# Patient Record
Sex: FEMALE | Race: WHITE | Employment: OTHER | URBAN - METROPOLITAN AREA
[De-identification: names, ages, dates, MRNs, and addresses within clinical notes are randomized per-mention and may not be internally consistent; named-entity substitution may affect disease eponyms.]

---

## 2017-06-02 PROBLEM — E78.2 MIXED HYPERLIPIDEMIA: Status: ACTIVE | Noted: 2017-06-02

## 2017-06-28 PROCEDURE — 89055 LEUKOCYTE ASSESSMENT FECAL: CPT | Performed by: INTERNAL MEDICINE

## 2017-06-28 PROCEDURE — 87177 OVA AND PARASITES SMEARS: CPT | Performed by: INTERNAL MEDICINE

## 2017-06-28 PROCEDURE — 87046 STOOL CULTR AEROBIC BACT EA: CPT | Performed by: INTERNAL MEDICINE

## 2017-06-28 PROCEDURE — 87045 FECES CULTURE AEROBIC BACT: CPT | Performed by: INTERNAL MEDICINE

## 2017-06-28 PROCEDURE — 87209 SMEAR COMPLEX STAIN: CPT | Performed by: INTERNAL MEDICINE

## 2017-06-28 PROCEDURE — 87493 C DIFF AMPLIFIED PROBE: CPT | Performed by: INTERNAL MEDICINE

## 2017-06-28 PROCEDURE — 87269 GIARDIA AG IF: CPT | Performed by: INTERNAL MEDICINE

## 2017-07-20 PROCEDURE — 36415 COLL VENOUS BLD VENIPUNCTURE: CPT | Performed by: INTERNAL MEDICINE

## 2017-07-20 PROCEDURE — 83516 IMMUNOASSAY NONANTIBODY: CPT | Performed by: INTERNAL MEDICINE

## 2017-08-23 PROBLEM — A41.50 GRAM NEGATIVE SEPSIS (HCC): Status: ACTIVE | Noted: 2017-08-23

## 2017-09-02 ENCOUNTER — TELEPHONE (OUTPATIENT)
Dept: RESPIRATORY THERAPY | Facility: HOSPITAL | Age: 62
End: 2017-09-02

## 2017-09-02 NOTE — TELEPHONE ENCOUNTER
Pt. Here  At [facility] today for IV Invanz infusion. Medication and side effects, lab results and treatment plan including tentative stop date 9/10/17 reviewed.  Pt. Is tolerating medication well with no issues, reports she is still having diarrh

## 2017-09-07 PROBLEM — K52.831 COLLAGENOUS COLITIS: Status: ACTIVE | Noted: 2017-09-07

## 2017-12-07 PROBLEM — A41.50 GRAM NEGATIVE SEPSIS (HCC): Status: RESOLVED | Noted: 2017-08-23 | Resolved: 2017-12-07

## 2018-05-14 PROBLEM — F11.90 OPIATE USE: Status: ACTIVE | Noted: 2018-05-14

## 2018-05-15 ENCOUNTER — LAB ENCOUNTER (OUTPATIENT)
Dept: LAB | Age: 63
End: 2018-05-15
Attending: Other
Payer: COMMERCIAL

## 2018-05-15 DIAGNOSIS — D46.4 RA (REFRACTORY ANEMIA) (HCC): Primary | ICD-10-CM

## 2018-05-15 PROCEDURE — 84443 ASSAY THYROID STIM HORMONE: CPT

## 2018-05-15 PROCEDURE — 83883 ASSAY NEPHELOMETRY NOT SPEC: CPT

## 2018-05-15 PROCEDURE — 86334 IMMUNOFIX E-PHORESIS SERUM: CPT

## 2018-05-15 PROCEDURE — 85652 RBC SED RATE AUTOMATED: CPT

## 2018-05-15 PROCEDURE — 82746 ASSAY OF FOLIC ACID SERUM: CPT

## 2018-05-15 PROCEDURE — 86038 ANTINUCLEAR ANTIBODIES: CPT

## 2018-05-15 PROCEDURE — 82306 VITAMIN D 25 HYDROXY: CPT

## 2018-05-15 PROCEDURE — 86431 RHEUMATOID FACTOR QUANT: CPT

## 2018-05-15 PROCEDURE — 86225 DNA ANTIBODY NATIVE: CPT

## 2018-05-15 PROCEDURE — 82607 VITAMIN B-12: CPT

## 2018-05-15 PROCEDURE — 84165 PROTEIN E-PHORESIS SERUM: CPT

## 2018-05-15 PROCEDURE — 86235 NUCLEAR ANTIGEN ANTIBODY: CPT

## 2018-05-15 PROCEDURE — 86140 C-REACTIVE PROTEIN: CPT

## 2018-05-15 PROCEDURE — 83036 HEMOGLOBIN GLYCOSYLATED A1C: CPT

## 2018-07-27 PROBLEM — M48.02 SPINAL STENOSIS IN CERVICAL REGION: Status: ACTIVE | Noted: 2018-07-27

## 2018-07-27 PROBLEM — M54.12 CERVICAL RADICULITIS: Status: ACTIVE | Noted: 2018-07-27

## 2018-07-27 PROBLEM — M47.12 CERVICAL SPONDYLITIC CORD COMPRESSION: Status: ACTIVE | Noted: 2018-07-27

## 2018-07-27 PROBLEM — M50.30 DDD (DEGENERATIVE DISC DISEASE), CERVICAL: Status: ACTIVE | Noted: 2018-07-27

## 2018-07-27 PROBLEM — M47.812 OSTEOARTHRITIS OF CERVICAL SPINE, UNSPECIFIED SPINAL OSTEOARTHRITIS COMPLICATION STATUS: Status: ACTIVE | Noted: 2018-07-27

## 2018-08-07 ENCOUNTER — HOSPITAL ENCOUNTER (OUTPATIENT)
Dept: GENERAL RADIOLOGY | Facility: HOSPITAL | Age: 63
Discharge: HOME | End: 2018-08-07
Attending: Other
Payer: COMMERCIAL

## 2018-08-07 DIAGNOSIS — R13.10 DYSPHAGIA: ICD-10-CM

## 2018-08-07 PROCEDURE — 74230 X-RAY XM SWLNG FUNCJ C+: CPT | Performed by: OTHER

## 2018-08-07 PROCEDURE — 92611 MOTION FLUOROSCOPY/SWALLOW: CPT

## 2018-08-07 NOTE — PROGRESS NOTES
ADULT VIDEOFLUOROSCOPIC SWALLOWING STUDY    Admission Date: 8/7/2018  Evaluation Date: 08/07/18  Radiologist: Dr. Melyssa Latif    Reason for Referral:  Pt is an 59 y/o female with c/o sensation of \"food sticking\" indicating sternal region.  She also reported sy Living Situation:  (Home)  History of Recent: No recent respiratory difficulty         Reason for Referral: R/O aspiration      Family/Patient Goals:   To determine etiology of symptoms     ASSESSMENT   DYSPHAGIA ASSESSMENT  Test completed in conjunction wi observed. Anterior osteophytes at C5-6 and C6-7 level noted by radiologist (please see report) however minimal retrograde flow with thin liquids only and contained within the esophagus.  Adequate base of tongue retraction/hyolaryngeal elevation observed wit

## 2018-10-15 ENCOUNTER — LAB ENCOUNTER (OUTPATIENT)
Dept: LAB | Age: 63
End: 2018-10-15
Attending: INTERNAL MEDICINE
Payer: COMMERCIAL

## 2018-10-15 DIAGNOSIS — I27.82 CHRONIC PULMONARY EMBOLISM (HCC): Primary | ICD-10-CM

## 2018-10-15 PROCEDURE — 85610 PROTHROMBIN TIME: CPT

## 2018-10-30 ENCOUNTER — LAB ENCOUNTER (OUTPATIENT)
Dept: LAB | Age: 63
End: 2018-10-30
Attending: INTERNAL MEDICINE
Payer: COMMERCIAL

## 2018-10-30 DIAGNOSIS — I27.82 CHRONIC PULMONARY THROMBOEMBOLISM SYNDROME (HCC): Primary | ICD-10-CM

## 2018-10-30 PROCEDURE — 85610 PROTHROMBIN TIME: CPT

## 2018-11-05 ENCOUNTER — HOSPITAL (OUTPATIENT)
Dept: OTHER | Age: 63
End: 2018-11-05
Attending: NEUROLOGICAL SURGERY

## 2018-12-10 PROBLEM — M54.12 CERVICAL RADICULITIS: Status: RESOLVED | Noted: 2018-07-27 | Resolved: 2018-12-10

## 2018-12-10 PROBLEM — M47.12 CERVICAL SPONDYLITIC CORD COMPRESSION: Status: RESOLVED | Noted: 2018-07-27 | Resolved: 2018-12-10

## 2020-02-11 ENCOUNTER — HOSPITAL ENCOUNTER (OUTPATIENT)
Dept: WOUND CARE | Age: 65
Discharge: STILL ADMITTED OR ADMINISTRATIVE | End: 2020-02-11
Attending: INTERNAL MEDICINE

## 2020-02-11 VITALS
SYSTOLIC BLOOD PRESSURE: 181 MMHG | OXYGEN SATURATION: 99 % | RESPIRATION RATE: 15 BRPM | TEMPERATURE: 96.6 F | HEART RATE: 77 BPM | DIASTOLIC BLOOD PRESSURE: 88 MMHG

## 2020-02-11 DIAGNOSIS — L97.912 ULCER OF RIGHT LOWER EXTREMITY WITH FAT LAYER EXPOSED (CMD): Primary | ICD-10-CM

## 2020-02-11 DIAGNOSIS — M06.9 RHEUMATOID ARTHRITIS INVOLVING MULTIPLE SITES, UNSPECIFIED RHEUMATOID FACTOR PRESENCE: ICD-10-CM

## 2020-02-11 DIAGNOSIS — I26.99 PULMONARY EMBOLISM, UNSPECIFIED CHRONICITY, UNSPECIFIED PULMONARY EMBOLISM TYPE, UNSPECIFIED WHETHER ACUTE COR PULMONALE PRESENT (CMD): ICD-10-CM

## 2020-02-11 DIAGNOSIS — L03.119 CELLULITIS AND ABSCESS OF LEG: ICD-10-CM

## 2020-02-11 DIAGNOSIS — D68.51 FACTOR V LEIDEN (CMD): ICD-10-CM

## 2020-02-11 DIAGNOSIS — L02.419 CELLULITIS AND ABSCESS OF LEG: ICD-10-CM

## 2020-02-11 DIAGNOSIS — L97.922 ULCER OF LEFT LOWER EXTREMITY, WITH FAT LAYER EXPOSED (CMD): ICD-10-CM

## 2020-02-11 PROBLEM — M50.30 DDD (DEGENERATIVE DISC DISEASE), CERVICAL: Status: ACTIVE | Noted: 2018-07-27

## 2020-02-11 PROBLEM — J44.9 COPD (CHRONIC OBSTRUCTIVE PULMONARY DISEASE) (CMD): Status: ACTIVE | Noted: 2020-02-11

## 2020-02-11 PROBLEM — R60.0 LEG EDEMA, LEFT: Status: ACTIVE | Noted: 2017-07-24

## 2020-02-11 PROBLEM — E78.2 MIXED HYPERLIPIDEMIA: Status: ACTIVE | Noted: 2017-06-02

## 2020-02-11 PROBLEM — D64.9 ANEMIA: Status: ACTIVE | Noted: 2017-07-24

## 2020-02-11 PROBLEM — M48.02 SPINAL STENOSIS IN CERVICAL REGION: Status: ACTIVE | Noted: 2018-07-27

## 2020-02-11 PROCEDURE — 99213 OFFICE O/P EST LOW 20 MIN: CPT

## 2020-02-11 RX ORDER — WARFARIN SODIUM 4 MG/1
4 TABLET ORAL
Status: ON HOLD | COMMUNITY
Start: 2015-03-17 | End: 2022-05-17

## 2020-02-11 RX ORDER — GABAPENTIN 400 MG/1
600 CAPSULE ORAL 2 TIMES DAILY
COMMUNITY
Start: 2019-12-06 | End: 2020-08-18 | Stop reason: DRUGHIGH

## 2020-02-11 RX ORDER — NYSTATIN 100000 [USP'U]/G
POWDER TOPICAL
Status: ON HOLD | COMMUNITY
Start: 2019-07-08 | End: 2022-05-17

## 2020-02-11 RX ORDER — LEFLUNOMIDE 20 MG/1
20 TABLET ORAL DAILY
COMMUNITY
End: 2022-08-26

## 2020-02-11 RX ORDER — ERYTHROMYCIN 5 MG/G
OINTMENT OPHTHALMIC
COMMUNITY
Start: 2019-02-12 | End: 2022-04-26

## 2020-02-11 RX ORDER — MORPHINE SULFATE 15 MG/1
15 TABLET ORAL 2 TIMES DAILY PRN
Status: ON HOLD | COMMUNITY
Start: 2019-12-02 | End: 2022-08-26 | Stop reason: SDUPTHER

## 2020-02-11 RX ORDER — SPIRONOLACTONE 25 MG/1
25 TABLET ORAL DAILY
COMMUNITY
Start: 2014-08-03

## 2020-02-11 RX ORDER — ALBUTEROL SULFATE 90 UG/1
2 AEROSOL, METERED RESPIRATORY (INHALATION) EVERY 4 HOURS PRN
COMMUNITY
Start: 2019-02-07

## 2020-02-11 RX ORDER — POTASSIUM CHLORIDE 1500 MG/1
20 TABLET, EXTENDED RELEASE ORAL 2 TIMES DAILY
COMMUNITY

## 2020-02-11 RX ORDER — MONTELUKAST SODIUM 10 MG/1
10 TABLET ORAL NIGHTLY
COMMUNITY
Start: 2015-01-20

## 2020-02-11 RX ORDER — CYCLOSPORINE 0.5 MG/ML
1 EMULSION OPHTHALMIC 2 TIMES DAILY
Refills: 9 | COMMUNITY
Start: 2019-12-06

## 2020-02-11 RX ORDER — BUDESONIDE 180 UG/1
1 AEROSOL, POWDER RESPIRATORY (INHALATION) PRN
COMMUNITY
Start: 2020-02-10

## 2020-02-11 RX ORDER — ZOLPIDEM TARTRATE 10 MG/1
10 TABLET ORAL
Status: ON HOLD | COMMUNITY
Start: 2017-02-02 | End: 2022-05-17

## 2020-02-11 RX ORDER — METHYLPREDNISOLONE 4 MG/1
4 TABLET ORAL 2 TIMES DAILY
COMMUNITY

## 2020-02-11 RX ORDER — TOPIRAMATE 50 MG/1
50 TABLET, FILM COATED ORAL 2 TIMES DAILY
COMMUNITY
Start: 2019-12-02 | End: 2022-08-31

## 2020-02-11 RX ORDER — PREDNISONE 5 MG/1
4 TABLET ORAL
Status: ON HOLD | COMMUNITY
Start: 2015-04-09 | End: 2022-05-17

## 2020-02-11 RX ORDER — HYDROXYCHLOROQUINE SULFATE 200 MG/1
200 TABLET, FILM COATED ORAL DAILY
COMMUNITY
Start: 2015-03-17 | End: 2022-08-26

## 2020-02-11 RX ORDER — CYCLOSPORINE 0.5 MG/ML
1 EMULSION OPHTHALMIC
Status: ON HOLD | COMMUNITY
End: 2022-05-17

## 2020-02-11 SDOH — HEALTH STABILITY: MENTAL HEALTH: HOW OFTEN DO YOU HAVE A DRINK CONTAINING ALCOHOL?: NEVER

## 2020-02-11 ASSESSMENT — ENCOUNTER SYMPTOMS
ABDOMINAL PAIN: 0
SINUS PAIN: 0
CHEST TIGHTNESS: 0
CHILLS: 0
COUGH: 0
EYE PAIN: 0
FATIGUE: 1
WOUND: 1
HEADACHES: 0
FEVER: 0
NAUSEA: 0
BACK PAIN: 1
EYE ITCHING: 0
AGITATION: 0
SORE THROAT: 0

## 2020-02-11 ASSESSMENT — PAIN SCALES - GENERAL: PAINLEVEL_OUTOF10: 7

## 2020-02-14 SDOH — HEALTH STABILITY: MENTAL HEALTH: HOW OFTEN DO YOU HAVE A DRINK CONTAINING ALCOHOL?: NEVER

## 2020-02-20 ENCOUNTER — HOSPITAL ENCOUNTER (OUTPATIENT)
Dept: WOUND CARE | Age: 65
Discharge: STILL ADMITTED OR ADMINISTRATIVE | End: 2020-02-20
Attending: FAMILY MEDICINE

## 2020-02-20 VITALS — TEMPERATURE: 98.8 F

## 2020-02-20 DIAGNOSIS — L97.922 ULCER OF LEFT LOWER EXTREMITY WITH FAT LAYER EXPOSED (CMD): Primary | ICD-10-CM

## 2020-02-20 DIAGNOSIS — L03.119 CELLULITIS AND ABSCESS OF LEG: ICD-10-CM

## 2020-02-20 DIAGNOSIS — I73.9 PAD (PERIPHERAL ARTERY DISEASE) (CMD): ICD-10-CM

## 2020-02-20 DIAGNOSIS — M06.9 RHEUMATOID ARTHRITIS INVOLVING MULTIPLE SITES, UNSPECIFIED RHEUMATOID FACTOR PRESENCE: ICD-10-CM

## 2020-02-20 DIAGNOSIS — L02.419 CELLULITIS AND ABSCESS OF LEG: ICD-10-CM

## 2020-02-20 PROCEDURE — 11042 DBRDMT SUBQ TIS 1ST 20SQCM/<: CPT

## 2020-02-20 RX ORDER — DOXYCYCLINE HYCLATE 100 MG/1
CAPSULE ORAL
Refills: 0 | COMMUNITY
Start: 2020-02-11 | End: 2020-09-10 | Stop reason: ALTCHOICE

## 2020-02-20 RX ORDER — DOXYCYCLINE HYCLATE 100 MG/1
100 CAPSULE ORAL 2 TIMES DAILY
Qty: 20 CAPSULE | Refills: 0 | Status: SHIPPED | OUTPATIENT
Start: 2020-02-20 | End: 2022-04-26

## 2020-02-27 ENCOUNTER — HOSPITAL ENCOUNTER (OUTPATIENT)
Dept: WOUND CARE | Age: 65
Discharge: STILL ADMITTED OR ADMINISTRATIVE | End: 2020-02-27
Attending: FAMILY MEDICINE

## 2020-02-27 VITALS — TEMPERATURE: 98.2 F

## 2020-02-27 DIAGNOSIS — L02.419 CELLULITIS AND ABSCESS OF LEG: ICD-10-CM

## 2020-02-27 DIAGNOSIS — R60.0 LEG EDEMA, LEFT: ICD-10-CM

## 2020-02-27 DIAGNOSIS — M06.9 RHEUMATOID ARTHRITIS INVOLVING MULTIPLE SITES, UNSPECIFIED RHEUMATOID FACTOR PRESENCE: ICD-10-CM

## 2020-02-27 DIAGNOSIS — L97.922 ULCER OF LEFT LOWER EXTREMITY WITH FAT LAYER EXPOSED (CMD): Primary | ICD-10-CM

## 2020-02-27 DIAGNOSIS — L03.119 CELLULITIS AND ABSCESS OF LEG: ICD-10-CM

## 2020-02-27 PROCEDURE — 11042 DBRDMT SUBQ TIS 1ST 20SQCM/<: CPT

## 2020-02-28 ENCOUNTER — HOSPITAL ENCOUNTER (OUTPATIENT)
Dept: ULTRASOUND IMAGING | Age: 65
Discharge: HOME | End: 2020-02-28
Attending: FAMILY MEDICINE

## 2020-02-28 DIAGNOSIS — I73.9 PAD (PERIPHERAL ARTERY DISEASE) (CMD): ICD-10-CM

## 2020-02-28 DIAGNOSIS — L02.419 CELLULITIS AND ABSCESS OF LEG: ICD-10-CM

## 2020-02-28 DIAGNOSIS — L03.119 CELLULITIS AND ABSCESS OF LEG: ICD-10-CM

## 2020-02-28 DIAGNOSIS — M06.9 RHEUMATOID ARTHRITIS INVOLVING MULTIPLE SITES, UNSPECIFIED RHEUMATOID FACTOR PRESENCE: ICD-10-CM

## 2020-02-28 PROCEDURE — 93925 LOWER EXTREMITY STUDY: CPT

## 2020-03-10 ENCOUNTER — APPOINTMENT (OUTPATIENT)
Dept: WOUND CARE | Age: 65
End: 2020-03-10
Attending: FAMILY MEDICINE

## 2020-03-12 ENCOUNTER — HOSPITAL ENCOUNTER (OUTPATIENT)
Dept: WOUND CARE | Age: 65
Discharge: STILL ADMITTED OR ADMINISTRATIVE | End: 2020-03-12
Attending: FAMILY MEDICINE

## 2020-03-12 VITALS — TEMPERATURE: 96.8 F

## 2020-03-12 DIAGNOSIS — I87.332 CHRONIC VENOUS HYPERTENSION (IDIOPATHIC) WITH ULCER AND INFLAMMATION OF LEFT LOWER EXTREMITY (CMD): ICD-10-CM

## 2020-03-12 DIAGNOSIS — L02.419 CELLULITIS AND ABSCESS OF LEG: ICD-10-CM

## 2020-03-12 DIAGNOSIS — L97.922 ULCER OF LEFT LOWER EXTREMITY WITH FAT LAYER EXPOSED (CMD): Primary | ICD-10-CM

## 2020-03-12 DIAGNOSIS — L03.119 CELLULITIS AND ABSCESS OF LEG: ICD-10-CM

## 2020-03-12 PROCEDURE — 11042 DBRDMT SUBQ TIS 1ST 20SQCM/<: CPT

## 2020-03-12 SDOH — HEALTH STABILITY: MENTAL HEALTH: HOW OFTEN DO YOU HAVE A DRINK CONTAINING ALCOHOL?: NEVER

## 2020-03-12 ASSESSMENT — PAIN SCALES - GENERAL: PAINLEVEL_OUTOF10: 0

## 2020-03-17 ENCOUNTER — APPOINTMENT (OUTPATIENT)
Dept: WOUND CARE | Age: 65
End: 2020-03-17
Attending: FAMILY MEDICINE

## 2020-03-17 ENCOUNTER — TELEPHONE (OUTPATIENT)
Dept: WOUND CARE | Age: 65
End: 2020-03-17

## 2020-03-19 ENCOUNTER — APPOINTMENT (OUTPATIENT)
Dept: WOUND CARE | Age: 65
End: 2020-03-19
Attending: FAMILY MEDICINE

## 2020-03-30 ENCOUNTER — TELEPHONE (OUTPATIENT)
Dept: WOUND CARE | Age: 65
End: 2020-03-30

## 2020-04-09 ENCOUNTER — APPOINTMENT (OUTPATIENT)
Dept: WOUND CARE | Age: 65
End: 2020-04-09
Attending: FAMILY MEDICINE

## 2020-04-30 ENCOUNTER — TELEPHONE (OUTPATIENT)
Dept: WOUND CARE | Age: 65
End: 2020-04-30

## 2020-05-07 ENCOUNTER — APPOINTMENT (OUTPATIENT)
Dept: WOUND CARE | Age: 65
End: 2020-05-07
Attending: FAMILY MEDICINE

## 2020-06-04 ENCOUNTER — APPOINTMENT (OUTPATIENT)
Dept: WOUND CARE | Age: 65
End: 2020-06-04
Attending: FAMILY MEDICINE

## 2020-06-09 ENCOUNTER — HOSPITAL ENCOUNTER (OUTPATIENT)
Dept: WOUND CARE | Age: 65
Discharge: STILL ADMITTED OR ADMINISTRATIVE | End: 2020-06-09
Attending: FAMILY MEDICINE

## 2020-06-09 VITALS
DIASTOLIC BLOOD PRESSURE: 84 MMHG | SYSTOLIC BLOOD PRESSURE: 183 MMHG | HEART RATE: 89 BPM | OXYGEN SATURATION: 98 % | TEMPERATURE: 96.6 F

## 2020-06-09 DIAGNOSIS — L97.912 ULCER OF RIGHT LOWER EXTREMITY WITH FAT LAYER EXPOSED (CMD): ICD-10-CM

## 2020-06-09 DIAGNOSIS — L02.419 CELLULITIS AND ABSCESS OF LEG: ICD-10-CM

## 2020-06-09 DIAGNOSIS — I87.333 VENOUS HYPERTENSION, CHRONIC, WITH ULCER AND INFLAMMATION, BILATERAL (CMD): ICD-10-CM

## 2020-06-09 DIAGNOSIS — L97.922 ULCER OF LEFT LOWER EXTREMITY WITH FAT LAYER EXPOSED (CMD): Primary | ICD-10-CM

## 2020-06-09 DIAGNOSIS — L03.119 CELLULITIS AND ABSCESS OF LEG: ICD-10-CM

## 2020-06-09 PROCEDURE — 11042 DBRDMT SUBQ TIS 1ST 20SQCM/<: CPT

## 2020-06-09 ASSESSMENT — PAIN SCALES - GENERAL: PAINLEVEL_OUTOF10: 8

## 2020-06-16 ENCOUNTER — HOSPITAL ENCOUNTER (OUTPATIENT)
Dept: WOUND CARE | Age: 65
Discharge: STILL ADMITTED OR ADMINISTRATIVE | End: 2020-06-16
Attending: FAMILY MEDICINE

## 2020-06-16 VITALS — TEMPERATURE: 95.4 F

## 2020-06-16 DIAGNOSIS — L97.922 ULCER OF LEFT LOWER EXTREMITY WITH FAT LAYER EXPOSED (CMD): Primary | ICD-10-CM

## 2020-06-16 DIAGNOSIS — L02.419 CELLULITIS AND ABSCESS OF LEG: ICD-10-CM

## 2020-06-16 DIAGNOSIS — I87.333 VENOUS HYPERTENSION, CHRONIC, WITH ULCER AND INFLAMMATION, BILATERAL (CMD): ICD-10-CM

## 2020-06-16 DIAGNOSIS — L03.119 CELLULITIS AND ABSCESS OF LEG: ICD-10-CM

## 2020-06-16 DIAGNOSIS — L97.912 ULCER OF RIGHT LOWER EXTREMITY WITH FAT LAYER EXPOSED (CMD): ICD-10-CM

## 2020-06-16 PROBLEM — Z86.711 HISTORY OF PULMONARY EMBOLISM: Status: ACTIVE | Noted: 2020-06-16

## 2020-06-16 PROBLEM — D68.51 FACTOR V LEIDEN (HCC): Status: ACTIVE | Noted: 2020-06-16

## 2020-06-16 PROBLEM — Z51.81 MONITORING FOR LONG-TERM ANTICOAGULANT USE: Status: ACTIVE | Noted: 2020-06-16

## 2020-06-16 PROBLEM — Z79.01 MONITORING FOR LONG-TERM ANTICOAGULANT USE: Status: ACTIVE | Noted: 2020-06-16

## 2020-06-16 PROCEDURE — 11042 DBRDMT SUBQ TIS 1ST 20SQCM/<: CPT

## 2020-06-16 PROCEDURE — 11045 DBRDMT SUBQ TISS EACH ADDL: CPT

## 2020-06-16 SDOH — HEALTH STABILITY: MENTAL HEALTH: HOW OFTEN DO YOU HAVE A DRINK CONTAINING ALCOHOL?: NEVER

## 2020-06-16 ASSESSMENT — PAIN SCALES - GENERAL: PAINLEVEL_OUTOF10: 6

## 2020-06-23 ENCOUNTER — HOSPITAL ENCOUNTER (OUTPATIENT)
Dept: WOUND CARE | Age: 65
Discharge: STILL ADMITTED OR ADMINISTRATIVE | End: 2020-06-23
Attending: FAMILY MEDICINE

## 2020-06-23 VITALS — TEMPERATURE: 96.8 F

## 2020-06-23 DIAGNOSIS — L03.119 CELLULITIS AND ABSCESS OF LEG: ICD-10-CM

## 2020-06-23 DIAGNOSIS — L02.419 CELLULITIS AND ABSCESS OF LEG: ICD-10-CM

## 2020-06-23 DIAGNOSIS — I87.333 VENOUS HYPERTENSION, CHRONIC, WITH ULCER AND INFLAMMATION, BILATERAL (CMD): Primary | ICD-10-CM

## 2020-06-23 DIAGNOSIS — I87.332 CHRONIC VENOUS HYPERTENSION (IDIOPATHIC) WITH ULCER AND INFLAMMATION OF LEFT LOWER EXTREMITY (CMD): ICD-10-CM

## 2020-06-23 DIAGNOSIS — L97.922 ULCER OF LEFT LOWER EXTREMITY WITH FAT LAYER EXPOSED (CMD): ICD-10-CM

## 2020-06-23 DIAGNOSIS — L97.912 ULCER OF RIGHT LOWER EXTREMITY WITH FAT LAYER EXPOSED (CMD): ICD-10-CM

## 2020-06-23 PROCEDURE — 11042 DBRDMT SUBQ TIS 1ST 20SQCM/<: CPT

## 2020-06-23 SDOH — HEALTH STABILITY: MENTAL HEALTH: HOW OFTEN DO YOU HAVE A DRINK CONTAINING ALCOHOL?: NEVER

## 2020-06-23 ASSESSMENT — PAIN SCALES - GENERAL: PAINLEVEL_OUTOF10: 9

## 2020-06-30 ENCOUNTER — HOSPITAL ENCOUNTER (OUTPATIENT)
Dept: WOUND CARE | Age: 65
Discharge: STILL ADMITTED OR ADMINISTRATIVE | End: 2020-06-30
Attending: FAMILY MEDICINE

## 2020-06-30 VITALS — TEMPERATURE: 97.7 F

## 2020-06-30 DIAGNOSIS — L97.922 ULCER OF LEFT LOWER EXTREMITY WITH FAT LAYER EXPOSED (CMD): ICD-10-CM

## 2020-06-30 DIAGNOSIS — I87.332 CHRONIC VENOUS HYPERTENSION (IDIOPATHIC) WITH ULCER AND INFLAMMATION OF LEFT LOWER EXTREMITY (CMD): ICD-10-CM

## 2020-06-30 DIAGNOSIS — L03.119 CELLULITIS AND ABSCESS OF LEG: ICD-10-CM

## 2020-06-30 DIAGNOSIS — L97.912 ULCER OF RIGHT LOWER EXTREMITY WITH FAT LAYER EXPOSED (CMD): ICD-10-CM

## 2020-06-30 DIAGNOSIS — L02.419 CELLULITIS AND ABSCESS OF LEG: ICD-10-CM

## 2020-06-30 DIAGNOSIS — I87.331 CHRONIC VENOUS HYPERTENSION (IDIOPATHIC) WITH ULCER AND INFLAMMATION OF RIGHT LOWER EXTREMITY (CMD): Primary | ICD-10-CM

## 2020-06-30 PROCEDURE — 11042 DBRDMT SUBQ TIS 1ST 20SQCM/<: CPT

## 2020-06-30 SDOH — HEALTH STABILITY: MENTAL HEALTH: HOW OFTEN DO YOU HAVE A DRINK CONTAINING ALCOHOL?: NEVER

## 2020-06-30 ASSESSMENT — PAIN SCALES - GENERAL: PAINLEVEL_OUTOF10: 7

## 2020-07-07 ENCOUNTER — HOSPITAL ENCOUNTER (OUTPATIENT)
Dept: WOUND CARE | Age: 65
Discharge: STILL ADMITTED OR ADMINISTRATIVE | End: 2020-07-07
Attending: FAMILY MEDICINE

## 2020-07-07 VITALS — TEMPERATURE: 97.1 F

## 2020-07-07 DIAGNOSIS — I87.332 CHRONIC VENOUS HYPERTENSION (IDIOPATHIC) WITH ULCER AND INFLAMMATION OF LEFT LOWER EXTREMITY (CMD): ICD-10-CM

## 2020-07-07 DIAGNOSIS — L02.419 CELLULITIS AND ABSCESS OF LEG: ICD-10-CM

## 2020-07-07 DIAGNOSIS — L97.922 ULCER OF LEFT LOWER EXTREMITY WITH FAT LAYER EXPOSED (CMD): ICD-10-CM

## 2020-07-07 DIAGNOSIS — I87.331 CHRONIC VENOUS HYPERTENSION (IDIOPATHIC) WITH ULCER AND INFLAMMATION OF RIGHT LOWER EXTREMITY (CMD): Primary | ICD-10-CM

## 2020-07-07 DIAGNOSIS — L97.912 ULCER OF RIGHT LOWER EXTREMITY WITH FAT LAYER EXPOSED (CMD): ICD-10-CM

## 2020-07-07 DIAGNOSIS — L03.119 CELLULITIS AND ABSCESS OF LEG: ICD-10-CM

## 2020-07-07 PROCEDURE — 11042 DBRDMT SUBQ TIS 1ST 20SQCM/<: CPT

## 2020-07-07 SDOH — HEALTH STABILITY: MENTAL HEALTH: HOW OFTEN DO YOU HAVE A DRINK CONTAINING ALCOHOL?: NEVER

## 2020-07-07 ASSESSMENT — PAIN SCALES - GENERAL: PAINLEVEL_OUTOF10: 0

## 2020-07-09 PROBLEM — M48.02 SPINAL STENOSIS IN CERVICAL REGION: Status: RESOLVED | Noted: 2018-07-27 | Resolved: 2020-07-09

## 2020-07-09 PROBLEM — K52.831 COLLAGENOUS COLITIS: Status: RESOLVED | Noted: 2017-09-07 | Resolved: 2020-07-09

## 2020-07-09 PROBLEM — M50.30 DDD (DEGENERATIVE DISC DISEASE), CERVICAL: Status: RESOLVED | Noted: 2018-07-27 | Resolved: 2020-07-09

## 2020-07-09 PROBLEM — Z86.711 HISTORY OF PULMONARY EMBOLISM: Status: RESOLVED | Noted: 2020-06-16 | Resolved: 2020-07-09

## 2020-07-14 ENCOUNTER — HOSPITAL ENCOUNTER (OUTPATIENT)
Dept: WOUND CARE | Age: 65
Discharge: STILL ADMITTED OR ADMINISTRATIVE | End: 2020-07-14
Attending: FAMILY MEDICINE

## 2020-07-14 VITALS — TEMPERATURE: 97.3 F

## 2020-07-14 DIAGNOSIS — L97.912 ULCER OF RIGHT LOWER EXTREMITY WITH FAT LAYER EXPOSED (CMD): ICD-10-CM

## 2020-07-14 DIAGNOSIS — L97.922 ULCER OF LEFT LOWER EXTREMITY WITH FAT LAYER EXPOSED (CMD): ICD-10-CM

## 2020-07-14 DIAGNOSIS — L02.419 CELLULITIS AND ABSCESS OF LEG: ICD-10-CM

## 2020-07-14 DIAGNOSIS — I87.331 CHRONIC VENOUS HYPERTENSION (IDIOPATHIC) WITH ULCER AND INFLAMMATION OF RIGHT LOWER EXTREMITY (CMD): Primary | ICD-10-CM

## 2020-07-14 DIAGNOSIS — I87.332 CHRONIC VENOUS HYPERTENSION (IDIOPATHIC) WITH ULCER AND INFLAMMATION OF LEFT LOWER EXTREMITY (CMD): ICD-10-CM

## 2020-07-14 DIAGNOSIS — L03.119 CELLULITIS AND ABSCESS OF LEG: ICD-10-CM

## 2020-07-14 PROCEDURE — 11042 DBRDMT SUBQ TIS 1ST 20SQCM/<: CPT

## 2020-07-14 RX ORDER — GABAPENTIN 600 MG/1
TABLET ORAL SEE ADMIN INSTRUCTIONS
COMMUNITY

## 2020-07-14 SDOH — HEALTH STABILITY: MENTAL HEALTH: HOW OFTEN DO YOU HAVE A DRINK CONTAINING ALCOHOL?: NEVER

## 2020-07-14 ASSESSMENT — PAIN SCALES - GENERAL: PAINLEVEL_OUTOF10: 0

## 2020-07-28 ENCOUNTER — HOSPITAL ENCOUNTER (OUTPATIENT)
Dept: WOUND CARE | Age: 65
Discharge: STILL ADMITTED OR ADMINISTRATIVE | End: 2020-07-28
Attending: FAMILY MEDICINE

## 2020-07-28 VITALS — TEMPERATURE: 97 F

## 2020-07-28 DIAGNOSIS — L02.419 CELLULITIS AND ABSCESS OF LEG: ICD-10-CM

## 2020-07-28 DIAGNOSIS — L97.912 ULCER OF RIGHT LOWER EXTREMITY WITH FAT LAYER EXPOSED (CMD): Primary | ICD-10-CM

## 2020-07-28 DIAGNOSIS — L03.119 CELLULITIS AND ABSCESS OF LEG: ICD-10-CM

## 2020-07-28 DIAGNOSIS — L97.922 ULCER OF LEFT LOWER EXTREMITY WITH FAT LAYER EXPOSED (CMD): ICD-10-CM

## 2020-07-28 PROCEDURE — 11042 DBRDMT SUBQ TIS 1ST 20SQCM/<: CPT

## 2020-07-28 SDOH — HEALTH STABILITY: MENTAL HEALTH: HOW OFTEN DO YOU HAVE A DRINK CONTAINING ALCOHOL?: NEVER

## 2020-07-28 ASSESSMENT — PAIN SCALES - GENERAL: PAINLEVEL_OUTOF10: 9

## 2020-08-06 ENCOUNTER — HOSPITAL ENCOUNTER (OUTPATIENT)
Dept: WOUND CARE | Age: 65
Discharge: STILL ADMITTED OR ADMINISTRATIVE | End: 2020-08-06
Attending: FAMILY MEDICINE

## 2020-08-06 VITALS — TEMPERATURE: 96.4 F

## 2020-08-06 DIAGNOSIS — L97.912 ULCER OF RIGHT LOWER EXTREMITY WITH FAT LAYER EXPOSED (CMD): ICD-10-CM

## 2020-08-06 DIAGNOSIS — L97.922 ULCER OF LEFT LOWER EXTREMITY WITH FAT LAYER EXPOSED (CMD): ICD-10-CM

## 2020-08-06 DIAGNOSIS — L03.119 CELLULITIS AND ABSCESS OF LEG: ICD-10-CM

## 2020-08-06 DIAGNOSIS — I87.332 CHRONIC VENOUS HYPERTENSION (IDIOPATHIC) WITH ULCER AND INFLAMMATION OF LEFT LOWER EXTREMITY (CMD): ICD-10-CM

## 2020-08-06 DIAGNOSIS — L02.419 CELLULITIS AND ABSCESS OF LEG: ICD-10-CM

## 2020-08-06 DIAGNOSIS — I87.331 CHRONIC VENOUS HYPERTENSION (IDIOPATHIC) WITH ULCER AND INFLAMMATION OF RIGHT LOWER EXTREMITY (CMD): Primary | ICD-10-CM

## 2020-08-06 PROCEDURE — 11042 DBRDMT SUBQ TIS 1ST 20SQCM/<: CPT

## 2020-08-06 ASSESSMENT — PAIN SCALES - GENERAL: PAINLEVEL_OUTOF10: 0

## 2020-08-18 ENCOUNTER — HOSPITAL ENCOUNTER (OUTPATIENT)
Dept: WOUND CARE | Age: 65
Discharge: STILL ADMITTED OR ADMINISTRATIVE | End: 2020-08-18
Attending: FAMILY MEDICINE

## 2020-08-18 VITALS — TEMPERATURE: 95.8 F

## 2020-08-18 DIAGNOSIS — L97.922 ULCER OF LEFT LOWER EXTREMITY WITH FAT LAYER EXPOSED (CMD): Primary | ICD-10-CM

## 2020-08-18 DIAGNOSIS — L97.912 ULCER OF RIGHT LOWER EXTREMITY WITH FAT LAYER EXPOSED (CMD): ICD-10-CM

## 2020-08-18 PROCEDURE — 11042 DBRDMT SUBQ TIS 1ST 20SQCM/<: CPT

## 2020-08-18 RX ORDER — NORTRIPTYLINE HYDROCHLORIDE 10 MG/1
10 CAPSULE ORAL NIGHTLY
COMMUNITY
End: 2022-05-03 | Stop reason: ALTCHOICE

## 2020-08-18 SDOH — HEALTH STABILITY: MENTAL HEALTH: HOW OFTEN DO YOU HAVE A DRINK CONTAINING ALCOHOL?: NEVER

## 2020-08-18 ASSESSMENT — PAIN SCALES - GENERAL: PAINLEVEL_OUTOF10: 0

## 2020-08-25 ENCOUNTER — HOSPITAL ENCOUNTER (OUTPATIENT)
Dept: WOUND CARE | Age: 65
Discharge: STILL ADMITTED OR ADMINISTRATIVE | End: 2020-08-25
Attending: FAMILY MEDICINE

## 2020-08-25 VITALS — TEMPERATURE: 96.4 F

## 2020-08-25 DIAGNOSIS — L97.922 ULCER OF LEFT LOWER EXTREMITY WITH FAT LAYER EXPOSED (CMD): ICD-10-CM

## 2020-08-25 DIAGNOSIS — L97.912 ULCER OF RIGHT LOWER EXTREMITY WITH FAT LAYER EXPOSED (CMD): Primary | ICD-10-CM

## 2020-08-25 DIAGNOSIS — L03.119 CELLULITIS AND ABSCESS OF LEG: ICD-10-CM

## 2020-08-25 DIAGNOSIS — L02.419 CELLULITIS AND ABSCESS OF LEG: ICD-10-CM

## 2020-08-25 PROCEDURE — 11042 DBRDMT SUBQ TIS 1ST 20SQCM/<: CPT

## 2020-08-25 SDOH — HEALTH STABILITY: MENTAL HEALTH: HOW OFTEN DO YOU HAVE A DRINK CONTAINING ALCOHOL?: NEVER

## 2020-09-01 ENCOUNTER — HOSPITAL ENCOUNTER (OUTPATIENT)
Dept: WOUND CARE | Age: 65
Discharge: STILL ADMITTED OR ADMINISTRATIVE | End: 2020-09-01
Attending: FAMILY MEDICINE

## 2020-09-01 VITALS — TEMPERATURE: 97.3 F

## 2020-09-01 DIAGNOSIS — L02.419 CELLULITIS AND ABSCESS OF LEG: ICD-10-CM

## 2020-09-01 DIAGNOSIS — L97.912 ULCER OF RIGHT LOWER EXTREMITY WITH FAT LAYER EXPOSED (CMD): ICD-10-CM

## 2020-09-01 DIAGNOSIS — L97.922 ULCER OF LEFT LOWER EXTREMITY WITH FAT LAYER EXPOSED (CMD): ICD-10-CM

## 2020-09-01 DIAGNOSIS — L03.119 CELLULITIS AND ABSCESS OF LEG: ICD-10-CM

## 2020-09-01 DIAGNOSIS — I87.332 CHRONIC VENOUS HYPERTENSION (IDIOPATHIC) WITH ULCER AND INFLAMMATION OF LEFT LOWER EXTREMITY (CMD): Primary | ICD-10-CM

## 2020-09-01 PROCEDURE — 11042 DBRDMT SUBQ TIS 1ST 20SQCM/<: CPT

## 2020-09-01 SDOH — HEALTH STABILITY: MENTAL HEALTH: HOW OFTEN DO YOU HAVE A DRINK CONTAINING ALCOHOL?: NEVER

## 2020-09-01 ASSESSMENT — PAIN SCALES - GENERAL: PAINLEVEL_OUTOF10: 0

## 2020-09-10 ENCOUNTER — HOSPITAL ENCOUNTER (OUTPATIENT)
Dept: WOUND CARE | Age: 65
Discharge: STILL ADMITTED OR ADMINISTRATIVE | End: 2020-09-10
Attending: FAMILY MEDICINE

## 2020-09-10 VITALS — TEMPERATURE: 96.3 F

## 2020-09-10 DIAGNOSIS — L03.119 CELLULITIS AND ABSCESS OF LEG: ICD-10-CM

## 2020-09-10 DIAGNOSIS — L02.419 CELLULITIS AND ABSCESS OF LEG: ICD-10-CM

## 2020-09-10 PROCEDURE — 99212 OFFICE O/P EST SF 10 MIN: CPT

## 2020-09-10 ASSESSMENT — PAIN SCALES - GENERAL: PAINLEVEL_OUTOF10: 0

## 2021-04-12 DIAGNOSIS — Z23 NEED FOR VACCINATION: ICD-10-CM

## 2022-04-15 ENCOUNTER — HOSPITAL ENCOUNTER (OUTPATIENT)
Dept: WOUND CARE | Age: 67
Discharge: STILL ADMITTED OR ADMINISTRATIVE | End: 2022-04-15
Attending: FAMILY MEDICINE

## 2022-04-15 VITALS
OXYGEN SATURATION: 97 % | HEART RATE: 90 BPM | SYSTOLIC BLOOD PRESSURE: 126 MMHG | DIASTOLIC BLOOD PRESSURE: 60 MMHG | TEMPERATURE: 97.1 F

## 2022-04-15 DIAGNOSIS — L97.912 ULCER OF RIGHT LOWER EXTREMITY WITH FAT LAYER EXPOSED (CMD): ICD-10-CM

## 2022-04-15 DIAGNOSIS — S81.801A TRAUMATIC OPEN WOUND OF RIGHT LOWER LEG, INITIAL ENCOUNTER: ICD-10-CM

## 2022-04-15 DIAGNOSIS — L97.922 ULCER OF LEFT LOWER EXTREMITY WITH FAT LAYER EXPOSED (CMD): ICD-10-CM

## 2022-04-15 DIAGNOSIS — L02.419 CELLULITIS AND ABSCESS OF LEG: ICD-10-CM

## 2022-04-15 DIAGNOSIS — S81.802A TRAUMATIC OPEN WOUND OF LEFT LOWER LEG, INITIAL ENCOUNTER: Primary | ICD-10-CM

## 2022-04-15 DIAGNOSIS — L03.119 CELLULITIS AND ABSCESS OF LEG: ICD-10-CM

## 2022-04-15 PROCEDURE — 99213 OFFICE O/P EST LOW 20 MIN: CPT

## 2022-04-15 ASSESSMENT — PAIN SCALES - GENERAL: PAINLEVEL_OUTOF10: 0

## 2022-04-26 ENCOUNTER — HOSPITAL ENCOUNTER (OUTPATIENT)
Dept: WOUND CARE | Age: 67
Discharge: STILL ADMITTED OR ADMINISTRATIVE | End: 2022-04-26
Attending: FAMILY MEDICINE

## 2022-04-26 VITALS — TEMPERATURE: 97.2 F

## 2022-04-26 DIAGNOSIS — I87.332 CHRONIC VENOUS HYPERTENSION (IDIOPATHIC) WITH ULCER AND INFLAMMATION OF LEFT LOWER EXTREMITY (CMD): Primary | ICD-10-CM

## 2022-04-26 DIAGNOSIS — S81.802D OPEN WOUND OF LEFT LOWER LEG, SUBSEQUENT ENCOUNTER: ICD-10-CM

## 2022-04-26 DIAGNOSIS — I87.331 CHRONIC VENOUS HYPERTENSION (IDIOPATHIC) WITH ULCER AND INFLAMMATION OF RIGHT LOWER EXTREMITY (CMD): ICD-10-CM

## 2022-04-26 DIAGNOSIS — S81.802A TRAUMATIC OPEN WOUND OF LEFT LOWER LEG, INITIAL ENCOUNTER: ICD-10-CM

## 2022-04-26 DIAGNOSIS — S81.801D OPEN WOUND OF RIGHT LOWER LEG, SUBSEQUENT ENCOUNTER: ICD-10-CM

## 2022-04-26 DIAGNOSIS — S81.801A TRAUMATIC OPEN WOUND OF RIGHT LOWER LEG, INITIAL ENCOUNTER: ICD-10-CM

## 2022-04-26 PROCEDURE — 11042 DBRDMT SUBQ TIS 1ST 20SQCM/<: CPT

## 2022-04-26 PROCEDURE — 11045 DBRDMT SUBQ TISS EACH ADDL: CPT

## 2022-04-26 ASSESSMENT — PAIN SCALES - GENERAL: PAINLEVEL_OUTOF10: 8

## 2022-05-03 ENCOUNTER — HOSPITAL ENCOUNTER (OUTPATIENT)
Dept: GENERAL RADIOLOGY | Age: 67
Discharge: HOME | End: 2022-05-03
Attending: FAMILY MEDICINE

## 2022-05-03 ENCOUNTER — HOSPITAL ENCOUNTER (OUTPATIENT)
Dept: WOUND CARE | Age: 67
Discharge: STILL ADMITTED OR ADMINISTRATIVE | End: 2022-05-03
Attending: FAMILY MEDICINE

## 2022-05-03 VITALS — TEMPERATURE: 96.8 F

## 2022-05-03 DIAGNOSIS — L03.119 CELLULITIS AND ABSCESS OF LEG: ICD-10-CM

## 2022-05-03 DIAGNOSIS — L03.119 CELLULITIS AND ABSCESS OF LEG: Primary | ICD-10-CM

## 2022-05-03 DIAGNOSIS — M79.604 PAIN IN RIGHT LEG: ICD-10-CM

## 2022-05-03 DIAGNOSIS — L97.912 ULCER OF RIGHT LOWER EXTREMITY WITH FAT LAYER EXPOSED (CMD): ICD-10-CM

## 2022-05-03 DIAGNOSIS — L02.419 CELLULITIS AND ABSCESS OF LEG: Primary | ICD-10-CM

## 2022-05-03 DIAGNOSIS — L97.922 ULCER OF LEFT LOWER EXTREMITY WITH FAT LAYER EXPOSED (CMD): ICD-10-CM

## 2022-05-03 DIAGNOSIS — L02.419 CELLULITIS AND ABSCESS OF LEG: ICD-10-CM

## 2022-05-03 PROCEDURE — 11045 DBRDMT SUBQ TISS EACH ADDL: CPT

## 2022-05-03 PROCEDURE — 73590 X-RAY EXAM OF LOWER LEG: CPT

## 2022-05-03 PROCEDURE — 11042 DBRDMT SUBQ TIS 1ST 20SQCM/<: CPT

## 2022-05-03 RX ORDER — BACLOFEN 10 MG/1
10 TABLET ORAL 3 TIMES DAILY
COMMUNITY
Start: 2022-04-13

## 2022-05-03 RX ORDER — FUROSEMIDE 20 MG/1
20 TABLET ORAL DAILY
COMMUNITY
Start: 2022-04-28

## 2022-05-03 ASSESSMENT — PAIN SCALES - GENERAL: PAINLEVEL_OUTOF10: 8

## 2022-05-10 ENCOUNTER — HOSPITAL ENCOUNTER (OUTPATIENT)
Dept: WOUND CARE | Age: 67
Discharge: STILL ADMITTED OR ADMINISTRATIVE | End: 2022-05-10
Attending: FAMILY MEDICINE

## 2022-05-10 VITALS — TEMPERATURE: 96 F

## 2022-05-10 DIAGNOSIS — L03.119 CELLULITIS AND ABSCESS OF LEG: ICD-10-CM

## 2022-05-10 DIAGNOSIS — L02.419 CELLULITIS AND ABSCESS OF LEG: ICD-10-CM

## 2022-05-10 DIAGNOSIS — L97.912 ULCER OF RIGHT LOWER EXTREMITY WITH FAT LAYER EXPOSED (CMD): ICD-10-CM

## 2022-05-10 DIAGNOSIS — L97.922 ULCER OF LEFT LOWER EXTREMITY WITH FAT LAYER EXPOSED (CMD): Primary | ICD-10-CM

## 2022-05-10 PROCEDURE — 11045 DBRDMT SUBQ TISS EACH ADDL: CPT

## 2022-05-10 PROCEDURE — 11042 DBRDMT SUBQ TIS 1ST 20SQCM/<: CPT

## 2022-05-10 RX ORDER — LIDOCAINE 50 MG/G
OINTMENT TOPICAL PRN
Qty: 35.44 G | Refills: 0 | Status: SHIPPED | OUTPATIENT
Start: 2022-05-10 | End: 2022-07-26 | Stop reason: ALTCHOICE

## 2022-05-10 ASSESSMENT — PAIN SCALES - GENERAL: PAINLEVEL_OUTOF10: 10

## 2022-05-17 ENCOUNTER — APPOINTMENT (OUTPATIENT)
Dept: ULTRASOUND IMAGING | Age: 67
DRG: 603 | End: 2022-05-17
Attending: HOSPITALIST

## 2022-05-17 ENCOUNTER — HOSPITAL ENCOUNTER (OUTPATIENT)
Dept: WOUND CARE | Age: 67
Discharge: STILL ADMITTED OR ADMINISTRATIVE | End: 2022-05-17
Attending: FAMILY MEDICINE

## 2022-05-17 ENCOUNTER — HOSPITAL ENCOUNTER (INPATIENT)
Age: 67
LOS: 1 days | Discharge: HOME | DRG: 603 | End: 2022-05-20
Attending: EMERGENCY MEDICINE | Admitting: HOSPITALIST

## 2022-05-17 ENCOUNTER — APPOINTMENT (OUTPATIENT)
Dept: CT IMAGING | Age: 67
DRG: 603 | End: 2022-05-17
Attending: EMERGENCY MEDICINE

## 2022-05-17 VITALS — TEMPERATURE: 96.3 F

## 2022-05-17 DIAGNOSIS — L03.115 CELLULITIS OF RIGHT LOWER LEG: Primary | ICD-10-CM

## 2022-05-17 DIAGNOSIS — G89.4 CHRONIC PAIN DISORDER: ICD-10-CM

## 2022-05-17 DIAGNOSIS — L97.922 ULCER OF LEFT LOWER EXTREMITY WITH FAT LAYER EXPOSED (CMD): ICD-10-CM

## 2022-05-17 DIAGNOSIS — L97.912 ULCER OF RIGHT LOWER EXTREMITY WITH FAT LAYER EXPOSED (CMD): ICD-10-CM

## 2022-05-17 DIAGNOSIS — L02.419 CELLULITIS AND ABSCESS OF LEG: Primary | ICD-10-CM

## 2022-05-17 DIAGNOSIS — L03.119 CELLULITIS AND ABSCESS OF LEG: Primary | ICD-10-CM

## 2022-05-17 DIAGNOSIS — L97.912 NON-PRESSURE CHRONIC ULCER OF RIGHT LOWER LEG WITH FAT LAYER EXPOSED (CMD): ICD-10-CM

## 2022-05-17 PROBLEM — L97.901: Status: ACTIVE | Noted: 2022-05-17

## 2022-05-17 LAB
ALBUMIN SERPL-MCNC: 3.9 G/DL (ref 3.6–5.1)
ALBUMIN/GLOB SERPL: 1.1 {RATIO} (ref 1–2.4)
ALP SERPL-CCNC: 81 UNITS/L (ref 45–117)
ALT SERPL-CCNC: 36 UNITS/L
ANION GAP SERPL CALC-SCNC: 9 MMOL/L (ref 10–20)
APTT PPP: 40 SEC (ref 22–30)
AST SERPL-CCNC: 43 UNITS/L
BASOPHILS # BLD: 0.1 K/MCL (ref 0–0.3)
BASOPHILS NFR BLD: 1 %
BILIRUB SERPL-MCNC: 0.7 MG/DL (ref 0.2–1)
BUN SERPL-MCNC: 21 MG/DL (ref 6–20)
BUN/CREAT SERPL: 36 (ref 7–25)
CALCIUM SERPL-MCNC: 10.5 MG/DL (ref 8.4–10.2)
CHLORIDE SERPL-SCNC: 109 MMOL/L (ref 98–107)
CO2 SERPL-SCNC: 24 MMOL/L (ref 21–32)
CREAT SERPL-MCNC: 0.58 MG/DL (ref 0.51–0.95)
CRP SERPL-MCNC: 0.7 MG/DL
DEPRECATED RDW RBC: 56 FL (ref 39–50)
EOSINOPHIL # BLD: 0 K/MCL (ref 0–0.5)
EOSINOPHIL NFR BLD: 0 %
ERYTHROCYTE [DISTWIDTH] IN BLOOD: 15.8 % (ref 11–15)
ERYTHROCYTE [SEDIMENTATION RATE] IN BLOOD BY WESTERGREN METHOD: 28 MM/HR (ref 0–20)
FASTING DURATION TIME PATIENT: ABNORMAL H
FLUAV RNA RESP QL NAA+PROBE: NOT DETECTED
FLUBV RNA RESP QL NAA+PROBE: NOT DETECTED
GFR SERPLBLD BASED ON 1.73 SQ M-ARVRAT: >90 ML/MIN
GLOBULIN SER-MCNC: 3.6 G/DL (ref 2–4)
GLUCOSE SERPL-MCNC: 91 MG/DL (ref 70–99)
HCT VFR BLD CALC: 41.4 % (ref 36–46.5)
HGB BLD-MCNC: 12.5 G/DL (ref 12–15.5)
IMM GRANULOCYTES # BLD AUTO: 0.1 K/MCL (ref 0–0.2)
IMM GRANULOCYTES # BLD: 0 %
INR PPP: 3.7
LYMPHOCYTES # BLD: 0.5 K/MCL (ref 1–4)
LYMPHOCYTES NFR BLD: 4 %
MCH RBC QN AUTO: 29.6 PG (ref 26–34)
MCHC RBC AUTO-ENTMCNC: 30.2 G/DL (ref 32–36.5)
MCV RBC AUTO: 98.1 FL (ref 78–100)
MONOCYTES # BLD: 0.6 K/MCL (ref 0.3–0.9)
MONOCYTES NFR BLD: 5 %
NEUTROPHILS # BLD: 12.5 K/MCL (ref 1.8–7.7)
NEUTROPHILS NFR BLD: 90 %
NRBC BLD MANUAL-RTO: 0 /100 WBC
PLATELET # BLD AUTO: 222 K/MCL (ref 140–450)
POTASSIUM SERPL-SCNC: 5.4 MMOL/L (ref 3.4–5.1)
PROCALCITONIN SERPL IA-MCNC: <0.05 NG/ML
PROT SERPL-MCNC: 7.5 G/DL (ref 6.4–8.2)
PROTHROMBIN TIME: 36.6 SEC (ref 9.7–11.8)
RAINBOW EXTRA TUBES HOLD SPECIMEN: NORMAL
RBC # BLD: 4.22 MIL/MCL (ref 4–5.2)
RSV AG NPH QL IA.RAPID: NOT DETECTED
SARS-COV-2 RNA RESP QL NAA+PROBE: NOT DETECTED
SERVICE CMNT-IMP: NORMAL
SERVICE CMNT-IMP: NORMAL
SODIUM SERPL-SCNC: 137 MMOL/L (ref 135–145)
WBC # BLD: 13.8 K/MCL (ref 4.2–11)

## 2022-05-17 PROCEDURE — 85025 COMPLETE CBC W/AUTO DIFF WBC: CPT | Performed by: EMERGENCY MEDICINE

## 2022-05-17 PROCEDURE — 10002803 HB RX 637: Performed by: HOSPITALIST

## 2022-05-17 PROCEDURE — 87205 SMEAR GRAM STAIN: CPT | Performed by: EMERGENCY MEDICINE

## 2022-05-17 PROCEDURE — 86140 C-REACTIVE PROTEIN: CPT | Performed by: EMERGENCY MEDICINE

## 2022-05-17 PROCEDURE — 85652 RBC SED RATE AUTOMATED: CPT | Performed by: EMERGENCY MEDICINE

## 2022-05-17 PROCEDURE — 10002800 HB RX 250 W HCPCS: Performed by: EMERGENCY MEDICINE

## 2022-05-17 PROCEDURE — G0378 HOSPITAL OBSERVATION PER HR: HCPCS

## 2022-05-17 PROCEDURE — 93925 LOWER EXTREMITY STUDY: CPT

## 2022-05-17 PROCEDURE — 96375 TX/PRO/DX INJ NEW DRUG ADDON: CPT

## 2022-05-17 PROCEDURE — 10002801 HB RX 250 W/O HCPCS: Performed by: EMERGENCY MEDICINE

## 2022-05-17 PROCEDURE — 84145 PROCALCITONIN (PCT): CPT | Performed by: EMERGENCY MEDICINE

## 2022-05-17 PROCEDURE — 99213 OFFICE O/P EST LOW 20 MIN: CPT

## 2022-05-17 PROCEDURE — 73700 CT LOWER EXTREMITY W/O DYE: CPT

## 2022-05-17 PROCEDURE — 0241U COVID/FLU/RSV PANEL: CPT | Performed by: EMERGENCY MEDICINE

## 2022-05-17 PROCEDURE — 99284 EMERGENCY DEPT VISIT MOD MDM: CPT

## 2022-05-17 PROCEDURE — 80053 COMPREHEN METABOLIC PANEL: CPT | Performed by: EMERGENCY MEDICINE

## 2022-05-17 PROCEDURE — 10004651 HB RX, NO CHARGE ITEM: Performed by: HOSPITALIST

## 2022-05-17 PROCEDURE — 85730 THROMBOPLASTIN TIME PARTIAL: CPT | Performed by: EMERGENCY MEDICINE

## 2022-05-17 PROCEDURE — 87040 BLOOD CULTURE FOR BACTERIA: CPT | Performed by: EMERGENCY MEDICINE

## 2022-05-17 PROCEDURE — 85610 PROTHROMBIN TIME: CPT | Performed by: EMERGENCY MEDICINE

## 2022-05-17 PROCEDURE — 96366 THER/PROPH/DIAG IV INF ADDON: CPT

## 2022-05-17 PROCEDURE — G1004 CDSM NDSC: HCPCS

## 2022-05-17 PROCEDURE — 36415 COLL VENOUS BLD VENIPUNCTURE: CPT

## 2022-05-17 PROCEDURE — 10002807 HB RX 258: Performed by: EMERGENCY MEDICINE

## 2022-05-17 PROCEDURE — C9803 HOPD COVID-19 SPEC COLLECT: HCPCS

## 2022-05-17 PROCEDURE — 96365 THER/PROPH/DIAG IV INF INIT: CPT

## 2022-05-17 RX ORDER — OMEPRAZOLE 40 MG/1
40 CAPSULE, DELAYED RELEASE ORAL DAILY
Status: ON HOLD | COMMUNITY
End: 2022-08-26 | Stop reason: SDUPTHER

## 2022-05-17 RX ORDER — MYCOPHENOLATE MOFETIL 500 MG/1
1000 TABLET ORAL
Status: ON HOLD | COMMUNITY
End: 2022-08-26 | Stop reason: HOSPADM

## 2022-05-17 RX ORDER — WARFARIN SODIUM 2 MG/1
2 TABLET ORAL
COMMUNITY

## 2022-05-17 RX ORDER — DOCUSATE SODIUM 100 MG/1
100 CAPSULE, LIQUID FILLED ORAL DAILY
COMMUNITY

## 2022-05-17 RX ORDER — CEFAZOLIN SODIUM/WATER 1 G/10 ML
1000 SYRINGE (ML) INTRAVENOUS EVERY EVENING
Status: DISCONTINUED | OUTPATIENT
Start: 2022-05-18 | End: 2022-05-20 | Stop reason: HOSPADM

## 2022-05-17 RX ORDER — LIDOCAINE 50 MG/G
1 OINTMENT TOPICAL
COMMUNITY
End: 2022-08-15

## 2022-05-17 RX ORDER — TOLTERODINE 4 MG/1
4 CAPSULE, EXTENDED RELEASE ORAL DAILY
COMMUNITY

## 2022-05-17 RX ORDER — PILOCARPINE HYDROCHLORIDE 5 MG/1
5-10 TABLET, FILM COATED ORAL 2 TIMES DAILY
COMMUNITY

## 2022-05-17 RX ORDER — ACETAMINOPHEN 325 MG/1
650 TABLET ORAL EVERY 6 HOURS PRN
Status: DISCONTINUED | OUTPATIENT
Start: 2022-05-17 | End: 2022-05-20 | Stop reason: HOSPADM

## 2022-05-17 RX ORDER — CEFAZOLIN SODIUM/WATER 2 G/20 ML
2000 SYRINGE (ML) INTRAVENOUS ONCE
Status: COMPLETED | OUTPATIENT
Start: 2022-05-17 | End: 2022-05-17

## 2022-05-17 RX ORDER — SIMVASTATIN 5 MG
5 TABLET ORAL NIGHTLY
COMMUNITY

## 2022-05-17 RX ORDER — MORPHINE SULFATE 15 MG/1
15 TABLET ORAL EVERY 8 HOURS PRN
Status: DISCONTINUED | OUTPATIENT
Start: 2022-05-17 | End: 2022-05-20 | Stop reason: HOSPADM

## 2022-05-17 RX ADMIN — CEFTRIAXONE SODIUM 2000 MG: 10 INJECTION, POWDER, FOR SOLUTION INTRAVENOUS at 16:13

## 2022-05-17 RX ADMIN — MORPHINE SULFATE 15 MG: 15 TABLET ORAL at 20:25

## 2022-05-17 RX ADMIN — VANCOMYCIN HYDROCHLORIDE 1250 MG: 10 INJECTION, POWDER, LYOPHILIZED, FOR SOLUTION INTRAVENOUS at 15:36

## 2022-05-17 RX ADMIN — ACETAMINOPHEN 650 MG: 325 TABLET ORAL at 23:15

## 2022-05-17 ASSESSMENT — ENCOUNTER SYMPTOMS
ADENOPATHY: 0
WHEEZING: 0
RHINORRHEA: 0
DIARRHEA: 0
ABDOMINAL PAIN: 0
FATIGUE: 0
HEADACHES: 0
SHORTNESS OF BREATH: 0
FEVER: 0
WEAKNESS: 0
CHILLS: 0
SORE THROAT: 0
WOUND: 1
NAUSEA: 0
VOMITING: 0
NUMBNESS: 0
PHOTOPHOBIA: 0
HALLUCINATIONS: 0
SINUS PRESSURE: 0

## 2022-05-17 ASSESSMENT — LIFESTYLE VARIABLES
ALCOHOL_USE_STATUS: NO OR LOW RISK WITH VALIDATED TOOL
HOW MANY STANDARD DRINKS CONTAINING ALCOHOL DO YOU HAVE ON A TYPICAL DAY: 0,1 OR 2
HOW OFTEN DO YOU HAVE 6 OR MORE DRINKS ON ONE OCCASION: NEVER
HOW OFTEN DO YOU HAVE A DRINK CONTAINING ALCOHOL: NEVER
AUDIT-C TOTAL SCORE: 0

## 2022-05-17 ASSESSMENT — COGNITIVE AND FUNCTIONAL STATUS - GENERAL
DO YOU HAVE DIFFICULTY DRESSING OR BATHING: NO
ARE YOU BLIND OR DO YOU HAVE SERIOUS DIFFICULTY SEEING, EVEN WHEN WEARING GLASSES: NO
BECAUSE OF A PHYSICAL, MENTAL, OR EMOTIONAL CONDITION, DO YOU HAVE DIFFICULTY DOING ERRANDS ALONE: NO
ARE YOU DEAF OR DO YOU HAVE SERIOUS DIFFICULTY  HEARING: NO
BECAUSE OF A PHYSICAL, MENTAL, OR EMOTIONAL CONDITION, DO YOU HAVE SERIOUS DIFFICULTY CONCENTRATING, REMEMBERING OR MAKING DECISIONS: NO
DO YOU HAVE SERIOUS DIFFICULTY WALKING OR CLIMBING STAIRS: NO

## 2022-05-17 ASSESSMENT — PAIN SCALES - GENERAL
PAINLEVEL_OUTOF10: 10

## 2022-05-17 ASSESSMENT — PATIENT HEALTH QUESTIONNAIRE - PHQ9
CLINICAL INTERPRETATION OF PHQ2 SCORE: NO FURTHER SCREENING NEEDED
2. FEELING DOWN, DEPRESSED OR HOPELESS: NOT AT ALL
SUM OF ALL RESPONSES TO PHQ9 QUESTIONS 1 AND 2: 0
SUM OF ALL RESPONSES TO PHQ9 QUESTIONS 1 AND 2: 0
IS PATIENT ABLE TO COMPLETE PHQ2 OR PHQ9: YES
1. LITTLE INTEREST OR PLEASURE IN DOING THINGS: NOT AT ALL

## 2022-05-17 ASSESSMENT — COLUMBIA-SUICIDE SEVERITY RATING SCALE - C-SSRS
2. HAVE YOU ACTUALLY HAD ANY THOUGHTS OF KILLING YOURSELF?: NO
IS THE PATIENT ABLE TO COMPLETE C-SSRS: YES
1. WITHIN THE PAST MONTH, HAVE YOU WISHED YOU WERE DEAD OR WISHED YOU COULD GO TO SLEEP AND NOT WAKE UP?: NO
6. HAVE YOU EVER DONE ANYTHING, STARTED TO DO ANYTHING, OR PREPARED TO DO ANYTHING TO END YOUR LIFE?: NO

## 2022-05-17 ASSESSMENT — ACTIVITIES OF DAILY LIVING (ADL)
RECENT_DECLINE_ADL: NO
CHRONIC_PAIN_PRESENT: YES, CHRONIC
ADL_SHORT_OF_BREATH: NO
ADL_SCORE: 12
ADL_BEFORE_ADMISSION: INDEPENDENT

## 2022-05-18 LAB
ANION GAP SERPL CALC-SCNC: 8 MMOL/L (ref 10–20)
BUN SERPL-MCNC: 22 MG/DL (ref 6–20)
BUN/CREAT SERPL: 35 (ref 7–25)
CALCIUM SERPL-MCNC: 9.6 MG/DL (ref 8.4–10.2)
CHLORIDE SERPL-SCNC: 114 MMOL/L (ref 98–107)
CO2 SERPL-SCNC: 23 MMOL/L (ref 21–32)
CREAT SERPL-MCNC: 0.62 MG/DL (ref 0.51–0.95)
DEPRECATED RDW RBC: 55 FL (ref 39–50)
ERYTHROCYTE [DISTWIDTH] IN BLOOD: 15.6 % (ref 11–15)
FASTING DURATION TIME PATIENT: ABNORMAL H
GFR SERPLBLD BASED ON 1.73 SQ M-ARVRAT: >90 ML/MIN
GLUCOSE SERPL-MCNC: 76 MG/DL (ref 70–99)
HCT VFR BLD CALC: 34.6 % (ref 36–46.5)
HGB BLD-MCNC: 10.6 G/DL (ref 12–15.5)
INR PPP: 2.7
MAGNESIUM SERPL-MCNC: 2 MG/DL (ref 1.7–2.4)
MCH RBC QN AUTO: 29.5 PG (ref 26–34)
MCHC RBC AUTO-ENTMCNC: 30.6 G/DL (ref 32–36.5)
MCV RBC AUTO: 96.4 FL (ref 78–100)
NRBC BLD MANUAL-RTO: 0 /100 WBC
PLATELET # BLD AUTO: 205 K/MCL (ref 140–450)
POTASSIUM SERPL-SCNC: 3.5 MMOL/L (ref 3.4–5.1)
PROTHROMBIN TIME: 27.2 SEC (ref 9.7–11.8)
RBC # BLD: 3.59 MIL/MCL (ref 4–5.2)
SODIUM SERPL-SCNC: 141 MMOL/L (ref 135–145)
WBC # BLD: 6.1 K/MCL (ref 4.2–11)

## 2022-05-18 PROCEDURE — 85610 PROTHROMBIN TIME: CPT | Performed by: HOSPITALIST

## 2022-05-18 PROCEDURE — 96376 TX/PRO/DX INJ SAME DRUG ADON: CPT

## 2022-05-18 PROCEDURE — G0378 HOSPITAL OBSERVATION PER HR: HCPCS

## 2022-05-18 PROCEDURE — 10004651 HB RX, NO CHARGE ITEM: Performed by: HOSPITALIST

## 2022-05-18 PROCEDURE — 10002803 HB RX 637: Performed by: HOSPITALIST

## 2022-05-18 PROCEDURE — 36415 COLL VENOUS BLD VENIPUNCTURE: CPT | Performed by: HOSPITALIST

## 2022-05-18 PROCEDURE — 94664 DEMO&/EVAL PT USE INHALER: CPT

## 2022-05-18 PROCEDURE — 80048 BASIC METABOLIC PNL TOTAL CA: CPT | Performed by: HOSPITALIST

## 2022-05-18 PROCEDURE — 85027 COMPLETE CBC AUTOMATED: CPT | Performed by: HOSPITALIST

## 2022-05-18 PROCEDURE — 10002800 HB RX 250 W HCPCS: Performed by: HOSPITALIST

## 2022-05-18 PROCEDURE — 10002801 HB RX 250 W/O HCPCS: Performed by: HOSPITALIST

## 2022-05-18 PROCEDURE — 83735 ASSAY OF MAGNESIUM: CPT | Performed by: HOSPITALIST

## 2022-05-18 PROCEDURE — 94640 AIRWAY INHALATION TREATMENT: CPT

## 2022-05-18 PROCEDURE — 10002807 HB RX 258: Performed by: HOSPITALIST

## 2022-05-18 RX ORDER — BACLOFEN 10 MG/1
10 TABLET ORAL 3 TIMES DAILY
Status: DISCONTINUED | OUTPATIENT
Start: 2022-05-18 | End: 2022-05-20 | Stop reason: HOSPADM

## 2022-05-18 RX ORDER — METHYLPREDNISOLONE 4 MG/1
4 TABLET ORAL 2 TIMES DAILY WITH MEALS
Status: DISCONTINUED | OUTPATIENT
Start: 2022-05-18 | End: 2022-05-20 | Stop reason: HOSPADM

## 2022-05-18 RX ORDER — GABAPENTIN 300 MG/1
1200 CAPSULE ORAL NIGHTLY
Status: DISCONTINUED | OUTPATIENT
Start: 2022-05-18 | End: 2022-05-20 | Stop reason: HOSPADM

## 2022-05-18 RX ORDER — FUROSEMIDE 20 MG/1
20 TABLET ORAL DAILY
Status: DISCONTINUED | OUTPATIENT
Start: 2022-05-18 | End: 2022-05-20 | Stop reason: HOSPADM

## 2022-05-18 RX ORDER — PRAVASTATIN SODIUM 10 MG
10 TABLET ORAL NIGHTLY
Status: DISCONTINUED | OUTPATIENT
Start: 2022-05-18 | End: 2022-05-20 | Stop reason: HOSPADM

## 2022-05-18 RX ORDER — TOLTERODINE 2 MG/1
4 CAPSULE, EXTENDED RELEASE ORAL DAILY
Status: DISCONTINUED | OUTPATIENT
Start: 2022-05-18 | End: 2022-05-20 | Stop reason: HOSPADM

## 2022-05-18 RX ORDER — TOPIRAMATE 25 MG/1
50 TABLET ORAL 2 TIMES DAILY
Status: DISCONTINUED | OUTPATIENT
Start: 2022-05-18 | End: 2022-05-20 | Stop reason: HOSPADM

## 2022-05-18 RX ORDER — PANTOPRAZOLE SODIUM 40 MG/1
40 TABLET, DELAYED RELEASE ORAL
Status: DISCONTINUED | OUTPATIENT
Start: 2022-05-18 | End: 2022-05-20 | Stop reason: HOSPADM

## 2022-05-18 RX ORDER — HYDROXYCHLOROQUINE SULFATE 200 MG/1
200 TABLET, FILM COATED ORAL 2 TIMES DAILY WITH MEALS
Status: DISCONTINUED | OUTPATIENT
Start: 2022-05-18 | End: 2022-05-20 | Stop reason: HOSPADM

## 2022-05-18 RX ORDER — LEFLUNOMIDE 10 MG/1
20 TABLET ORAL DAILY
Status: DISCONTINUED | OUTPATIENT
Start: 2022-05-18 | End: 2022-05-20 | Stop reason: HOSPADM

## 2022-05-18 RX ORDER — CYCLOSPORINE 0.5 MG/ML
1 EMULSION OPHTHALMIC 2 TIMES DAILY
Status: DISCONTINUED | OUTPATIENT
Start: 2022-05-18 | End: 2022-05-20 | Stop reason: HOSPADM

## 2022-05-18 RX ORDER — MONTELUKAST SODIUM 10 MG/1
10 TABLET ORAL NIGHTLY
Status: DISCONTINUED | OUTPATIENT
Start: 2022-05-18 | End: 2022-05-20 | Stop reason: HOSPADM

## 2022-05-18 RX ORDER — ALBUTEROL SULFATE 2.5 MG/3ML
2.5 SOLUTION RESPIRATORY (INHALATION)
Status: DISCONTINUED | OUTPATIENT
Start: 2022-05-18 | End: 2022-05-20 | Stop reason: HOSPADM

## 2022-05-18 RX ORDER — GABAPENTIN 300 MG/1
900 CAPSULE ORAL 2 TIMES DAILY
Status: DISCONTINUED | OUTPATIENT
Start: 2022-05-18 | End: 2022-05-20 | Stop reason: HOSPADM

## 2022-05-18 RX ORDER — MYCOPHENOLATE MOFETIL 500 MG/1
500-1000 TABLET ORAL 2 TIMES DAILY
Status: DISCONTINUED | OUTPATIENT
Start: 2022-05-18 | End: 2022-05-18 | Stop reason: DRUGHIGH

## 2022-05-18 RX ORDER — MYCOPHENOLATE MOFETIL 500 MG/1
500 TABLET ORAL
Status: DISCONTINUED | OUTPATIENT
Start: 2022-05-18 | End: 2022-05-20 | Stop reason: HOSPADM

## 2022-05-18 RX ORDER — MYCOPHENOLATE MOFETIL 500 MG/1
1000 TABLET ORAL
Status: DISCONTINUED | OUTPATIENT
Start: 2022-05-18 | End: 2022-05-20 | Stop reason: HOSPADM

## 2022-05-18 RX ORDER — GABAPENTIN 300 MG/1
900 CAPSULE ORAL 3 TIMES DAILY
Status: DISCONTINUED | OUTPATIENT
Start: 2022-05-18 | End: 2022-05-18 | Stop reason: DRUGHIGH

## 2022-05-18 RX ORDER — SPIRONOLACTONE 25 MG/1
25 TABLET ORAL DAILY
Status: DISCONTINUED | OUTPATIENT
Start: 2022-05-18 | End: 2022-05-20 | Stop reason: HOSPADM

## 2022-05-18 RX ORDER — GABAPENTIN 300 MG/1
300 CAPSULE ORAL NIGHTLY
Status: DISCONTINUED | OUTPATIENT
Start: 2022-05-18 | End: 2022-05-18 | Stop reason: DRUGHIGH

## 2022-05-18 RX ADMIN — ACETAMINOPHEN 650 MG: 325 TABLET ORAL at 16:06

## 2022-05-18 RX ADMIN — GABAPENTIN 900 MG: 300 CAPSULE ORAL at 12:13

## 2022-05-18 RX ADMIN — FLUTICASONE FUROATE 1 PUFF: 100 POWDER RESPIRATORY (INHALATION) at 20:49

## 2022-05-18 RX ADMIN — FUROSEMIDE 20 MG: 20 TABLET ORAL at 12:09

## 2022-05-18 RX ADMIN — SPIRONOLACTONE 25 MG: 25 TABLET ORAL at 12:15

## 2022-05-18 RX ADMIN — BACLOFEN 10 MG: 10 TABLET ORAL at 12:08

## 2022-05-18 RX ADMIN — TOLTERODINE 4 MG: 2 CAPSULE, EXTENDED RELEASE ORAL at 12:14

## 2022-05-18 RX ADMIN — MORPHINE SULFATE 15 MG: 15 TABLET ORAL at 04:33

## 2022-05-18 RX ADMIN — ACETAMINOPHEN 650 MG: 325 TABLET ORAL at 09:00

## 2022-05-18 RX ADMIN — METHYLPREDNISOLONE 4 MG: 4 TABLET ORAL at 17:13

## 2022-05-18 RX ADMIN — MORPHINE SULFATE 15 MG: 15 TABLET ORAL at 12:13

## 2022-05-18 RX ADMIN — MONTELUKAST SODIUM 10 MG: 10 TABLET, FILM COATED ORAL at 21:36

## 2022-05-18 RX ADMIN — CEFTRIAXONE SODIUM 1000 MG: 10 INJECTION, POWDER, FOR SOLUTION INTRAVENOUS at 16:06

## 2022-05-18 RX ADMIN — MORPHINE SULFATE 15 MG: 15 TABLET ORAL at 21:35

## 2022-05-18 RX ADMIN — TOPIRAMATE 50 MG: 25 TABLET, FILM COATED ORAL at 21:36

## 2022-05-18 RX ADMIN — PANTOPRAZOLE SODIUM 40 MG: 40 TABLET, DELAYED RELEASE ORAL at 12:12

## 2022-05-18 RX ADMIN — HYDROXYCHLOROQUINE SULFATE 200 MG: 200 TABLET, FILM COATED ORAL at 17:13

## 2022-05-18 RX ADMIN — BACLOFEN 10 MG: 10 TABLET ORAL at 21:35

## 2022-05-18 RX ADMIN — LEFLUNOMIDE 20 MG: 10 TABLET ORAL at 13:37

## 2022-05-18 RX ADMIN — VANCOMYCIN HYDROCHLORIDE 750 MG: 750 INJECTION, POWDER, LYOPHILIZED, FOR SOLUTION INTRAVENOUS at 17:13

## 2022-05-18 RX ADMIN — METHYLPREDNISOLONE 4 MG: 4 TABLET ORAL at 13:37

## 2022-05-18 RX ADMIN — TOPIRAMATE 50 MG: 25 TABLET, FILM COATED ORAL at 12:18

## 2022-05-18 RX ADMIN — CYCLOSPORINE 1 DROP: 0.5 EMULSION OPHTHALMIC at 13:38

## 2022-05-18 RX ADMIN — VANCOMYCIN HYDROCHLORIDE 750 MG: 750 INJECTION, POWDER, LYOPHILIZED, FOR SOLUTION INTRAVENOUS at 04:39

## 2022-05-18 RX ADMIN — MYCOPHENOLATE MOFETIL 1000 MG: 500 TABLET, FILM COATED ORAL at 13:55

## 2022-05-18 RX ADMIN — MYCOPHENOLATE MOFETIL 500 MG: 500 TABLET, FILM COATED ORAL at 21:36

## 2022-05-18 RX ADMIN — PRAVASTATIN SODIUM 10 MG: 10 TABLET ORAL at 21:36

## 2022-05-18 RX ADMIN — GABAPENTIN 1200 MG: 300 CAPSULE ORAL at 21:37

## 2022-05-18 RX ADMIN — HYDROXYCHLOROQUINE SULFATE 200 MG: 200 TABLET, FILM COATED ORAL at 12:08

## 2022-05-18 ASSESSMENT — PAIN SCALES - GENERAL
PAINLEVEL_OUTOF10: 5
PAINLEVEL_OUTOF10: 8
PAINLEVEL_OUTOF10: 5
PAINLEVEL_OUTOF10: 5
PAINLEVEL_OUTOF10: 8
PAINLEVEL_OUTOF10: 4
PAINLEVEL_OUTOF10: 0
PAINLEVEL_OUTOF10: 10
PAINLEVEL_OUTOF10: 10
PAINLEVEL_OUTOF10: 0
PAINLEVEL_OUTOF10: 10

## 2022-05-18 ASSESSMENT — PULMONARY FUNCTION TESTS
FEV1: 0
FEV1: 0
FEV1/FVC: UNABLE TO OBTAIN, OR GREATER THAN 70%
FEV1: 0
FEV1_PREDICTED: 0

## 2022-05-18 ASSESSMENT — COGNITIVE AND FUNCTIONAL STATUS - GENERAL
DO YOU HAVE SERIOUS DIFFICULTY WALKING OR CLIMBING STAIRS: YES
DO YOU HAVE DIFFICULTY DRESSING OR BATHING: NO
BECAUSE OF A PHYSICAL, MENTAL, OR EMOTIONAL CONDITION, DO YOU HAVE DIFFICULTY DOING ERRANDS ALONE: NO
BECAUSE OF A PHYSICAL, MENTAL, OR EMOTIONAL CONDITION, DO YOU HAVE SERIOUS DIFFICULTY CONCENTRATING, REMEMBERING OR MAKING DECISIONS: NO

## 2022-05-19 LAB
ANION GAP SERPL CALC-SCNC: 8 MMOL/L (ref 7–19)
BACTERIA SPEC AEROBE CULT: NORMAL
BUN SERPL-MCNC: 19 MG/DL (ref 6–20)
BUN/CREAT SERPL: 30 (ref 7–25)
CALCIUM SERPL-MCNC: 10.4 MG/DL (ref 8.4–10.2)
CHLORIDE SERPL-SCNC: 111 MMOL/L (ref 97–110)
CO2 SERPL-SCNC: 25 MMOL/L (ref 21–32)
CREAT SERPL-MCNC: 0.63 MG/DL (ref 0.51–0.95)
DEPRECATED RDW RBC: 57 FL (ref 39–50)
ERYTHROCYTE [DISTWIDTH] IN BLOOD: 15.7 % (ref 11–15)
FASTING DURATION TIME PATIENT: ABNORMAL H
GFR SERPLBLD BASED ON 1.73 SQ M-ARVRAT: >90 ML/MIN
GLUCOSE SERPL-MCNC: 91 MG/DL (ref 70–99)
GRAM STN SPEC: NORMAL
HCT VFR BLD CALC: 37.9 % (ref 36–46.5)
HGB BLD-MCNC: 11 G/DL (ref 12–15.5)
INR PPP: 1.6
MCH RBC QN AUTO: 28.9 PG (ref 26–34)
MCHC RBC AUTO-ENTMCNC: 29 G/DL (ref 32–36.5)
MCV RBC AUTO: 99.5 FL (ref 78–100)
NRBC BLD MANUAL-RTO: 0 /100 WBC
PLATELET # BLD AUTO: 202 K/MCL (ref 140–450)
POTASSIUM SERPL-SCNC: 3.7 MMOL/L (ref 3.4–5.1)
PROTHROMBIN TIME: 16.7 SEC (ref 9.7–11.8)
RBC # BLD: 3.81 MIL/MCL (ref 4–5.2)
SODIUM SERPL-SCNC: 140 MMOL/L (ref 135–145)
VANCOMYCIN TROUGH SERPL-MCNC: 15.1 MCG/ML (ref 10–20)
WBC # BLD: 7.1 K/MCL (ref 4.2–11)

## 2022-05-19 PROCEDURE — 10004651 HB RX, NO CHARGE ITEM: Performed by: HOSPITALIST

## 2022-05-19 PROCEDURE — 10002801 HB RX 250 W/O HCPCS: Performed by: HOSPITALIST

## 2022-05-19 PROCEDURE — 85027 COMPLETE CBC AUTOMATED: CPT | Performed by: HOSPITALIST

## 2022-05-19 PROCEDURE — 10002807 HB RX 258: Performed by: HOSPITALIST

## 2022-05-19 PROCEDURE — 10002803 HB RX 637: Performed by: HOSPITALIST

## 2022-05-19 PROCEDURE — 10004281 HB COUNTER-STAFF TIME PER 15 MIN

## 2022-05-19 PROCEDURE — 80202 ASSAY OF VANCOMYCIN: CPT | Performed by: HOSPITALIST

## 2022-05-19 PROCEDURE — 94640 AIRWAY INHALATION TREATMENT: CPT

## 2022-05-19 PROCEDURE — 13003291 HB INS MIDLINE W/GUIDE INCL CATH

## 2022-05-19 PROCEDURE — 87493 C DIFF AMPLIFIED PROBE: CPT | Performed by: INTERNAL MEDICINE

## 2022-05-19 PROCEDURE — 10002800 HB RX 250 W HCPCS: Performed by: HOSPITALIST

## 2022-05-19 PROCEDURE — 36415 COLL VENOUS BLD VENIPUNCTURE: CPT | Performed by: HOSPITALIST

## 2022-05-19 PROCEDURE — 85610 PROTHROMBIN TIME: CPT | Performed by: HOSPITALIST

## 2022-05-19 PROCEDURE — G0378 HOSPITAL OBSERVATION PER HR: HCPCS

## 2022-05-19 PROCEDURE — 80048 BASIC METABOLIC PNL TOTAL CA: CPT | Performed by: HOSPITALIST

## 2022-05-19 PROCEDURE — 10000002 HB ROOM CHARGE MED SURG

## 2022-05-19 PROCEDURE — 96376 TX/PRO/DX INJ SAME DRUG ADON: CPT

## 2022-05-19 RX ORDER — DAPTOMYCIN 50 MG/ML
4 INJECTION, POWDER, LYOPHILIZED, FOR SOLUTION INTRAVENOUS ONCE
Status: CANCELLED | OUTPATIENT
Start: 2022-05-21 | End: 2022-05-21

## 2022-05-19 RX ORDER — WARFARIN SODIUM 2 MG/1
2 TABLET ORAL ONCE
Status: COMPLETED | OUTPATIENT
Start: 2022-05-19 | End: 2022-05-19

## 2022-05-19 RX ORDER — 0.9 % SODIUM CHLORIDE 0.9 %
10 VIAL (ML) INJECTION EVERY 12 HOURS SCHEDULED
Status: DISCONTINUED | OUTPATIENT
Start: 2022-05-19 | End: 2022-05-20 | Stop reason: HOSPADM

## 2022-05-19 RX ORDER — 0.9 % SODIUM CHLORIDE 0.9 %
10 VIAL (ML) INJECTION PRN
Status: DISCONTINUED | OUTPATIENT
Start: 2022-05-19 | End: 2022-05-20 | Stop reason: HOSPADM

## 2022-05-19 RX ORDER — ENOXAPARIN SODIUM 100 MG/ML
1 INJECTION SUBCUTANEOUS EVERY 12 HOURS SCHEDULED
Status: DISCONTINUED | OUTPATIENT
Start: 2022-05-19 | End: 2022-05-20 | Stop reason: HOSPADM

## 2022-05-19 RX ADMIN — GABAPENTIN 900 MG: 300 CAPSULE ORAL at 08:12

## 2022-05-19 RX ADMIN — GABAPENTIN 900 MG: 300 CAPSULE ORAL at 13:34

## 2022-05-19 RX ADMIN — VANCOMYCIN HYDROCHLORIDE 750 MG: 750 INJECTION, POWDER, LYOPHILIZED, FOR SOLUTION INTRAVENOUS at 06:20

## 2022-05-19 RX ADMIN — ENOXAPARIN SODIUM 50 MG: 60 INJECTION SUBCUTANEOUS at 13:38

## 2022-05-19 RX ADMIN — FUROSEMIDE 20 MG: 20 TABLET ORAL at 08:12

## 2022-05-19 RX ADMIN — LEFLUNOMIDE 20 MG: 10 TABLET ORAL at 08:12

## 2022-05-19 RX ADMIN — GABAPENTIN 1200 MG: 300 CAPSULE ORAL at 20:47

## 2022-05-19 RX ADMIN — PRAVASTATIN SODIUM 10 MG: 10 TABLET ORAL at 20:48

## 2022-05-19 RX ADMIN — CYCLOSPORINE 1 DROP: 0.5 EMULSION OPHTHALMIC at 22:42

## 2022-05-19 RX ADMIN — TOPIRAMATE 50 MG: 25 TABLET, FILM COATED ORAL at 20:48

## 2022-05-19 RX ADMIN — BACLOFEN 10 MG: 10 TABLET ORAL at 13:35

## 2022-05-19 RX ADMIN — BACLOFEN 10 MG: 10 TABLET ORAL at 20:47

## 2022-05-19 RX ADMIN — MYCOPHENOLATE MOFETIL 1000 MG: 500 TABLET, FILM COATED ORAL at 08:17

## 2022-05-19 RX ADMIN — WARFARIN SODIUM 2 MG: 2 TABLET ORAL at 17:21

## 2022-05-19 RX ADMIN — MORPHINE SULFATE 15 MG: 15 TABLET ORAL at 22:46

## 2022-05-19 RX ADMIN — CYCLOSPORINE 1 DROP: 0.5 EMULSION OPHTHALMIC at 08:12

## 2022-05-19 RX ADMIN — ACETAMINOPHEN 650 MG: 325 TABLET ORAL at 13:34

## 2022-05-19 RX ADMIN — MYCOPHENOLATE MOFETIL 500 MG: 500 TABLET, FILM COATED ORAL at 22:42

## 2022-05-19 RX ADMIN — METHYLPREDNISOLONE 4 MG: 4 TABLET ORAL at 08:11

## 2022-05-19 RX ADMIN — ACETAMINOPHEN 650 MG: 325 TABLET ORAL at 20:47

## 2022-05-19 RX ADMIN — BACLOFEN 10 MG: 10 TABLET ORAL at 08:11

## 2022-05-19 RX ADMIN — TOLTERODINE 4 MG: 2 CAPSULE, EXTENDED RELEASE ORAL at 08:11

## 2022-05-19 RX ADMIN — PANTOPRAZOLE SODIUM 40 MG: 40 TABLET, DELAYED RELEASE ORAL at 06:08

## 2022-05-19 RX ADMIN — FLUTICASONE FUROATE 1 PUFF: 100 POWDER RESPIRATORY (INHALATION) at 20:23

## 2022-05-19 RX ADMIN — TOPIRAMATE 50 MG: 25 TABLET, FILM COATED ORAL at 08:12

## 2022-05-19 RX ADMIN — ACETAMINOPHEN 650 MG: 325 TABLET ORAL at 06:08

## 2022-05-19 RX ADMIN — VANCOMYCIN HYDROCHLORIDE 750 MG: 750 INJECTION, POWDER, LYOPHILIZED, FOR SOLUTION INTRAVENOUS at 17:25

## 2022-05-19 RX ADMIN — CEFTRIAXONE SODIUM 1000 MG: 10 INJECTION, POWDER, FOR SOLUTION INTRAVENOUS at 17:20

## 2022-05-19 RX ADMIN — HYDROXYCHLOROQUINE SULFATE 200 MG: 200 TABLET, FILM COATED ORAL at 08:12

## 2022-05-19 RX ADMIN — METHYLPREDNISOLONE 4 MG: 4 TABLET ORAL at 17:20

## 2022-05-19 RX ADMIN — MORPHINE SULFATE 15 MG: 15 TABLET ORAL at 08:17

## 2022-05-19 RX ADMIN — HYDROXYCHLOROQUINE SULFATE 200 MG: 200 TABLET, FILM COATED ORAL at 17:21

## 2022-05-19 RX ADMIN — SPIRONOLACTONE 25 MG: 25 TABLET ORAL at 08:11

## 2022-05-19 RX ADMIN — MONTELUKAST SODIUM 10 MG: 10 TABLET, FILM COATED ORAL at 20:48

## 2022-05-19 ASSESSMENT — PAIN SCALES - GENERAL
PAINLEVEL_OUTOF10: 10
PAINLEVEL_OUTOF10: 0
PAINLEVEL_OUTOF10: 10
PAINLEVEL_OUTOF10: 9
PAINLEVEL_OUTOF10: 9

## 2022-05-19 ASSESSMENT — PAIN SCALES - WONG BAKER
WONGBAKER_NUMERICALRESPONSE: 9
WONGBAKER_NUMERICALRESPONSE: 4
WONGBAKER_NUMERICALRESPONSE: 0

## 2022-05-20 VITALS
HEART RATE: 77 BPM | DIASTOLIC BLOOD PRESSURE: 74 MMHG | BODY MASS INDEX: 21.85 KG/M2 | HEIGHT: 61 IN | TEMPERATURE: 97.9 F | SYSTOLIC BLOOD PRESSURE: 147 MMHG | RESPIRATION RATE: 16 BRPM | WEIGHT: 115.74 LBS | OXYGEN SATURATION: 98 %

## 2022-05-20 LAB
C DIFF TOX B TCDB STL QL NAA+PROBE: NOT DETECTED
INR PPP: 1.5
PROTHROMBIN TIME: 15.6 SEC (ref 9.7–11.8)
RAINBOW EXTRA TUBES HOLD SPECIMEN: NORMAL

## 2022-05-20 PROCEDURE — 10002807 HB RX 258: Performed by: HOSPITALIST

## 2022-05-20 PROCEDURE — 10002800 HB RX 250 W HCPCS: Performed by: HOSPITALIST

## 2022-05-20 PROCEDURE — 10002800 HB RX 250 W HCPCS: Performed by: INTERNAL MEDICINE

## 2022-05-20 PROCEDURE — 10002803 HB RX 637: Performed by: HOSPITALIST

## 2022-05-20 PROCEDURE — 96376 TX/PRO/DX INJ SAME DRUG ADON: CPT

## 2022-05-20 PROCEDURE — 36415 COLL VENOUS BLD VENIPUNCTURE: CPT | Performed by: HOSPITALIST

## 2022-05-20 PROCEDURE — 85610 PROTHROMBIN TIME: CPT | Performed by: HOSPITALIST

## 2022-05-20 PROCEDURE — 10004651 HB RX, NO CHARGE ITEM: Performed by: INTERNAL MEDICINE

## 2022-05-20 RX ORDER — WARFARIN SODIUM 2 MG/1
2 TABLET ORAL ONCE
Status: COMPLETED | OUTPATIENT
Start: 2022-05-20 | End: 2022-05-20

## 2022-05-20 RX ORDER — ENOXAPARIN SODIUM 100 MG/ML
1 INJECTION SUBCUTANEOUS EVERY 12 HOURS SCHEDULED
Qty: 4.2 ML | Refills: 1 | Status: SHIPPED | OUTPATIENT
Start: 2022-05-20 | End: 2022-05-24

## 2022-05-20 RX ORDER — DAPTOMYCIN 50 MG/ML
4 INJECTION, POWDER, LYOPHILIZED, FOR SOLUTION INTRAVENOUS ONCE
Status: COMPLETED | OUTPATIENT
Start: 2022-05-20 | End: 2022-05-20

## 2022-05-20 RX ORDER — LOSARTAN POTASSIUM 25 MG/1
25 TABLET ORAL DAILY
Qty: 30 TABLET | Refills: 0 | Status: SHIPPED | OUTPATIENT
Start: 2022-05-20 | End: 2022-07-26 | Stop reason: ALTCHOICE

## 2022-05-20 RX ORDER — DAPTOMYCIN 50 MG/ML
200 INJECTION, POWDER, LYOPHILIZED, FOR SOLUTION INTRAVENOUS ONCE
Status: CANCELLED | OUTPATIENT
Start: 2022-05-21 | End: 2022-05-21

## 2022-05-20 RX ORDER — LOSARTAN POTASSIUM 25 MG/1
25 TABLET ORAL DAILY
Status: DISCONTINUED | OUTPATIENT
Start: 2022-05-20 | End: 2022-05-20 | Stop reason: HOSPADM

## 2022-05-20 RX ORDER — DIPHENOXYLATE HYDROCHLORIDE AND ATROPINE SULFATE 2.5; .025 MG/1; MG/1
2 TABLET ORAL 4 TIMES DAILY PRN
Qty: 20 TABLET | Refills: 0 | Status: SHIPPED | OUTPATIENT
Start: 2022-05-20

## 2022-05-20 RX ORDER — DIPHENOXYLATE HYDROCHLORIDE AND ATROPINE SULFATE 2.5; .025 MG/1; MG/1
2 TABLET ORAL 4 TIMES DAILY PRN
Status: DISCONTINUED | OUTPATIENT
Start: 2022-05-20 | End: 2022-05-20 | Stop reason: HOSPADM

## 2022-05-20 RX ADMIN — METHYLPREDNISOLONE 4 MG: 4 TABLET ORAL at 17:35

## 2022-05-20 RX ADMIN — CEFTRIAXONE SODIUM 1000 MG: 10 INJECTION, POWDER, FOR SOLUTION INTRAVENOUS at 17:37

## 2022-05-20 RX ADMIN — METHYLPREDNISOLONE 4 MG: 4 TABLET ORAL at 09:34

## 2022-05-20 RX ADMIN — MYCOPHENOLATE MOFETIL 1000 MG: 500 TABLET, FILM COATED ORAL at 08:39

## 2022-05-20 RX ADMIN — HYDROXYCHLOROQUINE SULFATE 200 MG: 200 TABLET, FILM COATED ORAL at 09:34

## 2022-05-20 RX ADMIN — SODIUM CHLORIDE, PRESERVATIVE FREE 10 ML: 5 INJECTION INTRAVENOUS at 09:41

## 2022-05-20 RX ADMIN — BACLOFEN 10 MG: 10 TABLET ORAL at 09:35

## 2022-05-20 RX ADMIN — HYDROXYCHLOROQUINE SULFATE 200 MG: 200 TABLET, FILM COATED ORAL at 17:36

## 2022-05-20 RX ADMIN — DAPTOMYCIN 200 MG: 500 INJECTION, POWDER, LYOPHILIZED, FOR SOLUTION INTRAVENOUS at 18:45

## 2022-05-20 RX ADMIN — SODIUM CHLORIDE, PRESERVATIVE FREE 10 ML: 5 INJECTION INTRAVENOUS at 00:03

## 2022-05-20 RX ADMIN — PANTOPRAZOLE SODIUM 40 MG: 40 TABLET, DELAYED RELEASE ORAL at 06:11

## 2022-05-20 RX ADMIN — LOSARTAN POTASSIUM 25 MG: 25 TABLET, FILM COATED ORAL at 17:36

## 2022-05-20 RX ADMIN — CYCLOSPORINE 1 DROP: 0.5 EMULSION OPHTHALMIC at 09:36

## 2022-05-20 RX ADMIN — SPIRONOLACTONE 25 MG: 25 TABLET ORAL at 09:34

## 2022-05-20 RX ADMIN — MORPHINE SULFATE 15 MG: 15 TABLET ORAL at 14:21

## 2022-05-20 RX ADMIN — WARFARIN SODIUM 2 MG: 2 TABLET ORAL at 17:36

## 2022-05-20 RX ADMIN — ENOXAPARIN SODIUM 50 MG: 60 INJECTION SUBCUTANEOUS at 09:36

## 2022-05-20 RX ADMIN — GABAPENTIN 900 MG: 300 CAPSULE ORAL at 09:35

## 2022-05-20 RX ADMIN — BACLOFEN 10 MG: 10 TABLET ORAL at 14:21

## 2022-05-20 RX ADMIN — LEFLUNOMIDE 20 MG: 10 TABLET ORAL at 09:34

## 2022-05-20 RX ADMIN — GABAPENTIN 900 MG: 300 CAPSULE ORAL at 14:21

## 2022-05-20 RX ADMIN — TOPIRAMATE 50 MG: 25 TABLET, FILM COATED ORAL at 09:35

## 2022-05-20 RX ADMIN — TOLTERODINE 4 MG: 2 CAPSULE, EXTENDED RELEASE ORAL at 09:35

## 2022-05-20 RX ADMIN — FUROSEMIDE 20 MG: 20 TABLET ORAL at 09:34

## 2022-05-20 RX ADMIN — VANCOMYCIN HYDROCHLORIDE 750 MG: 750 INJECTION, POWDER, LYOPHILIZED, FOR SOLUTION INTRAVENOUS at 06:19

## 2022-05-20 ASSESSMENT — PAIN SCALES - GENERAL
PAINLEVEL_OUTOF10: 0
PAINLEVEL_OUTOF10: 0

## 2022-05-21 ENCOUNTER — HOSPITAL ENCOUNTER (OUTPATIENT)
Dept: INFUSION THERAPY | Age: 67
Discharge: STILL ADMITTED OR ADMINISTRATIVE | End: 2022-05-21

## 2022-05-21 VITALS
HEART RATE: 75 BPM | DIASTOLIC BLOOD PRESSURE: 81 MMHG | TEMPERATURE: 98 F | SYSTOLIC BLOOD PRESSURE: 128 MMHG | OXYGEN SATURATION: 98 % | RESPIRATION RATE: 16 BRPM

## 2022-05-21 DIAGNOSIS — L97.912 ULCER OF RIGHT LOWER EXTREMITY WITH FAT LAYER EXPOSED (CMD): ICD-10-CM

## 2022-05-21 DIAGNOSIS — L97.901 LOWER EXTREMITY ULCERATION, UNSPECIFIED LATERALITY, LIMITED TO BREAKDOWN OF SKIN (CMD): ICD-10-CM

## 2022-05-21 DIAGNOSIS — L03.115 CELLULITIS OF RIGHT LOWER LEG: Primary | ICD-10-CM

## 2022-05-21 PROCEDURE — 96374 THER/PROPH/DIAG INJ IV PUSH: CPT

## 2022-05-21 PROCEDURE — 10002800 HB RX 250 W HCPCS: Performed by: INTERNAL MEDICINE

## 2022-05-21 RX ORDER — DAPTOMYCIN 50 MG/ML
200 INJECTION, POWDER, LYOPHILIZED, FOR SOLUTION INTRAVENOUS ONCE
Status: COMPLETED | OUTPATIENT
Start: 2022-05-21 | End: 2022-05-21

## 2022-05-21 RX ORDER — DAPTOMYCIN 50 MG/ML
200 INJECTION, POWDER, LYOPHILIZED, FOR SOLUTION INTRAVENOUS ONCE
Status: CANCELLED | OUTPATIENT
Start: 2022-05-22 | End: 2022-05-23

## 2022-05-21 RX ADMIN — DAPTOMYCIN 200 MG: 500 INJECTION, POWDER, LYOPHILIZED, FOR SOLUTION INTRAVENOUS at 09:51

## 2022-05-21 ASSESSMENT — PAIN SCALES - WONG BAKER: WONGBAKER_NUMERICALRESPONSE: 0

## 2022-05-21 ASSESSMENT — PAIN SCALES - GENERAL: PAINLEVEL_OUTOF10: 0

## 2022-05-22 ENCOUNTER — HOSPITAL ENCOUNTER (OUTPATIENT)
Dept: INFUSION THERAPY | Age: 67
Discharge: STILL ADMITTED OR ADMINISTRATIVE | End: 2022-05-22

## 2022-05-22 VITALS
SYSTOLIC BLOOD PRESSURE: 113 MMHG | OXYGEN SATURATION: 96 % | RESPIRATION RATE: 17 BRPM | HEART RATE: 80 BPM | TEMPERATURE: 97.3 F | DIASTOLIC BLOOD PRESSURE: 70 MMHG

## 2022-05-22 DIAGNOSIS — L03.115 CELLULITIS OF RIGHT LOWER LEG: Primary | ICD-10-CM

## 2022-05-22 DIAGNOSIS — L97.912 ULCER OF RIGHT LOWER EXTREMITY WITH FAT LAYER EXPOSED (CMD): ICD-10-CM

## 2022-05-22 DIAGNOSIS — L97.901 LOWER EXTREMITY ULCERATION, UNSPECIFIED LATERALITY, LIMITED TO BREAKDOWN OF SKIN (CMD): ICD-10-CM

## 2022-05-22 LAB
BACTERIA BLD CULT: NORMAL
BACTERIA BLD CULT: NORMAL

## 2022-05-22 PROCEDURE — 96374 THER/PROPH/DIAG INJ IV PUSH: CPT

## 2022-05-22 PROCEDURE — 10002800 HB RX 250 W HCPCS: Performed by: INTERNAL MEDICINE

## 2022-05-22 RX ORDER — DAPTOMYCIN 50 MG/ML
200 INJECTION, POWDER, LYOPHILIZED, FOR SOLUTION INTRAVENOUS ONCE
Status: CANCELLED | OUTPATIENT
Start: 2022-05-23 | End: 2022-05-23

## 2022-05-22 RX ORDER — DAPTOMYCIN 50 MG/ML
200 INJECTION, POWDER, LYOPHILIZED, FOR SOLUTION INTRAVENOUS ONCE
Status: COMPLETED | OUTPATIENT
Start: 2022-05-22 | End: 2022-05-22

## 2022-05-22 RX ADMIN — DAPTOMYCIN 200 MG: 500 INJECTION, POWDER, LYOPHILIZED, FOR SOLUTION INTRAVENOUS at 09:40

## 2022-05-22 ASSESSMENT — PAIN SCALES - GENERAL: PAINLEVEL_OUTOF10: 0

## 2022-05-23 ENCOUNTER — HOSPITAL ENCOUNTER (OUTPATIENT)
Dept: INFUSION THERAPY | Age: 67
Discharge: STILL ADMITTED OR ADMINISTRATIVE | End: 2022-05-23
Attending: INTERNAL MEDICINE

## 2022-05-23 ENCOUNTER — TELEPHONE (OUTPATIENT)
Dept: INFUSION THERAPY | Age: 67
End: 2022-05-23

## 2022-05-23 VITALS
HEART RATE: 71 BPM | OXYGEN SATURATION: 98 % | RESPIRATION RATE: 16 BRPM | SYSTOLIC BLOOD PRESSURE: 128 MMHG | DIASTOLIC BLOOD PRESSURE: 74 MMHG | TEMPERATURE: 98.4 F

## 2022-05-23 DIAGNOSIS — L03.115 CELLULITIS OF RIGHT LOWER LEG: Primary | ICD-10-CM

## 2022-05-23 DIAGNOSIS — L97.901 LOWER EXTREMITY ULCERATION, UNSPECIFIED LATERALITY, LIMITED TO BREAKDOWN OF SKIN (CMD): ICD-10-CM

## 2022-05-23 DIAGNOSIS — L97.912 ULCER OF RIGHT LOWER EXTREMITY WITH FAT LAYER EXPOSED (CMD): ICD-10-CM

## 2022-05-23 LAB
ALBUMIN SERPL-MCNC: 3.7 G/DL (ref 3.6–5.1)
ALBUMIN/GLOB SERPL: 1.2 {RATIO} (ref 1–2.4)
ALP SERPL-CCNC: 66 UNITS/L (ref 45–117)
ALT SERPL-CCNC: 43 UNITS/L
ANION GAP SERPL CALC-SCNC: 8 MMOL/L (ref 7–19)
AST SERPL-CCNC: 38 UNITS/L
BASOPHILS # BLD: 0.1 K/MCL (ref 0–0.3)
BASOPHILS NFR BLD: 1 %
BILIRUB SERPL-MCNC: 0.6 MG/DL (ref 0.2–1)
BUN SERPL-MCNC: 19 MG/DL (ref 6–20)
BUN/CREAT SERPL: 26 (ref 7–25)
CALCIUM SERPL-MCNC: 10 MG/DL (ref 8.4–10.2)
CHLORIDE SERPL-SCNC: 107 MMOL/L (ref 97–110)
CK SERPL-CCNC: 156 UNITS/L (ref 26–192)
CO2 SERPL-SCNC: 27 MMOL/L (ref 21–32)
CREAT SERPL-MCNC: 0.73 MG/DL (ref 0.51–0.95)
CRP SERPL-MCNC: <0.3 MG/DL
DEPRECATED RDW RBC: 54.1 FL (ref 39–50)
EOSINOPHIL # BLD: 0.1 K/MCL (ref 0–0.5)
EOSINOPHIL NFR BLD: 1 %
ERYTHROCYTE [DISTWIDTH] IN BLOOD: 15.1 % (ref 11–15)
FASTING DURATION TIME PATIENT: ABNORMAL H
GFR SERPLBLD BASED ON 1.73 SQ M-ARVRAT: >90 ML/MIN
GLOBULIN SER-MCNC: 3.1 G/DL (ref 2–4)
GLUCOSE SERPL-MCNC: 83 MG/DL (ref 70–99)
HCT VFR BLD CALC: 35.9 % (ref 36–46.5)
HGB BLD-MCNC: 10.8 G/DL (ref 12–15.5)
IMM GRANULOCYTES # BLD AUTO: 0.1 K/MCL (ref 0–0.2)
IMM GRANULOCYTES # BLD: 1 %
INR PPP: 2
LYMPHOCYTES # BLD: 0.7 K/MCL (ref 1–4)
LYMPHOCYTES NFR BLD: 12 %
MCH RBC QN AUTO: 29.5 PG (ref 26–34)
MCHC RBC AUTO-ENTMCNC: 30.1 G/DL (ref 32–36.5)
MCV RBC AUTO: 98.1 FL (ref 78–100)
MONOCYTES # BLD: 0.8 K/MCL (ref 0.3–0.9)
MONOCYTES NFR BLD: 14 %
NEUTROPHILS # BLD: 4.3 K/MCL (ref 1.8–7.7)
NEUTROPHILS NFR BLD: 71 %
NRBC BLD MANUAL-RTO: 0 /100 WBC
PLATELET # BLD AUTO: 195 K/MCL (ref 140–450)
POTASSIUM SERPL-SCNC: 3.2 MMOL/L (ref 3.4–5.1)
PROT SERPL-MCNC: 6.8 G/DL (ref 6.4–8.2)
PROTHROMBIN TIME: 20.1 SEC (ref 9.7–11.8)
RAINBOW EXTRA TUBES HOLD SPECIMEN: NORMAL
RAINBOW EXTRA TUBES HOLD SPECIMEN: NORMAL
RBC # BLD: 3.66 MIL/MCL (ref 4–5.2)
SODIUM SERPL-SCNC: 139 MMOL/L (ref 135–145)
WBC # BLD: 6.1 K/MCL (ref 4.2–11)

## 2022-05-23 PROCEDURE — 86140 C-REACTIVE PROTEIN: CPT | Performed by: INTERNAL MEDICINE

## 2022-05-23 PROCEDURE — 96374 THER/PROPH/DIAG INJ IV PUSH: CPT

## 2022-05-23 PROCEDURE — 85610 PROTHROMBIN TIME: CPT | Performed by: INTERNAL MEDICINE

## 2022-05-23 PROCEDURE — 80053 COMPREHEN METABOLIC PANEL: CPT | Performed by: INTERNAL MEDICINE

## 2022-05-23 PROCEDURE — 10002800 HB RX 250 W HCPCS: Performed by: INTERNAL MEDICINE

## 2022-05-23 PROCEDURE — 36592 COLLECT BLOOD FROM PICC: CPT

## 2022-05-23 PROCEDURE — 82550 ASSAY OF CK (CPK): CPT | Performed by: INTERNAL MEDICINE

## 2022-05-23 PROCEDURE — 85025 COMPLETE CBC W/AUTO DIFF WBC: CPT | Performed by: INTERNAL MEDICINE

## 2022-05-23 RX ORDER — DAPTOMYCIN 50 MG/ML
200 INJECTION, POWDER, LYOPHILIZED, FOR SOLUTION INTRAVENOUS ONCE
Status: COMPLETED | OUTPATIENT
Start: 2022-05-23 | End: 2022-05-23

## 2022-05-23 RX ORDER — DAPTOMYCIN 50 MG/ML
200 INJECTION, POWDER, LYOPHILIZED, FOR SOLUTION INTRAVENOUS ONCE
Status: CANCELLED | OUTPATIENT
Start: 2022-05-24 | End: 2022-05-30

## 2022-05-23 RX ADMIN — DAPTOMYCIN 200 MG: 500 INJECTION, POWDER, LYOPHILIZED, FOR SOLUTION INTRAVENOUS at 13:52

## 2022-05-23 ASSESSMENT — PAIN SCALES - GENERAL: PAINLEVEL_OUTOF10: 0

## 2022-05-24 ENCOUNTER — HOSPITAL ENCOUNTER (OUTPATIENT)
Dept: WOUND CARE | Age: 67
Discharge: STILL ADMITTED OR ADMINISTRATIVE | End: 2022-05-24
Attending: FAMILY MEDICINE

## 2022-05-24 ENCOUNTER — HOSPITAL ENCOUNTER (OUTPATIENT)
Dept: INFUSION THERAPY | Age: 67
Discharge: STILL ADMITTED OR ADMINISTRATIVE | End: 2022-05-24
Attending: INTERNAL MEDICINE

## 2022-05-24 VITALS
OXYGEN SATURATION: 98 % | RESPIRATION RATE: 18 BRPM | HEART RATE: 81 BPM | SYSTOLIC BLOOD PRESSURE: 120 MMHG | TEMPERATURE: 97.4 F | DIASTOLIC BLOOD PRESSURE: 68 MMHG

## 2022-05-24 VITALS — TEMPERATURE: 96.6 F

## 2022-05-24 DIAGNOSIS — L02.419 CELLULITIS AND ABSCESS OF LEG: ICD-10-CM

## 2022-05-24 DIAGNOSIS — L03.115 CELLULITIS OF RIGHT LOWER LEG: Primary | ICD-10-CM

## 2022-05-24 DIAGNOSIS — I87.332 CHRONIC VENOUS HYPERTENSION (IDIOPATHIC) WITH ULCER AND INFLAMMATION OF LEFT LOWER EXTREMITY (CMD): ICD-10-CM

## 2022-05-24 DIAGNOSIS — L03.119 CELLULITIS AND ABSCESS OF LEG: ICD-10-CM

## 2022-05-24 DIAGNOSIS — L97.901 LOWER EXTREMITY ULCERATION, UNSPECIFIED LATERALITY, LIMITED TO BREAKDOWN OF SKIN (CMD): ICD-10-CM

## 2022-05-24 DIAGNOSIS — L97.912 ULCER OF RIGHT LOWER EXTREMITY WITH FAT LAYER EXPOSED (CMD): Primary | ICD-10-CM

## 2022-05-24 DIAGNOSIS — L97.912 ULCER OF RIGHT LOWER EXTREMITY WITH FAT LAYER EXPOSED (CMD): ICD-10-CM

## 2022-05-24 DIAGNOSIS — L97.922 ULCER OF LEFT LOWER EXTREMITY WITH FAT LAYER EXPOSED (CMD): ICD-10-CM

## 2022-05-24 DIAGNOSIS — I87.331 CHRONIC VENOUS HYPERTENSION (IDIOPATHIC) WITH ULCER AND INFLAMMATION OF RIGHT LOWER EXTREMITY (CMD): ICD-10-CM

## 2022-05-24 PROCEDURE — 11042 DBRDMT SUBQ TIS 1ST 20SQCM/<: CPT

## 2022-05-24 PROCEDURE — 11045 DBRDMT SUBQ TISS EACH ADDL: CPT

## 2022-05-24 PROCEDURE — 10002800 HB RX 250 W HCPCS: Performed by: INTERNAL MEDICINE

## 2022-05-24 PROCEDURE — 96374 THER/PROPH/DIAG INJ IV PUSH: CPT

## 2022-05-24 RX ORDER — DAPTOMYCIN 50 MG/ML
200 INJECTION, POWDER, LYOPHILIZED, FOR SOLUTION INTRAVENOUS ONCE
Status: CANCELLED | OUTPATIENT
Start: 2022-05-30 | End: 2022-05-30

## 2022-05-24 RX ORDER — DAPTOMYCIN 50 MG/ML
200 INJECTION, POWDER, LYOPHILIZED, FOR SOLUTION INTRAVENOUS ONCE
Status: COMPLETED | OUTPATIENT
Start: 2022-05-24 | End: 2022-05-24

## 2022-05-24 RX ADMIN — DAPTOMYCIN 200 MG: 500 INJECTION, POWDER, LYOPHILIZED, FOR SOLUTION INTRAVENOUS at 13:15

## 2022-05-24 ASSESSMENT — PAIN SCALES - GENERAL
PAINLEVEL_OUTOF10: 10
PAINLEVEL_OUTOF10: 10

## 2022-05-25 ENCOUNTER — APPOINTMENT (OUTPATIENT)
Dept: INFUSION THERAPY | Age: 67
End: 2022-05-25

## 2022-05-25 ENCOUNTER — HOSPITAL ENCOUNTER (OUTPATIENT)
Dept: INFUSION THERAPY | Age: 67
Discharge: STILL ADMITTED OR ADMINISTRATIVE | End: 2022-05-25
Attending: RADIOLOGY

## 2022-05-25 VITALS
OXYGEN SATURATION: 98 % | TEMPERATURE: 98 F | RESPIRATION RATE: 16 BRPM | DIASTOLIC BLOOD PRESSURE: 66 MMHG | HEART RATE: 74 BPM | SYSTOLIC BLOOD PRESSURE: 116 MMHG

## 2022-05-25 DIAGNOSIS — L97.912 ULCER OF RIGHT LOWER EXTREMITY WITH FAT LAYER EXPOSED (CMD): ICD-10-CM

## 2022-05-25 DIAGNOSIS — L03.115 CELLULITIS OF RIGHT LOWER LEG: Primary | ICD-10-CM

## 2022-05-25 DIAGNOSIS — L97.901 LOWER EXTREMITY ULCERATION, UNSPECIFIED LATERALITY, LIMITED TO BREAKDOWN OF SKIN (CMD): ICD-10-CM

## 2022-05-25 PROCEDURE — 10004651 HB RX, NO CHARGE ITEM: Performed by: INTERNAL MEDICINE

## 2022-05-25 PROCEDURE — 96374 THER/PROPH/DIAG INJ IV PUSH: CPT

## 2022-05-25 PROCEDURE — 10002800 HB RX 250 W HCPCS: Performed by: INTERNAL MEDICINE

## 2022-05-25 RX ORDER — DAPTOMYCIN 50 MG/ML
200 INJECTION, POWDER, LYOPHILIZED, FOR SOLUTION INTRAVENOUS ONCE
Status: CANCELLED | OUTPATIENT
Start: 2022-05-26 | End: 2022-05-30

## 2022-05-25 RX ORDER — DAPTOMYCIN 50 MG/ML
200 INJECTION, POWDER, LYOPHILIZED, FOR SOLUTION INTRAVENOUS ONCE
Status: COMPLETED | OUTPATIENT
Start: 2022-05-25 | End: 2022-05-25

## 2022-05-25 RX ORDER — 0.9 % SODIUM CHLORIDE 0.9 %
10 VIAL (ML) INJECTION PRN
Status: DISCONTINUED | OUTPATIENT
Start: 2022-05-25 | End: 2022-05-27 | Stop reason: HOSPADM

## 2022-05-25 RX ORDER — 0.9 % SODIUM CHLORIDE 0.9 %
10 VIAL (ML) INJECTION PRN
Status: CANCELLED
Start: 2022-05-26

## 2022-05-25 RX ADMIN — SODIUM CHLORIDE 10 ML: 9 INJECTION, SOLUTION INTRAMUSCULAR; INTRAVENOUS; SUBCUTANEOUS at 08:11

## 2022-05-25 RX ADMIN — DAPTOMYCIN 200 MG: 500 INJECTION, POWDER, LYOPHILIZED, FOR SOLUTION INTRAVENOUS at 08:12

## 2022-05-25 RX ADMIN — SODIUM CHLORIDE 10 ML: 9 INJECTION, SOLUTION INTRAMUSCULAR; INTRAVENOUS; SUBCUTANEOUS at 08:15

## 2022-05-25 ASSESSMENT — PAIN SCALES - GENERAL: PAINLEVEL_OUTOF10: 9

## 2022-05-26 ENCOUNTER — APPOINTMENT (OUTPATIENT)
Dept: INFUSION THERAPY | Age: 67
End: 2022-05-26

## 2022-05-26 ENCOUNTER — HOSPITAL ENCOUNTER (OUTPATIENT)
Dept: INFUSION THERAPY | Age: 67
Discharge: STILL ADMITTED OR ADMINISTRATIVE | End: 2022-05-26
Attending: INTERNAL MEDICINE

## 2022-05-26 VITALS
OXYGEN SATURATION: 97 % | RESPIRATION RATE: 16 BRPM | SYSTOLIC BLOOD PRESSURE: 130 MMHG | DIASTOLIC BLOOD PRESSURE: 67 MMHG | TEMPERATURE: 97.8 F | HEART RATE: 78 BPM

## 2022-05-26 DIAGNOSIS — L03.115 CELLULITIS OF RIGHT LOWER LEG: Primary | ICD-10-CM

## 2022-05-26 DIAGNOSIS — L97.901 LOWER EXTREMITY ULCERATION, UNSPECIFIED LATERALITY, LIMITED TO BREAKDOWN OF SKIN (CMD): ICD-10-CM

## 2022-05-26 DIAGNOSIS — L97.912 ULCER OF RIGHT LOWER EXTREMITY WITH FAT LAYER EXPOSED (CMD): ICD-10-CM

## 2022-05-26 PROCEDURE — 10002800 HB RX 250 W HCPCS: Performed by: INTERNAL MEDICINE

## 2022-05-26 PROCEDURE — 10004651 HB RX, NO CHARGE ITEM: Performed by: INTERNAL MEDICINE

## 2022-05-26 PROCEDURE — 96374 THER/PROPH/DIAG INJ IV PUSH: CPT

## 2022-05-26 RX ORDER — 0.9 % SODIUM CHLORIDE 0.9 %
10 VIAL (ML) INJECTION PRN
Status: DISCONTINUED | OUTPATIENT
Start: 2022-05-26 | End: 2022-05-28 | Stop reason: HOSPADM

## 2022-05-26 RX ORDER — DAPTOMYCIN 50 MG/ML
200 INJECTION, POWDER, LYOPHILIZED, FOR SOLUTION INTRAVENOUS ONCE
Status: COMPLETED | OUTPATIENT
Start: 2022-05-26 | End: 2022-05-26

## 2022-05-26 RX ORDER — 0.9 % SODIUM CHLORIDE 0.9 %
10 VIAL (ML) INJECTION PRN
Status: CANCELLED
Start: 2022-05-30

## 2022-05-26 RX ORDER — DAPTOMYCIN 50 MG/ML
200 INJECTION, POWDER, LYOPHILIZED, FOR SOLUTION INTRAVENOUS ONCE
Status: CANCELLED | OUTPATIENT
Start: 2022-05-27 | End: 2022-05-30

## 2022-05-26 RX ADMIN — SODIUM CHLORIDE, PRESERVATIVE FREE 10 ML: 5 INJECTION INTRAVENOUS at 08:21

## 2022-05-26 RX ADMIN — DAPTOMYCIN 200 MG: 500 INJECTION, POWDER, LYOPHILIZED, FOR SOLUTION INTRAVENOUS at 08:16

## 2022-05-26 RX ADMIN — SODIUM CHLORIDE, PRESERVATIVE FREE 10 ML: 5 INJECTION INTRAVENOUS at 08:16

## 2022-05-26 ASSESSMENT — PAIN SCALES - GENERAL: PAINLEVEL_OUTOF10: 8

## 2022-05-27 ENCOUNTER — HOSPITAL ENCOUNTER (OUTPATIENT)
Dept: INFUSION THERAPY | Age: 67
Discharge: STILL ADMITTED OR ADMINISTRATIVE | End: 2022-05-27
Attending: INTERNAL MEDICINE

## 2022-05-27 VITALS
RESPIRATION RATE: 16 BRPM | HEART RATE: 85 BPM | DIASTOLIC BLOOD PRESSURE: 57 MMHG | OXYGEN SATURATION: 97 % | TEMPERATURE: 97.8 F | SYSTOLIC BLOOD PRESSURE: 125 MMHG

## 2022-05-27 DIAGNOSIS — L03.115 CELLULITIS OF RIGHT LOWER LEG: Primary | ICD-10-CM

## 2022-05-27 DIAGNOSIS — L97.901 LOWER EXTREMITY ULCERATION, UNSPECIFIED LATERALITY, LIMITED TO BREAKDOWN OF SKIN (CMD): ICD-10-CM

## 2022-05-27 DIAGNOSIS — L97.912 ULCER OF RIGHT LOWER EXTREMITY WITH FAT LAYER EXPOSED (CMD): ICD-10-CM

## 2022-05-27 PROCEDURE — 10002800 HB RX 250 W HCPCS: Performed by: INTERNAL MEDICINE

## 2022-05-27 PROCEDURE — 96374 THER/PROPH/DIAG INJ IV PUSH: CPT

## 2022-05-27 PROCEDURE — 10004651 HB RX, NO CHARGE ITEM: Performed by: INTERNAL MEDICINE

## 2022-05-27 RX ORDER — 0.9 % SODIUM CHLORIDE 0.9 %
10 VIAL (ML) INJECTION PRN
Status: DISCONTINUED | OUTPATIENT
Start: 2022-05-27 | End: 2022-05-29 | Stop reason: HOSPADM

## 2022-05-27 RX ORDER — 0.9 % SODIUM CHLORIDE 0.9 %
10 VIAL (ML) INJECTION PRN
Status: CANCELLED
Start: 2022-05-30

## 2022-05-27 RX ORDER — DAPTOMYCIN 50 MG/ML
200 INJECTION, POWDER, LYOPHILIZED, FOR SOLUTION INTRAVENOUS ONCE
Status: CANCELLED | OUTPATIENT
Start: 2022-05-28 | End: 2022-05-30

## 2022-05-27 RX ORDER — DAPTOMYCIN 50 MG/ML
200 INJECTION, POWDER, LYOPHILIZED, FOR SOLUTION INTRAVENOUS ONCE
Status: COMPLETED | OUTPATIENT
Start: 2022-05-27 | End: 2022-05-27

## 2022-05-27 RX ADMIN — DAPTOMYCIN 200 MG: 500 INJECTION, POWDER, LYOPHILIZED, FOR SOLUTION INTRAVENOUS at 13:09

## 2022-05-27 RX ADMIN — SODIUM CHLORIDE, PRESERVATIVE FREE 10 ML: 5 INJECTION INTRAVENOUS at 13:12

## 2022-05-27 RX ADMIN — SODIUM CHLORIDE, PRESERVATIVE FREE 10 ML: 5 INJECTION INTRAVENOUS at 13:09

## 2022-05-27 ASSESSMENT — PAIN SCALES - GENERAL: PAINLEVEL_OUTOF10: 8

## 2022-05-28 ENCOUNTER — HOSPITAL ENCOUNTER (OUTPATIENT)
Dept: INFUSION THERAPY | Age: 67
Discharge: STILL ADMITTED OR ADMINISTRATIVE | End: 2022-05-28

## 2022-05-28 VITALS
DIASTOLIC BLOOD PRESSURE: 69 MMHG | TEMPERATURE: 97.9 F | RESPIRATION RATE: 16 BRPM | SYSTOLIC BLOOD PRESSURE: 119 MMHG | OXYGEN SATURATION: 98 % | HEART RATE: 70 BPM

## 2022-05-28 DIAGNOSIS — L03.115 CELLULITIS OF RIGHT LOWER LEG: Primary | ICD-10-CM

## 2022-05-28 DIAGNOSIS — L97.912 ULCER OF RIGHT LOWER EXTREMITY WITH FAT LAYER EXPOSED (CMD): ICD-10-CM

## 2022-05-28 DIAGNOSIS — L97.901 LOWER EXTREMITY ULCERATION, UNSPECIFIED LATERALITY, LIMITED TO BREAKDOWN OF SKIN (CMD): ICD-10-CM

## 2022-05-28 PROCEDURE — 96374 THER/PROPH/DIAG INJ IV PUSH: CPT

## 2022-05-28 PROCEDURE — 10002800 HB RX 250 W HCPCS: Performed by: INTERNAL MEDICINE

## 2022-05-28 RX ORDER — 0.9 % SODIUM CHLORIDE 0.9 %
10 VIAL (ML) INJECTION PRN
Status: DISCONTINUED | OUTPATIENT
Start: 2022-05-28 | End: 2022-05-30 | Stop reason: HOSPADM

## 2022-05-28 RX ORDER — 0.9 % SODIUM CHLORIDE 0.9 %
10 VIAL (ML) INJECTION PRN
Status: CANCELLED
Start: 2022-05-30

## 2022-05-28 RX ORDER — DAPTOMYCIN 50 MG/ML
200 INJECTION, POWDER, LYOPHILIZED, FOR SOLUTION INTRAVENOUS ONCE
Status: COMPLETED | OUTPATIENT
Start: 2022-05-28 | End: 2022-05-28

## 2022-05-28 RX ORDER — DAPTOMYCIN 50 MG/ML
200 INJECTION, POWDER, LYOPHILIZED, FOR SOLUTION INTRAVENOUS ONCE
Status: CANCELLED | OUTPATIENT
Start: 2022-05-29 | End: 2022-05-30

## 2022-05-28 RX ADMIN — DAPTOMYCIN 200 MG: 500 INJECTION, POWDER, LYOPHILIZED, FOR SOLUTION INTRAVENOUS at 10:34

## 2022-05-29 ENCOUNTER — HOSPITAL ENCOUNTER (OUTPATIENT)
Dept: INFUSION THERAPY | Age: 67
Discharge: STILL ADMITTED OR ADMINISTRATIVE | End: 2022-05-29

## 2022-05-29 VITALS
HEART RATE: 74 BPM | DIASTOLIC BLOOD PRESSURE: 69 MMHG | TEMPERATURE: 97.7 F | OXYGEN SATURATION: 97 % | SYSTOLIC BLOOD PRESSURE: 115 MMHG | RESPIRATION RATE: 16 BRPM

## 2022-05-29 DIAGNOSIS — L97.912 ULCER OF RIGHT LOWER EXTREMITY WITH FAT LAYER EXPOSED (CMD): ICD-10-CM

## 2022-05-29 DIAGNOSIS — L97.901 LOWER EXTREMITY ULCERATION, UNSPECIFIED LATERALITY, LIMITED TO BREAKDOWN OF SKIN (CMD): ICD-10-CM

## 2022-05-29 DIAGNOSIS — L03.115 CELLULITIS OF RIGHT LOWER LEG: Primary | ICD-10-CM

## 2022-05-29 PROCEDURE — 10002800 HB RX 250 W HCPCS: Performed by: INTERNAL MEDICINE

## 2022-05-29 PROCEDURE — 96374 THER/PROPH/DIAG INJ IV PUSH: CPT

## 2022-05-29 RX ORDER — 0.9 % SODIUM CHLORIDE 0.9 %
10 VIAL (ML) INJECTION PRN
Status: DISCONTINUED | OUTPATIENT
Start: 2022-05-29 | End: 2022-05-31 | Stop reason: HOSPADM

## 2022-05-29 RX ORDER — DAPTOMYCIN 50 MG/ML
200 INJECTION, POWDER, LYOPHILIZED, FOR SOLUTION INTRAVENOUS ONCE
Status: CANCELLED | OUTPATIENT
Start: 2022-05-30 | End: 2022-05-30

## 2022-05-29 RX ORDER — DAPTOMYCIN 50 MG/ML
200 INJECTION, POWDER, LYOPHILIZED, FOR SOLUTION INTRAVENOUS ONCE
Status: COMPLETED | OUTPATIENT
Start: 2022-05-29 | End: 2022-05-29

## 2022-05-29 RX ORDER — 0.9 % SODIUM CHLORIDE 0.9 %
10 VIAL (ML) INJECTION PRN
Status: CANCELLED
Start: 2022-05-30

## 2022-05-29 RX ADMIN — DAPTOMYCIN 200 MG: 500 INJECTION, POWDER, LYOPHILIZED, FOR SOLUTION INTRAVENOUS at 08:59

## 2022-05-30 ENCOUNTER — HOSPITAL ENCOUNTER (OUTPATIENT)
Dept: INFUSION THERAPY | Age: 67
Discharge: STILL ADMITTED OR ADMINISTRATIVE | End: 2022-05-30

## 2022-05-30 VITALS
HEART RATE: 73 BPM | DIASTOLIC BLOOD PRESSURE: 70 MMHG | RESPIRATION RATE: 16 BRPM | SYSTOLIC BLOOD PRESSURE: 120 MMHG | TEMPERATURE: 97.9 F | OXYGEN SATURATION: 98 %

## 2022-05-30 DIAGNOSIS — L97.901 LOWER EXTREMITY ULCERATION, UNSPECIFIED LATERALITY, LIMITED TO BREAKDOWN OF SKIN (CMD): ICD-10-CM

## 2022-05-30 DIAGNOSIS — L03.115 CELLULITIS OF RIGHT LOWER LEG: Primary | ICD-10-CM

## 2022-05-30 DIAGNOSIS — L97.912 ULCER OF RIGHT LOWER EXTREMITY WITH FAT LAYER EXPOSED (CMD): ICD-10-CM

## 2022-05-30 LAB
ALBUMIN SERPL-MCNC: 3.5 G/DL (ref 3.6–5.1)
ALBUMIN/GLOB SERPL: 1.2 {RATIO} (ref 1–2.4)
ALP SERPL-CCNC: 53 UNITS/L (ref 45–117)
ALT SERPL-CCNC: 49 UNITS/L
ANION GAP SERPL CALC-SCNC: 11 MMOL/L (ref 7–19)
AST SERPL-CCNC: 35 UNITS/L
BASOPHILS # BLD: 0 K/MCL (ref 0–0.3)
BASOPHILS NFR BLD: 1 %
BILIRUB SERPL-MCNC: 0.4 MG/DL (ref 0.2–1)
BUN SERPL-MCNC: 24 MG/DL (ref 6–20)
BUN/CREAT SERPL: 37 (ref 7–25)
CALCIUM SERPL-MCNC: 10.4 MG/DL (ref 8.4–10.2)
CHLORIDE SERPL-SCNC: 112 MMOL/L (ref 97–110)
CK SERPL-CCNC: 257 UNITS/L (ref 26–192)
CO2 SERPL-SCNC: 23 MMOL/L (ref 21–32)
CREAT SERPL-MCNC: 0.65 MG/DL (ref 0.51–0.95)
CRP SERPL-MCNC: <0.3 MG/DL
DEPRECATED RDW RBC: 52.8 FL (ref 39–50)
EOSINOPHIL # BLD: 0.1 K/MCL (ref 0–0.5)
EOSINOPHIL NFR BLD: 1 %
ERYTHROCYTE [DISTWIDTH] IN BLOOD: 14.7 % (ref 11–15)
FASTING DURATION TIME PATIENT: ABNORMAL H
GFR SERPLBLD BASED ON 1.73 SQ M-ARVRAT: >90 ML/MIN
GLOBULIN SER-MCNC: 3 G/DL (ref 2–4)
GLUCOSE SERPL-MCNC: 93 MG/DL (ref 70–99)
HCT VFR BLD CALC: 36.5 % (ref 36–46.5)
HGB BLD-MCNC: 11.1 G/DL (ref 12–15.5)
IMM GRANULOCYTES # BLD AUTO: 0 K/MCL (ref 0–0.2)
IMM GRANULOCYTES # BLD: 1 %
LYMPHOCYTES # BLD: 0.7 K/MCL (ref 1–4)
LYMPHOCYTES NFR BLD: 11 %
MCH RBC QN AUTO: 29.4 PG (ref 26–34)
MCHC RBC AUTO-ENTMCNC: 30.4 G/DL (ref 32–36.5)
MCV RBC AUTO: 96.8 FL (ref 78–100)
MONOCYTES # BLD: 0.8 K/MCL (ref 0.3–0.9)
MONOCYTES NFR BLD: 12 %
NEUTROPHILS # BLD: 5.3 K/MCL (ref 1.8–7.7)
NEUTROPHILS NFR BLD: 74 %
NRBC BLD MANUAL-RTO: 0 /100 WBC
PLATELET # BLD AUTO: 232 K/MCL (ref 140–450)
POTASSIUM SERPL-SCNC: 3.7 MMOL/L (ref 3.4–5.1)
PROT SERPL-MCNC: 6.5 G/DL (ref 6.4–8.2)
RBC # BLD: 3.77 MIL/MCL (ref 4–5.2)
SODIUM SERPL-SCNC: 142 MMOL/L (ref 135–145)
WBC # BLD: 7 K/MCL (ref 4.2–11)

## 2022-05-30 PROCEDURE — 86140 C-REACTIVE PROTEIN: CPT | Performed by: INTERNAL MEDICINE

## 2022-05-30 PROCEDURE — 82550 ASSAY OF CK (CPK): CPT | Performed by: INTERNAL MEDICINE

## 2022-05-30 PROCEDURE — 96374 THER/PROPH/DIAG INJ IV PUSH: CPT

## 2022-05-30 PROCEDURE — 80053 COMPREHEN METABOLIC PANEL: CPT | Performed by: INTERNAL MEDICINE

## 2022-05-30 PROCEDURE — 85025 COMPLETE CBC W/AUTO DIFF WBC: CPT | Performed by: INTERNAL MEDICINE

## 2022-05-30 PROCEDURE — 10002800 HB RX 250 W HCPCS: Performed by: INTERNAL MEDICINE

## 2022-05-30 RX ORDER — DAPTOMYCIN 50 MG/ML
200 INJECTION, POWDER, LYOPHILIZED, FOR SOLUTION INTRAVENOUS ONCE
Status: CANCELLED | OUTPATIENT
Start: 2022-05-31 | End: 2022-06-06

## 2022-05-30 RX ORDER — 0.9 % SODIUM CHLORIDE 0.9 %
10 VIAL (ML) INJECTION PRN
Status: DISCONTINUED | OUTPATIENT
Start: 2022-05-30 | End: 2022-06-01 | Stop reason: HOSPADM

## 2022-05-30 RX ORDER — 0.9 % SODIUM CHLORIDE 0.9 %
10 VIAL (ML) INJECTION PRN
Status: CANCELLED
Start: 2022-06-06

## 2022-05-30 RX ORDER — DAPTOMYCIN 50 MG/ML
200 INJECTION, POWDER, LYOPHILIZED, FOR SOLUTION INTRAVENOUS ONCE
Status: COMPLETED | OUTPATIENT
Start: 2022-05-30 | End: 2022-05-30

## 2022-05-30 RX ADMIN — DAPTOMYCIN 200 MG: 500 INJECTION, POWDER, LYOPHILIZED, FOR SOLUTION INTRAVENOUS at 09:08

## 2022-05-31 ENCOUNTER — HOSPITAL ENCOUNTER (OUTPATIENT)
Dept: INFUSION THERAPY | Age: 67
Discharge: STILL ADMITTED OR ADMINISTRATIVE | End: 2022-05-31
Attending: INTERNAL MEDICINE

## 2022-05-31 VITALS
HEART RATE: 65 BPM | SYSTOLIC BLOOD PRESSURE: 107 MMHG | DIASTOLIC BLOOD PRESSURE: 68 MMHG | RESPIRATION RATE: 16 BRPM | TEMPERATURE: 98.2 F | OXYGEN SATURATION: 97 %

## 2022-05-31 DIAGNOSIS — L03.115 CELLULITIS OF RIGHT LOWER LEG: Primary | ICD-10-CM

## 2022-05-31 DIAGNOSIS — L97.912 ULCER OF RIGHT LOWER EXTREMITY WITH FAT LAYER EXPOSED (CMD): ICD-10-CM

## 2022-05-31 DIAGNOSIS — L97.901 LOWER EXTREMITY ULCERATION, UNSPECIFIED LATERALITY, LIMITED TO BREAKDOWN OF SKIN (CMD): ICD-10-CM

## 2022-05-31 PROCEDURE — 10002800 HB RX 250 W HCPCS: Performed by: INTERNAL MEDICINE

## 2022-05-31 PROCEDURE — 10004651 HB RX, NO CHARGE ITEM: Performed by: INTERNAL MEDICINE

## 2022-05-31 PROCEDURE — 96374 THER/PROPH/DIAG INJ IV PUSH: CPT

## 2022-05-31 RX ORDER — DAPTOMYCIN 50 MG/ML
200 INJECTION, POWDER, LYOPHILIZED, FOR SOLUTION INTRAVENOUS ONCE
Status: CANCELLED | OUTPATIENT
Start: 2022-06-01 | End: 2022-06-06

## 2022-05-31 RX ORDER — 0.9 % SODIUM CHLORIDE 0.9 %
10 VIAL (ML) INJECTION PRN
Status: CANCELLED
Start: 2022-06-06

## 2022-05-31 RX ORDER — 0.9 % SODIUM CHLORIDE 0.9 %
10 VIAL (ML) INJECTION PRN
Status: DISCONTINUED | OUTPATIENT
Start: 2022-05-31 | End: 2022-06-02 | Stop reason: HOSPADM

## 2022-05-31 RX ORDER — DAPTOMYCIN 50 MG/ML
200 INJECTION, POWDER, LYOPHILIZED, FOR SOLUTION INTRAVENOUS ONCE
Status: COMPLETED | OUTPATIENT
Start: 2022-05-31 | End: 2022-05-31

## 2022-05-31 RX ADMIN — SODIUM CHLORIDE, PRESERVATIVE FREE 10 ML: 5 INJECTION INTRAVENOUS at 14:09

## 2022-05-31 RX ADMIN — DAPTOMYCIN 200 MG: 500 INJECTION, POWDER, LYOPHILIZED, FOR SOLUTION INTRAVENOUS at 14:09

## 2022-05-31 ASSESSMENT — PAIN SCALES - GENERAL: PAINLEVEL_OUTOF10: 4

## 2022-06-01 ENCOUNTER — HOSPITAL ENCOUNTER (OUTPATIENT)
Dept: INFUSION THERAPY | Age: 67
Discharge: STILL ADMITTED OR ADMINISTRATIVE | End: 2022-06-01
Attending: INTERNAL MEDICINE

## 2022-06-01 VITALS
OXYGEN SATURATION: 98 % | RESPIRATION RATE: 16 BRPM | DIASTOLIC BLOOD PRESSURE: 73 MMHG | SYSTOLIC BLOOD PRESSURE: 139 MMHG | HEART RATE: 68 BPM | TEMPERATURE: 97.9 F

## 2022-06-01 DIAGNOSIS — L03.115 CELLULITIS OF RIGHT LOWER LEG: Primary | ICD-10-CM

## 2022-06-01 DIAGNOSIS — L97.901 LOWER EXTREMITY ULCERATION, UNSPECIFIED LATERALITY, LIMITED TO BREAKDOWN OF SKIN (CMD): ICD-10-CM

## 2022-06-01 DIAGNOSIS — L97.912 ULCER OF RIGHT LOWER EXTREMITY WITH FAT LAYER EXPOSED (CMD): ICD-10-CM

## 2022-06-01 PROCEDURE — 96374 THER/PROPH/DIAG INJ IV PUSH: CPT

## 2022-06-01 PROCEDURE — 10002800 HB RX 250 W HCPCS: Performed by: INTERNAL MEDICINE

## 2022-06-01 RX ORDER — 0.9 % SODIUM CHLORIDE 0.9 %
10 VIAL (ML) INJECTION PRN
Status: CANCELLED
Start: 2022-06-06

## 2022-06-01 RX ORDER — DAPTOMYCIN 50 MG/ML
200 INJECTION, POWDER, LYOPHILIZED, FOR SOLUTION INTRAVENOUS ONCE
Status: COMPLETED | OUTPATIENT
Start: 2022-06-01 | End: 2022-06-01

## 2022-06-01 RX ORDER — 0.9 % SODIUM CHLORIDE 0.9 %
10 VIAL (ML) INJECTION PRN
Status: DISCONTINUED | OUTPATIENT
Start: 2022-06-01 | End: 2022-06-03 | Stop reason: HOSPADM

## 2022-06-01 RX ORDER — DAPTOMYCIN 50 MG/ML
200 INJECTION, POWDER, LYOPHILIZED, FOR SOLUTION INTRAVENOUS ONCE
Status: CANCELLED | OUTPATIENT
Start: 2022-06-06 | End: 2022-06-06

## 2022-06-01 RX ORDER — DAPTOMYCIN 50 MG/ML
4 INJECTION, POWDER, LYOPHILIZED, FOR SOLUTION INTRAVENOUS ONCE
Status: CANCELLED | OUTPATIENT
Start: 2022-06-06 | End: 2022-06-06

## 2022-06-01 RX ADMIN — DAPTOMYCIN 200 MG: 500 INJECTION, POWDER, LYOPHILIZED, FOR SOLUTION INTRAVENOUS at 09:58

## 2022-06-02 ENCOUNTER — HOSPITAL ENCOUNTER (OUTPATIENT)
Dept: INFUSION THERAPY | Age: 67
Discharge: STILL ADMITTED OR ADMINISTRATIVE | End: 2022-06-02
Attending: OBSTETRICS & GYNECOLOGY

## 2022-06-02 VITALS
SYSTOLIC BLOOD PRESSURE: 98 MMHG | RESPIRATION RATE: 16 BRPM | TEMPERATURE: 97.3 F | OXYGEN SATURATION: 97 % | DIASTOLIC BLOOD PRESSURE: 60 MMHG | HEART RATE: 79 BPM

## 2022-06-02 DIAGNOSIS — L03.115 CELLULITIS OF RIGHT LOWER LEG: Primary | ICD-10-CM

## 2022-06-02 DIAGNOSIS — L97.901 LOWER EXTREMITY ULCERATION, UNSPECIFIED LATERALITY, LIMITED TO BREAKDOWN OF SKIN (CMD): ICD-10-CM

## 2022-06-02 DIAGNOSIS — L97.912 ULCER OF RIGHT LOWER EXTREMITY WITH FAT LAYER EXPOSED (CMD): ICD-10-CM

## 2022-06-02 PROCEDURE — 10002800 HB RX 250 W HCPCS: Performed by: INTERNAL MEDICINE

## 2022-06-02 PROCEDURE — 96374 THER/PROPH/DIAG INJ IV PUSH: CPT

## 2022-06-02 PROCEDURE — 10004651 HB RX, NO CHARGE ITEM: Performed by: INTERNAL MEDICINE

## 2022-06-02 RX ORDER — 0.9 % SODIUM CHLORIDE 0.9 %
10 VIAL (ML) INJECTION PRN
Status: DISCONTINUED | OUTPATIENT
Start: 2022-06-02 | End: 2022-06-04 | Stop reason: HOSPADM

## 2022-06-02 RX ORDER — DAPTOMYCIN 50 MG/ML
200 INJECTION, POWDER, LYOPHILIZED, FOR SOLUTION INTRAVENOUS ONCE
Status: COMPLETED | OUTPATIENT
Start: 2022-06-02 | End: 2022-06-02

## 2022-06-02 RX ORDER — DAPTOMYCIN 50 MG/ML
200 INJECTION, POWDER, LYOPHILIZED, FOR SOLUTION INTRAVENOUS ONCE
Status: CANCELLED | OUTPATIENT
Start: 2022-06-03 | End: 2022-06-06

## 2022-06-02 RX ORDER — 0.9 % SODIUM CHLORIDE 0.9 %
10 VIAL (ML) INJECTION PRN
Status: CANCELLED
Start: 2022-06-03

## 2022-06-02 RX ADMIN — SODIUM CHLORIDE, PRESERVATIVE FREE 10 ML: 5 INJECTION INTRAVENOUS at 08:27

## 2022-06-02 RX ADMIN — SODIUM CHLORIDE, PRESERVATIVE FREE 10 ML: 5 INJECTION INTRAVENOUS at 08:22

## 2022-06-02 RX ADMIN — DAPTOMYCIN 200 MG: 500 INJECTION, POWDER, LYOPHILIZED, FOR SOLUTION INTRAVENOUS at 08:23

## 2022-06-02 ASSESSMENT — PAIN SCALES - GENERAL: PAINLEVEL_OUTOF10: 8

## 2022-06-03 ENCOUNTER — HOSPITAL ENCOUNTER (OUTPATIENT)
Dept: INFUSION THERAPY | Age: 67
Discharge: STILL ADMITTED OR ADMINISTRATIVE | End: 2022-06-03

## 2022-06-03 VITALS
TEMPERATURE: 98.2 F | OXYGEN SATURATION: 98 % | DIASTOLIC BLOOD PRESSURE: 58 MMHG | SYSTOLIC BLOOD PRESSURE: 113 MMHG | HEART RATE: 82 BPM | RESPIRATION RATE: 16 BRPM

## 2022-06-03 DIAGNOSIS — L97.912 ULCER OF RIGHT LOWER EXTREMITY WITH FAT LAYER EXPOSED (CMD): ICD-10-CM

## 2022-06-03 DIAGNOSIS — L03.115 CELLULITIS OF RIGHT LOWER LEG: Primary | ICD-10-CM

## 2022-06-03 DIAGNOSIS — L97.901 LOWER EXTREMITY ULCERATION, UNSPECIFIED LATERALITY, LIMITED TO BREAKDOWN OF SKIN (CMD): ICD-10-CM

## 2022-06-03 PROCEDURE — 10002800 HB RX 250 W HCPCS: Performed by: INTERNAL MEDICINE

## 2022-06-03 PROCEDURE — 10004651 HB RX, NO CHARGE ITEM: Performed by: INTERNAL MEDICINE

## 2022-06-03 PROCEDURE — 96374 THER/PROPH/DIAG INJ IV PUSH: CPT

## 2022-06-03 RX ORDER — DAPTOMYCIN 50 MG/ML
200 INJECTION, POWDER, LYOPHILIZED, FOR SOLUTION INTRAVENOUS ONCE
Status: COMPLETED | OUTPATIENT
Start: 2022-06-03 | End: 2022-06-03

## 2022-06-03 RX ORDER — 0.9 % SODIUM CHLORIDE 0.9 %
10 VIAL (ML) INJECTION PRN
Status: DISCONTINUED | OUTPATIENT
Start: 2022-06-03 | End: 2022-06-05 | Stop reason: HOSPADM

## 2022-06-03 RX ORDER — DAPTOMYCIN 50 MG/ML
200 INJECTION, POWDER, LYOPHILIZED, FOR SOLUTION INTRAVENOUS ONCE
Status: CANCELLED | OUTPATIENT
Start: 2022-06-04 | End: 2022-06-06

## 2022-06-03 RX ORDER — 0.9 % SODIUM CHLORIDE 0.9 %
10 VIAL (ML) INJECTION PRN
Status: CANCELLED
Start: 2022-06-06

## 2022-06-03 RX ADMIN — SODIUM CHLORIDE, PRESERVATIVE FREE 10 ML: 5 INJECTION INTRAVENOUS at 13:11

## 2022-06-03 RX ADMIN — DAPTOMYCIN 200 MG: 500 INJECTION, POWDER, LYOPHILIZED, FOR SOLUTION INTRAVENOUS at 13:11

## 2022-06-03 ASSESSMENT — PAIN SCALES - GENERAL: PAINLEVEL_OUTOF10: 9

## 2022-06-04 ENCOUNTER — HOSPITAL ENCOUNTER (OUTPATIENT)
Dept: INFUSION THERAPY | Age: 67
Discharge: STILL ADMITTED OR ADMINISTRATIVE | End: 2022-06-04
Attending: INTERNAL MEDICINE

## 2022-06-04 VITALS
HEART RATE: 68 BPM | RESPIRATION RATE: 16 BRPM | DIASTOLIC BLOOD PRESSURE: 68 MMHG | SYSTOLIC BLOOD PRESSURE: 126 MMHG | OXYGEN SATURATION: 99 % | TEMPERATURE: 98 F

## 2022-06-04 DIAGNOSIS — L03.115 CELLULITIS OF RIGHT LOWER LEG: Primary | ICD-10-CM

## 2022-06-04 DIAGNOSIS — L97.912 ULCER OF RIGHT LOWER EXTREMITY WITH FAT LAYER EXPOSED (CMD): ICD-10-CM

## 2022-06-04 DIAGNOSIS — L97.901 LOWER EXTREMITY ULCERATION, UNSPECIFIED LATERALITY, LIMITED TO BREAKDOWN OF SKIN (CMD): ICD-10-CM

## 2022-06-04 PROCEDURE — 96374 THER/PROPH/DIAG INJ IV PUSH: CPT

## 2022-06-04 PROCEDURE — 10002800 HB RX 250 W HCPCS: Performed by: INTERNAL MEDICINE

## 2022-06-04 RX ORDER — DAPTOMYCIN 50 MG/ML
200 INJECTION, POWDER, LYOPHILIZED, FOR SOLUTION INTRAVENOUS ONCE
Status: COMPLETED | OUTPATIENT
Start: 2022-06-04 | End: 2022-06-04

## 2022-06-04 RX ORDER — 0.9 % SODIUM CHLORIDE 0.9 %
10 VIAL (ML) INJECTION PRN
Status: DISCONTINUED | OUTPATIENT
Start: 2022-06-04 | End: 2022-06-06 | Stop reason: HOSPADM

## 2022-06-04 RX ORDER — 0.9 % SODIUM CHLORIDE 0.9 %
10 VIAL (ML) INJECTION PRN
Status: CANCELLED
Start: 2022-06-06

## 2022-06-04 RX ORDER — DAPTOMYCIN 50 MG/ML
200 INJECTION, POWDER, LYOPHILIZED, FOR SOLUTION INTRAVENOUS ONCE
Status: CANCELLED | OUTPATIENT
Start: 2022-06-05 | End: 2022-06-06

## 2022-06-04 RX ADMIN — DAPTOMYCIN 200 MG: 500 INJECTION, POWDER, LYOPHILIZED, FOR SOLUTION INTRAVENOUS at 13:10

## 2022-06-04 ASSESSMENT — PAIN SCALES - GENERAL: PAINLEVEL_OUTOF10: 9

## 2022-06-05 ENCOUNTER — HOSPITAL ENCOUNTER (OUTPATIENT)
Dept: INFUSION THERAPY | Age: 67
Discharge: STILL ADMITTED OR ADMINISTRATIVE | End: 2022-06-05
Attending: INTERNAL MEDICINE

## 2022-06-05 VITALS
SYSTOLIC BLOOD PRESSURE: 121 MMHG | TEMPERATURE: 98.5 F | HEART RATE: 74 BPM | OXYGEN SATURATION: 98 % | DIASTOLIC BLOOD PRESSURE: 54 MMHG | RESPIRATION RATE: 16 BRPM

## 2022-06-05 DIAGNOSIS — L03.115 CELLULITIS OF RIGHT LOWER LEG: Primary | ICD-10-CM

## 2022-06-05 DIAGNOSIS — L97.912 ULCER OF RIGHT LOWER EXTREMITY WITH FAT LAYER EXPOSED (CMD): ICD-10-CM

## 2022-06-05 DIAGNOSIS — L97.901 LOWER EXTREMITY ULCERATION, UNSPECIFIED LATERALITY, LIMITED TO BREAKDOWN OF SKIN (CMD): ICD-10-CM

## 2022-06-05 PROCEDURE — 96374 THER/PROPH/DIAG INJ IV PUSH: CPT

## 2022-06-05 PROCEDURE — 10002800 HB RX 250 W HCPCS: Performed by: INTERNAL MEDICINE

## 2022-06-05 RX ORDER — 0.9 % SODIUM CHLORIDE 0.9 %
10 VIAL (ML) INJECTION PRN
Status: CANCELLED
Start: 2022-06-06

## 2022-06-05 RX ORDER — 0.9 % SODIUM CHLORIDE 0.9 %
10 VIAL (ML) INJECTION PRN
Status: DISCONTINUED | OUTPATIENT
Start: 2022-06-05 | End: 2022-06-07 | Stop reason: HOSPADM

## 2022-06-05 RX ORDER — DAPTOMYCIN 50 MG/ML
200 INJECTION, POWDER, LYOPHILIZED, FOR SOLUTION INTRAVENOUS ONCE
Status: COMPLETED | OUTPATIENT
Start: 2022-06-05 | End: 2022-06-05

## 2022-06-05 RX ORDER — DAPTOMYCIN 50 MG/ML
200 INJECTION, POWDER, LYOPHILIZED, FOR SOLUTION INTRAVENOUS ONCE
Status: CANCELLED | OUTPATIENT
Start: 2022-06-06 | End: 2022-06-06

## 2022-06-05 RX ADMIN — DAPTOMYCIN 200 MG: 500 INJECTION, POWDER, LYOPHILIZED, FOR SOLUTION INTRAVENOUS at 09:37

## 2022-06-05 ASSESSMENT — PAIN SCALES - GENERAL: PAINLEVEL_OUTOF10: 8

## 2022-06-06 ENCOUNTER — HOSPITAL ENCOUNTER (OUTPATIENT)
Dept: INFUSION THERAPY | Age: 67
Discharge: STILL ADMITTED OR ADMINISTRATIVE | End: 2022-06-06
Attending: INTERNAL MEDICINE

## 2022-06-06 VITALS
RESPIRATION RATE: 16 BRPM | DIASTOLIC BLOOD PRESSURE: 73 MMHG | SYSTOLIC BLOOD PRESSURE: 121 MMHG | OXYGEN SATURATION: 99 % | TEMPERATURE: 97.8 F | HEART RATE: 72 BPM

## 2022-06-06 DIAGNOSIS — L97.912 ULCER OF RIGHT LOWER EXTREMITY WITH FAT LAYER EXPOSED (CMD): ICD-10-CM

## 2022-06-06 DIAGNOSIS — L03.115 CELLULITIS OF RIGHT LOWER LEG: Primary | ICD-10-CM

## 2022-06-06 DIAGNOSIS — L97.901 LOWER EXTREMITY ULCERATION, UNSPECIFIED LATERALITY, LIMITED TO BREAKDOWN OF SKIN (CMD): ICD-10-CM

## 2022-06-06 LAB
ALBUMIN SERPL-MCNC: 3.6 G/DL (ref 3.6–5.1)
ALBUMIN/GLOB SERPL: 1.2 {RATIO} (ref 1–2.4)
ALP SERPL-CCNC: 67 UNITS/L (ref 45–117)
ALT SERPL-CCNC: 41 UNITS/L
ANION GAP SERPL CALC-SCNC: 9 MMOL/L (ref 7–19)
AST SERPL-CCNC: 40 UNITS/L
BASOPHILS # BLD: 0.1 K/MCL (ref 0–0.3)
BASOPHILS NFR BLD: 0 %
BILIRUB SERPL-MCNC: 0.5 MG/DL (ref 0.2–1)
BUN SERPL-MCNC: 19 MG/DL (ref 6–20)
BUN/CREAT SERPL: 33 (ref 7–25)
CALCIUM SERPL-MCNC: 9.9 MG/DL (ref 8.4–10.2)
CHLORIDE SERPL-SCNC: 108 MMOL/L (ref 97–110)
CK SERPL-CCNC: 287 UNITS/L (ref 26–192)
CO2 SERPL-SCNC: 26 MMOL/L (ref 21–32)
CREAT SERPL-MCNC: 0.58 MG/DL (ref 0.51–0.95)
CRP SERPL-MCNC: <0.3 MG/DL
DEPRECATED RDW RBC: 51.9 FL (ref 39–50)
EOSINOPHIL # BLD: 0 K/MCL (ref 0–0.5)
EOSINOPHIL NFR BLD: 0 %
ERYTHROCYTE [DISTWIDTH] IN BLOOD: 14.5 % (ref 11–15)
FASTING DURATION TIME PATIENT: ABNORMAL H
GFR SERPLBLD BASED ON 1.73 SQ M-ARVRAT: >90 ML/MIN
GLOBULIN SER-MCNC: 3 G/DL (ref 2–4)
GLUCOSE SERPL-MCNC: 79 MG/DL (ref 70–99)
HCT VFR BLD CALC: 37.4 % (ref 36–46.5)
HGB BLD-MCNC: 11.3 G/DL (ref 12–15.5)
IMM GRANULOCYTES # BLD AUTO: 0.1 K/MCL (ref 0–0.2)
IMM GRANULOCYTES # BLD: 0 %
LYMPHOCYTES # BLD: 0.6 K/MCL (ref 1–4)
LYMPHOCYTES NFR BLD: 5 %
MCH RBC QN AUTO: 29.4 PG (ref 26–34)
MCHC RBC AUTO-ENTMCNC: 30.2 G/DL (ref 32–36.5)
MCV RBC AUTO: 97.4 FL (ref 78–100)
MONOCYTES # BLD: 1 K/MCL (ref 0.3–0.9)
MONOCYTES NFR BLD: 7 %
NEUTROPHILS # BLD: 12.1 K/MCL (ref 1.8–7.7)
NEUTROPHILS NFR BLD: 88 %
NRBC BLD MANUAL-RTO: 0 /100 WBC
PLATELET # BLD AUTO: 251 K/MCL (ref 140–450)
POTASSIUM SERPL-SCNC: 3.4 MMOL/L (ref 3.4–5.1)
PROT SERPL-MCNC: 6.6 G/DL (ref 6.4–8.2)
RAINBOW EXTRA TUBES HOLD SPECIMEN: NORMAL
RBC # BLD: 3.84 MIL/MCL (ref 4–5.2)
SODIUM SERPL-SCNC: 140 MMOL/L (ref 135–145)
WBC # BLD: 13.9 K/MCL (ref 4.2–11)

## 2022-06-06 PROCEDURE — 10002800 HB RX 250 W HCPCS: Performed by: INTERNAL MEDICINE

## 2022-06-06 PROCEDURE — 86140 C-REACTIVE PROTEIN: CPT | Performed by: INTERNAL MEDICINE

## 2022-06-06 PROCEDURE — 10004651 HB RX, NO CHARGE ITEM: Performed by: INTERNAL MEDICINE

## 2022-06-06 PROCEDURE — 96374 THER/PROPH/DIAG INJ IV PUSH: CPT

## 2022-06-06 PROCEDURE — 80053 COMPREHEN METABOLIC PANEL: CPT | Performed by: INTERNAL MEDICINE

## 2022-06-06 PROCEDURE — 85025 COMPLETE CBC W/AUTO DIFF WBC: CPT | Performed by: INTERNAL MEDICINE

## 2022-06-06 PROCEDURE — 82550 ASSAY OF CK (CPK): CPT | Performed by: INTERNAL MEDICINE

## 2022-06-06 PROCEDURE — 36415 COLL VENOUS BLD VENIPUNCTURE: CPT

## 2022-06-06 RX ORDER — 0.9 % SODIUM CHLORIDE 0.9 %
10 VIAL (ML) INJECTION PRN
Status: CANCELLED
Start: 2022-06-13

## 2022-06-06 RX ORDER — 0.9 % SODIUM CHLORIDE 0.9 %
10 VIAL (ML) INJECTION PRN
Status: DISCONTINUED | OUTPATIENT
Start: 2022-06-06 | End: 2022-06-08 | Stop reason: HOSPADM

## 2022-06-06 RX ORDER — DAPTOMYCIN 50 MG/ML
200 INJECTION, POWDER, LYOPHILIZED, FOR SOLUTION INTRAVENOUS ONCE
Status: CANCELLED | OUTPATIENT
Start: 2022-06-07 | End: 2022-06-13

## 2022-06-06 RX ORDER — DAPTOMYCIN 50 MG/ML
200 INJECTION, POWDER, LYOPHILIZED, FOR SOLUTION INTRAVENOUS ONCE
Status: COMPLETED | OUTPATIENT
Start: 2022-06-06 | End: 2022-06-06

## 2022-06-06 RX ADMIN — SODIUM CHLORIDE, PRESERVATIVE FREE 10 ML: 5 INJECTION INTRAVENOUS at 13:17

## 2022-06-06 RX ADMIN — DAPTOMYCIN 200 MG: 500 INJECTION, POWDER, LYOPHILIZED, FOR SOLUTION INTRAVENOUS at 13:15

## 2022-06-06 ASSESSMENT — PAIN SCALES - GENERAL: PAINLEVEL_OUTOF10: 9

## 2022-06-07 ENCOUNTER — HOSPITAL ENCOUNTER (OUTPATIENT)
Dept: WOUND CARE | Age: 67
Discharge: STILL ADMITTED OR ADMINISTRATIVE | End: 2022-06-07
Attending: FAMILY MEDICINE

## 2022-06-07 ENCOUNTER — HOSPITAL ENCOUNTER (OUTPATIENT)
Dept: INFUSION THERAPY | Age: 67
Discharge: STILL ADMITTED OR ADMINISTRATIVE | End: 2022-06-07
Attending: INTERNAL MEDICINE

## 2022-06-07 VITALS
RESPIRATION RATE: 16 BRPM | SYSTOLIC BLOOD PRESSURE: 112 MMHG | HEART RATE: 77 BPM | DIASTOLIC BLOOD PRESSURE: 68 MMHG | OXYGEN SATURATION: 98 % | TEMPERATURE: 98 F

## 2022-06-07 VITALS — TEMPERATURE: 97 F

## 2022-06-07 DIAGNOSIS — L97.912 ULCER OF RIGHT LOWER EXTREMITY WITH FAT LAYER EXPOSED (CMD): Primary | ICD-10-CM

## 2022-06-07 DIAGNOSIS — L03.115 CELLULITIS OF RIGHT LOWER LEG: Primary | ICD-10-CM

## 2022-06-07 DIAGNOSIS — L97.901 LOWER EXTREMITY ULCERATION, UNSPECIFIED LATERALITY, LIMITED TO BREAKDOWN OF SKIN (CMD): ICD-10-CM

## 2022-06-07 DIAGNOSIS — L02.419 CELLULITIS AND ABSCESS OF LEG: ICD-10-CM

## 2022-06-07 DIAGNOSIS — L97.912 ULCER OF RIGHT LOWER EXTREMITY WITH FAT LAYER EXPOSED (CMD): ICD-10-CM

## 2022-06-07 DIAGNOSIS — L03.119 CELLULITIS AND ABSCESS OF LEG: ICD-10-CM

## 2022-06-07 DIAGNOSIS — L97.922 ULCER OF LEFT LOWER EXTREMITY WITH FAT LAYER EXPOSED (CMD): ICD-10-CM

## 2022-06-07 DIAGNOSIS — I87.332 CHRONIC VENOUS HYPERTENSION (IDIOPATHIC) WITH ULCER AND INFLAMMATION OF LEFT LOWER EXTREMITY (CMD): ICD-10-CM

## 2022-06-07 DIAGNOSIS — I87.331 CHRONIC VENOUS HYPERTENSION (IDIOPATHIC) WITH ULCER AND INFLAMMATION OF RIGHT LOWER EXTREMITY (CMD): ICD-10-CM

## 2022-06-07 PROCEDURE — 10002800 HB RX 250 W HCPCS: Performed by: INTERNAL MEDICINE

## 2022-06-07 PROCEDURE — 10004651 HB RX, NO CHARGE ITEM: Performed by: INTERNAL MEDICINE

## 2022-06-07 PROCEDURE — 11045 DBRDMT SUBQ TISS EACH ADDL: CPT

## 2022-06-07 PROCEDURE — 11042 DBRDMT SUBQ TIS 1ST 20SQCM/<: CPT

## 2022-06-07 PROCEDURE — 96374 THER/PROPH/DIAG INJ IV PUSH: CPT

## 2022-06-07 RX ORDER — DAPTOMYCIN 50 MG/ML
200 INJECTION, POWDER, LYOPHILIZED, FOR SOLUTION INTRAVENOUS ONCE
Status: CANCELLED | OUTPATIENT
Start: 2022-06-08 | End: 2022-06-13

## 2022-06-07 RX ORDER — DAPTOMYCIN 50 MG/ML
200 INJECTION, POWDER, LYOPHILIZED, FOR SOLUTION INTRAVENOUS ONCE
Status: COMPLETED | OUTPATIENT
Start: 2022-06-07 | End: 2022-06-07

## 2022-06-07 RX ORDER — 0.9 % SODIUM CHLORIDE 0.9 %
10 VIAL (ML) INJECTION PRN
Status: DISCONTINUED | OUTPATIENT
Start: 2022-06-07 | End: 2022-06-09 | Stop reason: HOSPADM

## 2022-06-07 RX ORDER — 0.9 % SODIUM CHLORIDE 0.9 %
10 VIAL (ML) INJECTION PRN
Status: CANCELLED
Start: 2022-06-13

## 2022-06-07 RX ADMIN — DAPTOMYCIN 200 MG: 500 INJECTION, POWDER, LYOPHILIZED, FOR SOLUTION INTRAVENOUS at 14:09

## 2022-06-07 RX ADMIN — SODIUM CHLORIDE, PRESERVATIVE FREE 10 ML: 5 INJECTION INTRAVENOUS at 14:14

## 2022-06-07 ASSESSMENT — PAIN SCALES - GENERAL
PAINLEVEL_OUTOF10: 6
PAINLEVEL_OUTOF10: 10

## 2022-06-08 ENCOUNTER — HOSPITAL ENCOUNTER (OUTPATIENT)
Dept: INFUSION THERAPY | Age: 67
Discharge: STILL ADMITTED OR ADMINISTRATIVE | End: 2022-06-08
Attending: INTERNAL MEDICINE

## 2022-06-08 VITALS
SYSTOLIC BLOOD PRESSURE: 122 MMHG | OXYGEN SATURATION: 100 % | HEART RATE: 72 BPM | TEMPERATURE: 97.9 F | RESPIRATION RATE: 16 BRPM | DIASTOLIC BLOOD PRESSURE: 77 MMHG

## 2022-06-08 DIAGNOSIS — L03.115 CELLULITIS OF RIGHT LOWER LEG: Primary | ICD-10-CM

## 2022-06-08 DIAGNOSIS — L97.901 LOWER EXTREMITY ULCERATION, UNSPECIFIED LATERALITY, LIMITED TO BREAKDOWN OF SKIN (CMD): ICD-10-CM

## 2022-06-08 DIAGNOSIS — L97.912 ULCER OF RIGHT LOWER EXTREMITY WITH FAT LAYER EXPOSED (CMD): ICD-10-CM

## 2022-06-08 PROCEDURE — 10002800 HB RX 250 W HCPCS: Performed by: INTERNAL MEDICINE

## 2022-06-08 PROCEDURE — 96374 THER/PROPH/DIAG INJ IV PUSH: CPT

## 2022-06-08 PROCEDURE — 10004651 HB RX, NO CHARGE ITEM: Performed by: INTERNAL MEDICINE

## 2022-06-08 RX ORDER — DAPTOMYCIN 50 MG/ML
200 INJECTION, POWDER, LYOPHILIZED, FOR SOLUTION INTRAVENOUS ONCE
Status: COMPLETED | OUTPATIENT
Start: 2022-06-08 | End: 2022-06-08

## 2022-06-08 RX ORDER — DAPTOMYCIN 50 MG/ML
200 INJECTION, POWDER, LYOPHILIZED, FOR SOLUTION INTRAVENOUS ONCE
Status: CANCELLED | OUTPATIENT
Start: 2022-06-09 | End: 2022-06-13

## 2022-06-08 RX ORDER — 0.9 % SODIUM CHLORIDE 0.9 %
10 VIAL (ML) INJECTION PRN
Status: DISCONTINUED | OUTPATIENT
Start: 2022-06-08 | End: 2022-06-10 | Stop reason: HOSPADM

## 2022-06-08 RX ORDER — 0.9 % SODIUM CHLORIDE 0.9 %
10 VIAL (ML) INJECTION PRN
Status: CANCELLED
Start: 2022-06-13

## 2022-06-08 RX ADMIN — DAPTOMYCIN 200 MG: 500 INJECTION, POWDER, LYOPHILIZED, FOR SOLUTION INTRAVENOUS at 15:19

## 2022-06-08 RX ADMIN — SODIUM CHLORIDE, PRESERVATIVE FREE 10 ML: 5 INJECTION INTRAVENOUS at 15:19

## 2022-06-08 ASSESSMENT — PAIN SCALES - GENERAL: PAINLEVEL_OUTOF10: 10

## 2022-06-09 ENCOUNTER — APPOINTMENT (OUTPATIENT)
Dept: CT IMAGING | Age: 67
End: 2022-06-09
Attending: FAMILY MEDICINE

## 2022-06-09 ENCOUNTER — HOSPITAL ENCOUNTER (OUTPATIENT)
Dept: INFUSION THERAPY | Age: 67
Discharge: STILL ADMITTED OR ADMINISTRATIVE | End: 2022-06-09
Attending: INTERNAL MEDICINE

## 2022-06-09 VITALS
RESPIRATION RATE: 16 BRPM | DIASTOLIC BLOOD PRESSURE: 67 MMHG | TEMPERATURE: 97.7 F | SYSTOLIC BLOOD PRESSURE: 121 MMHG | HEART RATE: 73 BPM | OXYGEN SATURATION: 97 %

## 2022-06-09 DIAGNOSIS — L03.115 CELLULITIS OF RIGHT LOWER LEG: Primary | ICD-10-CM

## 2022-06-09 DIAGNOSIS — L97.912 ULCER OF RIGHT LOWER EXTREMITY WITH FAT LAYER EXPOSED (CMD): ICD-10-CM

## 2022-06-09 DIAGNOSIS — L97.901 LOWER EXTREMITY ULCERATION, UNSPECIFIED LATERALITY, LIMITED TO BREAKDOWN OF SKIN (CMD): ICD-10-CM

## 2022-06-09 PROCEDURE — 10002800 HB RX 250 W HCPCS: Performed by: INTERNAL MEDICINE

## 2022-06-09 PROCEDURE — 10004651 HB RX, NO CHARGE ITEM: Performed by: INTERNAL MEDICINE

## 2022-06-09 PROCEDURE — 96374 THER/PROPH/DIAG INJ IV PUSH: CPT

## 2022-06-09 RX ORDER — DAPTOMYCIN 50 MG/ML
200 INJECTION, POWDER, LYOPHILIZED, FOR SOLUTION INTRAVENOUS ONCE
Status: COMPLETED | OUTPATIENT
Start: 2022-06-09 | End: 2022-06-09

## 2022-06-09 RX ORDER — DAPTOMYCIN 50 MG/ML
200 INJECTION, POWDER, LYOPHILIZED, FOR SOLUTION INTRAVENOUS ONCE
Status: CANCELLED | OUTPATIENT
Start: 2022-06-10 | End: 2022-06-13

## 2022-06-09 RX ORDER — 0.9 % SODIUM CHLORIDE 0.9 %
10 VIAL (ML) INJECTION PRN
Status: DISCONTINUED | OUTPATIENT
Start: 2022-06-09 | End: 2022-06-11 | Stop reason: HOSPADM

## 2022-06-09 RX ORDER — 0.9 % SODIUM CHLORIDE 0.9 %
10 VIAL (ML) INJECTION PRN
Status: CANCELLED
Start: 2022-06-13

## 2022-06-09 RX ADMIN — SODIUM CHLORIDE, PRESERVATIVE FREE 10 ML: 5 INJECTION INTRAVENOUS at 08:48

## 2022-06-09 RX ADMIN — SODIUM CHLORIDE, PRESERVATIVE FREE 10 ML: 5 INJECTION INTRAVENOUS at 08:37

## 2022-06-09 RX ADMIN — DAPTOMYCIN 200 MG: 500 INJECTION, POWDER, LYOPHILIZED, FOR SOLUTION INTRAVENOUS at 08:44

## 2022-06-10 ENCOUNTER — HOSPITAL ENCOUNTER (OUTPATIENT)
Dept: INFUSION THERAPY | Age: 67
Discharge: STILL ADMITTED OR ADMINISTRATIVE | End: 2022-06-10
Attending: INTERNAL MEDICINE

## 2022-06-10 VITALS
SYSTOLIC BLOOD PRESSURE: 114 MMHG | RESPIRATION RATE: 16 BRPM | DIASTOLIC BLOOD PRESSURE: 70 MMHG | HEART RATE: 79 BPM | TEMPERATURE: 97.6 F | OXYGEN SATURATION: 97 %

## 2022-06-10 DIAGNOSIS — L97.901 LOWER EXTREMITY ULCERATION, UNSPECIFIED LATERALITY, LIMITED TO BREAKDOWN OF SKIN (CMD): ICD-10-CM

## 2022-06-10 DIAGNOSIS — L03.115 CELLULITIS OF RIGHT LOWER LEG: Primary | ICD-10-CM

## 2022-06-10 DIAGNOSIS — L97.912 ULCER OF RIGHT LOWER EXTREMITY WITH FAT LAYER EXPOSED (CMD): ICD-10-CM

## 2022-06-10 PROCEDURE — 10004651 HB RX, NO CHARGE ITEM: Performed by: INTERNAL MEDICINE

## 2022-06-10 PROCEDURE — 10002800 HB RX 250 W HCPCS: Performed by: INTERNAL MEDICINE

## 2022-06-10 PROCEDURE — 96374 THER/PROPH/DIAG INJ IV PUSH: CPT

## 2022-06-10 RX ORDER — 0.9 % SODIUM CHLORIDE 0.9 %
10 VIAL (ML) INJECTION PRN
Status: DISCONTINUED | OUTPATIENT
Start: 2022-06-10 | End: 2022-06-10

## 2022-06-10 RX ORDER — 0.9 % SODIUM CHLORIDE 0.9 %
10 VIAL (ML) INJECTION PRN
Status: CANCELLED
Start: 2022-06-13

## 2022-06-10 RX ORDER — DAPTOMYCIN 50 MG/ML
200 INJECTION, POWDER, LYOPHILIZED, FOR SOLUTION INTRAVENOUS ONCE
Status: CANCELLED | OUTPATIENT
Start: 2022-06-13 | End: 2022-06-13

## 2022-06-10 RX ORDER — DAPTOMYCIN 50 MG/ML
200 INJECTION, POWDER, LYOPHILIZED, FOR SOLUTION INTRAVENOUS ONCE
Status: COMPLETED | OUTPATIENT
Start: 2022-06-10 | End: 2022-06-10

## 2022-06-10 RX ADMIN — SODIUM CHLORIDE, PRESERVATIVE FREE 10 ML: 5 INJECTION INTRAVENOUS at 08:39

## 2022-06-10 RX ADMIN — DAPTOMYCIN 200 MG: 500 INJECTION, POWDER, LYOPHILIZED, FOR SOLUTION INTRAVENOUS at 08:36

## 2022-06-10 ASSESSMENT — PAIN SCALES - GENERAL: PAINLEVEL_OUTOF10: 8

## 2022-06-21 ENCOUNTER — HOSPITAL ENCOUNTER (OUTPATIENT)
Dept: WOUND CARE | Age: 67
Discharge: STILL ADMITTED OR ADMINISTRATIVE | End: 2022-06-21
Attending: FAMILY MEDICINE

## 2022-06-21 VITALS — TEMPERATURE: 97.2 F

## 2022-06-21 DIAGNOSIS — I87.332 CHRONIC VENOUS HYPERTENSION (IDIOPATHIC) WITH ULCER AND INFLAMMATION OF LEFT LOWER EXTREMITY (CMD): Primary | ICD-10-CM

## 2022-06-21 DIAGNOSIS — I87.331 CHRONIC VENOUS HYPERTENSION (IDIOPATHIC) WITH ULCER AND INFLAMMATION OF RIGHT LOWER EXTREMITY (CMD): ICD-10-CM

## 2022-06-21 DIAGNOSIS — L97.922 ULCER OF LEFT LOWER EXTREMITY WITH FAT LAYER EXPOSED (CMD): ICD-10-CM

## 2022-06-21 DIAGNOSIS — L03.119 CELLULITIS AND ABSCESS OF LEG: ICD-10-CM

## 2022-06-21 DIAGNOSIS — L97.912 ULCER OF RIGHT LOWER EXTREMITY WITH FAT LAYER EXPOSED (CMD): ICD-10-CM

## 2022-06-21 DIAGNOSIS — L02.419 CELLULITIS AND ABSCESS OF LEG: ICD-10-CM

## 2022-06-21 PROCEDURE — 11042 DBRDMT SUBQ TIS 1ST 20SQCM/<: CPT

## 2022-06-21 ASSESSMENT — PAIN SCALES - GENERAL: PAINLEVEL_OUTOF10: 7

## 2022-06-28 ENCOUNTER — HOSPITAL ENCOUNTER (OUTPATIENT)
Dept: WOUND CARE | Age: 67
Discharge: STILL ADMITTED OR ADMINISTRATIVE | End: 2022-06-28
Attending: FAMILY MEDICINE

## 2022-06-28 VITALS — TEMPERATURE: 96.8 F

## 2022-06-28 DIAGNOSIS — L97.912 ULCER OF RIGHT LOWER EXTREMITY WITH FAT LAYER EXPOSED (CMD): Primary | ICD-10-CM

## 2022-06-28 DIAGNOSIS — L03.119 CELLULITIS AND ABSCESS OF LEG: ICD-10-CM

## 2022-06-28 DIAGNOSIS — L02.419 CELLULITIS AND ABSCESS OF LEG: ICD-10-CM

## 2022-06-28 DIAGNOSIS — L97.922 ULCER OF LEFT LOWER EXTREMITY WITH FAT LAYER EXPOSED (CMD): ICD-10-CM

## 2022-06-28 PROCEDURE — 11042 DBRDMT SUBQ TIS 1ST 20SQCM/<: CPT

## 2022-06-28 ASSESSMENT — PAIN SCALES - GENERAL: PAINLEVEL_OUTOF10: 5

## 2022-07-05 ENCOUNTER — APPOINTMENT (OUTPATIENT)
Dept: WOUND CARE | Age: 67
End: 2022-07-05
Attending: FAMILY MEDICINE

## 2022-07-05 ENCOUNTER — TELEPHONE (OUTPATIENT)
Dept: WOUND CARE | Age: 67
End: 2022-07-05

## 2022-07-12 ENCOUNTER — HOSPITAL ENCOUNTER (OUTPATIENT)
Dept: WOUND CARE | Age: 67
Discharge: STILL ADMITTED OR ADMINISTRATIVE | End: 2022-07-12
Attending: SPECIALIST

## 2022-07-12 VITALS — TEMPERATURE: 96.7 F

## 2022-07-12 DIAGNOSIS — L97.922 ULCER OF LEFT LOWER EXTREMITY WITH FAT LAYER EXPOSED (CMD): ICD-10-CM

## 2022-07-12 DIAGNOSIS — L03.119 CELLULITIS AND ABSCESS OF LEG: ICD-10-CM

## 2022-07-12 DIAGNOSIS — L97.912 ULCER OF RIGHT LOWER EXTREMITY WITH FAT LAYER EXPOSED (CMD): ICD-10-CM

## 2022-07-12 DIAGNOSIS — L02.419 CELLULITIS AND ABSCESS OF LEG: ICD-10-CM

## 2022-07-12 DIAGNOSIS — I87.332 CHRONIC VENOUS HYPERTENSION (IDIOPATHIC) WITH ULCER AND INFLAMMATION OF LEFT LOWER EXTREMITY (CMD): Primary | ICD-10-CM

## 2022-07-12 DIAGNOSIS — I87.331 CHRONIC VENOUS HYPERTENSION (IDIOPATHIC) WITH ULCER AND INFLAMMATION OF RIGHT LOWER EXTREMITY (CMD): ICD-10-CM

## 2022-07-12 PROCEDURE — 11042 DBRDMT SUBQ TIS 1ST 20SQCM/<: CPT

## 2022-07-12 ASSESSMENT — PAIN SCALES - GENERAL: PAINLEVEL_OUTOF10: 7

## 2022-07-18 ENCOUNTER — HOSPITAL ENCOUNTER (OUTPATIENT)
Dept: WOUND CARE | Age: 67
Discharge: STILL ADMITTED OR ADMINISTRATIVE | End: 2022-07-18
Attending: SPECIALIST

## 2022-07-18 ENCOUNTER — HOSPITAL ENCOUNTER (OUTPATIENT)
Age: 67
End: 2022-07-18
Attending: SPECIALIST | Admitting: SPECIALIST

## 2022-07-18 VITALS — TEMPERATURE: 97.2 F

## 2022-07-18 DIAGNOSIS — J44.9 CHRONIC OBSTRUCTIVE PULMONARY DISEASE, UNSPECIFIED COPD TYPE (CMD): ICD-10-CM

## 2022-07-18 DIAGNOSIS — L03.119 CELLULITIS AND ABSCESS OF LEG: ICD-10-CM

## 2022-07-18 DIAGNOSIS — L97.922 ULCER OF LEFT LOWER EXTREMITY WITH FAT LAYER EXPOSED (CMD): ICD-10-CM

## 2022-07-18 DIAGNOSIS — Z79.01 CHRONIC ANTICOAGULATION: ICD-10-CM

## 2022-07-18 DIAGNOSIS — Z01.812 PRE-PROCEDURAL LABORATORY EXAMINATIONS: Primary | ICD-10-CM

## 2022-07-18 DIAGNOSIS — L02.419 CELLULITIS AND ABSCESS OF LEG: ICD-10-CM

## 2022-07-18 DIAGNOSIS — L97.912 ULCER OF RIGHT LOWER EXTREMITY WITH FAT LAYER EXPOSED (CMD): Primary | ICD-10-CM

## 2022-07-18 PROCEDURE — 99213 OFFICE O/P EST LOW 20 MIN: CPT

## 2022-07-18 ASSESSMENT — PAIN SCALES - GENERAL: PAINLEVEL_OUTOF10: 7

## 2022-07-26 ENCOUNTER — HOSPITAL ENCOUNTER (OUTPATIENT)
Dept: WOUND CARE | Age: 67
Discharge: STILL ADMITTED OR ADMINISTRATIVE | End: 2022-07-26
Attending: FAMILY MEDICINE

## 2022-07-26 ENCOUNTER — HOSPITAL ENCOUNTER (OUTPATIENT)
Dept: LAB | Age: 67
Discharge: HOME | End: 2022-07-26
Attending: SPECIALIST

## 2022-07-26 VITALS — TEMPERATURE: 95.6 F

## 2022-07-26 VITALS — WEIGHT: 113 LBS | BODY MASS INDEX: 21.34 KG/M2 | HEIGHT: 61 IN

## 2022-07-26 DIAGNOSIS — L97.922 ULCER OF LEFT LOWER EXTREMITY WITH FAT LAYER EXPOSED (CMD): ICD-10-CM

## 2022-07-26 DIAGNOSIS — Z01.812 PRE-PROCEDURAL LABORATORY EXAMINATIONS: ICD-10-CM

## 2022-07-26 DIAGNOSIS — L97.912 ULCER OF RIGHT LOWER EXTREMITY WITH FAT LAYER EXPOSED (CMD): Primary | ICD-10-CM

## 2022-07-26 DIAGNOSIS — L03.119 CELLULITIS AND ABSCESS OF LEG: ICD-10-CM

## 2022-07-26 DIAGNOSIS — L02.419 CELLULITIS AND ABSCESS OF LEG: ICD-10-CM

## 2022-07-26 PROCEDURE — U0003 INFECTIOUS AGENT DETECTION BY NUCLEIC ACID (DNA OR RNA); SEVERE ACUTE RESPIRATORY SYNDROME CORONAVIRUS 2 (SARS-COV-2) (CORONAVIRUS DISEASE [COVID-19]), AMPLIFIED PROBE TECHNIQUE, MAKING USE OF HIGH THROUGHPUT TECHNOLOGIES AS DESCRIBED BY CMS-2020-01-R: HCPCS | Performed by: SPECIALIST

## 2022-07-26 PROCEDURE — 99213 OFFICE O/P EST LOW 20 MIN: CPT

## 2022-07-26 ASSESSMENT — ACTIVITIES OF DAILY LIVING (ADL)
SENSORY_SUPPORT_DEVICES: EYEGLASSES
ADL_BEFORE_ADMISSION: NEEDS/REQUIRES ASSISTANCE
ADL_SCORE: 0

## 2022-07-26 ASSESSMENT — PAIN SCALES - GENERAL: PAINLEVEL_OUTOF10: 0

## 2022-07-27 LAB
SARS-COV-2 RNA RESP QL NAA+PROBE: DETECTED
SERVICE CMNT-IMP: ABNORMAL

## 2022-08-01 ENCOUNTER — HOSPITAL ENCOUNTER (OUTPATIENT)
Dept: GENERAL RADIOLOGY | Age: 67
Discharge: HOME | End: 2022-08-01

## 2022-08-01 DIAGNOSIS — J44.9 CHRONIC OBSTRUCTIVE PULMONARY DISEASE, UNSPECIFIED COPD TYPE (CMD): ICD-10-CM

## 2022-08-01 PROCEDURE — 71046 X-RAY EXAM CHEST 2 VIEWS: CPT

## 2022-08-02 ENCOUNTER — HOSPITAL ENCOUNTER (OUTPATIENT)
Dept: WOUND CARE | Age: 67
Discharge: STILL ADMITTED OR ADMINISTRATIVE | End: 2022-08-02
Attending: FAMILY MEDICINE

## 2022-08-02 VITALS — TEMPERATURE: 96.1 F

## 2022-08-02 DIAGNOSIS — I87.331 CHRONIC VENOUS HYPERTENSION (IDIOPATHIC) WITH ULCER AND INFLAMMATION OF RIGHT LOWER EXTREMITY (CMD): ICD-10-CM

## 2022-08-02 DIAGNOSIS — L88 PYODERMA GANGRENOSA: ICD-10-CM

## 2022-08-02 DIAGNOSIS — L97.912 ULCER OF RIGHT LOWER EXTREMITY WITH FAT LAYER EXPOSED (CMD): ICD-10-CM

## 2022-08-02 DIAGNOSIS — L03.119 CELLULITIS AND ABSCESS OF LEG: ICD-10-CM

## 2022-08-02 DIAGNOSIS — I87.332 CHRONIC VENOUS HYPERTENSION (IDIOPATHIC) WITH ULCER AND INFLAMMATION OF LEFT LOWER EXTREMITY (CMD): Primary | ICD-10-CM

## 2022-08-02 DIAGNOSIS — L02.419 CELLULITIS AND ABSCESS OF LEG: ICD-10-CM

## 2022-08-02 DIAGNOSIS — L97.922 ULCER OF LEFT LOWER EXTREMITY WITH FAT LAYER EXPOSED (CMD): ICD-10-CM

## 2022-08-02 PROCEDURE — 99213 OFFICE O/P EST LOW 20 MIN: CPT

## 2022-08-02 ASSESSMENT — PAIN SCALES - GENERAL: PAINLEVEL_OUTOF10: 6

## 2022-08-09 ENCOUNTER — HOSPITAL ENCOUNTER (OUTPATIENT)
Dept: WOUND CARE | Age: 67
Discharge: STILL ADMITTED OR ADMINISTRATIVE | End: 2022-08-09
Attending: FAMILY MEDICINE

## 2022-08-09 VITALS — TEMPERATURE: 96.8 F

## 2022-08-09 DIAGNOSIS — L97.912 ULCER OF RIGHT LOWER EXTREMITY WITH FAT LAYER EXPOSED (CMD): Primary | ICD-10-CM

## 2022-08-09 DIAGNOSIS — L02.419 CELLULITIS AND ABSCESS OF LEG: ICD-10-CM

## 2022-08-09 DIAGNOSIS — L03.119 CELLULITIS AND ABSCESS OF LEG: ICD-10-CM

## 2022-08-09 DIAGNOSIS — L97.922 ULCER OF LEFT LOWER EXTREMITY WITH FAT LAYER EXPOSED (CMD): ICD-10-CM

## 2022-08-09 PROCEDURE — 99213 OFFICE O/P EST LOW 20 MIN: CPT

## 2022-08-09 RX ORDER — PREDNISONE 20 MG/1
TABLET ORAL
COMMUNITY
Start: 2022-08-08 | End: 2022-08-15

## 2022-08-09 RX ORDER — AZITHROMYCIN 250 MG/1
TABLET, FILM COATED ORAL
COMMUNITY
Start: 2022-08-08 | End: 2022-08-15

## 2022-08-09 RX ORDER — FLUTICASONE FUROATE AND VILANTEROL 100; 25 UG/1; UG/1
1 POWDER RESPIRATORY (INHALATION) DAILY
COMMUNITY
Start: 2022-08-08 | End: 2022-11-06

## 2022-08-09 ASSESSMENT — PAIN SCALES - GENERAL: PAINLEVEL_OUTOF10: 6

## 2022-08-15 ENCOUNTER — HOSPITAL ENCOUNTER (INPATIENT)
Age: 67
LOS: 11 days | Discharge: NURSING, REHAB OR LONG-TERM CARE | DRG: 175 | End: 2022-08-26
Attending: EMERGENCY MEDICINE

## 2022-08-15 ENCOUNTER — APPOINTMENT (OUTPATIENT)
Dept: CT IMAGING | Age: 67
DRG: 175 | End: 2022-08-15
Attending: EMERGENCY MEDICINE

## 2022-08-15 ENCOUNTER — APPOINTMENT (OUTPATIENT)
Dept: GENERAL RADIOLOGY | Age: 67
DRG: 175 | End: 2022-08-15
Attending: EMERGENCY MEDICINE

## 2022-08-15 DIAGNOSIS — K29.70 GASTRITIS WITHOUT BLEEDING, UNSPECIFIED CHRONICITY, UNSPECIFIED GASTRITIS TYPE: ICD-10-CM

## 2022-08-15 DIAGNOSIS — I26.99 PULMONARY EMBOLISM, OTHER, UNSPECIFIED CHRONICITY, UNSPECIFIED WHETHER ACUTE COR PULMONALE PRESENT (CMD): ICD-10-CM

## 2022-08-15 DIAGNOSIS — J18.9 PNEUMONIA DUE TO INFECTIOUS ORGANISM, UNSPECIFIED LATERALITY, UNSPECIFIED PART OF LUNG: ICD-10-CM

## 2022-08-15 DIAGNOSIS — K57.90 DIVERTICULOSIS: ICD-10-CM

## 2022-08-15 DIAGNOSIS — I26.94 MULTIPLE SUBSEGMENTAL PULMONARY EMBOLI WITHOUT ACUTE COR PULMONALE (CMD): Primary | ICD-10-CM

## 2022-08-15 DIAGNOSIS — R09.02 HYPOXIA: ICD-10-CM

## 2022-08-15 DIAGNOSIS — K64.9 HEMORRHOIDS, UNSPECIFIED HEMORRHOID TYPE: ICD-10-CM

## 2022-08-15 DIAGNOSIS — K20.90 ESOPHAGITIS: ICD-10-CM

## 2022-08-15 LAB
ALBUMIN SERPL-MCNC: 2.9 G/DL (ref 3.6–5.1)
ALBUMIN/GLOB SERPL: 0.5 {RATIO} (ref 1–2.4)
ALP SERPL-CCNC: 122 UNITS/L (ref 45–117)
ALT SERPL-CCNC: 59 UNITS/L
ANION GAP SERPL CALC-SCNC: 11 MMOL/L (ref 7–19)
APTT PPP: 30 SEC (ref 22–30)
AST SERPL-CCNC: 55 UNITS/L
ATRIAL RATE (BPM): 99
BASOPHILS # BLD: 0 K/MCL (ref 0–0.3)
BASOPHILS NFR BLD: 0 %
BILIRUB SERPL-MCNC: 1.2 MG/DL (ref 0.2–1)
BUN SERPL-MCNC: 20 MG/DL (ref 6–20)
BUN/CREAT SERPL: 27 (ref 7–25)
CALCIUM SERPL-MCNC: 10.6 MG/DL (ref 8.4–10.2)
CHLORIDE SERPL-SCNC: 103 MMOL/L (ref 97–110)
CO2 SERPL-SCNC: 23 MMOL/L (ref 21–32)
CREAT SERPL-MCNC: 0.75 MG/DL (ref 0.51–0.95)
DEPRECATED RDW RBC: 47.8 FL (ref 39–50)
EOSINOPHIL # BLD: 0 K/MCL (ref 0–0.5)
EOSINOPHIL NFR BLD: 0 %
ERYTHROCYTE [DISTWIDTH] IN BLOOD: 15.1 % (ref 11–15)
FASTING DURATION TIME PATIENT: ABNORMAL H
GFR SERPLBLD BASED ON 1.73 SQ M-ARVRAT: 88 ML/MIN
GLOBULIN SER-MCNC: 5.7 G/DL (ref 2–4)
GLUCOSE SERPL-MCNC: 79 MG/DL (ref 70–99)
HCT VFR BLD CALC: 36.7 % (ref 36–46.5)
HGB BLD-MCNC: 11.4 G/DL (ref 12–15.5)
IMM GRANULOCYTES # BLD AUTO: 0.2 K/MCL (ref 0–0.2)
IMM GRANULOCYTES # BLD: 2 %
INR PPP: 1.4
LACTATE BLDV-SCNC: 1.1 MMOL/L (ref 0–2)
LYMPHOCYTES # BLD: 0.2 K/MCL (ref 1–4)
LYMPHOCYTES NFR BLD: 2 %
MCH RBC QN AUTO: 27.5 PG (ref 26–34)
MCHC RBC AUTO-ENTMCNC: 31.1 G/DL (ref 32–36.5)
MCV RBC AUTO: 88.4 FL (ref 78–100)
MONOCYTES # BLD: 1 K/MCL (ref 0.3–0.9)
MONOCYTES NFR BLD: 6 %
NEUTROPHILS # BLD: 14.3 K/MCL (ref 1.8–7.7)
NEUTROPHILS NFR BLD: 90 %
NRBC BLD MANUAL-RTO: 0 /100 WBC
P AXIS (DEGREES): 60
PLATELET # BLD AUTO: 102 K/MCL (ref 140–450)
POTASSIUM SERPL-SCNC: 4.1 MMOL/L (ref 3.4–5.1)
PR-INTERVAL (MSEC): 108
PROCALCITONIN SERPL IA-MCNC: 0.31 NG/ML
PROT SERPL-MCNC: 8.6 G/DL (ref 6.4–8.2)
PROTHROMBIN TIME: 15 SEC (ref 9.7–11.8)
QRS-INTERVAL (MSEC): 66
QT-INTERVAL (MSEC): 282
QTC: 362
R AXIS (DEGREES): 28
RAINBOW EXTRA TUBES HOLD SPECIMEN: NORMAL
RBC # BLD: 4.15 MIL/MCL (ref 4–5.2)
REPORT TEXT: NORMAL
SODIUM SERPL-SCNC: 133 MMOL/L (ref 135–145)
T AXIS (DEGREES): 80
TROPONIN I SERPL DL<=0.01 NG/ML-MCNC: 76 NG/L
VENTRICULAR RATE EKG/MIN (BPM): 99
WBC # BLD: 15.8 K/MCL (ref 4.2–11)

## 2022-08-15 PROCEDURE — G1004 CDSM NDSC: HCPCS

## 2022-08-15 PROCEDURE — 85730 THROMBOPLASTIN TIME PARTIAL: CPT | Performed by: EMERGENCY MEDICINE

## 2022-08-15 PROCEDURE — 99285 EMERGENCY DEPT VISIT HI MDM: CPT

## 2022-08-15 PROCEDURE — 13003289 HB OXYGEN THERAPY DAILY

## 2022-08-15 PROCEDURE — 10002801 HB RX 250 W/O HCPCS: Performed by: EMERGENCY MEDICINE

## 2022-08-15 PROCEDURE — 10002803 HB RX 637: Performed by: INTERNAL MEDICINE

## 2022-08-15 PROCEDURE — 10002807 HB RX 258: Performed by: HOSPITALIST

## 2022-08-15 PROCEDURE — 93005 ELECTROCARDIOGRAM TRACING: CPT | Performed by: EMERGENCY MEDICINE

## 2022-08-15 PROCEDURE — 80053 COMPREHEN METABOLIC PANEL: CPT | Performed by: EMERGENCY MEDICINE

## 2022-08-15 PROCEDURE — 96365 THER/PROPH/DIAG IV INF INIT: CPT

## 2022-08-15 PROCEDURE — 93005 ELECTROCARDIOGRAM TRACING: CPT | Performed by: HOSPITALIST

## 2022-08-15 PROCEDURE — 10004651 HB RX, NO CHARGE ITEM: Performed by: EMERGENCY MEDICINE

## 2022-08-15 PROCEDURE — 96375 TX/PRO/DX INJ NEW DRUG ADDON: CPT

## 2022-08-15 PROCEDURE — 10002800 HB RX 250 W HCPCS: Performed by: EMERGENCY MEDICINE

## 2022-08-15 PROCEDURE — 94640 AIRWAY INHALATION TREATMENT: CPT

## 2022-08-15 PROCEDURE — 10002805 HB CONTRAST AGENT: Performed by: EMERGENCY MEDICINE

## 2022-08-15 PROCEDURE — 87040 BLOOD CULTURE FOR BACTERIA: CPT | Performed by: EMERGENCY MEDICINE

## 2022-08-15 PROCEDURE — 85025 COMPLETE CBC W/AUTO DIFF WBC: CPT | Performed by: EMERGENCY MEDICINE

## 2022-08-15 PROCEDURE — 84145 PROCALCITONIN (PCT): CPT | Performed by: EMERGENCY MEDICINE

## 2022-08-15 PROCEDURE — 83605 ASSAY OF LACTIC ACID: CPT | Performed by: EMERGENCY MEDICINE

## 2022-08-15 PROCEDURE — 71275 CT ANGIOGRAPHY CHEST: CPT

## 2022-08-15 PROCEDURE — 10002800 HB RX 250 W HCPCS: Performed by: INTERNAL MEDICINE

## 2022-08-15 PROCEDURE — 10002801 HB RX 250 W/O HCPCS: Performed by: HOSPITALIST

## 2022-08-15 PROCEDURE — 85610 PROTHROMBIN TIME: CPT | Performed by: EMERGENCY MEDICINE

## 2022-08-15 PROCEDURE — 84484 ASSAY OF TROPONIN QUANT: CPT | Performed by: EMERGENCY MEDICINE

## 2022-08-15 PROCEDURE — 36415 COLL VENOUS BLD VENIPUNCTURE: CPT

## 2022-08-15 PROCEDURE — 10003585 HB ROOM CHARGE INTERMEDIATE CARE

## 2022-08-15 PROCEDURE — 10002807 HB RX 258: Performed by: EMERGENCY MEDICINE

## 2022-08-15 PROCEDURE — 10004281 HB COUNTER-STAFF TIME PER 15 MIN

## 2022-08-15 PROCEDURE — 10002803 HB RX 637: Performed by: HOSPITALIST

## 2022-08-15 PROCEDURE — 10002019 HB COUNTER RESP ASSESSMENT

## 2022-08-15 PROCEDURE — 71045 X-RAY EXAM CHEST 1 VIEW: CPT

## 2022-08-15 RX ORDER — CEFAZOLIN SODIUM/WATER 2 G/20 ML
2000 SYRINGE (ML) INTRAVENOUS ONCE
Status: COMPLETED | OUTPATIENT
Start: 2022-08-15 | End: 2022-08-15

## 2022-08-15 RX ORDER — FUROSEMIDE 20 MG/1
20 TABLET ORAL DAILY
Status: DISCONTINUED | OUTPATIENT
Start: 2022-08-16 | End: 2022-08-26 | Stop reason: HOSPADM

## 2022-08-15 RX ORDER — ENOXAPARIN SODIUM 100 MG/ML
1 INJECTION SUBCUTANEOUS EVERY 12 HOURS
Status: DISCONTINUED | OUTPATIENT
Start: 2022-08-16 | End: 2022-08-26 | Stop reason: HOSPADM

## 2022-08-15 RX ORDER — MONTELUKAST SODIUM 10 MG/1
10 TABLET ORAL NIGHTLY
Status: DISCONTINUED | OUTPATIENT
Start: 2022-08-15 | End: 2022-08-26 | Stop reason: HOSPADM

## 2022-08-15 RX ORDER — TOLTERODINE 4 MG/1
4 CAPSULE, EXTENDED RELEASE ORAL DAILY
Status: DISCONTINUED | OUTPATIENT
Start: 2022-08-16 | End: 2022-08-26 | Stop reason: HOSPADM

## 2022-08-15 RX ORDER — VANCOMYCIN HYDROCHLORIDE 125 MG/1
125 CAPSULE ORAL 4 TIMES DAILY
Status: ON HOLD | COMMUNITY
End: 2022-08-26 | Stop reason: HOSPADM

## 2022-08-15 RX ORDER — LIDOCAINE 50 MG/G
1 OINTMENT TOPICAL PRN
COMMUNITY

## 2022-08-15 RX ORDER — GABAPENTIN 300 MG/1
600 CAPSULE ORAL 3 TIMES DAILY
Status: DISCONTINUED | OUTPATIENT
Start: 2022-08-15 | End: 2022-08-15

## 2022-08-15 RX ORDER — SPIRONOLACTONE 25 MG/1
25 TABLET ORAL DAILY
Status: DISCONTINUED | OUTPATIENT
Start: 2022-08-16 | End: 2022-08-26 | Stop reason: HOSPADM

## 2022-08-15 RX ORDER — ALBUTEROL SULFATE 2.5 MG/3ML
2.5 SOLUTION RESPIRATORY (INHALATION) EVERY 6 HOURS PRN
Status: DISCONTINUED | OUTPATIENT
Start: 2022-08-15 | End: 2022-08-26 | Stop reason: HOSPADM

## 2022-08-15 RX ORDER — WARFARIN SODIUM 2 MG/1
1 TABLET ORAL
COMMUNITY

## 2022-08-15 RX ORDER — ENOXAPARIN SODIUM 100 MG/ML
1 INJECTION SUBCUTANEOUS ONCE
Status: COMPLETED | OUTPATIENT
Start: 2022-08-15 | End: 2022-08-15

## 2022-08-15 RX ORDER — PANTOPRAZOLE SODIUM 40 MG/1
40 TABLET, DELAYED RELEASE ORAL
Status: DISCONTINUED | OUTPATIENT
Start: 2022-08-16 | End: 2022-08-26 | Stop reason: HOSPADM

## 2022-08-15 RX ORDER — GABAPENTIN 300 MG/1
600 CAPSULE ORAL EVERY 12 HOURS SCHEDULED
Status: DISCONTINUED | OUTPATIENT
Start: 2022-08-15 | End: 2022-08-16

## 2022-08-15 RX ORDER — MORPHINE SULFATE 15 MG/1
15 TABLET ORAL EVERY 12 HOURS PRN
Status: DISCONTINUED | OUTPATIENT
Start: 2022-08-15 | End: 2022-08-18

## 2022-08-15 RX ORDER — TOPIRAMATE 25 MG/1
50 TABLET ORAL 2 TIMES DAILY
Status: DISCONTINUED | OUTPATIENT
Start: 2022-08-15 | End: 2022-08-26 | Stop reason: HOSPADM

## 2022-08-15 RX ORDER — ACETAMINOPHEN 325 MG/1
650 TABLET ORAL EVERY 4 HOURS PRN
Status: DISCONTINUED | OUTPATIENT
Start: 2022-08-15 | End: 2022-08-26 | Stop reason: HOSPADM

## 2022-08-15 RX ORDER — METHYLPREDNISOLONE 4 MG/1
4 TABLET ORAL 2 TIMES DAILY
Status: DISCONTINUED | OUTPATIENT
Start: 2022-08-15 | End: 2022-08-26 | Stop reason: HOSPADM

## 2022-08-15 RX ORDER — PRAVASTATIN SODIUM 20 MG
10 TABLET ORAL NIGHTLY
Status: DISCONTINUED | OUTPATIENT
Start: 2022-08-15 | End: 2022-08-26 | Stop reason: HOSPADM

## 2022-08-15 RX ORDER — DIPHENOXYLATE HYDROCHLORIDE AND ATROPINE SULFATE 2.5; .025 MG/1; MG/1
2 TABLET ORAL 4 TIMES DAILY PRN
Status: DISCONTINUED | OUTPATIENT
Start: 2022-08-15 | End: 2022-08-26 | Stop reason: HOSPADM

## 2022-08-15 RX ORDER — HYDROXYCHLOROQUINE SULFATE 200 MG/1
200 TABLET, FILM COATED ORAL DAILY
Status: DISCONTINUED | OUTPATIENT
Start: 2022-08-16 | End: 2022-08-26 | Stop reason: HOSPADM

## 2022-08-15 RX ORDER — 0.9 % SODIUM CHLORIDE 0.9 %
2 VIAL (ML) INJECTION EVERY 12 HOURS SCHEDULED
Status: DISCONTINUED | OUTPATIENT
Start: 2022-08-15 | End: 2022-08-26 | Stop reason: HOSPADM

## 2022-08-15 RX ORDER — FUROSEMIDE 10 MG/ML
20 INJECTION INTRAMUSCULAR; INTRAVENOUS ONCE
Status: COMPLETED | OUTPATIENT
Start: 2022-08-15 | End: 2022-08-15

## 2022-08-15 RX ORDER — CEFAZOLIN SODIUM/WATER 2 G/20 ML
2000 SYRINGE (ML) INTRAVENOUS DAILY
Status: DISCONTINUED | OUTPATIENT
Start: 2022-08-16 | End: 2022-08-16

## 2022-08-15 RX ORDER — FLUTICASONE FUROATE AND VILANTEROL 100; 25 UG/1; UG/1
1 POWDER RESPIRATORY (INHALATION) DAILY
Status: DISCONTINUED | OUTPATIENT
Start: 2022-08-15 | End: 2022-08-26 | Stop reason: HOSPADM

## 2022-08-15 RX ORDER — PROCHLORPERAZINE EDISYLATE 5 MG/ML
5 INJECTION INTRAMUSCULAR; INTRAVENOUS EVERY 4 HOURS PRN
Status: DISCONTINUED | OUTPATIENT
Start: 2022-08-15 | End: 2022-08-26 | Stop reason: HOSPADM

## 2022-08-15 RX ORDER — BACLOFEN 10 MG/1
10 TABLET ORAL 3 TIMES DAILY
Status: DISCONTINUED | OUTPATIENT
Start: 2022-08-15 | End: 2022-08-26 | Stop reason: HOSPADM

## 2022-08-15 RX ORDER — ALBUTEROL SULFATE 2.5 MG/3ML
2.5 SOLUTION RESPIRATORY (INHALATION) EVERY 6 HOURS PRN
COMMUNITY

## 2022-08-15 RX ADMIN — FLUTICASONE FUROATE 1 PUFF: 100 POWDER RESPIRATORY (INHALATION) at 20:33

## 2022-08-15 RX ADMIN — GABAPENTIN 600 MG: 300 CAPSULE ORAL at 21:11

## 2022-08-15 RX ADMIN — CEFTRIAXONE SODIUM 2000 MG: 10 INJECTION, POWDER, FOR SOLUTION INTRAVENOUS at 14:42

## 2022-08-15 RX ADMIN — MONTELUKAST SODIUM 10 MG: 10 TABLET, FILM COATED ORAL at 21:07

## 2022-08-15 RX ADMIN — PRAVASTATIN SODIUM 10 MG: 20 TABLET ORAL at 21:07

## 2022-08-15 RX ADMIN — METHYLPREDNISOLONE 4 MG: 4 TABLET ORAL at 21:06

## 2022-08-15 RX ADMIN — TOPIRAMATE 50 MG: 25 TABLET, FILM COATED ORAL at 21:07

## 2022-08-15 RX ADMIN — DOXYCYCLINE 100 MG: 100 INJECTION, POWDER, LYOPHILIZED, FOR SOLUTION INTRAVENOUS at 14:54

## 2022-08-15 RX ADMIN — IOHEXOL 60 ML: 350 INJECTION, SOLUTION INTRAVENOUS at 14:48

## 2022-08-15 RX ADMIN — ENOXAPARIN SODIUM 50 MG: 60 INJECTION SUBCUTANEOUS at 15:35

## 2022-08-15 RX ADMIN — BACLOFEN 10 MG: 10 TABLET ORAL at 21:07

## 2022-08-15 RX ADMIN — DOXYCYCLINE 100 MG: 100 INJECTION, POWDER, LYOPHILIZED, FOR SOLUTION INTRAVENOUS at 21:18

## 2022-08-15 RX ADMIN — SODIUM CHLORIDE, PRESERVATIVE FREE 2 ML: 5 INJECTION INTRAVENOUS at 21:17

## 2022-08-15 RX ADMIN — FUROSEMIDE 20 MG: 10 INJECTION, SOLUTION INTRAMUSCULAR; INTRAVENOUS at 18:03

## 2022-08-15 ASSESSMENT — ENCOUNTER SYMPTOMS
FEVER: 0
ABDOMINAL PAIN: 0
ABDOMINAL DISTENTION: 0
CHEST TIGHTNESS: 0
VOMITING: 0
ADENOPATHY: 0
SINUS PAIN: 0
BACK PAIN: 0
EYES NEGATIVE: 1
DIZZINESS: 0
ENDOCRINE NEGATIVE: 1
ALLERGIC/IMMUNOLOGIC NEGATIVE: 1
SHORTNESS OF BREATH: 1
WHEEZING: 1
CHILLS: 0
FATIGUE: 1
DIARRHEA: 0
CONSTITUTIONAL NEGATIVE: 1
CONFUSION: 0
LIGHT-HEADEDNESS: 0
SORE THROAT: 0
COUGH: 0
HEADACHES: 0
NAUSEA: 0

## 2022-08-15 ASSESSMENT — ACTIVITIES OF DAILY LIVING (ADL)
ADL_SCORE: 12
ADL_BEFORE_ADMISSION: INDEPENDENT
ADL_SHORT_OF_BREATH: YES
RECENT_DECLINE_ADL: NO
CHRONIC_PAIN_PRESENT: YES, CHRONIC
DESCRIBE HOW PAIN IMPACTS YOUR LIFE: MOBILITY

## 2022-08-15 ASSESSMENT — COLUMBIA-SUICIDE SEVERITY RATING SCALE - C-SSRS
1. WITHIN THE PAST MONTH, HAVE YOU WISHED YOU WERE DEAD OR WISHED YOU COULD GO TO SLEEP AND NOT WAKE UP?: NO
6. HAVE YOU EVER DONE ANYTHING, STARTED TO DO ANYTHING, OR PREPARED TO DO ANYTHING TO END YOUR LIFE?: NO
IS THE PATIENT ABLE TO COMPLETE C-SSRS: YES
2. HAVE YOU ACTUALLY HAD ANY THOUGHTS OF KILLING YOURSELF?: NO

## 2022-08-15 ASSESSMENT — LIFESTYLE VARIABLES
HOW OFTEN DO YOU HAVE 6 OR MORE DRINKS ON ONE OCCASION: NEVER
ALCOHOL_USE_STATUS: NO OR LOW RISK WITH VALIDATED TOOL
HOW OFTEN DO YOU HAVE A DRINK CONTAINING ALCOHOL: NEVER
HOW MANY STANDARD DRINKS CONTAINING ALCOHOL DO YOU HAVE ON A TYPICAL DAY: 0,1 OR 2
AUDIT-C TOTAL SCORE: 0

## 2022-08-15 ASSESSMENT — PATIENT HEALTH QUESTIONNAIRE - PHQ9
CLINICAL INTERPRETATION OF PHQ2 SCORE: NO FURTHER SCREENING NEEDED
2. FEELING DOWN, DEPRESSED OR HOPELESS: SEVERAL DAYS
SUM OF ALL RESPONSES TO PHQ9 QUESTIONS 1 AND 2: 1
1. LITTLE INTEREST OR PLEASURE IN DOING THINGS: NOT AT ALL
SUM OF ALL RESPONSES TO PHQ9 QUESTIONS 1 AND 2: 1
IS PATIENT ABLE TO COMPLETE PHQ2 OR PHQ9: YES

## 2022-08-15 ASSESSMENT — COGNITIVE AND FUNCTIONAL STATUS - GENERAL
DO YOU HAVE DIFFICULTY DRESSING OR BATHING: NO
ARE YOU DEAF OR DO YOU HAVE SERIOUS DIFFICULTY  HEARING: NO
BECAUSE OF A PHYSICAL, MENTAL, OR EMOTIONAL CONDITION, DO YOU HAVE SERIOUS DIFFICULTY CONCENTRATING, REMEMBERING OR MAKING DECISIONS: NO
ARE YOU BLIND OR DO YOU HAVE SERIOUS DIFFICULTY SEEING, EVEN WHEN WEARING GLASSES: NO
DO YOU HAVE SERIOUS DIFFICULTY WALKING OR CLIMBING STAIRS: NO
BECAUSE OF A PHYSICAL, MENTAL, OR EMOTIONAL CONDITION, DO YOU HAVE DIFFICULTY DOING ERRANDS ALONE: YES

## 2022-08-15 ASSESSMENT — HEART SCORE
TROPONIN: 1-3X NORMAL LIMIT
EKG: NORMAL
RISK FACTORS: 1-2 RISK FACTORS
AGE: EQUAL OR GREATER THAN 65
HISTORY: SLIGHTLY SUSPICIOUS
HEART SCORE: 4

## 2022-08-15 ASSESSMENT — PULMONARY FUNCTION TESTS: FEV1/FVC: UNABLE TO OBTAIN, OR GREATER THAN 70%

## 2022-08-15 ASSESSMENT — PAIN SCALES - GENERAL
PAINLEVEL_OUTOF10: 4
PAINLEVEL_OUTOF10: 5
PAINLEVEL_OUTOF10: 0

## 2022-08-16 ENCOUNTER — APPOINTMENT (OUTPATIENT)
Dept: CARDIOLOGY | Age: 67
DRG: 175 | End: 2022-08-16
Attending: INTERNAL MEDICINE

## 2022-08-16 ENCOUNTER — APPOINTMENT (OUTPATIENT)
Dept: GENERAL RADIOLOGY | Age: 67
DRG: 175 | End: 2022-08-16
Attending: HOSPITALIST

## 2022-08-16 LAB
ALBUMIN SERPL-MCNC: 2.2 G/DL (ref 3.6–5.1)
ALBUMIN/GLOB SERPL: 0.6 {RATIO} (ref 1–2.4)
ALP SERPL-CCNC: 97 UNITS/L (ref 45–117)
ALT SERPL-CCNC: 32 UNITS/L
ANION GAP SERPL CALC-SCNC: 12 MMOL/L (ref 7–19)
AORTIC VALVE AREA: NORMAL
APPEARANCE UR: CLEAR
ASCENDING AORTA (AAD): 3.1
AST SERPL-CCNC: 32 UNITS/L
ATRIAL RATE (BPM): 90
AV MEAN GRADIENT (AVMG): NORMAL
AV MEAN VELOCITY (AVMV): NORMAL
AV PEAK GRADIENT (AVPG): NORMAL
AV PEAK VELOCITY (AVPV): NORMAL
AV STENOSIS SEVERITY TEXT: NORMAL
BASOPHILS # BLD: 0 K/MCL (ref 0–0.3)
BASOPHILS NFR BLD: 0 %
BILIRUB SERPL-MCNC: 0.8 MG/DL (ref 0.2–1)
BILIRUB UR QL STRIP: NEGATIVE
BUN SERPL-MCNC: 18 MG/DL (ref 6–20)
BUN/CREAT SERPL: 28 (ref 7–25)
CALCIUM SERPL-MCNC: 9.8 MG/DL (ref 8.4–10.2)
CHLORIDE SERPL-SCNC: 105 MMOL/L (ref 97–110)
CO2 SERPL-SCNC: 23 MMOL/L (ref 21–32)
COLOR UR: YELLOW
CREAT SERPL-MCNC: 0.64 MG/DL (ref 0.51–0.95)
DEPRECATED RDW RBC: 47.8 FL (ref 39–50)
DOP CALC LVOT PEAK VEL (LVOTPV): 0.99
E WAVE DECELARATION TIME (MDT): 206
EOSINOPHIL # BLD: 0 K/MCL (ref 0–0.5)
EOSINOPHIL NFR BLD: 0 %
ERYTHROCYTE [DISTWIDTH] IN BLOOD: 15.2 % (ref 11–15)
EST RIGHT VENT SYSTOLIC PRESSURE BY TRICUSPID REGURGITATION JET (RVSP): 34.81
FASTING DURATION TIME PATIENT: ABNORMAL H
GFR SERPLBLD BASED ON 1.73 SQ M-ARVRAT: >90 ML/MIN
GLOBULIN SER-MCNC: 3.8 G/DL (ref 2–4)
GLUCOSE SERPL-MCNC: 73 MG/DL (ref 70–99)
GLUCOSE UR STRIP-MCNC: NEGATIVE MG/DL
HCT VFR BLD CALC: 30.7 % (ref 36–46.5)
HGB BLD-MCNC: 9.9 G/DL (ref 12–15.5)
HGB UR QL STRIP: NEGATIVE
IMM GRANULOCYTES # BLD AUTO: 0.1 K/MCL (ref 0–0.2)
IMM GRANULOCYTES # BLD: 1 %
KETONES UR STRIP-MCNC: ABNORMAL MG/DL
LEFT INTERNAL DIMENSION IN SYSTOLE (LVSD): 2.5
LEFT VENTRICULAR INTERNAL DIMENSION IN DIASTOLE (LVDD): 4.1
LEUKOCYTE ESTERASE UR QL STRIP: NEGATIVE
LV EF: NORMAL %
LVOT 2D (LVOTD): 2
LVOT VTI (LVOTVTI): 19.8
LYMPHOCYTES # BLD: 0.3 K/MCL (ref 1–4)
LYMPHOCYTES NFR BLD: 3 %
MCH RBC QN AUTO: 28.2 PG (ref 26–34)
MCHC RBC AUTO-ENTMCNC: 32.2 G/DL (ref 32–36.5)
MCV RBC AUTO: 87.5 FL (ref 78–100)
MONOCYTES # BLD: 0.8 K/MCL (ref 0.3–0.9)
MONOCYTES NFR BLD: 8 %
MV E TISSUE VEL LAT (MELV): 10.8
MV E TISSUE VEL MED (MESV): 6
MV E WAVE VEL/E TISSUE VEL MED(MSR): 9.13
NEUTROPHILS # BLD: 8.8 K/MCL (ref 1.8–7.7)
NEUTROPHILS NFR BLD: 88 %
NITRITE UR QL STRIP: NEGATIVE
NRBC BLD MANUAL-RTO: 0 /100 WBC
P AXIS (DEGREES): 56
PH UR STRIP: 5 [PH] (ref 5–7)
PLATELET # BLD AUTO: 112 K/MCL (ref 140–450)
POTASSIUM SERPL-SCNC: 3.5 MMOL/L (ref 3.4–5.1)
POTASSIUM SERPL-SCNC: 3.9 MMOL/L (ref 3.4–5.1)
PR-INTERVAL (MSEC): 112
PROCALCITONIN SERPL IA-MCNC: 0.25 NG/ML
PROT SERPL-MCNC: 6 G/DL (ref 6.4–8.2)
PROT UR STRIP-MCNC: NEGATIVE MG/DL
QRS-INTERVAL (MSEC): 76
QT-INTERVAL (MSEC): 312
QTC: 382
R AXIS (DEGREES): 17
RBC # BLD: 3.51 MIL/MCL (ref 4–5.2)
REPORT TEXT: NORMAL
SODIUM SERPL-SCNC: 136 MMOL/L (ref 135–145)
SP GR UR STRIP: 1.02 (ref 1–1.03)
T AXIS (DEGREES): 61
TRICUSPID ANNULAR PLANE SYSTOLIC EXCURSION (TAPSE): 1.84
TROPONIN I SERPL DL<=0.01 NG/ML-MCNC: 64 NG/L
TROPONIN I SERPL DL<=0.01 NG/ML-MCNC: 66 NG/L
UROBILINOGEN UR STRIP-MCNC: 0.2 MG/DL
VENTRICULAR RATE EKG/MIN (BPM): 90
WBC # BLD: 10 K/MCL (ref 4.2–11)

## 2022-08-16 PROCEDURE — 93306 TTE W/DOPPLER COMPLETE: CPT

## 2022-08-16 PROCEDURE — 84484 ASSAY OF TROPONIN QUANT: CPT | Performed by: HOSPITALIST

## 2022-08-16 PROCEDURE — 80053 COMPREHEN METABOLIC PANEL: CPT | Performed by: HOSPITALIST

## 2022-08-16 PROCEDURE — 36415 COLL VENOUS BLD VENIPUNCTURE: CPT | Performed by: HOSPITALIST

## 2022-08-16 PROCEDURE — 10002800 HB RX 250 W HCPCS: Performed by: EMERGENCY MEDICINE

## 2022-08-16 PROCEDURE — 10002807 HB RX 258: Performed by: HOSPITALIST

## 2022-08-16 PROCEDURE — 10004281 HB COUNTER-STAFF TIME PER 15 MIN

## 2022-08-16 PROCEDURE — 10002800 HB RX 250 W HCPCS: Performed by: HOSPITALIST

## 2022-08-16 PROCEDURE — 84484 ASSAY OF TROPONIN QUANT: CPT | Performed by: INTERNAL MEDICINE

## 2022-08-16 PROCEDURE — 84145 PROCALCITONIN (PCT): CPT | Performed by: HOSPITALIST

## 2022-08-16 PROCEDURE — 10004651 HB RX, NO CHARGE ITEM: Performed by: EMERGENCY MEDICINE

## 2022-08-16 PROCEDURE — 94640 AIRWAY INHALATION TREATMENT: CPT

## 2022-08-16 PROCEDURE — 84132 ASSAY OF SERUM POTASSIUM: CPT | Performed by: HOSPITALIST

## 2022-08-16 PROCEDURE — 85025 COMPLETE CBC W/AUTO DIFF WBC: CPT | Performed by: HOSPITALIST

## 2022-08-16 PROCEDURE — 74220 X-RAY XM ESOPHAGUS 1CNTRST: CPT

## 2022-08-16 PROCEDURE — 10002805 HB CONTRAST AGENT: Performed by: HOSPITALIST

## 2022-08-16 PROCEDURE — 94664 DEMO&/EVAL PT USE INHALER: CPT

## 2022-08-16 PROCEDURE — 13003289 HB OXYGEN THERAPY DAILY

## 2022-08-16 PROCEDURE — 10002801 HB RX 250 W/O HCPCS: Performed by: HOSPITALIST

## 2022-08-16 PROCEDURE — 10003562 HB COUNTER - EKG

## 2022-08-16 PROCEDURE — 10003585 HB ROOM CHARGE INTERMEDIATE CARE

## 2022-08-16 PROCEDURE — 81003 URINALYSIS AUTO W/O SCOPE: CPT | Performed by: HOSPITALIST

## 2022-08-16 PROCEDURE — 10002803 HB RX 637: Performed by: HOSPITALIST

## 2022-08-16 PROCEDURE — 93005 ELECTROCARDIOGRAM TRACING: CPT | Performed by: HOSPITALIST

## 2022-08-16 RX ORDER — GABAPENTIN 300 MG/1
300 CAPSULE ORAL NIGHTLY
Status: DISCONTINUED | OUTPATIENT
Start: 2022-08-16 | End: 2022-08-26 | Stop reason: HOSPADM

## 2022-08-16 RX ORDER — DIPHENOXYLATE HYDROCHLORIDE AND ATROPINE SULFATE 2.5; .025 MG/1; MG/1
2 TABLET ORAL ONCE
Status: COMPLETED | OUTPATIENT
Start: 2022-08-16 | End: 2022-08-16

## 2022-08-16 RX ORDER — GABAPENTIN 300 MG/1
900 CAPSULE ORAL 3 TIMES DAILY
Status: DISCONTINUED | OUTPATIENT
Start: 2022-08-16 | End: 2022-08-26 | Stop reason: HOSPADM

## 2022-08-16 RX ORDER — POTASSIUM CHLORIDE 20 MEQ/1
40 TABLET, EXTENDED RELEASE ORAL ONCE
Status: COMPLETED | OUTPATIENT
Start: 2022-08-16 | End: 2022-08-16

## 2022-08-16 RX ORDER — METHYLPREDNISOLONE SODIUM SUCCINATE 40 MG/ML
40 INJECTION, POWDER, LYOPHILIZED, FOR SOLUTION INTRAMUSCULAR; INTRAVENOUS ONCE
Status: COMPLETED | OUTPATIENT
Start: 2022-08-17 | End: 2022-08-17

## 2022-08-16 RX ADMIN — METHYLPREDNISOLONE 4 MG: 4 TABLET ORAL at 21:16

## 2022-08-16 RX ADMIN — MONTELUKAST SODIUM 10 MG: 10 TABLET, FILM COATED ORAL at 21:16

## 2022-08-16 RX ADMIN — HYDROXYCHLOROQUINE SULFATE 200 MG: 200 TABLET, FILM COATED ORAL at 08:45

## 2022-08-16 RX ADMIN — SODIUM CHLORIDE, PRESERVATIVE FREE 2 ML: 5 INJECTION INTRAVENOUS at 08:46

## 2022-08-16 RX ADMIN — PRAVASTATIN SODIUM 10 MG: 20 TABLET ORAL at 21:16

## 2022-08-16 RX ADMIN — DOXYCYCLINE 100 MG: 100 INJECTION, POWDER, LYOPHILIZED, FOR SOLUTION INTRAVENOUS at 09:11

## 2022-08-16 RX ADMIN — ENOXAPARIN SODIUM 50 MG: 60 INJECTION SUBCUTANEOUS at 02:38

## 2022-08-16 RX ADMIN — FUROSEMIDE 20 MG: 20 TABLET ORAL at 08:46

## 2022-08-16 RX ADMIN — GABAPENTIN 900 MG: 300 CAPSULE ORAL at 21:16

## 2022-08-16 RX ADMIN — TOLTERODINE 4 MG: 4 CAPSULE, EXTENDED RELEASE ORAL at 08:46

## 2022-08-16 RX ADMIN — BACLOFEN 10 MG: 10 TABLET ORAL at 14:23

## 2022-08-16 RX ADMIN — SODIUM CHLORIDE 25 ML: 9 INJECTION, SOLUTION INTRAVENOUS at 09:06

## 2022-08-16 RX ADMIN — PANTOPRAZOLE SODIUM 40 MG: 40 TABLET, DELAYED RELEASE ORAL at 07:06

## 2022-08-16 RX ADMIN — SODIUM CHLORIDE, PRESERVATIVE FREE 2 ML: 5 INJECTION INTRAVENOUS at 21:18

## 2022-08-16 RX ADMIN — DIPHENOXYLATE HYDROCHLORIDE AND ATROPINE SULFATE 2 TABLET: 2.5; .025 TABLET ORAL at 16:11

## 2022-08-16 RX ADMIN — TOPIRAMATE 50 MG: 25 TABLET, FILM COATED ORAL at 21:16

## 2022-08-16 RX ADMIN — ENOXAPARIN SODIUM 50 MG: 60 INJECTION SUBCUTANEOUS at 14:23

## 2022-08-16 RX ADMIN — SPIRONOLACTONE 25 MG: 25 TABLET ORAL at 08:45

## 2022-08-16 RX ADMIN — BACLOFEN 10 MG: 10 TABLET ORAL at 21:16

## 2022-08-16 RX ADMIN — FLUTICASONE FUROATE AND VILANTEROL TRIFENATATE 1 PUFF: 100; 25 POWDER RESPIRATORY (INHALATION) at 07:44

## 2022-08-16 RX ADMIN — MORPHINE SULFATE 15 MG: 15 TABLET ORAL at 11:06

## 2022-08-16 RX ADMIN — TOPIRAMATE 50 MG: 25 TABLET, FILM COATED ORAL at 08:45

## 2022-08-16 RX ADMIN — BACLOFEN 10 MG: 10 TABLET ORAL at 08:45

## 2022-08-16 RX ADMIN — PERFLUTREN 2 ML: 6.52 INJECTION, SUSPENSION INTRAVENOUS at 12:15

## 2022-08-16 RX ADMIN — POTASSIUM CHLORIDE 40 MEQ: 20 TABLET, EXTENDED RELEASE ORAL at 08:46

## 2022-08-16 RX ADMIN — CEFTRIAXONE SODIUM 2000 MG: 10 INJECTION, POWDER, FOR SOLUTION INTRAVENOUS at 08:46

## 2022-08-16 RX ADMIN — METHYLPREDNISOLONE 4 MG: 4 TABLET ORAL at 08:45

## 2022-08-16 RX ADMIN — GABAPENTIN 300 MG: 300 CAPSULE ORAL at 21:15

## 2022-08-16 RX ADMIN — MORPHINE SULFATE 2 MG: 2 INJECTION, SOLUTION INTRAMUSCULAR; INTRAVENOUS at 16:11

## 2022-08-16 ASSESSMENT — PAIN SCALES - GENERAL
PAINLEVEL_OUTOF10: 10
PAINLEVEL_OUTOF10: 5
PAINLEVEL_OUTOF10: 6
PAINLEVEL_OUTOF10: 7
PAINLEVEL_OUTOF10: 0
PAINLEVEL_OUTOF10: 0

## 2022-08-17 ENCOUNTER — APPOINTMENT (OUTPATIENT)
Dept: CARDIOLOGY | Age: 67
DRG: 175 | End: 2022-08-17
Attending: INTERNAL MEDICINE

## 2022-08-17 LAB
ANION GAP SERPL CALC-SCNC: 9 MMOL/L (ref 7–19)
ATRIAL RATE (BPM): 103
BUN SERPL-MCNC: 23 MG/DL (ref 6–20)
BUN/CREAT SERPL: 40 (ref 7–25)
C DIFF TOX B TCDB STL QL NAA+PROBE: NOT DETECTED
CALCIUM SERPL-MCNC: 10 MG/DL (ref 8.4–10.2)
CHLORIDE SERPL-SCNC: 106 MMOL/L (ref 97–110)
CO2 SERPL-SCNC: 26 MMOL/L (ref 21–32)
CREAT SERPL-MCNC: 0.58 MG/DL (ref 0.51–0.95)
DEPRECATED RDW RBC: 47.8 FL (ref 39–50)
ERYTHROCYTE [DISTWIDTH] IN BLOOD: 15.3 % (ref 11–15)
FASTING DURATION TIME PATIENT: ABNORMAL H
GFR SERPLBLD BASED ON 1.73 SQ M-ARVRAT: >90 ML/MIN
GLUCOSE SERPL-MCNC: 168 MG/DL (ref 70–99)
HCT VFR BLD CALC: 33.2 % (ref 36–46.5)
HGB BLD-MCNC: 10.4 G/DL (ref 12–15.5)
MCH RBC QN AUTO: 27.4 PG (ref 26–34)
MCHC RBC AUTO-ENTMCNC: 31.3 G/DL (ref 32–36.5)
MCV RBC AUTO: 87.6 FL (ref 78–100)
NRBC BLD MANUAL-RTO: 0 /100 WBC
P AXIS (DEGREES): 55
PLATELET # BLD AUTO: 160 K/MCL (ref 140–450)
POTASSIUM SERPL-SCNC: 3.8 MMOL/L (ref 3.4–5.1)
PR-INTERVAL (MSEC): 106
QRS-INTERVAL (MSEC): 86
QT-INTERVAL (MSEC): 326
QTC: 427
R AXIS (DEGREES): 4
RBC # BLD: 3.79 MIL/MCL (ref 4–5.2)
REPORT TEXT: NORMAL
SODIUM SERPL-SCNC: 137 MMOL/L (ref 135–145)
STRESS TARGET HR: 154 BPM
T AXIS (DEGREES): 93
VENTRICULAR RATE EKG/MIN (BPM): 103
WBC # BLD: 10.5 K/MCL (ref 4.2–11)

## 2022-08-17 PROCEDURE — 85027 COMPLETE CBC AUTOMATED: CPT | Performed by: HOSPITALIST

## 2022-08-17 PROCEDURE — 10002801 HB RX 250 W/O HCPCS: Performed by: HOSPITALIST

## 2022-08-17 PROCEDURE — 10004651 HB RX, NO CHARGE ITEM: Performed by: EMERGENCY MEDICINE

## 2022-08-17 PROCEDURE — 87493 C DIFF AMPLIFIED PROBE: CPT | Performed by: INTERNAL MEDICINE

## 2022-08-17 PROCEDURE — 36415 COLL VENOUS BLD VENIPUNCTURE: CPT | Performed by: HOSPITALIST

## 2022-08-17 PROCEDURE — 10002803 HB RX 637: Performed by: HOSPITALIST

## 2022-08-17 PROCEDURE — 13003289 HB OXYGEN THERAPY DAILY

## 2022-08-17 PROCEDURE — 78452 HT MUSCLE IMAGE SPECT MULT: CPT

## 2022-08-17 PROCEDURE — G1004 CDSM NDSC: HCPCS

## 2022-08-17 PROCEDURE — A9500 TC99M SESTAMIBI: HCPCS | Performed by: INTERNAL MEDICINE

## 2022-08-17 PROCEDURE — 94640 AIRWAY INHALATION TREATMENT: CPT

## 2022-08-17 PROCEDURE — 10003585 HB ROOM CHARGE INTERMEDIATE CARE

## 2022-08-17 PROCEDURE — 80048 BASIC METABOLIC PNL TOTAL CA: CPT | Performed by: HOSPITALIST

## 2022-08-17 PROCEDURE — 10006150 HB RX 343: Performed by: INTERNAL MEDICINE

## 2022-08-17 PROCEDURE — 87046 STOOL CULTR AEROBIC BACT EA: CPT | Performed by: INTERNAL MEDICINE

## 2022-08-17 PROCEDURE — 10002800 HB RX 250 W HCPCS: Performed by: HOSPITALIST

## 2022-08-17 PROCEDURE — 87209 SMEAR COMPLEX STAIN: CPT | Performed by: INTERNAL MEDICINE

## 2022-08-17 PROCEDURE — 10004281 HB COUNTER-STAFF TIME PER 15 MIN

## 2022-08-17 PROCEDURE — 93017 CV STRESS TEST TRACING ONLY: CPT

## 2022-08-17 RX ORDER — GABAPENTIN 300 MG/1
300 CAPSULE ORAL
Status: CANCELLED | OUTPATIENT
Start: 2022-08-17

## 2022-08-17 RX ORDER — TETRAKIS(2-METHOXYISOBUTYLISOCYANIDE)COPPER(I) TETRAFLUOROBORATE 1 MG/ML
24 INJECTION, POWDER, LYOPHILIZED, FOR SOLUTION INTRAVENOUS ONCE
Status: COMPLETED | OUTPATIENT
Start: 2022-08-17 | End: 2022-08-17

## 2022-08-17 RX ORDER — ACETAMINOPHEN 500 MG
1000 TABLET ORAL
Status: CANCELLED | OUTPATIENT
Start: 2022-08-17

## 2022-08-17 RX ORDER — CLINDAMYCIN PHOSPHATE 900 MG/50ML
900 INJECTION INTRAVENOUS
Status: CANCELLED | OUTPATIENT
Start: 2022-08-17

## 2022-08-17 RX ORDER — METHYLPREDNISOLONE SODIUM SUCCINATE 40 MG/ML
40 INJECTION, POWDER, LYOPHILIZED, FOR SOLUTION INTRAMUSCULAR; INTRAVENOUS DAILY
Status: COMPLETED | OUTPATIENT
Start: 2022-08-18 | End: 2022-08-19

## 2022-08-17 RX ORDER — FAMOTIDINE 20 MG/1
20 TABLET, FILM COATED ORAL
Status: CANCELLED | OUTPATIENT
Start: 2022-08-17

## 2022-08-17 RX ORDER — REGADENOSON 0.08 MG/ML
0.4 INJECTION, SOLUTION INTRAVENOUS ONCE
Status: COMPLETED | OUTPATIENT
Start: 2022-08-17 | End: 2022-08-17

## 2022-08-17 RX ORDER — CHLORHEXIDINE GLUCONATE ORAL RINSE 1.2 MG/ML
15 SOLUTION DENTAL
Status: CANCELLED | OUTPATIENT
Start: 2022-08-17

## 2022-08-17 RX ORDER — TETRAKIS(2-METHOXYISOBUTYLISOCYANIDE)COPPER(I) TETRAFLUOROBORATE 1 MG/ML
8 INJECTION, POWDER, LYOPHILIZED, FOR SOLUTION INTRAVENOUS ONCE
Status: COMPLETED | OUTPATIENT
Start: 2022-08-17 | End: 2022-08-17

## 2022-08-17 RX ADMIN — GABAPENTIN 900 MG: 300 CAPSULE ORAL at 08:48

## 2022-08-17 RX ADMIN — SODIUM CHLORIDE, PRESERVATIVE FREE 2 ML: 5 INJECTION INTRAVENOUS at 20:57

## 2022-08-17 RX ADMIN — TOLTERODINE 4 MG: 4 CAPSULE, EXTENDED RELEASE ORAL at 08:52

## 2022-08-17 RX ADMIN — FLUTICASONE FUROATE 1 PUFF: 100 POWDER RESPIRATORY (INHALATION) at 20:21

## 2022-08-17 RX ADMIN — GABAPENTIN 900 MG: 300 CAPSULE ORAL at 20:55

## 2022-08-17 RX ADMIN — BACLOFEN 10 MG: 10 TABLET ORAL at 08:52

## 2022-08-17 RX ADMIN — TOPIRAMATE 50 MG: 25 TABLET, FILM COATED ORAL at 20:55

## 2022-08-17 RX ADMIN — REGADENOSON 0.4 MG: 0.08 INJECTION, SOLUTION INTRAVENOUS at 10:47

## 2022-08-17 RX ADMIN — FUROSEMIDE 20 MG: 20 TABLET ORAL at 08:52

## 2022-08-17 RX ADMIN — FLUTICASONE FUROATE AND VILANTEROL TRIFENATATE 1 PUFF: 100; 25 POWDER RESPIRATORY (INHALATION) at 08:28

## 2022-08-17 RX ADMIN — BACLOFEN 10 MG: 10 TABLET ORAL at 20:55

## 2022-08-17 RX ADMIN — BACLOFEN 10 MG: 10 TABLET ORAL at 14:58

## 2022-08-17 RX ADMIN — TETRAKIS(2-METHOXYISOBUTYLISOCYANIDE)COPPER(I) TETRAFLUOROBORATE 28 MILLICURIE: 1 INJECTION, POWDER, LYOPHILIZED, FOR SOLUTION INTRAVENOUS at 10:47

## 2022-08-17 RX ADMIN — GABAPENTIN 300 MG: 300 CAPSULE ORAL at 20:55

## 2022-08-17 RX ADMIN — TETRAKIS(2-METHOXYISOBUTYLISOCYANIDE)COPPER(I) TETRAFLUOROBORATE 9.5 MILLICURIE: 1 INJECTION, POWDER, LYOPHILIZED, FOR SOLUTION INTRAVENOUS at 09:04

## 2022-08-17 RX ADMIN — SPIRONOLACTONE 25 MG: 25 TABLET ORAL at 09:25

## 2022-08-17 RX ADMIN — PANTOPRAZOLE SODIUM 40 MG: 40 TABLET, DELAYED RELEASE ORAL at 05:33

## 2022-08-17 RX ADMIN — HYDROXYCHLOROQUINE SULFATE 200 MG: 200 TABLET, FILM COATED ORAL at 08:48

## 2022-08-17 RX ADMIN — TOPIRAMATE 50 MG: 25 TABLET, FILM COATED ORAL at 08:52

## 2022-08-17 RX ADMIN — PRAVASTATIN SODIUM 10 MG: 20 TABLET ORAL at 20:56

## 2022-08-17 RX ADMIN — ENOXAPARIN SODIUM 50 MG: 60 INJECTION SUBCUTANEOUS at 02:13

## 2022-08-17 RX ADMIN — ENOXAPARIN SODIUM 50 MG: 60 INJECTION SUBCUTANEOUS at 14:58

## 2022-08-17 RX ADMIN — SODIUM CHLORIDE, PRESERVATIVE FREE 2 ML: 5 INJECTION INTRAVENOUS at 06:57

## 2022-08-17 RX ADMIN — MORPHINE SULFATE 15 MG: 15 TABLET ORAL at 08:57

## 2022-08-17 RX ADMIN — MONTELUKAST SODIUM 10 MG: 10 TABLET, FILM COATED ORAL at 20:55

## 2022-08-17 RX ADMIN — METHYLPREDNISOLONE SODIUM SUCCINATE 40 MG: 40 INJECTION, POWDER, FOR SOLUTION INTRAMUSCULAR; INTRAVENOUS at 08:52

## 2022-08-17 RX ADMIN — GABAPENTIN 900 MG: 300 CAPSULE ORAL at 14:58

## 2022-08-17 RX ADMIN — MORPHINE SULFATE 15 MG: 15 TABLET ORAL at 23:17

## 2022-08-17 RX ADMIN — MORPHINE SULFATE 15 MG: 15 TABLET ORAL at 02:17

## 2022-08-17 ASSESSMENT — ENCOUNTER SYMPTOMS
DIZZINESS: 0
SHORTNESS OF BREATH: 0
ABDOMINAL PAIN: 0
EYE PAIN: 0
FATIGUE: 0
BLOOD IN STOOL: 0
WHEEZING: 0
ABDOMINAL DISTENTION: 0
NUMBNESS: 0
COUGH: 0
EYE ITCHING: 0
HALLUCINATIONS: 0
VOMITING: 0
FACIAL SWELLING: 0
NAUSEA: 0
CHILLS: 0
CONSTIPATION: 0
AGITATION: 0
DIARRHEA: 1
FEVER: 0
BACK PAIN: 0
HEADACHES: 0
TROUBLE SWALLOWING: 0
CONFUSION: 0

## 2022-08-17 ASSESSMENT — PAIN SCALES - GENERAL
PAINLEVEL_OUTOF10: 0
PAINLEVEL_OUTOF10: 3
PAINLEVEL_OUTOF10: 9
PAINLEVEL_OUTOF10: 9
PAINLEVEL_OUTOF10: 0
PAINLEVEL_OUTOF10: 8

## 2022-08-17 ASSESSMENT — PAIN SCALES - WONG BAKER
WONGBAKER_NUMERICALRESPONSE: 3
WONGBAKER_NUMERICALRESPONSE: 1

## 2022-08-18 ENCOUNTER — APPOINTMENT (OUTPATIENT)
Dept: CT IMAGING | Age: 67
DRG: 175 | End: 2022-08-18
Attending: HOSPITALIST

## 2022-08-18 LAB
ANION GAP SERPL CALC-SCNC: 8 MMOL/L (ref 7–19)
BUN SERPL-MCNC: 23 MG/DL (ref 6–20)
BUN/CREAT SERPL: 52 (ref 7–25)
CALCIUM SERPL-MCNC: 9.9 MG/DL (ref 8.4–10.2)
CHLORIDE SERPL-SCNC: 108 MMOL/L (ref 97–110)
CO2 SERPL-SCNC: 27 MMOL/L (ref 21–32)
CREAT SERPL-MCNC: 0.44 MG/DL (ref 0.51–0.95)
CRP SERPL-MCNC: 7.3 MG/DL
DEPRECATED RDW RBC: 48 FL (ref 39–50)
ERYTHROCYTE [DISTWIDTH] IN BLOOD: 15.5 % (ref 11–15)
ERYTHROCYTE [SEDIMENTATION RATE] IN BLOOD BY WESTERGREN METHOD: 54 MM/HR (ref 0–20)
FASTING DURATION TIME PATIENT: ABNORMAL H
GFR SERPLBLD BASED ON 1.73 SQ M-ARVRAT: >90 ML/MIN
GLUCOSE SERPL-MCNC: 112 MG/DL (ref 70–99)
HCT VFR BLD CALC: 30.5 % (ref 36–46.5)
HEMOCCULT STL QL: NEGATIVE
HGB BLD-MCNC: 9.5 G/DL (ref 12–15.5)
MCH RBC QN AUTO: 27.2 PG (ref 26–34)
MCHC RBC AUTO-ENTMCNC: 31.1 G/DL (ref 32–36.5)
MCV RBC AUTO: 87.4 FL (ref 78–100)
NRBC BLD MANUAL-RTO: 0 /100 WBC
PLATELET # BLD AUTO: 210 K/MCL (ref 140–450)
POTASSIUM SERPL-SCNC: 4 MMOL/L (ref 3.4–5.1)
RBC # BLD: 3.49 MIL/MCL (ref 4–5.2)
SODIUM SERPL-SCNC: 139 MMOL/L (ref 135–145)
WBC # BLD: 10.2 K/MCL (ref 4.2–11)

## 2022-08-18 PROCEDURE — 97165 OT EVAL LOW COMPLEX 30 MIN: CPT

## 2022-08-18 PROCEDURE — 10002800 HB RX 250 W HCPCS: Performed by: HOSPITALIST

## 2022-08-18 PROCEDURE — 94640 AIRWAY INHALATION TREATMENT: CPT

## 2022-08-18 PROCEDURE — 85652 RBC SED RATE AUTOMATED: CPT | Performed by: HOSPITALIST

## 2022-08-18 PROCEDURE — 87449 NOS EACH ORGANISM AG IA: CPT | Performed by: INTERNAL MEDICINE

## 2022-08-18 PROCEDURE — 10003585 HB ROOM CHARGE INTERMEDIATE CARE

## 2022-08-18 PROCEDURE — 10002801 HB RX 250 W/O HCPCS: Performed by: HOSPITALIST

## 2022-08-18 PROCEDURE — 36415 COLL VENOUS BLD VENIPUNCTURE: CPT | Performed by: HOSPITALIST

## 2022-08-18 PROCEDURE — 10004651 HB RX, NO CHARGE ITEM: Performed by: EMERGENCY MEDICINE

## 2022-08-18 PROCEDURE — 97530 THERAPEUTIC ACTIVITIES: CPT

## 2022-08-18 PROCEDURE — 87177 OVA AND PARASITES SMEARS: CPT | Performed by: INTERNAL MEDICINE

## 2022-08-18 PROCEDURE — 71260 CT THORAX DX C+: CPT

## 2022-08-18 PROCEDURE — 10002803 HB RX 637: Performed by: HOSPITALIST

## 2022-08-18 PROCEDURE — 87427 SHIGA-LIKE TOXIN AG IA: CPT | Performed by: INTERNAL MEDICINE

## 2022-08-18 PROCEDURE — 86140 C-REACTIVE PROTEIN: CPT | Performed by: HOSPITALIST

## 2022-08-18 PROCEDURE — 10004281 HB COUNTER-STAFF TIME PER 15 MIN

## 2022-08-18 PROCEDURE — 87045 FECES CULTURE AEROBIC BACT: CPT | Performed by: INTERNAL MEDICINE

## 2022-08-18 PROCEDURE — 85027 COMPLETE CBC AUTOMATED: CPT | Performed by: HOSPITALIST

## 2022-08-18 PROCEDURE — 82270 OCCULT BLOOD FECES: CPT | Performed by: HOSPITALIST

## 2022-08-18 PROCEDURE — 97116 GAIT TRAINING THERAPY: CPT

## 2022-08-18 PROCEDURE — 10002805 HB CONTRAST AGENT: Performed by: HOSPITALIST

## 2022-08-18 PROCEDURE — G1004 CDSM NDSC: HCPCS

## 2022-08-18 PROCEDURE — 13003289 HB OXYGEN THERAPY DAILY

## 2022-08-18 PROCEDURE — 97162 PT EVAL MOD COMPLEX 30 MIN: CPT

## 2022-08-18 PROCEDURE — 80048 BASIC METABOLIC PNL TOTAL CA: CPT | Performed by: HOSPITALIST

## 2022-08-18 RX ORDER — LEFLUNOMIDE 10 MG/1
20 TABLET ORAL DAILY
Status: DISCONTINUED | OUTPATIENT
Start: 2022-08-18 | End: 2022-08-26 | Stop reason: HOSPADM

## 2022-08-18 RX ORDER — MYCOPHENOLATE MOFETIL 500 MG/1
500 TABLET ORAL EVERY EVENING
COMMUNITY
End: 2022-08-26

## 2022-08-18 RX ORDER — MORPHINE SULFATE 15 MG/1
15 TABLET ORAL EVERY 8 HOURS PRN
Status: DISCONTINUED | OUTPATIENT
Start: 2022-08-18 | End: 2022-08-26 | Stop reason: HOSPADM

## 2022-08-18 RX ORDER — MYCOPHENOLATE MOFETIL 500 MG/1
500 TABLET ORAL EVERY EVENING
Status: DISCONTINUED | OUTPATIENT
Start: 2022-08-18 | End: 2022-08-26 | Stop reason: HOSPADM

## 2022-08-18 RX ORDER — MYCOPHENOLATE MOFETIL 500 MG/1
1000 TABLET ORAL
Status: DISCONTINUED | OUTPATIENT
Start: 2022-08-19 | End: 2022-08-26 | Stop reason: HOSPADM

## 2022-08-18 RX ADMIN — FLUTICASONE FUROATE AND VILANTEROL TRIFENATATE 1 PUFF: 100; 25 POWDER RESPIRATORY (INHALATION) at 09:41

## 2022-08-18 RX ADMIN — MORPHINE SULFATE 15 MG: 15 TABLET ORAL at 21:26

## 2022-08-18 RX ADMIN — FUROSEMIDE 20 MG: 20 TABLET ORAL at 08:15

## 2022-08-18 RX ADMIN — LEFLUNOMIDE 20 MG: 10 TABLET ORAL at 14:20

## 2022-08-18 RX ADMIN — GABAPENTIN 900 MG: 300 CAPSULE ORAL at 21:27

## 2022-08-18 RX ADMIN — TOPIRAMATE 50 MG: 25 TABLET, FILM COATED ORAL at 08:15

## 2022-08-18 RX ADMIN — GABAPENTIN 900 MG: 300 CAPSULE ORAL at 14:11

## 2022-08-18 RX ADMIN — IOHEXOL 50 ML: 350 INJECTION, SOLUTION INTRAVENOUS at 16:36

## 2022-08-18 RX ADMIN — MYCOPHENOLATE MOFETIL 500 MG: 500 TABLET, FILM COATED ORAL at 18:34

## 2022-08-18 RX ADMIN — HYDROXYCHLOROQUINE SULFATE 200 MG: 200 TABLET, FILM COATED ORAL at 08:15

## 2022-08-18 RX ADMIN — METHYLPREDNISOLONE SODIUM SUCCINATE 40 MG: 40 INJECTION, POWDER, FOR SOLUTION INTRAMUSCULAR; INTRAVENOUS at 08:15

## 2022-08-18 RX ADMIN — TOPIRAMATE 50 MG: 25 TABLET, FILM COATED ORAL at 21:27

## 2022-08-18 RX ADMIN — MONTELUKAST SODIUM 10 MG: 10 TABLET, FILM COATED ORAL at 21:27

## 2022-08-18 RX ADMIN — SPIRONOLACTONE 25 MG: 25 TABLET ORAL at 08:15

## 2022-08-18 RX ADMIN — BACLOFEN 10 MG: 10 TABLET ORAL at 21:28

## 2022-08-18 RX ADMIN — GABAPENTIN 300 MG: 300 CAPSULE ORAL at 21:27

## 2022-08-18 RX ADMIN — BACLOFEN 10 MG: 10 TABLET ORAL at 14:11

## 2022-08-18 RX ADMIN — GABAPENTIN 900 MG: 300 CAPSULE ORAL at 08:15

## 2022-08-18 RX ADMIN — SODIUM CHLORIDE, PRESERVATIVE FREE 2 ML: 5 INJECTION INTRAVENOUS at 08:16

## 2022-08-18 RX ADMIN — MORPHINE SULFATE 15 MG: 15 TABLET ORAL at 14:11

## 2022-08-18 RX ADMIN — ENOXAPARIN SODIUM 50 MG: 60 INJECTION SUBCUTANEOUS at 14:11

## 2022-08-18 RX ADMIN — SODIUM CHLORIDE, PRESERVATIVE FREE 2 ML: 5 INJECTION INTRAVENOUS at 21:30

## 2022-08-18 RX ADMIN — FLUTICASONE FUROATE 1 PUFF: 100 POWDER RESPIRATORY (INHALATION) at 21:05

## 2022-08-18 RX ADMIN — BACLOFEN 10 MG: 10 TABLET ORAL at 08:15

## 2022-08-18 RX ADMIN — PANTOPRAZOLE SODIUM 40 MG: 40 TABLET, DELAYED RELEASE ORAL at 06:28

## 2022-08-18 RX ADMIN — ENOXAPARIN SODIUM 50 MG: 60 INJECTION SUBCUTANEOUS at 02:34

## 2022-08-18 RX ADMIN — PRAVASTATIN SODIUM 10 MG: 20 TABLET ORAL at 21:27

## 2022-08-18 RX ADMIN — TOLTERODINE 4 MG: 4 CAPSULE, EXTENDED RELEASE ORAL at 08:15

## 2022-08-18 ASSESSMENT — PAIN SCALES - GENERAL
PAINLEVEL_OUTOF10: 4
PAINLEVEL_OUTOF10: 8
PAINLEVEL_OUTOF10: 8
PAINLEVEL_OUTOF10: 0
PAINLEVEL_OUTOF10: 0

## 2022-08-18 ASSESSMENT — ACTIVITIES OF DAILY LIVING (ADL)
PRIOR_ADL: MODIFIED INDEPENDENT
PRIOR_ADL_TOILETING: MODIFIED INDEPENDENT
EATING: MODIFIED INDEPENDENT

## 2022-08-18 ASSESSMENT — PAIN SCALES - WONG BAKER
WONGBAKER_NUMERICALRESPONSE: 0
WONGBAKER_NUMERICALRESPONSE: 0

## 2022-08-18 ASSESSMENT — COGNITIVE AND FUNCTIONAL STATUS - GENERAL
HELP NEEDED FOR PERSONAL GROOMING: A LITTLE
HELP NEEDED FOR TOILETING: A LOT
DAILY_ACTIVITY_CONVERTED_SCORE: 35.96
BASIC_MOBILITY_RAW_SCORE: 17
BASIC_MOBILITY_CONVERTED_SCORE: 39.67
HELP NEEDED FOR BATHING: A LOT
HELP NEEDED DRESSING REGULAR UPPER BODY CLOTHING: A LITTLE
DAILY_ACTIVITY_RAW_SCORE: 16
HELP NEEDED DRESSING REGULAR LOWER BODY CLOTHING: A LOT

## 2022-08-19 LAB
ANION GAP SERPL CALC-SCNC: 8 MMOL/L (ref 7–19)
BUN SERPL-MCNC: 27 MG/DL (ref 6–20)
BUN/CREAT SERPL: 50 (ref 7–25)
C JEJUNI+C COLI AG STL QL: NORMAL
CALCIUM SERPL-MCNC: 10.4 MG/DL (ref 8.4–10.2)
CHLORIDE SERPL-SCNC: 108 MMOL/L (ref 97–110)
CO2 SERPL-SCNC: 28 MMOL/L (ref 21–32)
CREAT SERPL-MCNC: 0.54 MG/DL (ref 0.51–0.95)
DEPRECATED RDW RBC: 48 FL (ref 39–50)
ERYTHROCYTE [DISTWIDTH] IN BLOOD: 15.4 % (ref 11–15)
FASTING DURATION TIME PATIENT: ABNORMAL H
GFR SERPLBLD BASED ON 1.73 SQ M-ARVRAT: >90 ML/MIN
GLUCOSE SERPL-MCNC: 106 MG/DL (ref 70–99)
HCT VFR BLD CALC: 31.6 % (ref 36–46.5)
HGB BLD-MCNC: 9.9 G/DL (ref 12–15.5)
MCH RBC QN AUTO: 27.5 PG (ref 26–34)
MCHC RBC AUTO-ENTMCNC: 31.3 G/DL (ref 32–36.5)
MCV RBC AUTO: 87.8 FL (ref 78–100)
NRBC BLD MANUAL-RTO: 0 /100 WBC
O+P SPEC MICRO: NORMAL
PLATELET # BLD AUTO: 236 K/MCL (ref 140–450)
POTASSIUM SERPL-SCNC: 4 MMOL/L (ref 3.4–5.1)
RBC # BLD: 3.6 MIL/MCL (ref 4–5.2)
SODIUM SERPL-SCNC: 140 MMOL/L (ref 135–145)
WBC # BLD: 12 K/MCL (ref 4.2–11)

## 2022-08-19 PROCEDURE — 10002800 HB RX 250 W HCPCS: Performed by: HOSPITALIST

## 2022-08-19 PROCEDURE — 10004281 HB COUNTER-STAFF TIME PER 15 MIN

## 2022-08-19 PROCEDURE — 85670 THROMBIN TIME PLASMA: CPT | Performed by: INTERNAL MEDICINE

## 2022-08-19 PROCEDURE — 10003585 HB ROOM CHARGE INTERMEDIATE CARE

## 2022-08-19 PROCEDURE — 85027 COMPLETE CBC AUTOMATED: CPT | Performed by: HOSPITALIST

## 2022-08-19 PROCEDURE — 10002801 HB RX 250 W/O HCPCS: Performed by: HOSPITALIST

## 2022-08-19 PROCEDURE — 10002803 HB RX 637: Performed by: HOSPITALIST

## 2022-08-19 PROCEDURE — 13003289 HB OXYGEN THERAPY DAILY

## 2022-08-19 PROCEDURE — 94640 AIRWAY INHALATION TREATMENT: CPT

## 2022-08-19 PROCEDURE — 36415 COLL VENOUS BLD VENIPUNCTURE: CPT | Performed by: HOSPITALIST

## 2022-08-19 PROCEDURE — 10002807 HB RX 258: Performed by: HOSPITALIST

## 2022-08-19 PROCEDURE — 10004651 HB RX, NO CHARGE ITEM: Performed by: EMERGENCY MEDICINE

## 2022-08-19 PROCEDURE — 80048 BASIC METABOLIC PNL TOTAL CA: CPT | Performed by: HOSPITALIST

## 2022-08-19 RX ORDER — SODIUM CHLORIDE 9 MG/ML
INJECTION, SOLUTION INTRAVENOUS CONTINUOUS
Status: DISCONTINUED | OUTPATIENT
Start: 2022-08-19 | End: 2022-08-23

## 2022-08-19 RX ADMIN — GABAPENTIN 900 MG: 300 CAPSULE ORAL at 21:09

## 2022-08-19 RX ADMIN — FUROSEMIDE 20 MG: 20 TABLET ORAL at 08:55

## 2022-08-19 RX ADMIN — MONTELUKAST SODIUM 10 MG: 10 TABLET, FILM COATED ORAL at 21:09

## 2022-08-19 RX ADMIN — GABAPENTIN 300 MG: 300 CAPSULE ORAL at 21:09

## 2022-08-19 RX ADMIN — FLUTICASONE FUROATE 1 PUFF: 100 POWDER RESPIRATORY (INHALATION) at 21:09

## 2022-08-19 RX ADMIN — FLUTICASONE FUROATE AND VILANTEROL TRIFENATATE 1 PUFF: 100; 25 POWDER RESPIRATORY (INHALATION) at 09:10

## 2022-08-19 RX ADMIN — SODIUM CHLORIDE: 9 INJECTION, SOLUTION INTRAVENOUS at 14:54

## 2022-08-19 RX ADMIN — MYCOPHENOLATE MOFETIL 1000 MG: 500 TABLET, FILM COATED ORAL at 09:00

## 2022-08-19 RX ADMIN — TOPIRAMATE 50 MG: 25 TABLET, FILM COATED ORAL at 08:55

## 2022-08-19 RX ADMIN — SODIUM CHLORIDE, PRESERVATIVE FREE 2 ML: 5 INJECTION INTRAVENOUS at 08:55

## 2022-08-19 RX ADMIN — LEFLUNOMIDE 20 MG: 10 TABLET ORAL at 08:55

## 2022-08-19 RX ADMIN — MYCOPHENOLATE MOFETIL 500 MG: 500 TABLET, FILM COATED ORAL at 17:49

## 2022-08-19 RX ADMIN — BACLOFEN 10 MG: 10 TABLET ORAL at 14:39

## 2022-08-19 RX ADMIN — METHYLPREDNISOLONE 4 MG: 4 TABLET ORAL at 21:09

## 2022-08-19 RX ADMIN — DOXYCYCLINE 100 MG: 100 INJECTION, POWDER, LYOPHILIZED, FOR SOLUTION INTRAVENOUS at 16:00

## 2022-08-19 RX ADMIN — HYDROXYCHLOROQUINE SULFATE 200 MG: 200 TABLET, FILM COATED ORAL at 08:55

## 2022-08-19 RX ADMIN — GABAPENTIN 900 MG: 300 CAPSULE ORAL at 14:39

## 2022-08-19 RX ADMIN — MORPHINE SULFATE 15 MG: 15 TABLET ORAL at 08:55

## 2022-08-19 RX ADMIN — PRAVASTATIN SODIUM 10 MG: 20 TABLET ORAL at 21:09

## 2022-08-19 RX ADMIN — SPIRONOLACTONE 25 MG: 25 TABLET ORAL at 08:55

## 2022-08-19 RX ADMIN — ENOXAPARIN SODIUM 50 MG: 60 INJECTION SUBCUTANEOUS at 04:00

## 2022-08-19 RX ADMIN — ENOXAPARIN SODIUM 50 MG: 60 INJECTION SUBCUTANEOUS at 14:40

## 2022-08-19 RX ADMIN — ALBUTEROL SULFATE 2.5 MG: 2.5 SOLUTION RESPIRATORY (INHALATION) at 09:15

## 2022-08-19 RX ADMIN — BACLOFEN 10 MG: 10 TABLET ORAL at 08:55

## 2022-08-19 RX ADMIN — SODIUM CHLORIDE, PRESERVATIVE FREE 2 ML: 5 INJECTION INTRAVENOUS at 21:10

## 2022-08-19 RX ADMIN — BACLOFEN 10 MG: 10 TABLET ORAL at 21:10

## 2022-08-19 RX ADMIN — TOLTERODINE 4 MG: 4 CAPSULE, EXTENDED RELEASE ORAL at 08:55

## 2022-08-19 RX ADMIN — METHYLPREDNISOLONE SODIUM SUCCINATE 40 MG: 40 INJECTION, POWDER, FOR SOLUTION INTRAMUSCULAR; INTRAVENOUS at 08:56

## 2022-08-19 RX ADMIN — CEFEPIME 1000 MG: 1 INJECTION, POWDER, FOR SOLUTION INTRAMUSCULAR; INTRAVENOUS at 21:15

## 2022-08-19 RX ADMIN — PANTOPRAZOLE SODIUM 40 MG: 40 TABLET, DELAYED RELEASE ORAL at 05:43

## 2022-08-19 RX ADMIN — TOPIRAMATE 50 MG: 25 TABLET, FILM COATED ORAL at 21:09

## 2022-08-19 RX ADMIN — CEFEPIME 1000 MG: 1 INJECTION, POWDER, FOR SOLUTION INTRAMUSCULAR; INTRAVENOUS at 14:42

## 2022-08-19 RX ADMIN — GABAPENTIN 900 MG: 300 CAPSULE ORAL at 08:54

## 2022-08-19 ASSESSMENT — PAIN SCALES - WONG BAKER
WONGBAKER_NUMERICALRESPONSE: 0
WONGBAKER_NUMERICALRESPONSE: 7

## 2022-08-19 ASSESSMENT — PAIN SCALES - GENERAL: PAINLEVEL_OUTOF10: 4

## 2022-08-20 LAB
ANION GAP SERPL CALC-SCNC: 9 MMOL/L (ref 7–19)
BACTERIA BLD CULT: NORMAL
BACTERIA BLD CULT: NORMAL
BACTERIA STL CULT: NORMAL
BUN SERPL-MCNC: 26 MG/DL (ref 6–20)
BUN/CREAT SERPL: 51 (ref 7–25)
CALCIUM SERPL-MCNC: 10.1 MG/DL (ref 8.4–10.2)
CHLORIDE SERPL-SCNC: 109 MMOL/L (ref 97–110)
CO2 SERPL-SCNC: 26 MMOL/L (ref 21–32)
CREAT SERPL-MCNC: 0.51 MG/DL (ref 0.51–0.95)
DEPRECATED RDW RBC: 48.5 FL (ref 39–50)
E COLI SHIGA-LIKE TOXIN 1+2 STL QL IA: NORMAL
ERYTHROCYTE [DISTWIDTH] IN BLOOD: 15.8 % (ref 11–15)
FASTING DURATION TIME PATIENT: ABNORMAL H
GFR SERPLBLD BASED ON 1.73 SQ M-ARVRAT: >90 ML/MIN
GLUCOSE SERPL-MCNC: 96 MG/DL (ref 70–99)
HCT VFR BLD CALC: 33.6 % (ref 36–46.5)
HGB BLD-MCNC: 10.5 G/DL (ref 12–15.5)
MCH RBC QN AUTO: 27.6 PG (ref 26–34)
MCHC RBC AUTO-ENTMCNC: 31.3 G/DL (ref 32–36.5)
MCV RBC AUTO: 88.4 FL (ref 78–100)
NRBC BLD MANUAL-RTO: 0 /100 WBC
PLATELET # BLD AUTO: 258 K/MCL (ref 140–450)
POTASSIUM SERPL-SCNC: 4.1 MMOL/L (ref 3.4–5.1)
RBC # BLD: 3.8 MIL/MCL (ref 4–5.2)
SODIUM SERPL-SCNC: 140 MMOL/L (ref 135–145)
WBC # BLD: 13.1 K/MCL (ref 4.2–11)

## 2022-08-20 PROCEDURE — 10002807 HB RX 258: Performed by: HOSPITALIST

## 2022-08-20 PROCEDURE — 10002801 HB RX 250 W/O HCPCS: Performed by: HOSPITALIST

## 2022-08-20 PROCEDURE — 13003289 HB OXYGEN THERAPY DAILY

## 2022-08-20 PROCEDURE — 85027 COMPLETE CBC AUTOMATED: CPT | Performed by: HOSPITALIST

## 2022-08-20 PROCEDURE — 10002803 HB RX 637: Performed by: HOSPITALIST

## 2022-08-20 PROCEDURE — 80048 BASIC METABOLIC PNL TOTAL CA: CPT | Performed by: HOSPITALIST

## 2022-08-20 PROCEDURE — 10002800 HB RX 250 W HCPCS: Performed by: HOSPITALIST

## 2022-08-20 PROCEDURE — 87040 BLOOD CULTURE FOR BACTERIA: CPT | Performed by: HOSPITALIST

## 2022-08-20 PROCEDURE — 94640 AIRWAY INHALATION TREATMENT: CPT

## 2022-08-20 PROCEDURE — 10003585 HB ROOM CHARGE INTERMEDIATE CARE

## 2022-08-20 PROCEDURE — 10004651 HB RX, NO CHARGE ITEM: Performed by: HOSPITALIST

## 2022-08-20 PROCEDURE — 36415 COLL VENOUS BLD VENIPUNCTURE: CPT | Performed by: HOSPITALIST

## 2022-08-20 RX ADMIN — BACLOFEN 10 MG: 10 TABLET ORAL at 20:45

## 2022-08-20 RX ADMIN — FLUTICASONE FUROATE AND VILANTEROL TRIFENATATE 1 PUFF: 100; 25 POWDER RESPIRATORY (INHALATION) at 09:24

## 2022-08-20 RX ADMIN — METHYLPREDNISOLONE 4 MG: 4 TABLET ORAL at 09:57

## 2022-08-20 RX ADMIN — GABAPENTIN 900 MG: 300 CAPSULE ORAL at 09:56

## 2022-08-20 RX ADMIN — TOPIRAMATE 50 MG: 25 TABLET, FILM COATED ORAL at 20:45

## 2022-08-20 RX ADMIN — CEFEPIME 1000 MG: 1 INJECTION, POWDER, FOR SOLUTION INTRAMUSCULAR; INTRAVENOUS at 06:12

## 2022-08-20 RX ADMIN — MORPHINE SULFATE 15 MG: 15 TABLET ORAL at 22:38

## 2022-08-20 RX ADMIN — MORPHINE SULFATE 15 MG: 15 TABLET ORAL at 09:59

## 2022-08-20 RX ADMIN — CEFEPIME 1000 MG: 1 INJECTION, POWDER, FOR SOLUTION INTRAMUSCULAR; INTRAVENOUS at 20:49

## 2022-08-20 RX ADMIN — SODIUM CHLORIDE: 9 INJECTION, SOLUTION INTRAVENOUS at 12:55

## 2022-08-20 RX ADMIN — CEFEPIME 1000 MG: 1 INJECTION, POWDER, FOR SOLUTION INTRAMUSCULAR; INTRAVENOUS at 14:54

## 2022-08-20 RX ADMIN — ENOXAPARIN SODIUM 50 MG: 60 INJECTION SUBCUTANEOUS at 03:06

## 2022-08-20 RX ADMIN — MYCOPHENOLATE MOFETIL 1000 MG: 500 TABLET, FILM COATED ORAL at 09:57

## 2022-08-20 RX ADMIN — GABAPENTIN 900 MG: 300 CAPSULE ORAL at 20:45

## 2022-08-20 RX ADMIN — FLUTICASONE FUROATE 1 PUFF: 100 POWDER RESPIRATORY (INHALATION) at 20:26

## 2022-08-20 RX ADMIN — BACLOFEN 10 MG: 10 TABLET ORAL at 14:51

## 2022-08-20 RX ADMIN — MONTELUKAST SODIUM 10 MG: 10 TABLET, FILM COATED ORAL at 20:45

## 2022-08-20 RX ADMIN — LEFLUNOMIDE 20 MG: 10 TABLET ORAL at 09:56

## 2022-08-20 RX ADMIN — TOPIRAMATE 50 MG: 25 TABLET, FILM COATED ORAL at 09:57

## 2022-08-20 RX ADMIN — GABAPENTIN 900 MG: 300 CAPSULE ORAL at 14:51

## 2022-08-20 RX ADMIN — PRAVASTATIN SODIUM 10 MG: 20 TABLET ORAL at 20:45

## 2022-08-20 RX ADMIN — DOXYCYCLINE 100 MG: 100 INJECTION, POWDER, LYOPHILIZED, FOR SOLUTION INTRAVENOUS at 18:00

## 2022-08-20 RX ADMIN — PANTOPRAZOLE SODIUM 40 MG: 40 TABLET, DELAYED RELEASE ORAL at 06:11

## 2022-08-20 RX ADMIN — ENOXAPARIN SODIUM 50 MG: 60 INJECTION SUBCUTANEOUS at 14:51

## 2022-08-20 RX ADMIN — DOXYCYCLINE 100 MG: 100 INJECTION, POWDER, LYOPHILIZED, FOR SOLUTION INTRAVENOUS at 07:29

## 2022-08-20 RX ADMIN — FUROSEMIDE 20 MG: 20 TABLET ORAL at 09:57

## 2022-08-20 RX ADMIN — TOLTERODINE 4 MG: 4 CAPSULE, EXTENDED RELEASE ORAL at 09:57

## 2022-08-20 RX ADMIN — ACETAMINOPHEN 650 MG: 325 TABLET ORAL at 10:31

## 2022-08-20 RX ADMIN — METHYLPREDNISOLONE 4 MG: 4 TABLET ORAL at 20:45

## 2022-08-20 RX ADMIN — GABAPENTIN 300 MG: 300 CAPSULE ORAL at 20:45

## 2022-08-20 RX ADMIN — HYDROXYCHLOROQUINE SULFATE 200 MG: 200 TABLET, FILM COATED ORAL at 09:57

## 2022-08-20 RX ADMIN — BACLOFEN 10 MG: 10 TABLET ORAL at 09:57

## 2022-08-20 ASSESSMENT — PAIN SCALES - GENERAL
PAINLEVEL_OUTOF10: 4
PAINLEVEL_OUTOF10: 8
PAINLEVEL_OUTOF10: 4
PAINLEVEL_OUTOF10: 4
PAINLEVEL_OUTOF10: 7

## 2022-08-21 LAB
ALBUMIN SERPL-MCNC: 2 G/DL (ref 3.6–5.1)
ALBUMIN/GLOB SERPL: 0.5 {RATIO} (ref 1–2.4)
ALP SERPL-CCNC: 93 UNITS/L (ref 45–117)
ALT SERPL-CCNC: 32 UNITS/L
ANION GAP SERPL CALC-SCNC: 9 MMOL/L (ref 7–19)
APTT HEX PL PPP: 1 DELTA SEC
AST SERPL-CCNC: 26 UNITS/L
BASOPHILS # BLD: 0 K/MCL (ref 0–0.3)
BASOPHILS NFR BLD: 0 %
BILIRUB SERPL-MCNC: 0.5 MG/DL (ref 0.2–1)
BUN SERPL-MCNC: 24 MG/DL (ref 6–20)
BUN/CREAT SERPL: 63 (ref 7–25)
BURR CELLS BLD QL SMEAR: ABNORMAL
CALCIUM SERPL-MCNC: 9.7 MG/DL (ref 8.4–10.2)
CHLORIDE SERPL-SCNC: 109 MMOL/L (ref 97–110)
CO2 SERPL-SCNC: 24 MMOL/L (ref 21–32)
CONFIRM DRVVT: 1.25 RATIO
CREAT SERPL-MCNC: 0.38 MG/DL (ref 0.51–0.95)
DEPRECATED RDW RBC: 50.9 FL (ref 39–50)
EOSINOPHIL # BLD: 0 K/MCL (ref 0–0.5)
EOSINOPHIL NFR BLD: 0 %
ERYTHROCYTE [DISTWIDTH] IN BLOOD: 15.9 % (ref 11–15)
FASTING DURATION TIME PATIENT: ABNORMAL H
GFR SERPLBLD BASED ON 1.73 SQ M-ARVRAT: >90 ML/MIN
GLOBULIN SER-MCNC: 3.8 G/DL (ref 2–4)
GLUCOSE SERPL-MCNC: 84 MG/DL (ref 70–99)
HCT VFR BLD CALC: 32.3 % (ref 36–46.5)
HGB BLD-MCNC: 9.9 G/DL (ref 12–15.5)
LYMPHOCYTES # BLD: 0 K/MCL (ref 1–4)
LYMPHOCYTES NFR BLD: 0 %
MCH RBC QN AUTO: 27.3 PG (ref 26–34)
MCHC RBC AUTO-ENTMCNC: 30.7 G/DL (ref 32–36.5)
MCV RBC AUTO: 89.2 FL (ref 78–100)
MIXING DRVVT: 1.16 RATIO
MIXING DRVVT: 45.5 SEC
MONOCYTES # BLD: 0.4 K/MCL (ref 0.3–0.9)
MONOCYTES NFR BLD: 3 %
NEUTROPHILS # BLD: 11.3 K/MCL (ref 1.8–7.7)
NEUTS BAND NFR BLD: 2 % (ref 0–10)
NEUTS SEG NFR BLD: 95 %
NRBC BLD MANUAL-RTO: 0 /100 WBC
OVALOCYTES BLD QL SMEAR: ABNORMAL
PATH REV: ABNORMAL
PLAT MORPH BLD: NORMAL
PLATELET # BLD AUTO: 283 K/MCL (ref 140–450)
POTASSIUM SERPL-SCNC: 3.8 MMOL/L (ref 3.4–5.1)
PROT SERPL-MCNC: 5.8 G/DL (ref 6.4–8.2)
RBC # BLD: 3.62 MIL/MCL (ref 4–5.2)
SCHISTOCYTES BLD QL SMEAR: ABNORMAL
SCREEN APTT: 21.5 SEC (ref 22–30)
SCREEN DRVVT: 49.3 SEC
SODIUM SERPL-SCNC: 138 MMOL/L (ref 135–145)
THROMBIN TIME: 17.6 SEC (ref 15.3–21.1)
WBC # BLD: 11.7 K/MCL (ref 4.2–11)
WBC MORPH BLD: NORMAL

## 2022-08-21 PROCEDURE — 10002807 HB RX 258: Performed by: HOSPITALIST

## 2022-08-21 PROCEDURE — 13003289 HB OXYGEN THERAPY DAILY

## 2022-08-21 PROCEDURE — 85027 COMPLETE CBC AUTOMATED: CPT | Performed by: HOSPITALIST

## 2022-08-21 PROCEDURE — 10002803 HB RX 637: Performed by: INTERNAL MEDICINE

## 2022-08-21 PROCEDURE — 80053 COMPREHEN METABOLIC PANEL: CPT | Performed by: HOSPITALIST

## 2022-08-21 PROCEDURE — 10002803 HB RX 637: Performed by: HOSPITALIST

## 2022-08-21 PROCEDURE — 36415 COLL VENOUS BLD VENIPUNCTURE: CPT | Performed by: HOSPITALIST

## 2022-08-21 PROCEDURE — 10004281 HB COUNTER-STAFF TIME PER 15 MIN

## 2022-08-21 PROCEDURE — 94640 AIRWAY INHALATION TREATMENT: CPT

## 2022-08-21 PROCEDURE — 10003585 HB ROOM CHARGE INTERMEDIATE CARE

## 2022-08-21 PROCEDURE — 10002801 HB RX 250 W/O HCPCS: Performed by: HOSPITALIST

## 2022-08-21 PROCEDURE — 10002800 HB RX 250 W HCPCS: Performed by: HOSPITALIST

## 2022-08-21 RX ORDER — MAGNESIUM CITRATE
300 SOLUTION, ORAL ORAL ONCE
Status: DISCONTINUED | OUTPATIENT
Start: 2022-08-21 | End: 2022-08-21

## 2022-08-21 RX ORDER — PEG-3350, SODIUM SULFATE, SODIUM CHLORIDE, POTASSIUM CHLORIDE, SODIUM ASCORBATE AND ASCORBIC ACID 7.5-2.691G
1000 KIT ORAL ONCE
Status: COMPLETED | OUTPATIENT
Start: 2022-08-21 | End: 2022-08-21

## 2022-08-21 RX ADMIN — ENOXAPARIN SODIUM 50 MG: 60 INJECTION SUBCUTANEOUS at 04:12

## 2022-08-21 RX ADMIN — TOPIRAMATE 50 MG: 25 TABLET, FILM COATED ORAL at 20:44

## 2022-08-21 RX ADMIN — ALBUTEROL SULFATE 2.5 MG: 2.5 SOLUTION RESPIRATORY (INHALATION) at 10:10

## 2022-08-21 RX ADMIN — DOXYCYCLINE 100 MG: 100 INJECTION, POWDER, LYOPHILIZED, FOR SOLUTION INTRAVENOUS at 17:31

## 2022-08-21 RX ADMIN — PEG-3350, SODIUM SULFATE, SODIUM CHLORIDE, POTASSIUM CHLORIDE, SODIUM ASCORBATE AND ASCORBIC ACID 1000 ML: KIT at 17:29

## 2022-08-21 RX ADMIN — DOXYCYCLINE 100 MG: 100 INJECTION, POWDER, LYOPHILIZED, FOR SOLUTION INTRAVENOUS at 04:33

## 2022-08-21 RX ADMIN — CEFEPIME 1000 MG: 1 INJECTION, POWDER, FOR SOLUTION INTRAMUSCULAR; INTRAVENOUS at 14:51

## 2022-08-21 RX ADMIN — GABAPENTIN 900 MG: 300 CAPSULE ORAL at 09:43

## 2022-08-21 RX ADMIN — MORPHINE SULFATE 15 MG: 15 TABLET ORAL at 17:39

## 2022-08-21 RX ADMIN — BACLOFEN 10 MG: 10 TABLET ORAL at 14:46

## 2022-08-21 RX ADMIN — GABAPENTIN 900 MG: 300 CAPSULE ORAL at 14:46

## 2022-08-21 RX ADMIN — TOPIRAMATE 50 MG: 25 TABLET, FILM COATED ORAL at 09:43

## 2022-08-21 RX ADMIN — CEFEPIME 1000 MG: 1 INJECTION, POWDER, FOR SOLUTION INTRAMUSCULAR; INTRAVENOUS at 06:43

## 2022-08-21 RX ADMIN — PRAVASTATIN SODIUM 10 MG: 20 TABLET ORAL at 20:44

## 2022-08-21 RX ADMIN — METHYLPREDNISOLONE 4 MG: 4 TABLET ORAL at 09:42

## 2022-08-21 RX ADMIN — FUROSEMIDE 20 MG: 20 TABLET ORAL at 09:43

## 2022-08-21 RX ADMIN — METHYLPREDNISOLONE 4 MG: 4 TABLET ORAL at 20:44

## 2022-08-21 RX ADMIN — GABAPENTIN 900 MG: 300 CAPSULE ORAL at 20:44

## 2022-08-21 RX ADMIN — BACLOFEN 10 MG: 10 TABLET ORAL at 09:43

## 2022-08-21 RX ADMIN — CEFEPIME 1000 MG: 1 INJECTION, POWDER, FOR SOLUTION INTRAMUSCULAR; INTRAVENOUS at 20:50

## 2022-08-21 RX ADMIN — ENOXAPARIN SODIUM 50 MG: 60 INJECTION SUBCUTANEOUS at 14:46

## 2022-08-21 RX ADMIN — GABAPENTIN 300 MG: 300 CAPSULE ORAL at 20:44

## 2022-08-21 RX ADMIN — TOLTERODINE 4 MG: 4 CAPSULE, EXTENDED RELEASE ORAL at 09:43

## 2022-08-21 RX ADMIN — BACLOFEN 10 MG: 10 TABLET ORAL at 20:44

## 2022-08-21 RX ADMIN — PANTOPRAZOLE SODIUM 40 MG: 40 TABLET, DELAYED RELEASE ORAL at 06:36

## 2022-08-21 RX ADMIN — MONTELUKAST SODIUM 10 MG: 10 TABLET, FILM COATED ORAL at 20:44

## 2022-08-21 ASSESSMENT — PAIN SCALES - GENERAL
PAINLEVEL_OUTOF10: 8
PAINLEVEL_OUTOF10: 4
PAINLEVEL_OUTOF10: 2
PAINLEVEL_OUTOF10: 0

## 2022-08-22 ENCOUNTER — ANESTHESIA EVENT (OUTPATIENT)
Dept: GASTROENTEROLOGY | Age: 67
DRG: 175 | End: 2022-08-22

## 2022-08-22 ENCOUNTER — APPOINTMENT (OUTPATIENT)
Dept: GASTROENTEROLOGY | Age: 67
DRG: 175 | End: 2022-08-22
Attending: INTERNAL MEDICINE

## 2022-08-22 ENCOUNTER — ANESTHESIA (OUTPATIENT)
Dept: GASTROENTEROLOGY | Age: 67
DRG: 175 | End: 2022-08-22

## 2022-08-22 LAB
ANION GAP SERPL CALC-SCNC: 10 MMOL/L (ref 7–19)
ATRIAL RATE (BPM): 98
BASOPHILS # BLD: 0.1 K/MCL (ref 0–0.3)
BASOPHILS NFR BLD: 1 %
BUN SERPL-MCNC: 27 MG/DL (ref 6–20)
BUN/CREAT SERPL: 66 (ref 7–25)
BURR CELLS BLD QL SMEAR: ABNORMAL
CALCIUM SERPL-MCNC: 9.7 MG/DL (ref 8.4–10.2)
CHLORIDE SERPL-SCNC: 113 MMOL/L (ref 97–110)
CO2 SERPL-SCNC: 23 MMOL/L (ref 21–32)
CREAT SERPL-MCNC: 0.41 MG/DL (ref 0.51–0.95)
DEPRECATED RDW RBC: 51.6 FL (ref 39–50)
EOSINOPHIL # BLD: 0.1 K/MCL (ref 0–0.5)
EOSINOPHIL NFR BLD: 1 %
ERYTHROCYTE [DISTWIDTH] IN BLOOD: 16.2 % (ref 11–15)
FASTING DURATION TIME PATIENT: ABNORMAL H
GFR SERPLBLD BASED ON 1.73 SQ M-ARVRAT: >90 ML/MIN
GLUCOSE SERPL-MCNC: 74 MG/DL (ref 70–99)
HCT VFR BLD CALC: 31.7 % (ref 36–46.5)
HGB BLD-MCNC: 9.6 G/DL (ref 12–15.5)
LYMPHOCYTES # BLD: 0.4 K/MCL (ref 1–4)
LYMPHOCYTES NFR BLD: 4 %
MCH RBC QN AUTO: 27.3 PG (ref 26–34)
MCHC RBC AUTO-ENTMCNC: 30.3 G/DL (ref 32–36.5)
MCV RBC AUTO: 90.1 FL (ref 78–100)
METAMYELOCYTES NFR BLD: 2 % (ref 0–2)
MONOCYTES # BLD: 0.7 K/MCL (ref 0.3–0.9)
MONOCYTES NFR BLD: 7 %
MYELOCYTES # BLD MANUAL: 1 %
NEUTROPHILS # BLD: 8.1 K/MCL (ref 1.8–7.7)
NEUTS SEG NFR BLD: 84 %
NRBC BLD MANUAL-RTO: 0 /100 WBC
OVALOCYTES BLD QL SMEAR: ABNORMAL
P AXIS (DEGREES): 51
PLAT MORPH BLD: NORMAL
PLATELET # BLD AUTO: 292 K/MCL (ref 140–450)
POTASSIUM SERPL-SCNC: 3.7 MMOL/L (ref 3.4–5.1)
PR-INTERVAL (MSEC): 112
QRS-INTERVAL (MSEC): 80
QT-INTERVAL (MSEC): 314
QTC: 401
R AXIS (DEGREES): 10
RBC # BLD: 3.52 MIL/MCL (ref 4–5.2)
REPORT TEXT: NORMAL
SCHISTOCYTES BLD QL SMEAR: ABNORMAL
SODIUM SERPL-SCNC: 142 MMOL/L (ref 135–145)
T AXIS (DEGREES): 74
TROPONIN I SERPL DL<=0.01 NG/ML-MCNC: 33 NG/L
VENTRICULAR RATE EKG/MIN (BPM): 98
WBC # BLD: 9.7 K/MCL (ref 4.2–11)
WBC MORPH BLD: NORMAL

## 2022-08-22 PROCEDURE — 10002803 HB RX 637: Performed by: HOSPITALIST

## 2022-08-22 PROCEDURE — 85027 COMPLETE CBC AUTOMATED: CPT | Performed by: HOSPITALIST

## 2022-08-22 PROCEDURE — 36415 COLL VENOUS BLD VENIPUNCTURE: CPT | Performed by: HOSPITALIST

## 2022-08-22 PROCEDURE — 10004651 HB RX, NO CHARGE ITEM: Performed by: EMERGENCY MEDICINE

## 2022-08-22 PROCEDURE — 10002800 HB RX 250 W HCPCS: Performed by: HOSPITALIST

## 2022-08-22 PROCEDURE — 13000001 HB PHASE II RECOVERY EA 30 MINUTES

## 2022-08-22 PROCEDURE — 10002800 HB RX 250 W HCPCS

## 2022-08-22 PROCEDURE — 0DB38ZX EXCISION OF LOWER ESOPHAGUS, VIA NATURAL OR ARTIFICIAL OPENING ENDOSCOPIC, DIAGNOSTIC: ICD-10-PCS | Performed by: INTERNAL MEDICINE

## 2022-08-22 PROCEDURE — 0DB68ZX EXCISION OF STOMACH, VIA NATURAL OR ARTIFICIAL OPENING ENDOSCOPIC, DIAGNOSTIC: ICD-10-PCS | Performed by: INTERNAL MEDICINE

## 2022-08-22 PROCEDURE — 10002807 HB RX 258: Performed by: INTERNAL MEDICINE

## 2022-08-22 PROCEDURE — 0DB98ZX EXCISION OF DUODENUM, VIA NATURAL OR ARTIFICIAL OPENING ENDOSCOPIC, DIAGNOSTIC: ICD-10-PCS | Performed by: INTERNAL MEDICINE

## 2022-08-22 PROCEDURE — 88305 TISSUE EXAM BY PATHOLOGIST: CPT | Performed by: INTERNAL MEDICINE

## 2022-08-22 PROCEDURE — 10002807 HB RX 258: Performed by: HOSPITALIST

## 2022-08-22 PROCEDURE — 0DBH8ZX EXCISION OF CECUM, VIA NATURAL OR ARTIFICIAL OPENING ENDOSCOPIC, DIAGNOSTIC: ICD-10-PCS | Performed by: INTERNAL MEDICINE

## 2022-08-22 PROCEDURE — 10002803 HB RX 637: Performed by: INTERNAL MEDICINE

## 2022-08-22 PROCEDURE — 93010 ELECTROCARDIOGRAM REPORT: CPT | Performed by: INTERNAL MEDICINE

## 2022-08-22 PROCEDURE — 0D758ZZ DILATION OF ESOPHAGUS, VIA NATURAL OR ARTIFICIAL OPENING ENDOSCOPIC: ICD-10-PCS | Performed by: INTERNAL MEDICINE

## 2022-08-22 PROCEDURE — 94640 AIRWAY INHALATION TREATMENT: CPT

## 2022-08-22 PROCEDURE — 10002801 HB RX 250 W/O HCPCS: Performed by: HOSPITALIST

## 2022-08-22 PROCEDURE — 13003289 HB OXYGEN THERAPY DAILY

## 2022-08-22 PROCEDURE — 93005 ELECTROCARDIOGRAM TRACING: CPT | Performed by: HOSPITALIST

## 2022-08-22 PROCEDURE — 0DB28ZX EXCISION OF MIDDLE ESOPHAGUS, VIA NATURAL OR ARTIFICIAL OPENING ENDOSCOPIC, DIAGNOSTIC: ICD-10-PCS | Performed by: INTERNAL MEDICINE

## 2022-08-22 PROCEDURE — 13000008 HB ANESTHESIA MAC OUTSIDE OR

## 2022-08-22 PROCEDURE — 13000028 HB GI MAJOR COMPLEX CASE S/U + 1ST 15 MIN

## 2022-08-22 PROCEDURE — 0DBL8ZX EXCISION OF TRANSVERSE COLON, VIA NATURAL OR ARTIFICIAL OPENING ENDOSCOPIC, DIAGNOSTIC: ICD-10-PCS | Performed by: INTERNAL MEDICINE

## 2022-08-22 PROCEDURE — 10004451 HB PACU RECOVERY 1ST 30 MINUTES

## 2022-08-22 PROCEDURE — 84484 ASSAY OF TROPONIN QUANT: CPT | Performed by: HOSPITALIST

## 2022-08-22 PROCEDURE — 10003585 HB ROOM CHARGE INTERMEDIATE CARE

## 2022-08-22 PROCEDURE — 0DBN8ZX EXCISION OF SIGMOID COLON, VIA NATURAL OR ARTIFICIAL OPENING ENDOSCOPIC, DIAGNOSTIC: ICD-10-PCS | Performed by: INTERNAL MEDICINE

## 2022-08-22 PROCEDURE — 80048 BASIC METABOLIC PNL TOTAL CA: CPT | Performed by: HOSPITALIST

## 2022-08-22 PROCEDURE — 13000029 HB GI MAJOR COMPLEX CASE EA ADD MINUTE

## 2022-08-22 RX ORDER — SODIUM CHLORIDE 9 MG/ML
INJECTION, SOLUTION INTRAVENOUS CONTINUOUS
Status: DISCONTINUED | OUTPATIENT
Start: 2022-08-22 | End: 2022-08-22

## 2022-08-22 RX ORDER — CHOLESTYRAMINE LIGHT 4 G/5.7G
4 POWDER, FOR SUSPENSION ORAL 2 TIMES DAILY
Status: DISCONTINUED | OUTPATIENT
Start: 2022-08-22 | End: 2022-08-26 | Stop reason: HOSPADM

## 2022-08-22 RX ORDER — PROPOFOL 10 MG/ML
INJECTION, EMULSION INTRAVENOUS PRN
Status: DISCONTINUED | OUTPATIENT
Start: 2022-08-22 | End: 2022-08-22

## 2022-08-22 RX ORDER — LOPERAMIDE HYDROCHLORIDE 2 MG/1
2 CAPSULE ORAL PRN
Status: DISPENSED | OUTPATIENT
Start: 2022-08-22 | End: 2022-08-23

## 2022-08-22 RX ORDER — BUDESONIDE 3 MG/1
9 CAPSULE, COATED PELLETS ORAL EVERY MORNING
Status: DISCONTINUED | OUTPATIENT
Start: 2022-08-23 | End: 2022-08-26 | Stop reason: HOSPADM

## 2022-08-22 RX ADMIN — CEFEPIME 1000 MG: 1 INJECTION, POWDER, FOR SOLUTION INTRAMUSCULAR; INTRAVENOUS at 15:13

## 2022-08-22 RX ADMIN — TOPIRAMATE 50 MG: 25 TABLET, FILM COATED ORAL at 09:38

## 2022-08-22 RX ADMIN — GABAPENTIN 300 MG: 300 CAPSULE ORAL at 21:37

## 2022-08-22 RX ADMIN — PROCHLORPERAZINE EDISYLATE 5 MG: 5 INJECTION INTRAMUSCULAR; INTRAVENOUS at 01:18

## 2022-08-22 RX ADMIN — MONTELUKAST SODIUM 10 MG: 10 TABLET, FILM COATED ORAL at 21:37

## 2022-08-22 RX ADMIN — PROCHLORPERAZINE EDISYLATE 5 MG: 5 INJECTION INTRAMUSCULAR; INTRAVENOUS at 11:25

## 2022-08-22 RX ADMIN — SODIUM CHLORIDE: 9 INJECTION, SOLUTION INTRAVENOUS at 16:28

## 2022-08-22 RX ADMIN — METHYLPREDNISOLONE 4 MG: 4 TABLET ORAL at 21:37

## 2022-08-22 RX ADMIN — GABAPENTIN 900 MG: 300 CAPSULE ORAL at 21:37

## 2022-08-22 RX ADMIN — MORPHINE SULFATE 15 MG: 15 TABLET ORAL at 11:21

## 2022-08-22 RX ADMIN — PROPOFOL 50 MCG/KG/MIN: 10 INJECTION, EMULSION INTRAVENOUS at 16:58

## 2022-08-22 RX ADMIN — METHYLPREDNISOLONE 4 MG: 4 TABLET ORAL at 09:38

## 2022-08-22 RX ADMIN — CEFEPIME 1000 MG: 1 INJECTION, POWDER, FOR SOLUTION INTRAMUSCULAR; INTRAVENOUS at 21:44

## 2022-08-22 RX ADMIN — BACLOFEN 10 MG: 10 TABLET ORAL at 21:37

## 2022-08-22 RX ADMIN — BACLOFEN 10 MG: 10 TABLET ORAL at 15:12

## 2022-08-22 RX ADMIN — PROPOFOL 20 MG: 10 INJECTION, EMULSION INTRAVENOUS at 16:56

## 2022-08-22 RX ADMIN — TOLTERODINE 4 MG: 4 CAPSULE, EXTENDED RELEASE ORAL at 09:38

## 2022-08-22 RX ADMIN — GABAPENTIN 900 MG: 300 CAPSULE ORAL at 15:12

## 2022-08-22 RX ADMIN — PROPOFOL 20 MG: 10 INJECTION, EMULSION INTRAVENOUS at 17:10

## 2022-08-22 RX ADMIN — CHOLESTYRAMINE 4 G: 4 POWDER, FOR SUSPENSION ORAL at 21:40

## 2022-08-22 RX ADMIN — FLUTICASONE FUROATE AND VILANTEROL TRIFENATATE 1 PUFF: 100; 25 POWDER RESPIRATORY (INHALATION) at 08:14

## 2022-08-22 RX ADMIN — CEFEPIME 1000 MG: 1 INJECTION, POWDER, FOR SOLUTION INTRAMUSCULAR; INTRAVENOUS at 06:04

## 2022-08-22 RX ADMIN — SODIUM CHLORIDE, PRESERVATIVE FREE 2 ML: 5 INJECTION INTRAVENOUS at 21:43

## 2022-08-22 RX ADMIN — DOXYCYCLINE 100 MG: 100 INJECTION, POWDER, LYOPHILIZED, FOR SOLUTION INTRAVENOUS at 06:02

## 2022-08-22 RX ADMIN — TOPIRAMATE 50 MG: 25 TABLET, FILM COATED ORAL at 21:37

## 2022-08-22 RX ADMIN — PRAVASTATIN SODIUM 10 MG: 20 TABLET ORAL at 21:37

## 2022-08-22 RX ADMIN — PANTOPRAZOLE SODIUM 40 MG: 40 TABLET, DELAYED RELEASE ORAL at 09:38

## 2022-08-22 RX ADMIN — GABAPENTIN 900 MG: 300 CAPSULE ORAL at 09:38

## 2022-08-22 RX ADMIN — BACLOFEN 10 MG: 10 TABLET ORAL at 09:38

## 2022-08-22 RX ADMIN — LOPERAMIDE HYDROCHLORIDE 2 MG: 2 CAPSULE ORAL at 21:37

## 2022-08-22 SDOH — SOCIAL STABILITY: SOCIAL INSECURITY: RISK FACTORS: HEART DISEASE

## 2022-08-22 SDOH — SOCIAL STABILITY: SOCIAL INSECURITY: RISK FACTORS: PULMONARY DISEASE

## 2022-08-22 SDOH — SOCIAL STABILITY: SOCIAL INSECURITY: RISK FACTORS: HEMATOLOGICAL DISEASE

## 2022-08-22 ASSESSMENT — COPD QUESTIONNAIRES
COPD: 1
CAT_SEVERITY: SEVERE

## 2022-08-22 ASSESSMENT — PAIN SCALES - WONG BAKER
WONGBAKER_NUMERICALRESPONSE: 0
WONGBAKER_NUMERICALRESPONSE: 0

## 2022-08-22 ASSESSMENT — PAIN SCALES - GENERAL
PAINLEVEL_OUTOF10: 6
PAINLEVEL_OUTOF10: 0
PAINLEVEL_OUTOF10: 10
PAINLEVEL_OUTOF10: 0
PAINLEVEL_OUTOF10: 0

## 2022-08-23 LAB
ANION GAP SERPL CALC-SCNC: 12 MMOL/L (ref 7–19)
BASOPHILS # BLD: 0.1 K/MCL (ref 0–0.3)
BASOPHILS # BLD: 0.2 K/MCL (ref 0–0.3)
BASOPHILS NFR BLD: 1 %
BASOPHILS NFR BLD: 2 %
BUN SERPL-MCNC: 28 MG/DL (ref 6–20)
BUN/CREAT SERPL: 62 (ref 7–25)
BURR CELLS BLD QL SMEAR: ABNORMAL
BURR CELLS BLD QL SMEAR: ABNORMAL
CALCIUM SERPL-MCNC: 9.6 MG/DL (ref 8.4–10.2)
CHLORIDE SERPL-SCNC: 114 MMOL/L (ref 97–110)
CO2 SERPL-SCNC: 20 MMOL/L (ref 21–32)
CREAT SERPL-MCNC: 0.45 MG/DL (ref 0.51–0.95)
DEPRECATED RDW RBC: 53.3 FL (ref 39–50)
DEPRECATED RDW RBC: 54.3 FL (ref 39–50)
EOSINOPHIL # BLD: 0.1 K/MCL (ref 0–0.5)
EOSINOPHIL # BLD: 0.1 K/MCL (ref 0–0.5)
EOSINOPHIL NFR BLD: 1 %
EOSINOPHIL NFR BLD: 1 %
ERYTHROCYTE [DISTWIDTH] IN BLOOD: 16.4 % (ref 11–15)
ERYTHROCYTE [DISTWIDTH] IN BLOOD: 16.5 % (ref 11–15)
FASTING DURATION TIME PATIENT: ABNORMAL H
GFR SERPLBLD BASED ON 1.73 SQ M-ARVRAT: >90 ML/MIN
GLUCOSE SERPL-MCNC: 62 MG/DL (ref 70–99)
HCT VFR BLD CALC: 30.4 % (ref 36–46.5)
HCT VFR BLD CALC: 30.8 % (ref 36–46.5)
HGB BLD-MCNC: 9.1 G/DL (ref 12–15.5)
HGB BLD-MCNC: 9.4 G/DL (ref 12–15.5)
INR PPP: 1.2
LYMPHOCYTES # BLD: 0.2 K/MCL (ref 1–4)
LYMPHOCYTES # BLD: 0.6 K/MCL (ref 1–4)
LYMPHOCYTES NFR BLD: 2 %
LYMPHOCYTES NFR BLD: 7 %
MCH RBC QN AUTO: 27.7 PG (ref 26–34)
MCH RBC QN AUTO: 27.9 PG (ref 26–34)
MCHC RBC AUTO-ENTMCNC: 29.9 G/DL (ref 32–36.5)
MCHC RBC AUTO-ENTMCNC: 30.5 G/DL (ref 32–36.5)
MCV RBC AUTO: 91.4 FL (ref 78–100)
MCV RBC AUTO: 92.4 FL (ref 78–100)
METAMYELOCYTES NFR BLD: 1 % (ref 0–2)
MONOCYTES # BLD: 0.7 K/MCL (ref 0.3–0.9)
MONOCYTES # BLD: 0.7 K/MCL (ref 0.3–0.9)
MONOCYTES NFR BLD: 8 %
MONOCYTES NFR BLD: 8 %
MYELOCYTES # BLD MANUAL: 1 %
NEUTROPHILS # BLD: 7.1 K/MCL (ref 1.8–7.7)
NEUTROPHILS # BLD: 7.3 K/MCL (ref 1.8–7.7)
NEUTS BAND NFR BLD: 2 % (ref 0–10)
NEUTS BAND NFR BLD: 4 % (ref 0–10)
NEUTS SEG NFR BLD: 80 %
NEUTS SEG NFR BLD: 82 %
NRBC BLD MANUAL-RTO: 0 /100 WBC
NRBC BLD MANUAL-RTO: 0 /100 WBC
OVALOCYTES BLD QL SMEAR: ABNORMAL
OVALOCYTES BLD QL SMEAR: ABNORMAL
PLAT MORPH BLD: NORMAL
PLAT MORPH BLD: NORMAL
PLATELET # BLD AUTO: 263 K/MCL (ref 140–450)
PLATELET # BLD AUTO: 267 K/MCL (ref 140–450)
POLYCHROMASIA BLD QL SMEAR: ABNORMAL
POLYCHROMASIA BLD QL SMEAR: ABNORMAL
POTASSIUM SERPL-SCNC: 3.6 MMOL/L (ref 3.4–5.1)
PROTHROMBIN TIME: 12.4 SEC (ref 9.7–11.8)
RBC # BLD: 3.29 MIL/MCL (ref 4–5.2)
RBC # BLD: 3.37 MIL/MCL (ref 4–5.2)
SCHISTOCYTES BLD QL SMEAR: ABNORMAL
SCHISTOCYTES BLD QL SMEAR: ABNORMAL
SODIUM SERPL-SCNC: 142 MMOL/L (ref 135–145)
WBC # BLD: 8.5 K/MCL (ref 4.2–11)
WBC # BLD: 8.6 K/MCL (ref 4.2–11)
WBC MORPH BLD: NORMAL
WBC MORPH BLD: NORMAL

## 2022-08-23 PROCEDURE — 97530 THERAPEUTIC ACTIVITIES: CPT

## 2022-08-23 PROCEDURE — 10002803 HB RX 637: Performed by: INTERNAL MEDICINE

## 2022-08-23 PROCEDURE — 85610 PROTHROMBIN TIME: CPT | Performed by: HOSPITALIST

## 2022-08-23 PROCEDURE — 10002803 HB RX 637: Performed by: HOSPITALIST

## 2022-08-23 PROCEDURE — 10002801 HB RX 250 W/O HCPCS: Performed by: HOSPITALIST

## 2022-08-23 PROCEDURE — 10004281 HB COUNTER-STAFF TIME PER 15 MIN

## 2022-08-23 PROCEDURE — 36415 COLL VENOUS BLD VENIPUNCTURE: CPT | Performed by: INTERNAL MEDICINE

## 2022-08-23 PROCEDURE — 10002800 HB RX 250 W HCPCS: Performed by: HOSPITALIST

## 2022-08-23 PROCEDURE — 13003289 HB OXYGEN THERAPY DAILY

## 2022-08-23 PROCEDURE — 94640 AIRWAY INHALATION TREATMENT: CPT

## 2022-08-23 PROCEDURE — 10004651 HB RX, NO CHARGE ITEM: Performed by: EMERGENCY MEDICINE

## 2022-08-23 PROCEDURE — 85027 COMPLETE CBC AUTOMATED: CPT | Performed by: INTERNAL MEDICINE

## 2022-08-23 PROCEDURE — 85027 COMPLETE CBC AUTOMATED: CPT | Performed by: HOSPITALIST

## 2022-08-23 PROCEDURE — 10002807 HB RX 258: Performed by: HOSPITALIST

## 2022-08-23 PROCEDURE — 97535 SELF CARE MNGMENT TRAINING: CPT

## 2022-08-23 PROCEDURE — 80048 BASIC METABOLIC PNL TOTAL CA: CPT | Performed by: HOSPITALIST

## 2022-08-23 PROCEDURE — 10003585 HB ROOM CHARGE INTERMEDIATE CARE

## 2022-08-23 RX ORDER — WARFARIN SODIUM 1 MG/1
2 TABLET ORAL ONCE
Status: COMPLETED | OUTPATIENT
Start: 2022-08-23 | End: 2022-08-23

## 2022-08-23 RX ADMIN — BACLOFEN 10 MG: 10 TABLET ORAL at 20:18

## 2022-08-23 RX ADMIN — CEFEPIME 1000 MG: 1 INJECTION, POWDER, FOR SOLUTION INTRAMUSCULAR; INTRAVENOUS at 22:27

## 2022-08-23 RX ADMIN — CHOLESTYRAMINE 4 G: 4 POWDER, FOR SUSPENSION ORAL at 10:00

## 2022-08-23 RX ADMIN — SODIUM CHLORIDE, PRESERVATIVE FREE 2 ML: 5 INJECTION INTRAVENOUS at 08:42

## 2022-08-23 RX ADMIN — SODIUM CHLORIDE, PRESERVATIVE FREE 2 ML: 5 INJECTION INTRAVENOUS at 20:19

## 2022-08-23 RX ADMIN — METHYLPREDNISOLONE 4 MG: 4 TABLET ORAL at 08:41

## 2022-08-23 RX ADMIN — PRAVASTATIN SODIUM 10 MG: 20 TABLET ORAL at 20:18

## 2022-08-23 RX ADMIN — GABAPENTIN 300 MG: 300 CAPSULE ORAL at 20:19

## 2022-08-23 RX ADMIN — METHYLPREDNISOLONE 4 MG: 4 TABLET ORAL at 20:18

## 2022-08-23 RX ADMIN — PANTOPRAZOLE SODIUM 40 MG: 40 TABLET, DELAYED RELEASE ORAL at 05:49

## 2022-08-23 RX ADMIN — BUDESONIDE 9 MG: 3 CAPSULE ORAL at 05:49

## 2022-08-23 RX ADMIN — WARFARIN SODIUM 2 MG: 1 TABLET ORAL at 18:21

## 2022-08-23 RX ADMIN — LOPERAMIDE HYDROCHLORIDE 2 MG: 2 CAPSULE ORAL at 05:49

## 2022-08-23 RX ADMIN — DOXYCYCLINE 100 MG: 100 INJECTION, POWDER, LYOPHILIZED, FOR SOLUTION INTRAVENOUS at 07:29

## 2022-08-23 RX ADMIN — MORPHINE SULFATE 15 MG: 15 TABLET ORAL at 15:54

## 2022-08-23 RX ADMIN — TOPIRAMATE 50 MG: 25 TABLET, FILM COATED ORAL at 20:18

## 2022-08-23 RX ADMIN — CEFEPIME 1000 MG: 1 INJECTION, POWDER, FOR SOLUTION INTRAMUSCULAR; INTRAVENOUS at 15:00

## 2022-08-23 RX ADMIN — DOXYCYCLINE 100 MG: 100 INJECTION, POWDER, LYOPHILIZED, FOR SOLUTION INTRAVENOUS at 18:26

## 2022-08-23 RX ADMIN — BACLOFEN 10 MG: 10 TABLET ORAL at 15:55

## 2022-08-23 RX ADMIN — MONTELUKAST SODIUM 10 MG: 10 TABLET, FILM COATED ORAL at 20:18

## 2022-08-23 RX ADMIN — FUROSEMIDE 20 MG: 20 TABLET ORAL at 08:42

## 2022-08-23 RX ADMIN — GABAPENTIN 900 MG: 300 CAPSULE ORAL at 08:41

## 2022-08-23 RX ADMIN — MORPHINE SULFATE 15 MG: 15 TABLET ORAL at 06:34

## 2022-08-23 RX ADMIN — GABAPENTIN 900 MG: 300 CAPSULE ORAL at 20:17

## 2022-08-23 RX ADMIN — BACLOFEN 10 MG: 10 TABLET ORAL at 08:42

## 2022-08-23 RX ADMIN — ENOXAPARIN SODIUM 50 MG: 60 INJECTION SUBCUTANEOUS at 15:55

## 2022-08-23 RX ADMIN — FLUTICASONE FUROATE AND VILANTEROL TRIFENATATE 1 PUFF: 100; 25 POWDER RESPIRATORY (INHALATION) at 09:18

## 2022-08-23 RX ADMIN — CEFEPIME 1000 MG: 1 INJECTION, POWDER, FOR SOLUTION INTRAMUSCULAR; INTRAVENOUS at 05:46

## 2022-08-23 RX ADMIN — TOPIRAMATE 50 MG: 25 TABLET, FILM COATED ORAL at 08:41

## 2022-08-23 RX ADMIN — GABAPENTIN 900 MG: 300 CAPSULE ORAL at 15:55

## 2022-08-23 RX ADMIN — TOLTERODINE 4 MG: 4 CAPSULE, EXTENDED RELEASE ORAL at 08:42

## 2022-08-23 RX ADMIN — CHOLESTYRAMINE 4 G: 4 POWDER, FOR SUSPENSION ORAL at 20:19

## 2022-08-23 ASSESSMENT — COGNITIVE AND FUNCTIONAL STATUS - GENERAL
BASIC_MOBILITY_CONVERTED_SCORE: 41.05
BASIC_MOBILITY_RAW_SCORE: 18
HELP NEEDED FOR PERSONAL GROOMING: A LITTLE
HELP NEEDED FOR TOILETING: A LOT
HELP NEEDED DRESSING REGULAR LOWER BODY CLOTHING: A LOT
HELP NEEDED DRESSING REGULAR UPPER BODY CLOTHING: A LITTLE
DAILY_ACTIVITY_CONVERTED_SCORE: 35.96
HELP NEEDED FOR BATHING: A LOT
DAILY_ACTIVITY_RAW_SCORE: 16

## 2022-08-23 ASSESSMENT — PAIN SCALES - GENERAL
PAINLEVEL_OUTOF10: 3
PAINLEVEL_OUTOF10: 0
PAINLEVEL_OUTOF10: 9
PAINLEVEL_OUTOF10: 3
PAINLEVEL_OUTOF10: 9

## 2022-08-23 ASSESSMENT — ACTIVITIES OF DAILY LIVING (ADL): HOME_MANAGEMENT_TIME_ENTRY: 30

## 2022-08-24 LAB
DEPRECATED RDW RBC: 52.4 FL (ref 39–50)
ERYTHROCYTE [DISTWIDTH] IN BLOOD: 16.3 % (ref 11–15)
HCT VFR BLD CALC: 32 % (ref 36–46.5)
HGB BLD-MCNC: 9.9 G/DL (ref 12–15.5)
INR PPP: 1.3
MCH RBC QN AUTO: 27.7 PG (ref 26–34)
MCHC RBC AUTO-ENTMCNC: 30.9 G/DL (ref 32–36.5)
MCV RBC AUTO: 89.6 FL (ref 78–100)
NRBC BLD MANUAL-RTO: 0 /100 WBC
PLATELET # BLD AUTO: 257 K/MCL (ref 140–450)
PROTHROMBIN TIME: 13.5 SEC (ref 9.7–11.8)
RAINBOW EXTRA TUBES HOLD SPECIMEN: NORMAL
RBC # BLD: 3.57 MIL/MCL (ref 4–5.2)
WBC # BLD: 9.1 K/MCL (ref 4.2–11)

## 2022-08-24 PROCEDURE — 36415 COLL VENOUS BLD VENIPUNCTURE: CPT | Performed by: HOSPITALIST

## 2022-08-24 PROCEDURE — 85610 PROTHROMBIN TIME: CPT | Performed by: HOSPITALIST

## 2022-08-24 PROCEDURE — 85027 COMPLETE CBC AUTOMATED: CPT | Performed by: HOSPITALIST

## 2022-08-24 PROCEDURE — 10004281 HB COUNTER-STAFF TIME PER 15 MIN

## 2022-08-24 PROCEDURE — 10002801 HB RX 250 W/O HCPCS: Performed by: HOSPITALIST

## 2022-08-24 PROCEDURE — 97530 THERAPEUTIC ACTIVITIES: CPT

## 2022-08-24 PROCEDURE — 10004651 HB RX, NO CHARGE ITEM: Performed by: EMERGENCY MEDICINE

## 2022-08-24 PROCEDURE — 10002807 HB RX 258: Performed by: HOSPITALIST

## 2022-08-24 PROCEDURE — 13003289 HB OXYGEN THERAPY DAILY

## 2022-08-24 PROCEDURE — 10002803 HB RX 637: Performed by: INTERNAL MEDICINE

## 2022-08-24 PROCEDURE — 10002800 HB RX 250 W HCPCS: Performed by: HOSPITALIST

## 2022-08-24 PROCEDURE — 10002803 HB RX 637: Performed by: HOSPITALIST

## 2022-08-24 PROCEDURE — 10003585 HB ROOM CHARGE INTERMEDIATE CARE

## 2022-08-24 RX ORDER — WARFARIN SODIUM 1 MG/1
2 TABLET ORAL ONCE
Status: COMPLETED | OUTPATIENT
Start: 2022-08-24 | End: 2022-08-24

## 2022-08-24 RX ORDER — TRAZODONE HYDROCHLORIDE 50 MG/1
50 TABLET ORAL ONCE
Status: COMPLETED | OUTPATIENT
Start: 2022-08-24 | End: 2022-08-24

## 2022-08-24 RX ORDER — WARFARIN SODIUM 5 MG/1
5 TABLET ORAL ONCE
Status: DISCONTINUED | OUTPATIENT
Start: 2022-08-24 | End: 2022-08-24

## 2022-08-24 RX ADMIN — BUDESONIDE 9 MG: 3 CAPSULE ORAL at 11:49

## 2022-08-24 RX ADMIN — FLUTICASONE FUROATE AND VILANTEROL TRIFENATATE 1 PUFF: 100; 25 POWDER RESPIRATORY (INHALATION) at 10:36

## 2022-08-24 RX ADMIN — CEFEPIME 1000 MG: 1 INJECTION, POWDER, FOR SOLUTION INTRAMUSCULAR; INTRAVENOUS at 22:15

## 2022-08-24 RX ADMIN — BACLOFEN 10 MG: 10 TABLET ORAL at 14:03

## 2022-08-24 RX ADMIN — SODIUM CHLORIDE, PRESERVATIVE FREE 2 ML: 5 INJECTION INTRAVENOUS at 08:26

## 2022-08-24 RX ADMIN — TOPIRAMATE 50 MG: 25 TABLET, FILM COATED ORAL at 08:20

## 2022-08-24 RX ADMIN — WARFARIN SODIUM 2 MG: 1 TABLET ORAL at 14:03

## 2022-08-24 RX ADMIN — GABAPENTIN 900 MG: 300 CAPSULE ORAL at 14:03

## 2022-08-24 RX ADMIN — BACLOFEN 10 MG: 10 TABLET ORAL at 21:58

## 2022-08-24 RX ADMIN — SODIUM CHLORIDE, PRESERVATIVE FREE 2 ML: 5 INJECTION INTRAVENOUS at 22:03

## 2022-08-24 RX ADMIN — ENOXAPARIN SODIUM 50 MG: 60 INJECTION SUBCUTANEOUS at 05:08

## 2022-08-24 RX ADMIN — SODIUM CHLORIDE 25 ML: 9 INJECTION, SOLUTION INTRAVENOUS at 22:13

## 2022-08-24 RX ADMIN — CEFEPIME 1000 MG: 1 INJECTION, POWDER, FOR SOLUTION INTRAMUSCULAR; INTRAVENOUS at 05:13

## 2022-08-24 RX ADMIN — TRAZODONE HYDROCHLORIDE 50 MG: 50 TABLET ORAL at 23:26

## 2022-08-24 RX ADMIN — CHOLESTYRAMINE 4 G: 4 POWDER, FOR SUSPENSION ORAL at 08:20

## 2022-08-24 RX ADMIN — BACLOFEN 10 MG: 10 TABLET ORAL at 08:20

## 2022-08-24 RX ADMIN — PANTOPRAZOLE SODIUM 40 MG: 40 TABLET, DELAYED RELEASE ORAL at 05:45

## 2022-08-24 RX ADMIN — GABAPENTIN 900 MG: 300 CAPSULE ORAL at 08:20

## 2022-08-24 RX ADMIN — CHOLESTYRAMINE 4 G: 4 POWDER, FOR SUSPENSION ORAL at 21:58

## 2022-08-24 RX ADMIN — METHYLPREDNISOLONE 4 MG: 4 TABLET ORAL at 08:20

## 2022-08-24 RX ADMIN — PRAVASTATIN SODIUM 10 MG: 20 TABLET ORAL at 21:56

## 2022-08-24 RX ADMIN — METHYLPREDNISOLONE 4 MG: 4 TABLET ORAL at 21:58

## 2022-08-24 RX ADMIN — MORPHINE SULFATE 15 MG: 15 TABLET ORAL at 19:49

## 2022-08-24 RX ADMIN — ENOXAPARIN SODIUM 50 MG: 60 INJECTION SUBCUTANEOUS at 14:03

## 2022-08-24 RX ADMIN — MONTELUKAST SODIUM 10 MG: 10 TABLET, FILM COATED ORAL at 21:58

## 2022-08-24 RX ADMIN — DOXYCYCLINE 100 MG: 100 INJECTION, POWDER, LYOPHILIZED, FOR SOLUTION INTRAVENOUS at 18:00

## 2022-08-24 RX ADMIN — FUROSEMIDE 20 MG: 20 TABLET ORAL at 08:20

## 2022-08-24 RX ADMIN — GABAPENTIN 900 MG: 300 CAPSULE ORAL at 21:57

## 2022-08-24 RX ADMIN — TOLTERODINE 4 MG: 4 CAPSULE, EXTENDED RELEASE ORAL at 08:20

## 2022-08-24 RX ADMIN — CEFEPIME 1000 MG: 1 INJECTION, POWDER, FOR SOLUTION INTRAMUSCULAR; INTRAVENOUS at 14:05

## 2022-08-24 RX ADMIN — DOXYCYCLINE 100 MG: 100 INJECTION, POWDER, LYOPHILIZED, FOR SOLUTION INTRAVENOUS at 05:49

## 2022-08-24 RX ADMIN — GABAPENTIN 300 MG: 300 CAPSULE ORAL at 22:00

## 2022-08-24 RX ADMIN — TOPIRAMATE 50 MG: 25 TABLET, FILM COATED ORAL at 21:58

## 2022-08-24 ASSESSMENT — COGNITIVE AND FUNCTIONAL STATUS - GENERAL
BASIC_MOBILITY_RAW_SCORE: 18
DAILY_ACTIVITY_CONVERTED_SCORE: 35.96
HELP NEEDED DRESSING REGULAR UPPER BODY CLOTHING: A LITTLE
DAILY_ACTIVITY_RAW_SCORE: 16
BASIC_MOBILITY_CONVERTED_SCORE: 41.05
HELP NEEDED FOR PERSONAL GROOMING: A LITTLE
HELP NEEDED FOR BATHING: A LOT
HELP NEEDED DRESSING REGULAR LOWER BODY CLOTHING: A LOT
HELP NEEDED FOR TOILETING: A LOT

## 2022-08-24 ASSESSMENT — PAIN SCALES - GENERAL
PAINLEVEL_OUTOF10: 0
PAINLEVEL_OUTOF10: 8
PAINLEVEL_OUTOF10: 10

## 2022-08-25 LAB
ASR DISCLAIMER: NORMAL
BACTERIA BLD CULT: NORMAL
BACTERIA BLD CULT: NORMAL
CASE RPRT: NORMAL
CLINICAL INFO: NORMAL
INR PPP: 1.4
PATH REPORT.FINAL DX SPEC: NORMAL
PATH REPORT.GROSS SPEC: NORMAL
PROTHROMBIN TIME: 14.3 SEC (ref 9.7–11.8)
RAINBOW EXTRA TUBES HOLD SPECIMEN: NORMAL
RAINBOW EXTRA TUBES HOLD SPECIMEN: NORMAL
SARS-COV-2 RNA RESP QL NAA+PROBE: NOT DETECTED
SERVICE CMNT-IMP: NORMAL
SERVICE CMNT-IMP: NORMAL

## 2022-08-25 PROCEDURE — 87635 SARS-COV-2 COVID-19 AMP PRB: CPT | Performed by: HOSPITALIST

## 2022-08-25 PROCEDURE — 94640 AIRWAY INHALATION TREATMENT: CPT

## 2022-08-25 PROCEDURE — 10002807 HB RX 258: Performed by: HOSPITALIST

## 2022-08-25 PROCEDURE — 10002803 HB RX 637: Performed by: INTERNAL MEDICINE

## 2022-08-25 PROCEDURE — 13003289 HB OXYGEN THERAPY DAILY

## 2022-08-25 PROCEDURE — 10002803 HB RX 637: Performed by: HOSPITALIST

## 2022-08-25 PROCEDURE — 36415 COLL VENOUS BLD VENIPUNCTURE: CPT | Performed by: HOSPITALIST

## 2022-08-25 PROCEDURE — 10004281 HB COUNTER-STAFF TIME PER 15 MIN

## 2022-08-25 PROCEDURE — 10003585 HB ROOM CHARGE INTERMEDIATE CARE

## 2022-08-25 PROCEDURE — 10002801 HB RX 250 W/O HCPCS: Performed by: HOSPITALIST

## 2022-08-25 PROCEDURE — 10002800 HB RX 250 W HCPCS: Performed by: HOSPITALIST

## 2022-08-25 PROCEDURE — 10004651 HB RX, NO CHARGE ITEM: Performed by: HOSPITALIST

## 2022-08-25 PROCEDURE — 10004651 HB RX, NO CHARGE ITEM: Performed by: EMERGENCY MEDICINE

## 2022-08-25 PROCEDURE — 85610 PROTHROMBIN TIME: CPT | Performed by: HOSPITALIST

## 2022-08-25 RX ORDER — WARFARIN SODIUM 1 MG/1
4 TABLET ORAL ONCE
Status: COMPLETED | OUTPATIENT
Start: 2022-08-25 | End: 2022-08-25

## 2022-08-25 RX ADMIN — CEFEPIME 1000 MG: 1 INJECTION, POWDER, FOR SOLUTION INTRAMUSCULAR; INTRAVENOUS at 07:05

## 2022-08-25 RX ADMIN — PANTOPRAZOLE SODIUM 40 MG: 40 TABLET, DELAYED RELEASE ORAL at 05:02

## 2022-08-25 RX ADMIN — ENOXAPARIN SODIUM 50 MG: 60 INJECTION SUBCUTANEOUS at 17:43

## 2022-08-25 RX ADMIN — METHYLPREDNISOLONE 4 MG: 4 TABLET ORAL at 08:58

## 2022-08-25 RX ADMIN — DOXYCYCLINE 100 MG: 100 INJECTION, POWDER, LYOPHILIZED, FOR SOLUTION INTRAVENOUS at 05:09

## 2022-08-25 RX ADMIN — CHOLESTYRAMINE 4 G: 4 POWDER, FOR SUSPENSION ORAL at 08:58

## 2022-08-25 RX ADMIN — GABAPENTIN 900 MG: 300 CAPSULE ORAL at 08:58

## 2022-08-25 RX ADMIN — MONTELUKAST SODIUM 10 MG: 10 TABLET, FILM COATED ORAL at 20:19

## 2022-08-25 RX ADMIN — BUDESONIDE 9 MG: 3 CAPSULE ORAL at 08:02

## 2022-08-25 RX ADMIN — PRAVASTATIN SODIUM 10 MG: 20 TABLET ORAL at 20:18

## 2022-08-25 RX ADMIN — GABAPENTIN 300 MG: 300 CAPSULE ORAL at 20:19

## 2022-08-25 RX ADMIN — GABAPENTIN 900 MG: 300 CAPSULE ORAL at 13:07

## 2022-08-25 RX ADMIN — BACLOFEN 10 MG: 10 TABLET ORAL at 20:19

## 2022-08-25 RX ADMIN — GABAPENTIN 900 MG: 300 CAPSULE ORAL at 20:20

## 2022-08-25 RX ADMIN — WARFARIN SODIUM 4 MG: 1 TABLET ORAL at 17:43

## 2022-08-25 RX ADMIN — BACLOFEN 10 MG: 10 TABLET ORAL at 13:07

## 2022-08-25 RX ADMIN — TOPIRAMATE 50 MG: 25 TABLET, FILM COATED ORAL at 08:58

## 2022-08-25 RX ADMIN — BACLOFEN 10 MG: 10 TABLET ORAL at 08:58

## 2022-08-25 RX ADMIN — CEFEPIME 1000 MG: 1 INJECTION, POWDER, FOR SOLUTION INTRAMUSCULAR; INTRAVENOUS at 13:08

## 2022-08-25 RX ADMIN — SODIUM CHLORIDE, PRESERVATIVE FREE 2 ML: 5 INJECTION INTRAVENOUS at 08:59

## 2022-08-25 RX ADMIN — TOPIRAMATE 50 MG: 25 TABLET, FILM COATED ORAL at 20:17

## 2022-08-25 RX ADMIN — METHYLPREDNISOLONE 4 MG: 4 TABLET ORAL at 20:19

## 2022-08-25 RX ADMIN — CHOLESTYRAMINE 4 G: 4 POWDER, FOR SUSPENSION ORAL at 20:20

## 2022-08-25 RX ADMIN — TOLTERODINE 4 MG: 4 CAPSULE, EXTENDED RELEASE ORAL at 08:58

## 2022-08-25 RX ADMIN — FUROSEMIDE 20 MG: 20 TABLET ORAL at 08:59

## 2022-08-25 RX ADMIN — FLUTICASONE FUROATE AND VILANTEROL TRIFENATATE 1 PUFF: 100; 25 POWDER RESPIRATORY (INHALATION) at 09:59

## 2022-08-25 RX ADMIN — ACETAMINOPHEN 650 MG: 325 TABLET ORAL at 05:02

## 2022-08-25 RX ADMIN — MORPHINE SULFATE 15 MG: 15 TABLET ORAL at 20:28

## 2022-08-25 RX ADMIN — ENOXAPARIN SODIUM 50 MG: 60 INJECTION SUBCUTANEOUS at 05:02

## 2022-08-25 RX ADMIN — MORPHINE SULFATE 15 MG: 15 TABLET ORAL at 13:18

## 2022-08-25 ASSESSMENT — PAIN SCALES - GENERAL
PAINLEVEL_OUTOF10: 0
PAINLEVEL_OUTOF10: 2
PAINLEVEL_OUTOF10: 4
PAINLEVEL_OUTOF10: 8
PAINLEVEL_OUTOF10: 9
PAINLEVEL_OUTOF10: 10
PAINLEVEL_OUTOF10: 9

## 2022-08-26 VITALS
WEIGHT: 101.19 LBS | TEMPERATURE: 99 F | SYSTOLIC BLOOD PRESSURE: 135 MMHG | HEART RATE: 82 BPM | DIASTOLIC BLOOD PRESSURE: 72 MMHG | BODY MASS INDEX: 19.11 KG/M2 | RESPIRATION RATE: 20 BRPM | HEIGHT: 61 IN | OXYGEN SATURATION: 99 %

## 2022-08-26 LAB
INR PPP: 1.4
PROTHROMBIN TIME: 15 SEC (ref 9.7–11.8)

## 2022-08-26 PROCEDURE — 97116 GAIT TRAINING THERAPY: CPT

## 2022-08-26 PROCEDURE — 10002803 HB RX 637: Performed by: HOSPITALIST

## 2022-08-26 PROCEDURE — 36415 COLL VENOUS BLD VENIPUNCTURE: CPT | Performed by: HOSPITALIST

## 2022-08-26 PROCEDURE — 10002800 HB RX 250 W HCPCS: Performed by: HOSPITALIST

## 2022-08-26 PROCEDURE — 94640 AIRWAY INHALATION TREATMENT: CPT

## 2022-08-26 PROCEDURE — 85610 PROTHROMBIN TIME: CPT | Performed by: HOSPITALIST

## 2022-08-26 PROCEDURE — 97530 THERAPEUTIC ACTIVITIES: CPT

## 2022-08-26 PROCEDURE — 10004281 HB COUNTER-STAFF TIME PER 15 MIN

## 2022-08-26 PROCEDURE — 13003289 HB OXYGEN THERAPY DAILY

## 2022-08-26 PROCEDURE — 10002803 HB RX 637: Performed by: INTERNAL MEDICINE

## 2022-08-26 RX ORDER — MORPHINE SULFATE 15 MG/1
15 TABLET ORAL 2 TIMES DAILY PRN
Qty: 20 TABLET | Refills: 0 | Status: SHIPPED | OUTPATIENT
Start: 2022-08-26

## 2022-08-26 RX ORDER — WARFARIN SODIUM 5 MG/1
5 TABLET ORAL ONCE
Status: COMPLETED | OUTPATIENT
Start: 2022-08-26 | End: 2022-08-26

## 2022-08-26 RX ORDER — ATOVAQUONE 750 MG/5ML
1500 SUSPENSION ORAL DAILY
Qty: 300 ML | Refills: 0 | Status: SHIPPED | OUTPATIENT
Start: 2022-08-26 | End: 2022-09-25

## 2022-08-26 RX ORDER — OMEPRAZOLE 40 MG/1
40 CAPSULE, DELAYED RELEASE ORAL EVERY 12 HOURS
Qty: 60 CAPSULE | Refills: 0 | Status: SHIPPED | OUTPATIENT
Start: 2022-08-26 | End: 2022-09-25

## 2022-08-26 RX ORDER — WARFARIN SODIUM 5 MG/1
5 TABLET ORAL ONCE
Status: DISCONTINUED | OUTPATIENT
Start: 2022-08-26 | End: 2022-08-26

## 2022-08-26 RX ORDER — BUDESONIDE 3 MG/1
9 CAPSULE, COATED PELLETS ORAL EVERY MORNING
Qty: 90 CAPSULE | Refills: 0 | Status: SHIPPED | OUTPATIENT
Start: 2022-08-27 | End: 2022-09-26

## 2022-08-26 RX ORDER — CHOLESTYRAMINE LIGHT 4 G/5.7G
4 POWDER, FOR SUSPENSION ORAL 2 TIMES DAILY
Qty: 60 PACKET | Refills: 0 | Status: SHIPPED | OUTPATIENT
Start: 2022-08-26 | End: 2022-09-25

## 2022-08-26 RX ADMIN — FUROSEMIDE 20 MG: 20 TABLET ORAL at 08:20

## 2022-08-26 RX ADMIN — PANTOPRAZOLE SODIUM 40 MG: 40 TABLET, DELAYED RELEASE ORAL at 07:16

## 2022-08-26 RX ADMIN — WARFARIN SODIUM 5 MG: 5 TABLET ORAL at 11:31

## 2022-08-26 RX ADMIN — METHYLPREDNISOLONE 4 MG: 4 TABLET ORAL at 08:20

## 2022-08-26 RX ADMIN — GABAPENTIN 900 MG: 300 CAPSULE ORAL at 08:24

## 2022-08-26 RX ADMIN — FLUTICASONE FUROATE AND VILANTEROL TRIFENATATE 1 PUFF: 100; 25 POWDER RESPIRATORY (INHALATION) at 08:27

## 2022-08-26 RX ADMIN — BUDESONIDE 9 MG: 3 CAPSULE ORAL at 08:25

## 2022-08-26 RX ADMIN — TOPIRAMATE 50 MG: 25 TABLET, FILM COATED ORAL at 08:20

## 2022-08-26 RX ADMIN — ENOXAPARIN SODIUM 50 MG: 60 INJECTION SUBCUTANEOUS at 07:16

## 2022-08-26 RX ADMIN — TOLTERODINE 4 MG: 4 CAPSULE, EXTENDED RELEASE ORAL at 08:19

## 2022-08-26 RX ADMIN — BACLOFEN 10 MG: 10 TABLET ORAL at 08:20

## 2022-08-26 RX ADMIN — CHOLESTYRAMINE 4 G: 4 POWDER, FOR SUSPENSION ORAL at 08:22

## 2022-08-26 ASSESSMENT — PAIN SCALES - WONG BAKER: WONGBAKER_NUMERICALRESPONSE: 0

## 2022-08-26 ASSESSMENT — PAIN SCALES - GENERAL: PAINLEVEL_OUTOF10: 0

## 2022-08-26 ASSESSMENT — COGNITIVE AND FUNCTIONAL STATUS - GENERAL
BASIC_MOBILITY_RAW_SCORE: 21
BASIC_MOBILITY_RAW_SCORE: 18
BASIC_MOBILITY_CONVERTED_SCORE: 47.40
BASIC_MOBILITY_CONVERTED_SCORE: 41.05
BASIC_MOBILITY_CONVERTED_SCORE: 45.55
BASIC_MOBILITY_RAW_SCORE: 22

## 2022-08-26 ASSESSMENT — ENCOUNTER SYMPTOMS: PAIN SEVERITY NOW: 0

## 2022-09-26 ENCOUNTER — TELEPHONE (OUTPATIENT)
Dept: SCHEDULING | Age: 67
End: 2022-09-26

## 2022-10-21 ENCOUNTER — HOSPITAL ENCOUNTER (OUTPATIENT)
Dept: CT IMAGING | Age: 67
Discharge: HOME | End: 2022-10-21
Attending: INTERNAL MEDICINE

## 2022-10-21 DIAGNOSIS — U07.1 PNEUMONIA DUE TO COVID-19 VIRUS: ICD-10-CM

## 2022-10-21 DIAGNOSIS — J12.82 PNEUMONIA DUE TO COVID-19 VIRUS: ICD-10-CM

## 2022-10-21 PROCEDURE — 71250 CT THORAX DX C-: CPT

## 2022-11-09 DIAGNOSIS — Z01.812 PRE-PROCEDURE LAB EXAM: Primary | ICD-10-CM

## 2022-11-09 DIAGNOSIS — G47.33 OSA (OBSTRUCTIVE SLEEP APNEA): Primary | ICD-10-CM

## 2022-12-09 ENCOUNTER — APPOINTMENT (OUTPATIENT)
Dept: SLEEP MEDICINE | Age: 67
End: 2022-12-09
Attending: INTERNAL MEDICINE

## 2023-03-07 ENCOUNTER — HOSPITAL ENCOUNTER (OUTPATIENT)
Dept: GASTROENTEROLOGY | Age: 68
Discharge: HOME | End: 2023-03-07
Attending: INTERNAL MEDICINE

## 2023-03-07 ENCOUNTER — ANESTHESIA EVENT (OUTPATIENT)
Dept: GASTROENTEROLOGY | Age: 68
End: 2023-03-07

## 2023-03-07 ENCOUNTER — ANESTHESIA (OUTPATIENT)
Dept: GASTROENTEROLOGY | Age: 68
End: 2023-03-07

## 2023-03-07 VITALS
TEMPERATURE: 97.9 F | DIASTOLIC BLOOD PRESSURE: 70 MMHG | HEIGHT: 61 IN | SYSTOLIC BLOOD PRESSURE: 128 MMHG | RESPIRATION RATE: 12 BRPM | HEART RATE: 58 BPM | OXYGEN SATURATION: 98 % | BODY MASS INDEX: 21.73 KG/M2 | WEIGHT: 115.08 LBS

## 2023-03-07 DIAGNOSIS — R13.10 DYSPHAGIA: ICD-10-CM

## 2023-03-07 DIAGNOSIS — K22.2 ESOPHAGEAL STRICTURE: ICD-10-CM

## 2023-03-07 PROCEDURE — 88305 TISSUE EXAM BY PATHOLOGIST: CPT | Performed by: INTERNAL MEDICINE

## 2023-03-07 PROCEDURE — C1726 CATH, BAL DIL, NON-VASCULAR: HCPCS

## 2023-03-07 PROCEDURE — 13000001 HB PHASE II RECOVERY EA 30 MINUTES

## 2023-03-07 PROCEDURE — 10004451 HB PACU RECOVERY 1ST 30 MINUTES

## 2023-03-07 PROCEDURE — 10002800 HB RX 250 W HCPCS: Performed by: ANESTHESIOLOGY

## 2023-03-07 PROCEDURE — 10002807 HB RX 258: Performed by: ANESTHESIOLOGY

## 2023-03-07 PROCEDURE — C1769 GUIDE WIRE: HCPCS

## 2023-03-07 PROCEDURE — 13000008 HB ANESTHESIA MAC OUTSIDE OR

## 2023-03-07 PROCEDURE — 10002807 HB RX 258: Performed by: INTERNAL MEDICINE

## 2023-03-07 PROCEDURE — 10006023 HB SUPPLY 272

## 2023-03-07 PROCEDURE — 13000028 HB GI MAJOR COMPLEX CASE S/U + 1ST 15 MIN

## 2023-03-07 RX ORDER — SODIUM CHLORIDE, SODIUM LACTATE, POTASSIUM CHLORIDE, CALCIUM CHLORIDE 600; 310; 30; 20 MG/100ML; MG/100ML; MG/100ML; MG/100ML
INJECTION, SOLUTION INTRAVENOUS CONTINUOUS PRN
Status: DISCONTINUED | OUTPATIENT
Start: 2023-03-07 | End: 2023-03-07

## 2023-03-07 RX ORDER — SODIUM CHLORIDE 9 MG/ML
INJECTION, SOLUTION INTRAVENOUS CONTINUOUS
Status: DISCONTINUED | OUTPATIENT
Start: 2023-03-07 | End: 2023-03-09 | Stop reason: HOSPADM

## 2023-03-07 RX ADMIN — SODIUM CHLORIDE, POTASSIUM CHLORIDE, SODIUM LACTATE AND CALCIUM CHLORIDE: 600; 310; 30; 20 INJECTION, SOLUTION INTRAVENOUS at 11:56

## 2023-03-07 RX ADMIN — PROPOFOL 100 MCG/KG/MIN: 10 INJECTION, EMULSION INTRAVENOUS at 12:11

## 2023-03-07 RX ADMIN — SODIUM CHLORIDE: 9 INJECTION, SOLUTION INTRAVENOUS at 12:08

## 2023-03-07 ASSESSMENT — ACTIVITIES OF DAILY LIVING (ADL)
BATHING: INDEPENDENT
TOILETING: INDEPENDENT
RECENT_DECLINE_ADL: NO
FEEDING: INDEPENDENT
DRESSING: NEEDS ASSISTANCE
CONTINENCE: INDEPENDENT
SENSORY_SUPPORT_DEVICES: EYEGLASSES
NEEDS_ASSIST: YES
HISTORY OF FALLING IN THE LAST YEAR (PRIOR TO ADMISSION): NO
ADL_SCORE: 11
TRANSFERRING: INDEPENDENT
ADL_BEFORE_ADMISSION: NEEDS/REQUIRES ASSISTANCE
ADL_SHORT_OF_BREATH: NO

## 2023-03-07 ASSESSMENT — PAIN SCALES - WONG BAKER
WONGBAKER_NUMERICALRESPONSE: 0
WONGBAKER_NUMERICALRESPONSE: 0

## 2023-03-07 ASSESSMENT — COGNITIVE AND FUNCTIONAL STATUS - GENERAL
ARE YOU BLIND OR DO YOU HAVE SERIOUS DIFFICULTY SEEING, EVEN WHEN WEARING GLASSES: NO
ARE YOU DEAF OR DO YOU HAVE SERIOUS DIFFICULTY  HEARING: NO

## 2023-03-07 ASSESSMENT — PAIN SCALES - GENERAL
PAINLEVEL_OUTOF10: 0
PAINLEVEL_OUTOF10: 0

## 2023-03-07 ASSESSMENT — COPD QUESTIONNAIRES: COPD: 1

## 2023-03-09 LAB
ASR DISCLAIMER: NORMAL
CASE RPRT: NORMAL
CLINICAL INFO: NORMAL
PATH REPORT.FINAL DX SPEC: NORMAL
PATH REPORT.GROSS SPEC: NORMAL

## 2023-08-21 ENCOUNTER — ORDER TRANSCRIPTION (OUTPATIENT)
Dept: PHYSICAL THERAPY | Facility: HOSPITAL | Age: 68
End: 2023-08-21

## 2023-08-21 DIAGNOSIS — J38.3 VOCAL CORD BOWING: Primary | ICD-10-CM

## 2023-08-22 ENCOUNTER — TELEPHONE (OUTPATIENT)
Dept: PHYSICAL THERAPY | Facility: HOSPITAL | Age: 68
End: 2023-08-22

## 2023-08-28 ENCOUNTER — OFFICE VISIT (OUTPATIENT)
Dept: SPEECH THERAPY | Facility: HOSPITAL | Age: 68
End: 2023-08-28
Attending: OTOLARYNGOLOGY
Payer: MEDICARE

## 2023-09-07 ENCOUNTER — OFFICE VISIT (OUTPATIENT)
Dept: SPEECH THERAPY | Facility: HOSPITAL | Age: 68
End: 2023-09-07
Attending: OTOLARYNGOLOGY
Payer: COMMERCIAL

## 2023-09-07 DIAGNOSIS — J38.3 VOCAL CORD BOWING: Primary | ICD-10-CM

## 2023-09-07 DIAGNOSIS — R49.0 DYSPHONIA: ICD-10-CM

## 2023-09-07 PROCEDURE — 92524 BEHAVRAL QUALIT ANALYS VOICE: CPT

## 2023-09-07 NOTE — PROGRESS NOTES
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 Patient arrived on time. Patient participated actively in assessment tasks. PATIENT SUMMARY   Blanka Keita is a 79year old female who presents to therapy today with complaints of vocal cord bowing. Patient reports hoarseness and variable voicing stating, \"My voice comes and goes\". Current functional limitations include reduced vocal quality and increased strain with daily vocal tasks. Pain: No pain reported on this date.    Recent Medical History: laryngopharyngeal reflux (LPR), dysphonia  Past Medical History:   Diagnosis Date    Cataract     both    Cervical radiculitis 7/27/2018    Cervical spondylitic cord compression 7/27/2018    Chronic pulmonary embolism ([address] 1/7/2016    Collagenous colitis 9/7/2017    COPD (chronic obstructive pulmonary disease) ([facility])     DDD (degenerative disc disease), cervical 7/27/2018    Degenerative disc disease     DVT (deep venous thrombosis) ([facility])     Factor 5 Leiden mutation, heterozygous ([address]     Fibromyalgia     Fusion of spine, thoracolumbar region 9/26/2016    History of pulmonary embolism 6/16/2020    Migraine     Neuropathy     GRACE (obstructive sleep apnea) [facility] 3/29/22    AHI 16    Osteoarthrosis, unspecified whether generalized or localized, unspecified site     Osteoporosis     PE (pulmonary embolism)     RA (rheumatoid arthritis) ([address]     Rheumatoid arthritis(714.0)     Spinal stenosis in cervical region 7/27/2018    Transfusion history     Unspecified essential hypertension      Current Medications: SILVER SULFADIAZINE 1 % External Cream, APPLY TOPICALLY TO THE AFFECTED AREA DAILY, Disp: 25 g, Rfl: 0  lidocaine (LIDODERM) 5 % External Patch, Place 1 patch onto the skin daily., Disp: 30 patch, Rfl: 2  baclofen 10 MG Oral Tab, Take 1 tablet (10 mg total) by mouth 3 (three) times daily. , Disp: 90 tablet, Rfl: 2  Wound Dressings (MEDIHONEY WOUND/BURN DRESSING) External Gel, Apply 1 Application topically daily as needed. , Disp: 44 mL, Rfl: 1  RECTASMOOTHE 5 % External Cream, APPLY A SMALL AMOUNT TO THE AFFECTED AREA UP TWICE DAILY, Disp: 30 g, Rfl: 0  warfarin 2 MG Oral Tab, Take 1 tablet (2 mg total) by mouth nightly., Disp: 90 tablet, Rfl: 3  Wound Dressings (ADAPTIC NON-ADHERING DRESSING) External Pads, Apply 1 each topically daily as needed. , Disp: 30 each, Rfl: 1  Omeprazole 40 MG Oral Capsule Delayed Release, Take 1 capsule (40 mg total) by mouth daily. , Disp: 90 capsule, Rfl: 3  enoxaparin 60 MG/0.6ML Subcutaneous Solution, INJECT 60MG SUB Q INTO ABDOMINAL AREA TWICE DAILY under direction of coumadin clinic, Disp: 20 each, Rfl: 1  gabapentin 600 MG Oral Tab, , Disp: , Rfl:   tolterodine tartrate 4 MG Oral Capsule SR 24 Hr, Take 1 capsule (4 mg total) by mouth daily. , Disp: 90 capsule, Rfl: 3  simvastatin 5 MG Oral Tab, Take 1 tablet (5 mg total) by mouth nightly., Disp: 90 tablet, Rfl: 3  RESTASIS MULTIDOSE 0.05 % Ophthalmic Emulsion, , Disp: , Rfl:   methylPREDNISolone 4 MG Oral Tab, , Disp: , Rfl:   Cholecalciferol 25 MCG (1000 UT) Oral Tab, Every Day, Disp: , Rfl:   Mycophenolate Mofetil 500 MG Oral Tab, Take 1,000 mg by mouth 2 (two) times daily. , Disp: , Rfl:   KEVZARA 200 MG/1. 14ML Subcutaneous Solution Auto-injector, , Disp: , Rfl:   potassium chloride 20 MEQ Oral Tab CR, Take 1 tablet (20 mEq total) by mouth 2 (two) times daily. , Disp: 180 tablet, Rfl: 3  spironolactone 25 MG Oral Tab, Take 1 tablet (25 mg total) by mouth daily. , Disp: 90 tablet, Rfl: 3  Nystatin 452669 UNIT/GM External Powder, Apply 1 Application topically 2 (two) times daily as needed. , Disp: 60 g, Rfl: 2  MONTELUKAST SODIUM 10 MG Oral Tab, TAKE 1 TABLET(10 MG) BY MOUTH EVERY DAY, Disp: 90 tablet, Rfl: 3  metroNIDAZOLE 1 % External Gel, Apply to face nightly, Disp: 60 g, Rfl: 1  PULMICORT FLEXHALER 180 MCG/ACT Inhalation Aerosol Powder, Breath Activated, INHALE 2 PUFFS INTO THE LUNGS TWICE DAILY, Disp: 1 each, Rfl: 11  GABAPENTIN 400 MG Oral Cap, TAKE 3 CAPSULES BY MOUTH THREE TIMES DAILY, Disp: 270 capsule, Rfl: 5  Pilocarpine HCl 5 MG Oral Tab, Take 5 mg by mouth 3 (three) times daily. , Disp: , Rfl:   Albuterol Sulfate  (90 Base) MCG/ACT Inhalation Aero Soln, Inhale 2 puffs into the lungs every 6 (six) hours as needed for Wheezing or Shortness of Breath., Disp: 18 g, Rfl: 3  Probiotic Product (PROBIOTIC DAILY OR), Take by mouth., Disp: , Rfl:   Diphenoxylate-Atropine (LOMOTIL OR), Take by mouth., Disp: , Rfl:   hydrocortisone (ANUSOL-HC) 2.5 % Rectal Cream, Place 1 Application rectally 2 (two) times daily as needed for Hemorrhoids. , Disp: 60 g, Rfl: 1  Zolpidem Tartrate (AMBIEN) 10 MG Oral Tab, Take 1 tablet (10 mg total) by mouth nightly as needed. (Patient not taking: Reported on 3/4/2022), Disp: 30 tablet, Rfl: 1  Ammonium Lactate (LAC-HYDRIN) 12 % External Cream, Apply qd prn to afected area, Disp: 140 g, Rfl: 3  leflunomide (ARAVA) 20 MG Oral Tab, Take 20 mg by mouth daily. , Disp: , Rfl:   Hydroxychloroquine Sulfate 200 MG Oral Tab, Take  by mouth 2 (two) times daily. , Disp: , Rfl:     No current facility-administered medications on file prior to visit. VOICE HISTORY  Current Voice Diagnosis: Vocal cord bowing  Date of ENT Evaluation: 8/21/2023  History of current complaint/diagnosis: Hoarseness noted s/p COVID-19 infection in August 2022  Current Vocal Demands: social, daily life    Laryngoscopy Findings (8/21/2023): \"There were no nasopharyngeal masses or lesions. The scope was then advanced; the epiglottis and piriforms were free of any masses. The tongue base appeared normal. Both true vocal cords were free of masses.  Vocal cord mobility was normal.  Findings:  Mild-moderate interarytenoid pachydermia  Vocal cord bowing\"    Debbie describes prior level of function WFL prior to COVID-19 infection. Patient does have h/o vocal nodules which she attended the [facility] [place] to resolve. Pt goals include improving vocal quality. George Mikayla presents to speech therapy evaluation with primary c/o vocal cord bowing resulting in hoarseness and dysphonia. The results of the objective tests and measures show variable pitch, variable loudness, rough vocal quality, strain, and patient's negative perception of her vocal quality. Functional deficits include but are not limited to difficulty communicating effectively via phone and face-to-face and frustration regarding voice problem. Signs and symptoms are consistent with diagnosis of dysphonia 2/2 vocal cord bowing. Pt and SLP discussed evaluation findings, pathology, POC and HEP. Pt voiced understanding and performs HEP correctly. Skilled Speech Therapy is medically necessary to address the above impairments and reach functional goals. OBJECTIVE:   VOCAL ANALYSIS (Consensus Auditory-Perceptual Evaluation of Voice (CAPE-V) Completed)  Overall Severity: Moderately-Severe Deviant, Consistent, and Score: 75/100  Roughness: Moderately-Severe Deviant, Consistent, and Score: 75/100  Breathiness: Moderately Deviant, Inconsistent, and Score: 45/100  Strain: Moderately-Severe Deviant, Consistent, and Score: 75/100  Pitch: Moderately-Severe Deviant, Consistent, and Score: 70/100  Loudness: Moderately Deviant, Inconsistent, and Score: 50/100    S/Z Ratio: 1.47 (>1.4 is abnormal)  Maximum Phonation time: 22 seconds with variable vocal quality  Resonance: WFL    Vocal Handicap Index (VHI) Score:   Functional: mild, 9/40   Physical: mild, 14/40   Emotional: mild, 4/40   Total Score: mild, 27/120    Vocal Abuses: N/A    Respiration: Decreased coordination of respiration and phonation 2/2 limited inspiratory reserve. Patient presents with clavicular breathing. Patient with L lower lobe resection. Resonance: [facility]    Today's Treatment:  Pt education was provided on exam findings, treatment diagnosis, treatment plan, expectations, and prognosis. Pt was also provided recommendations for use of vocal hygiene practices and vocal function exercises. Patient was instructed in and issued a HEP for: vocal function exercises and vocal hygiene practices    Charges: Ruben maloney, [id number]      Total Timed Treatment: 45 min    PLAN OF CARE:    Goals: (to be met in 12 visits)   STG 1: Patient will verbalize understanding and report use diaphragmatic breathing and vocal hygiene strategies over 2-3 sessions. STG 2: Patient will complete vocal function and/or resonance exercises with 80% accuracy given min verbal/visual cues over 2-3 sessions. STG 3: Patient will indicate reduced voice handicap after completing Vocal Function and High Intensity Voice Exercises measured by improved Voice Handicap Index score (baseline 27/120). STG 4: Patient will independently complete home exercise program (i.e., vocal function exercises and high intensity voice exercises) at least 6 times per week across 6 weeks. Frequency / Duration: Patient will be seen for 1x/week or a total of 12 visits over a 90 day period. Treatment will include: speech therapy    Education or treatment limitation: None  Rehab Potential:good      Patient/Family/Caregiver was advised of these findings, precautions, and treatment options and has agreed to actively participate in planning and for this course of care. Thank you for your referral. Please co-sign or sign and return this letter via fax as soon as possible to [phone].  If you have any questions, please contact me at Dept: [phone]    Sincerely,  Electronically signed by therapist: CHIQUIS Gamino  [de-identified] certification required: Yes  I certify the need for these services furnished under this plan of treatment and while under my care.    X___________________________________________________ Date____________________    Certification From: 5/3/8827  To:12/6/2023

## 2023-09-12 ENCOUNTER — APPOINTMENT (OUTPATIENT)
Dept: SPEECH THERAPY | Facility: HOSPITAL | Age: 68
End: 2023-09-12
Attending: OTOLARYNGOLOGY
Payer: COMMERCIAL

## 2023-09-20 ENCOUNTER — OFFICE VISIT (OUTPATIENT)
Dept: SPEECH THERAPY | Facility: HOSPITAL | Age: 68
End: 2023-09-20
Attending: OTOLARYNGOLOGY
Payer: COMMERCIAL

## 2023-09-20 PROCEDURE — 92507 TX SP LANG VOICE COMM INDIV: CPT

## 2023-09-20 NOTE — PROGRESS NOTES
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 Patient participated actively in therapeutic tasks. Patient reports one day last week in which voice returned to \"normal\". Reports no pitch breaks or hoarseness on that date. Pain: Patient not being seen for pain    Objective:  Goals: (to be met in 12 visits)   STG 1: Patient will verbalize understanding and report use diaphragmatic breathing and vocal hygiene strategies over 2-3 sessions. Progress: Initial training of diaphragmatic breathing and vocal hygiene strategies completed. STG 2: Patient will complete vocal function and/or resonance exercises with 80% accuracy given min verbal/visual cues over 2-3 sessions. Progress: 70% given mod verbal and visual cues    STG 3: Patient will indicate reduced voice handicap after completing Vocal Function and High Intensity Voice Exercises measured by improved Voice Handicap Index score (baseline 27 / 120). Progress: In progress. Patient reports improved vocal quality on one day last week. STG 4: Patient will independently complete home exercise program (i.e., vocal function exercises and high intensity voice exercises) at least 6 times per week across 6 weeks. Progress: Provided with complete HEP on this date    HEP: Vocal function and high intensity vocal exercises  Education: Vocal hygiene and diaphragmatic breathing    Assessment: Patient presents with dysphonia characterized by variable pitch, variable loudness, rough vocal quality, and strain.  Patient's deficits adversely impact her perception of her voice and decrease her ability to effectively communicate with others. On this date, patient completed initial training of diaphragmatic breathing, vocal hygiene strategies, and vocal function and high intensity vocal exercises. Patient benefited from direct instruction and opportunities to increase awareness of pitch, resonance, and vocal quality. Patient increased use of strategies and accuracy of completion of exercises given cueing and support. Plan: Continue speech-language therapy targeting voice with use of vocal function and high intensity vocal exercises.

## 2023-09-27 ENCOUNTER — OFFICE VISIT (OUTPATIENT)
Dept: SPEECH THERAPY | Facility: HOSPITAL | Age: 68
End: 2023-09-27
Attending: OTOLARYNGOLOGY
Payer: COMMERCIAL

## 2023-09-27 PROCEDURE — 92507 TX SP LANG VOICE COMM INDIV: CPT

## 2023-09-27 NOTE — PROGRESS NOTES
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 Patient participated actively in therapeutic tasks. Patient reports improved vocal quality on this date. Pain: Patient not being seen for pain    Objective:  Goals: (to be met in 12 visits)   STG 1: Patient will verbalize understanding and report use diaphragmatic breathing and vocal hygiene strategies over 2-3 sessions. Progress: Verbalized understanding of vocal hygiene and diaphragmatic breathing given min verbal and visual cues    STG 2: Patient will complete vocal function and/or resonance exercises with 80% accuracy given min verbal/visual cues over 2-3 sessions. Progress: 75% given mod verbal and visual cues    STG 3: Patient will indicate reduced voice handicap after completing Vocal Function and High Intensity Voice Exercises measured by improved Voice Handicap Index score (baseline 27 / 120). Progress: In progress. Patient reports improved vocal quality on one day last week. STG 4: Patient will independently complete home exercise program (i.e., vocal function exercises and high intensity voice exercises) at least 6 times per week across 6 weeks. Progress: Reported compliance with HEP over past week. HEP: Vocal function and high intensity vocal exercises  Education: Vocal hygiene and diaphragmatic breathing    Assessment: Patient presents with dysphonia characterized by variable pitch, variable loudness, rough vocal quality, and strain.  Patient's deficits adversely impact her perception of her voice and decrease her ability to effectively communicate with others. On this date, patient demonstrated increased understanding of and ability to complete diaphragmatic breathing tasks. Patient benefited from verbal and visual cues to complete VFE and HIVE. Demonstrated increased awareness of pitch, resonance, and vocal quality throughout trials. Plan: Continue speech-language therapy targeting voice with use of vocal function and high intensity vocal exercises.

## 2023-10-02 ENCOUNTER — OFFICE VISIT (OUTPATIENT)
Dept: SPEECH THERAPY | Facility: HOSPITAL | Age: 68
End: 2023-10-02
Attending: OTOLARYNGOLOGY
Payer: COMMERCIAL

## 2023-10-02 PROCEDURE — 92507 TX SP LANG VOICE COMM INDIV: CPT

## 2023-10-02 NOTE — PROGRESS NOTES
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 Patient participated actively in therapeutic tasks. Patient reports improved vocal quality on this date. Pain: Patient not being seen for pain    Objective:  Goals: (to be met in 12 visits)   STG 1: Patient will verbalize understanding and report use diaphragmatic breathing and vocal hygiene strategies over 2-3 sessions. Progress: Verbalized understanding of vocal hygiene and diaphragmatic breathing given min verbal and visual cues    STG 2: Patient will complete vocal function and/or resonance exercises with 80% accuracy given min verbal/visual cues over 2-3 sessions. Progress: 80% given mod verbal and visual cues    STG 3: Patient will indicate reduced voice handicap after completing Vocal Function and High Intensity Voice Exercises measured by improved Voice Handicap Index score (baseline 27 / 120). Progress: In progress. Patient reports improved vocal quality on one day last week. STG 4: Patient will independently complete home exercise program (i.e., vocal function exercises and high intensity voice exercises) at least 6 times per week across 6 weeks. Progress: Reported compliance with HEP over past week. HEP: Vocal function and high intensity vocal exercises  Education: Vocal hygiene and diaphragmatic breathing    Assessment: Patient presents with dysphonia characterized by variable pitch, variable loudness, rough vocal quality, and strain.  Patient's deficits adversely impact her perception of her voice and decrease her ability to effectively communicate with others. On this date, patient increased understanding of use of VFE and HIVE to support phonation and and ability to express self. Reported use of relaxation techniques to reduce tension in shoulders. Patient continues to benefit from verbal and visual cues to support accuracy and increase awareness of performance for self-monitoring. Plan: Continue speech-language therapy targeting voice with use of vocal function and high intensity vocal exercises.

## 2023-10-10 ENCOUNTER — OFFICE VISIT (OUTPATIENT)
Dept: SPEECH THERAPY | Facility: HOSPITAL | Age: 68
End: 2023-10-10
Attending: INTERNAL MEDICINE
Payer: COMMERCIAL

## 2023-10-10 PROCEDURE — 92507 TX SP LANG VOICE COMM INDIV: CPT

## 2023-10-10 NOTE — PROGRESS NOTES
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 provider found  Date of Evaluation:   9/7/2023    Precautions:  Fall Risk Next MD visit:   none scheduled  Date of Surgery: n/a   Insurance Primary/Secondary: MEDICARE / Bryan Parekh PPO       # Auth Visits: 75 combined  Total Timed Treatment: 45 min  Date POC Expires: 12/6/2023  Total Treatment time: 45 min  Charges: [id number]   Treatment Number: 5    Subjective: Patient arrived on time to session. Patient participated actively in therapeutic tasks. Patient reports improved vocal quality on this date. Pain: Patient not being seen for pain    Objective:  Goals: (to be met in 12 visits)   STG 1: Patient will verbalize understanding and report use diaphragmatic breathing and vocal hygiene strategies over 2-3 sessions. Progress: Verbalized understanding of vocal hygiene and diaphragmatic breathing given min verbal and visual cues    STG 2: Patient will complete vocal function and/or resonance exercises with 80% accuracy given min verbal/visual cues over 2-3 sessions. Progress: 80% given min verbal and visual cues. Goal met; to gauge carryover in next session. STG 3: Patient will indicate reduced voice handicap after completing Vocal Function and High Intensity Voice Exercises measured by improved Voice Handicap Index score (baseline 27 / 120). Progress: In progress. STG 4: Patient will independently complete home exercise program (i.e., vocal function exercises and high intensity voice exercises) at least 6 times per week across 6 weeks. Progress: Reported compliance with HEP over past week. HEP: Vocal function and high intensity vocal exercises  Education: Vocal hygiene and diaphragmatic breathing    Assessment: Patient presents with dysphonia characterized by variable pitch, variable loudness, rough vocal quality, and strain.  Patient's deficits adversely impact her perception of her voice and decrease her ability to effectively communicate with others. On this date, patient demonstrated improved vocal quality and ability to utilize breath support to engage oral resonance and decrease hoarseness. Patient benefited from verbal and visual cues throughout session tasks to facilitate intensity and improved resonance. Plan: Continue speech-language therapy targeting voice with use of vocal function and high intensity vocal exercises.

## 2023-10-24 ENCOUNTER — OFFICE VISIT (OUTPATIENT)
Dept: SPEECH THERAPY | Facility: HOSPITAL | Age: 68
End: 2023-10-24
Attending: INTERNAL MEDICINE
Payer: COMMERCIAL

## 2023-10-24 PROCEDURE — 92507 TX SP LANG VOICE COMM INDIV: CPT

## 2023-10-24 NOTE — PROGRESS NOTES
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 Patient participated actively in therapeutic tasks. Patient reports improved vocal quality on this date and yesterday. Last week, Monday-Wednesday vocal quality was reduced despite use of exercises. Pain: Patient not being seen for pain. Objective:  Goals: (to be met in 12 visits)   STG 1: Patient will verbalize understanding and report use diaphragmatic breathing and vocal hygiene strategies over 2-3 sessions. Progress: Verbalized understanding of vocal hygiene and diaphragmatic breathing given min verbal and visual cues    STG 2: Patient will complete vocal function and/or resonance exercises with 80% accuracy given min verbal/visual cues over 2-3 sessions. Progress: 95% given intermittent verbal cues. Goal met; to gauge carryover in final d/c session. STG 3: Patient will indicate reduced voice handicap after completing Vocal Function and High Intensity Voice Exercises measured by improved Voice Handicap Index score (baseline 27 / 120). Progress: In progress. STG 4: Patient will independently complete home exercise program (i.e., vocal function exercises and high intensity voice exercises) at least 6 times per week across 6 weeks. Progress: Reported compliance with HEP over past week. HEP: Vocal function and high intensity vocal exercises  Education: Vocal hygiene and diaphragmatic breathing    Assessment: Patient presents with dysphonia characterized by variable pitch, variable loudness, rough vocal quality, and strain.  Patient's deficits adversely impact her perception of her voice and decrease her ability to effectively communicate with others. On this date, patient completed vocal function and high intensity voice exercises. Patient demonstrates increased independence with completion of exercises and ability to self-monitor productions. Patient exhibits readiness for d/c given carryover of progress to final session. Plan: Continue speech-language therapy targeting voice with use of vocal function and high intensity vocal exercises. Patient to have one additional d/c session.

## 2023-10-30 ENCOUNTER — TELEPHONE (OUTPATIENT)
Dept: SPEECH THERAPY | Facility: HOSPITAL | Age: 68
End: 2023-10-30

## 2023-10-31 ENCOUNTER — APPOINTMENT (OUTPATIENT)
Dept: SPEECH THERAPY | Facility: HOSPITAL | Age: 68
End: 2023-10-31
Attending: INTERNAL MEDICINE
Payer: COMMERCIAL

## 2023-11-01 ENCOUNTER — OFFICE VISIT (OUTPATIENT)
Dept: SPEECH THERAPY | Facility: HOSPITAL | Age: 68
End: 2023-11-01
Attending: INTERNAL MEDICINE
Payer: MEDICARE

## 2023-11-01 PROCEDURE — 92507 TX SP LANG VOICE COMM INDIV: CPT

## 2023-11-01 NOTE — PROGRESS NOTES
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 Dear Dr. Fidel Rubio,  This letter is to inform you of Vero Rodriguez's progress in speech-language therapy. Since her initial evaluation, Ralph Elam has attended 7 sessions. Therapy sessions have targeted voice including use of high intensity vocal exercises. A home exercise program (HEP) addressing HIVE and vocal function exercises has been provided and completed consistently. During this treatment period, Ralph Elam has demonstrated improved ability to project voice and facilitate increased vocal quality given training and cueing. As Vero has demonstrated significant progress and has met all goals, it is recommended that she be discharged from speech therapy at this time. Thank you for your referral. Please re-consult should further concerns arise. Subjective: Patient arrived on time to session. Patient participated actively in therapeutic tasks. Pain: Patient not being seen for pain. Objective:  Goals: (to be met in 12 visits)   STG 1: Patient will verbalize understanding and report use diaphragmatic breathing and vocal hygiene strategies over 2-3 sessions. Progress: Verbalized understanding of vocal hygiene and diaphragmatic breathing independently. Goal met. STG 2: Patient will complete vocal function and/or resonance exercises with 80% accuracy given min verbal/visual cues over 2-3 sessions. Progress: 95% given intermittent verbal cues. Goal met.     STG 3: Patient will indicate reduced voice handicap after completing Vocal Function and High Intensity Voice Exercises measured by improved Voice Handicap Index score (baseline 27 / 120). Progress: 11/120. Goal met. STG 4: Patient will independently complete home exercise program (i.e., vocal function exercises and high intensity voice exercises) at least 6 times per week across 6 weeks. Progress: Goal met. 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 Patient's deficits adversely impacted her perception of her voice and decrease her ability to effectively communicate with others. Patient has made significant progress toward improving vocal quality and ability to utilize voice within functional opportunities. Patient has also reported increased perception of voice with less impact on overall quality of communication. Plan: Patient to be discharged from speech-language therapy as all goals have been met. Patient/Family/Caregiver was advised of these findings, precautions, and treatment options and has agreed to actively participate in planning and for this course of care. Thank you for your referral. If you have any questions, please contact me at Dept: [phone].     Sincerely,  Electronically signed by therapist: CHIQUIS Garza

## 2024-01-05 ENCOUNTER — HOSPITAL ENCOUNTER (OUTPATIENT)
Dept: WOUND CARE | Age: 69
Discharge: HOME | End: 2024-01-05
Attending: FAMILY MEDICINE

## 2024-01-05 VITALS
OXYGEN SATURATION: 100 % | TEMPERATURE: 98.1 F | DIASTOLIC BLOOD PRESSURE: 68 MMHG | HEART RATE: 70 BPM | SYSTOLIC BLOOD PRESSURE: 143 MMHG

## 2024-01-05 DIAGNOSIS — I10 ESSENTIAL HYPERTENSION, BENIGN: ICD-10-CM

## 2024-01-05 DIAGNOSIS — L97.912 ULCER OF RIGHT LOWER EXTREMITY WITH FAT LAYER EXPOSED (CMD): ICD-10-CM

## 2024-01-05 DIAGNOSIS — L02.419 CELLULITIS AND ABSCESS OF LEG: ICD-10-CM

## 2024-01-05 DIAGNOSIS — D68.51 FACTOR V LEIDEN (CMD): ICD-10-CM

## 2024-01-05 DIAGNOSIS — E78.2 MIXED HYPERLIPIDEMIA: ICD-10-CM

## 2024-01-05 DIAGNOSIS — L97.922 ULCER OF LEFT LOWER EXTREMITY WITH FAT LAYER EXPOSED (CMD): ICD-10-CM

## 2024-01-05 DIAGNOSIS — S81.801A WOUND OF RIGHT LEG, INITIAL ENCOUNTER: Primary | ICD-10-CM

## 2024-01-05 DIAGNOSIS — I87.332 CHRONIC VENOUS HYPERTENSION (IDIOPATHIC) WITH ULCER AND INFLAMMATION OF LEFT LOWER EXTREMITY (CMD): ICD-10-CM

## 2024-01-05 DIAGNOSIS — L03.119 CELLULITIS AND ABSCESS OF LEG: ICD-10-CM

## 2024-01-05 PROCEDURE — 99203 OFFICE O/P NEW LOW 30 MIN: CPT | Performed by: FAMILY MEDICINE

## 2024-01-05 PROCEDURE — 3077F SYST BP >= 140 MM HG: CPT | Performed by: FAMILY MEDICINE

## 2024-01-05 PROCEDURE — 3078F DIAST BP <80 MM HG: CPT | Performed by: FAMILY MEDICINE

## 2024-01-05 PROCEDURE — 99213 OFFICE O/P EST LOW 20 MIN: CPT

## 2024-01-05 ASSESSMENT — PAIN SCALES - GENERAL: PAINLEVEL_OUTOF10: 0

## 2024-01-15 ENCOUNTER — HOSPITAL ENCOUNTER (INPATIENT)
Age: 69
LOS: 3 days | Discharge: HOME | DRG: 603 | End: 2024-01-18
Attending: EMERGENCY MEDICINE | Admitting: INTERNAL MEDICINE

## 2024-01-15 ENCOUNTER — APPOINTMENT (OUTPATIENT)
Dept: CT IMAGING | Age: 69
DRG: 603 | End: 2024-01-15
Attending: INTERNAL MEDICINE

## 2024-01-15 DIAGNOSIS — L03.90 CELLULITIS, UNSPECIFIED CELLULITIS SITE: Primary | ICD-10-CM

## 2024-01-15 LAB
ANION GAP SERPL CALC-SCNC: 10 MMOL/L (ref 7–19)
BASOPHILS # BLD: 0.1 K/MCL (ref 0–0.3)
BASOPHILS NFR BLD: 0 %
BUN SERPL-MCNC: 24 MG/DL (ref 6–20)
BUN/CREAT SERPL: 34 (ref 7–25)
CALCIUM SERPL-MCNC: 10.1 MG/DL (ref 8.4–10.2)
CHLORIDE SERPL-SCNC: 106 MMOL/L (ref 97–110)
CO2 SERPL-SCNC: 24 MMOL/L (ref 21–32)
CREAT SERPL-MCNC: 0.7 MG/DL (ref 0.51–0.95)
DEPRECATED RDW RBC: 55.2 FL (ref 39–50)
EGFRCR SERPLBLD CKD-EPI 2021: >90 ML/MIN/{1.73_M2}
EOSINOPHIL # BLD: 0 K/MCL (ref 0–0.5)
EOSINOPHIL NFR BLD: 0 %
ERYTHROCYTE [DISTWIDTH] IN BLOOD: 15.9 % (ref 11–15)
FASTING DURATION TIME PATIENT: ABNORMAL H
FLUAV RNA RESP QL NAA+PROBE: NOT DETECTED
FLUBV RNA RESP QL NAA+PROBE: NOT DETECTED
GLUCOSE BLDC GLUCOMTR-MCNC: 181 MG/DL (ref 70–99)
GLUCOSE BLDC GLUCOMTR-MCNC: 205 MG/DL (ref 70–99)
GLUCOSE BLDC GLUCOMTR-MCNC: 48 MG/DL (ref 70–99)
GLUCOSE SERPL-MCNC: 69 MG/DL (ref 70–99)
HCT VFR BLD CALC: 39.2 % (ref 36–46.5)
HGB BLD-MCNC: 11.9 G/DL (ref 12–15.5)
IMM GRANULOCYTES # BLD AUTO: 0.2 K/MCL (ref 0–0.2)
IMM GRANULOCYTES # BLD: 1 %
INR PPP: 2
LACTATE BLDV-SCNC: 1.3 MMOL/L (ref 0–2)
LYMPHOCYTES # BLD: 1.4 K/MCL (ref 1–4)
LYMPHOCYTES NFR BLD: 7 %
MCH RBC QN AUTO: 28.7 PG (ref 26–34)
MCHC RBC AUTO-ENTMCNC: 30.4 G/DL (ref 32–36.5)
MCV RBC AUTO: 94.7 FL (ref 78–100)
MONOCYTES # BLD: 1.6 K/MCL (ref 0.3–0.9)
MONOCYTES NFR BLD: 8 %
NEUTROPHILS # BLD: 17.1 K/MCL (ref 1.8–7.7)
NEUTROPHILS NFR BLD: 84 %
NRBC BLD MANUAL-RTO: 0 /100 WBC
PLATELET # BLD AUTO: 238 K/MCL (ref 140–450)
POTASSIUM SERPL-SCNC: 3.8 MMOL/L (ref 3.4–5.1)
PROTHROMBIN TIME: 20.3 SEC (ref 9.7–11.8)
RBC # BLD: 4.14 MIL/MCL (ref 4–5.2)
RSV AG NPH QL IA.RAPID: NOT DETECTED
SARS-COV-2 N GENE CT SPEC QN NAA N2: 34.1
SARS-COV-2 RNA RESP QL NAA+PROBE: DETECTED
SERVICE CMNT-IMP: ABNORMAL
SODIUM SERPL-SCNC: 136 MMOL/L (ref 135–145)
WBC # BLD: 20.4 K/MCL (ref 4.2–11)

## 2024-01-15 PROCEDURE — 10002801 HB RX 250 W/O HCPCS

## 2024-01-15 PROCEDURE — 0241U COVID/FLU/RSV PANEL: CPT | Performed by: EMERGENCY MEDICINE

## 2024-01-15 PROCEDURE — 73701 CT LOWER EXTREMITY W/DYE: CPT

## 2024-01-15 PROCEDURE — 96374 THER/PROPH/DIAG INJ IV PUSH: CPT

## 2024-01-15 PROCEDURE — 85610 PROTHROMBIN TIME: CPT | Performed by: EMERGENCY MEDICINE

## 2024-01-15 PROCEDURE — 10002803 HB RX 637: Performed by: INTERNAL MEDICINE

## 2024-01-15 PROCEDURE — 85025 COMPLETE CBC W/AUTO DIFF WBC: CPT | Performed by: EMERGENCY MEDICINE

## 2024-01-15 PROCEDURE — 83605 ASSAY OF LACTIC ACID: CPT | Performed by: EMERGENCY MEDICINE

## 2024-01-15 PROCEDURE — 10002800 HB RX 250 W HCPCS: Performed by: INTERNAL MEDICINE

## 2024-01-15 PROCEDURE — 10002807 HB RX 258: Performed by: EMERGENCY MEDICINE

## 2024-01-15 PROCEDURE — 10002807 HB RX 258: Performed by: INTERNAL MEDICINE

## 2024-01-15 PROCEDURE — 80048 BASIC METABOLIC PNL TOTAL CA: CPT | Performed by: EMERGENCY MEDICINE

## 2024-01-15 PROCEDURE — 10000002 HB ROOM CHARGE MED SURG

## 2024-01-15 PROCEDURE — 10002800 HB RX 250 W HCPCS: Performed by: EMERGENCY MEDICINE

## 2024-01-15 PROCEDURE — 10004651 HB RX, NO CHARGE ITEM: Performed by: INTERNAL MEDICINE

## 2024-01-15 PROCEDURE — 99284 EMERGENCY DEPT VISIT MOD MDM: CPT

## 2024-01-15 PROCEDURE — 10002801 HB RX 250 W/O HCPCS: Performed by: INTERNAL MEDICINE

## 2024-01-15 PROCEDURE — 36415 COLL VENOUS BLD VENIPUNCTURE: CPT

## 2024-01-15 PROCEDURE — 10002805 HB CONTRAST AGENT: Performed by: INTERNAL MEDICINE

## 2024-01-15 PROCEDURE — 82962 GLUCOSE BLOOD TEST: CPT

## 2024-01-15 PROCEDURE — 87040 BLOOD CULTURE FOR BACTERIA: CPT | Performed by: EMERGENCY MEDICINE

## 2024-01-15 PROCEDURE — 10002803 HB RX 637: Performed by: EMERGENCY MEDICINE

## 2024-01-15 RX ORDER — AMOXICILLIN 250 MG
2 CAPSULE ORAL 2 TIMES DAILY PRN
Status: DISCONTINUED | OUTPATIENT
Start: 2024-01-15 | End: 2024-01-18 | Stop reason: HOSPADM

## 2024-01-15 RX ORDER — TOPIRAMATE 25 MG/1
50 TABLET ORAL NIGHTLY
Status: DISCONTINUED | OUTPATIENT
Start: 2024-01-15 | End: 2024-01-18 | Stop reason: HOSPADM

## 2024-01-15 RX ORDER — ONDANSETRON 2 MG/ML
4 INJECTION INTRAMUSCULAR; INTRAVENOUS EVERY 12 HOURS PRN
Status: DISCONTINUED | OUTPATIENT
Start: 2024-01-15 | End: 2024-01-18 | Stop reason: HOSPADM

## 2024-01-15 RX ORDER — GABAPENTIN 300 MG/1
900 CAPSULE ORAL
Status: DISCONTINUED | OUTPATIENT
Start: 2024-01-16 | End: 2024-01-15 | Stop reason: CLARIF

## 2024-01-15 RX ORDER — POLYETHYLENE GLYCOL 3350 17 G/17G
17 POWDER, FOR SOLUTION ORAL DAILY PRN
Status: DISCONTINUED | OUTPATIENT
Start: 2024-01-15 | End: 2024-01-18 | Stop reason: HOSPADM

## 2024-01-15 RX ORDER — BUDESONIDE 3 MG/1
3 CAPSULE, COATED PELLETS ORAL EVERY MORNING
COMMUNITY

## 2024-01-15 RX ORDER — DEXTROSE MONOHYDRATE 25 G/50ML
25 INJECTION, SOLUTION INTRAVENOUS ONCE
Status: COMPLETED | OUTPATIENT
Start: 2024-01-15 | End: 2024-01-15

## 2024-01-15 RX ORDER — DEXTROSE MONOHYDRATE 25 G/50ML
INJECTION, SOLUTION INTRAVENOUS
Status: COMPLETED
Start: 2024-01-15 | End: 2024-01-15

## 2024-01-15 RX ORDER — BUDESONIDE 3 MG/1
3 CAPSULE, COATED PELLETS ORAL EVERY MORNING
Status: DISCONTINUED | OUTPATIENT
Start: 2024-01-16 | End: 2024-01-18 | Stop reason: HOSPADM

## 2024-01-15 RX ORDER — SPIRONOLACTONE 25 MG/1
25 TABLET ORAL DAILY
Status: DISCONTINUED | OUTPATIENT
Start: 2024-01-16 | End: 2024-01-18 | Stop reason: HOSPADM

## 2024-01-15 RX ORDER — ACETAMINOPHEN 325 MG/1
650 TABLET ORAL EVERY 4 HOURS PRN
Status: DISCONTINUED | OUTPATIENT
Start: 2024-01-15 | End: 2024-01-18 | Stop reason: HOSPADM

## 2024-01-15 RX ORDER — MORPHINE SULFATE 15 MG/1
15 TABLET, FILM COATED, EXTENDED RELEASE ORAL ONCE
Status: COMPLETED | OUTPATIENT
Start: 2024-01-15 | End: 2024-01-15

## 2024-01-15 RX ORDER — HYDROXYCHLOROQUINE SULFATE 200 MG/1
400 TABLET, FILM COATED ORAL NIGHTLY
Status: DISCONTINUED | OUTPATIENT
Start: 2024-01-15 | End: 2024-01-18 | Stop reason: HOSPADM

## 2024-01-15 RX ORDER — BISOPROLOL FUMARATE 10 MG/1
10 TABLET, FILM COATED ORAL DAILY
COMMUNITY

## 2024-01-15 RX ORDER — TOPIRAMATE 100 MG/1
100 TABLET, FILM COATED ORAL DAILY
Status: DISCONTINUED | OUTPATIENT
Start: 2024-01-16 | End: 2024-01-18 | Stop reason: HOSPADM

## 2024-01-15 RX ORDER — GABAPENTIN 300 MG/1
900 CAPSULE ORAL
Status: DISCONTINUED | OUTPATIENT
Start: 2024-01-16 | End: 2024-01-18 | Stop reason: HOSPADM

## 2024-01-15 RX ORDER — NYSTATIN 100000 [USP'U]/G
1 POWDER TOPICAL EVERY MORNING
COMMUNITY

## 2024-01-15 RX ORDER — FLUTICASONE FUROATE AND VILANTEROL 100; 25 UG/1; UG/1
1 POWDER RESPIRATORY (INHALATION) DAILY
Status: DISCONTINUED | OUTPATIENT
Start: 2024-01-16 | End: 2024-01-18 | Stop reason: HOSPADM

## 2024-01-15 RX ORDER — MYCOPHENOLATE MOFETIL 500 MG/1
1000 TABLET ORAL 2 TIMES DAILY
Status: ON HOLD | COMMUNITY
End: 2024-01-18

## 2024-01-15 RX ORDER — PANTOPRAZOLE SODIUM 40 MG/1
40 TABLET, DELAYED RELEASE ORAL
Status: DISCONTINUED | OUTPATIENT
Start: 2024-01-16 | End: 2024-01-18 | Stop reason: HOSPADM

## 2024-01-15 RX ORDER — ACETAMINOPHEN 650 MG/1
650 SUPPOSITORY RECTAL EVERY 4 HOURS PRN
Status: DISCONTINUED | OUTPATIENT
Start: 2024-01-15 | End: 2024-01-18 | Stop reason: HOSPADM

## 2024-01-15 RX ORDER — ALBUTEROL SULFATE 90 UG/1
2 AEROSOL, METERED RESPIRATORY (INHALATION) EVERY 4 HOURS PRN
Status: DISCONTINUED | OUTPATIENT
Start: 2024-01-15 | End: 2024-01-18 | Stop reason: HOSPADM

## 2024-01-15 RX ORDER — HYDROXYCHLOROQUINE SULFATE 200 MG/1
400 TABLET, FILM COATED ORAL AT BEDTIME
COMMUNITY

## 2024-01-15 RX ORDER — LIDOCAINE 4 G/G
1 PATCH TOPICAL DAILY
Status: DISCONTINUED | OUTPATIENT
Start: 2024-01-16 | End: 2024-01-18 | Stop reason: HOSPADM

## 2024-01-15 RX ORDER — FUROSEMIDE 20 MG/1
20 TABLET ORAL DAILY
Status: DISCONTINUED | OUTPATIENT
Start: 2024-01-16 | End: 2024-01-18 | Stop reason: HOSPADM

## 2024-01-15 RX ORDER — CYCLOSPORINE 0.5 MG/ML
1 EMULSION OPHTHALMIC 2 TIMES DAILY
Status: DISCONTINUED | OUTPATIENT
Start: 2024-01-15 | End: 2024-01-18 | Stop reason: HOSPADM

## 2024-01-15 RX ORDER — ONDANSETRON 4 MG/1
4 TABLET, ORALLY DISINTEGRATING ORAL EVERY 12 HOURS PRN
Status: DISCONTINUED | OUTPATIENT
Start: 2024-01-15 | End: 2024-01-18 | Stop reason: HOSPADM

## 2024-01-15 RX ORDER — PANTOPRAZOLE SODIUM 40 MG/1
40 TABLET, DELAYED RELEASE ORAL DAILY
COMMUNITY

## 2024-01-15 RX ORDER — METHYLPREDNISOLONE 4 MG/1
4 TABLET ORAL ONCE
Status: COMPLETED | OUTPATIENT
Start: 2024-01-15 | End: 2024-01-15

## 2024-01-15 RX ORDER — MORPHINE SULFATE 20 MG/1
20 CAPSULE, EXTENDED RELEASE ORAL 2 TIMES DAILY PRN
Status: DISCONTINUED | OUTPATIENT
Start: 2024-01-15 | End: 2024-01-16

## 2024-01-15 RX ORDER — BACLOFEN 10 MG/1
10 TABLET ORAL 3 TIMES DAILY
Status: DISCONTINUED | OUTPATIENT
Start: 2024-01-15 | End: 2024-01-18 | Stop reason: HOSPADM

## 2024-01-15 RX ORDER — DOCUSATE SODIUM 100 MG/1
100 CAPSULE, LIQUID FILLED ORAL DAILY
Status: DISCONTINUED | OUTPATIENT
Start: 2024-01-16 | End: 2024-01-18 | Stop reason: HOSPADM

## 2024-01-15 RX ORDER — METOPROLOL SUCCINATE 100 MG/1
200 TABLET, EXTENDED RELEASE ORAL DAILY
Status: DISCONTINUED | OUTPATIENT
Start: 2024-01-16 | End: 2024-01-18 | Stop reason: HOSPADM

## 2024-01-15 RX ORDER — TOLTERODINE 4 MG/1
4 CAPSULE, EXTENDED RELEASE ORAL DAILY
Status: DISCONTINUED | OUTPATIENT
Start: 2024-01-16 | End: 2024-01-18 | Stop reason: HOSPADM

## 2024-01-15 RX ORDER — METHYLPREDNISOLONE 4 MG/1
4 TABLET ORAL 2 TIMES DAILY
Status: DISCONTINUED | OUTPATIENT
Start: 2024-01-15 | End: 2024-01-16 | Stop reason: CLARIF

## 2024-01-15 RX ORDER — BISACODYL 10 MG
10 SUPPOSITORY, RECTAL RECTAL DAILY PRN
Status: DISCONTINUED | OUTPATIENT
Start: 2024-01-15 | End: 2024-01-18 | Stop reason: HOSPADM

## 2024-01-15 RX ORDER — MONTELUKAST SODIUM 10 MG/1
10 TABLET ORAL NIGHTLY
Status: DISCONTINUED | OUTPATIENT
Start: 2024-01-15 | End: 2024-01-18 | Stop reason: HOSPADM

## 2024-01-15 RX ORDER — LANOLIN ALCOHOL/MO/W.PET/CERES
3 CREAM (GRAM) TOPICAL NIGHTLY PRN
Status: DISCONTINUED | OUTPATIENT
Start: 2024-01-15 | End: 2024-01-18 | Stop reason: HOSPADM

## 2024-01-15 RX ORDER — METHYLPREDNISOLONE 4 MG/1
2 TABLET ORAL AT BEDTIME
Status: ON HOLD | COMMUNITY
End: 2024-01-18

## 2024-01-15 RX ORDER — MORPHINE SULFATE 20 MG/1
20 CAPSULE, EXTENDED RELEASE ORAL
COMMUNITY

## 2024-01-15 RX ORDER — MYCOPHENOLATE MOFETIL 500 MG/1
1000 TABLET ORAL 2 TIMES DAILY
Status: DISCONTINUED | OUTPATIENT
Start: 2024-01-15 | End: 2024-01-18 | Stop reason: HOSPADM

## 2024-01-15 RX ORDER — MORPHINE SULFATE 15 MG/1
15 TABLET, FILM COATED, EXTENDED RELEASE ORAL 2 TIMES DAILY PRN
Status: CANCELLED | OUTPATIENT
Start: 2024-01-16

## 2024-01-15 RX ORDER — GABAPENTIN 300 MG/1
1200 CAPSULE ORAL NIGHTLY
Status: DISCONTINUED | OUTPATIENT
Start: 2024-01-15 | End: 2024-01-18 | Stop reason: HOSPADM

## 2024-01-15 RX ORDER — 0.9 % SODIUM CHLORIDE 0.9 %
2 VIAL (ML) INJECTION EVERY 12 HOURS SCHEDULED
Status: DISCONTINUED | OUTPATIENT
Start: 2024-01-15 | End: 2024-01-18 | Stop reason: HOSPADM

## 2024-01-15 RX ORDER — PILOCARPINE HYDROCHLORIDE 5 MG/1
5 TABLET, FILM COATED ORAL 3 TIMES DAILY
Status: DISCONTINUED | OUTPATIENT
Start: 2024-01-15 | End: 2024-01-18 | Stop reason: HOSPADM

## 2024-01-15 RX ORDER — PRAVASTATIN SODIUM 10 MG
10 TABLET ORAL NIGHTLY
Status: DISCONTINUED | OUTPATIENT
Start: 2024-01-15 | End: 2024-01-18 | Stop reason: HOSPADM

## 2024-01-15 RX ORDER — LIDOCAINE 50 MG/G
1 PATCH TOPICAL EVERY 24 HOURS
COMMUNITY

## 2024-01-15 RX ORDER — WARFARIN SODIUM 2 MG/1
2 TABLET ORAL
COMMUNITY

## 2024-01-15 RX ADMIN — MORPHINE SULFATE 15 MG: 15 TABLET, FILM COATED, EXTENDED RELEASE ORAL at 18:30

## 2024-01-15 RX ADMIN — BACLOFEN 10 MG: 10 TABLET ORAL at 22:37

## 2024-01-15 RX ADMIN — VANCOMYCIN HYDROCHLORIDE 750 MG: 750 INJECTION, POWDER, LYOPHILIZED, FOR SOLUTION INTRAVENOUS at 18:29

## 2024-01-15 RX ADMIN — GABAPENTIN 1200 MG: 300 CAPSULE ORAL at 22:37

## 2024-01-15 RX ADMIN — PRAVASTATIN SODIUM 10 MG: 10 TABLET ORAL at 22:45

## 2024-01-15 RX ADMIN — HYDROXYCHLOROQUINE SULFATE 400 MG: 200 TABLET ORAL at 22:37

## 2024-01-15 RX ADMIN — IOHEXOL 75 ML: 350 INJECTION, SOLUTION INTRAVENOUS at 21:03

## 2024-01-15 RX ADMIN — DEXTROSE MONOHYDRATE 25 G: 25 INJECTION, SOLUTION INTRAVENOUS at 18:54

## 2024-01-15 RX ADMIN — METHYLPREDNISOLONE 4 MG: 4 TABLET ORAL at 22:41

## 2024-01-15 RX ADMIN — TOPIRAMATE 50 MG: 25 TABLET, FILM COATED ORAL at 22:40

## 2024-01-15 RX ADMIN — PILOCARPINE HYDROCHLORIDE 5 MG: 5 TABLET, FILM COATED ORAL at 22:38

## 2024-01-15 RX ADMIN — MYCOPHENOLATE MOFETIL 1000 MG: 500 TABLET, FILM COATED ORAL at 22:37

## 2024-01-15 RX ADMIN — MONTELUKAST SODIUM 10 MG: 10 TABLET, FILM COATED ORAL at 22:37

## 2024-01-15 RX ADMIN — CEFAZOLIN SODIUM 2000 MG: 300 INJECTION, POWDER, LYOPHILIZED, FOR SOLUTION INTRAVENOUS at 17:35

## 2024-01-15 RX ADMIN — CEFEPIME 1000 MG: 1 INJECTION, POWDER, FOR SOLUTION INTRAMUSCULAR; INTRAVENOUS at 22:36

## 2024-01-15 RX ADMIN — SODIUM CHLORIDE, PRESERVATIVE FREE 2 ML: 5 INJECTION INTRAVENOUS at 22:41

## 2024-01-15 ASSESSMENT — ORIENTATION MEMORY CONCENTRATION TEST (OMCT)
OMCT INTERPRETATION: 0-6: NO SIGNIFICANT IMPAIRMENT
OMCT SCORE: 0
WHAT TIME IS IT (NO WATCH OR CLOCK): CORRECT
WHAT YEAR IS IT NOW (MUST BE EXACT): CORRECT
SAY THE MONTHS IN REVERSE ORDER STARTING WITH LAST MONTH: CORRECT
COUNT BACKWARDS FROM 20 TO 1: CORRECT
REPEAT THE NAME AND ADDRESS I ASKED YOU TO REMEMBER: CORRECT
WHAT MONTH IS IT NOW: CORRECT

## 2024-01-15 ASSESSMENT — PAIN DESCRIPTION - PAIN TYPE: TYPE: ACUTE PAIN

## 2024-01-15 ASSESSMENT — PAIN SCALES - GENERAL
PAINLEVEL_OUTOF10: 7
PAINLEVEL_OUTOF10: 10
PAINLEVEL_OUTOF10: 10

## 2024-01-15 ASSESSMENT — PAIN SCALES - PAIN ASSESSMENT IN ADVANCED DEMENTIA (PAINAD)
TOTALSCORE: 0
BODYLANGUAGE: RELAXED
FACIALEXPRESSION: SMILING OR INEXPRESSIVE
BREATHING: NORMAL
CONSOLABILITY: NO NEED TO CONSOLE

## 2024-01-16 ENCOUNTER — APPOINTMENT (OUTPATIENT)
Dept: WOUND CARE | Age: 69
End: 2024-01-16
Attending: FAMILY MEDICINE

## 2024-01-16 LAB
ANION GAP SERPL CALC-SCNC: 10 MMOL/L (ref 7–19)
BASOPHILS # BLD: 0 K/MCL (ref 0–0.3)
BASOPHILS NFR BLD: 0 %
BUN SERPL-MCNC: 22 MG/DL (ref 6–20)
BUN/CREAT SERPL: 35 (ref 7–25)
CALCIUM SERPL-MCNC: 9.8 MG/DL (ref 8.4–10.2)
CHLORIDE SERPL-SCNC: 114 MMOL/L (ref 97–110)
CO2 SERPL-SCNC: 22 MMOL/L (ref 21–32)
CREAT SERPL-MCNC: 0.62 MG/DL (ref 0.51–0.95)
DEPRECATED RDW RBC: 55.7 FL (ref 39–50)
EGFRCR SERPLBLD CKD-EPI 2021: >90 ML/MIN/{1.73_M2}
EOSINOPHIL # BLD: 0 K/MCL (ref 0–0.5)
EOSINOPHIL NFR BLD: 0 %
ERYTHROCYTE [DISTWIDTH] IN BLOOD: 15.9 % (ref 11–15)
FASTING DURATION TIME PATIENT: ABNORMAL H
GLUCOSE SERPL-MCNC: 90 MG/DL (ref 70–99)
HCT VFR BLD CALC: 36.6 % (ref 36–46.5)
HGB BLD-MCNC: 11 G/DL (ref 12–15.5)
IMM GRANULOCYTES # BLD AUTO: 0.1 K/MCL (ref 0–0.2)
IMM GRANULOCYTES # BLD: 1 %
INR PPP: 2
LYMPHOCYTES # BLD: 0.9 K/MCL (ref 1–4)
LYMPHOCYTES NFR BLD: 7 %
MAGNESIUM SERPL-MCNC: 2.3 MG/DL (ref 1.7–2.4)
MCH RBC QN AUTO: 28.4 PG (ref 26–34)
MCHC RBC AUTO-ENTMCNC: 30.1 G/DL (ref 32–36.5)
MCV RBC AUTO: 94.3 FL (ref 78–100)
MONOCYTES # BLD: 0.9 K/MCL (ref 0.3–0.9)
MONOCYTES NFR BLD: 7 %
NEUTROPHILS # BLD: 10.4 K/MCL (ref 1.8–7.7)
NEUTROPHILS NFR BLD: 85 %
NRBC BLD MANUAL-RTO: 0 /100 WBC
PLATELET # BLD AUTO: 227 K/MCL (ref 140–450)
POTASSIUM SERPL-SCNC: 4.7 MMOL/L (ref 3.4–5.1)
PROTHROMBIN TIME: 20.8 SEC (ref 9.7–11.8)
RAINBOW EXTRA TUBES HOLD SPECIMEN: NORMAL
RBC # BLD: 3.88 MIL/MCL (ref 4–5.2)
SODIUM SERPL-SCNC: 141 MMOL/L (ref 135–145)
WBC # BLD: 12.3 K/MCL (ref 4.2–11)

## 2024-01-16 PROCEDURE — 10002803 HB RX 637: Performed by: HOSPITALIST

## 2024-01-16 PROCEDURE — 85610 PROTHROMBIN TIME: CPT | Performed by: INTERNAL MEDICINE

## 2024-01-16 PROCEDURE — 10002807 HB RX 258: Performed by: INTERNAL MEDICINE

## 2024-01-16 PROCEDURE — 85025 COMPLETE CBC W/AUTO DIFF WBC: CPT | Performed by: INTERNAL MEDICINE

## 2024-01-16 PROCEDURE — 10000002 HB ROOM CHARGE MED SURG

## 2024-01-16 PROCEDURE — 36415 COLL VENOUS BLD VENIPUNCTURE: CPT | Performed by: INTERNAL MEDICINE

## 2024-01-16 PROCEDURE — 94664 DEMO&/EVAL PT USE INHALER: CPT

## 2024-01-16 PROCEDURE — 10002803 HB RX 637: Performed by: INTERNAL MEDICINE

## 2024-01-16 PROCEDURE — 10004180 HB COUNTER-TRANSPORT

## 2024-01-16 PROCEDURE — 10004651 HB RX, NO CHARGE ITEM: Performed by: INTERNAL MEDICINE

## 2024-01-16 PROCEDURE — 80048 BASIC METABOLIC PNL TOTAL CA: CPT | Performed by: INTERNAL MEDICINE

## 2024-01-16 PROCEDURE — 10002801 HB RX 250 W/O HCPCS: Performed by: INTERNAL MEDICINE

## 2024-01-16 PROCEDURE — 83735 ASSAY OF MAGNESIUM: CPT | Performed by: INTERNAL MEDICINE

## 2024-01-16 PROCEDURE — 94640 AIRWAY INHALATION TREATMENT: CPT

## 2024-01-16 PROCEDURE — 10002800 HB RX 250 W HCPCS: Performed by: INTERNAL MEDICINE

## 2024-01-16 RX ORDER — METHYLPREDNISOLONE 4 MG/1
4 TABLET ORAL DAILY
Status: ON HOLD | COMMUNITY
End: 2024-01-18 | Stop reason: HOSPADM

## 2024-01-16 RX ORDER — METHYLPREDNISOLONE 4 MG/1
2 TABLET ORAL AT BEDTIME
Status: DISCONTINUED | OUTPATIENT
Start: 2024-01-16 | End: 2024-01-18 | Stop reason: HOSPADM

## 2024-01-16 RX ORDER — METHYLPREDNISOLONE 4 MG/1
4 TABLET ORAL
Status: DISCONTINUED | OUTPATIENT
Start: 2024-01-16 | End: 2024-01-17

## 2024-01-16 RX ORDER — MORPHINE SULFATE 15 MG/1
15 TABLET, FILM COATED, EXTENDED RELEASE ORAL 2 TIMES DAILY PRN
Status: DISCONTINUED | OUTPATIENT
Start: 2024-01-16 | End: 2024-01-18 | Stop reason: HOSPADM

## 2024-01-16 RX ORDER — WARFARIN SODIUM 2 MG/1
2 TABLET ORAL ONCE
Status: COMPLETED | OUTPATIENT
Start: 2024-01-16 | End: 2024-01-16

## 2024-01-16 RX ADMIN — TOPIRAMATE 100 MG: 100 TABLET ORAL at 10:18

## 2024-01-16 RX ADMIN — FUROSEMIDE 20 MG: 20 TABLET ORAL at 10:18

## 2024-01-16 RX ADMIN — MONTELUKAST SODIUM 10 MG: 10 TABLET, FILM COATED ORAL at 21:46

## 2024-01-16 RX ADMIN — PANTOPRAZOLE SODIUM 40 MG: 40 TABLET, DELAYED RELEASE ORAL at 07:07

## 2024-01-16 RX ADMIN — PILOCARPINE HYDROCHLORIDE 5 MG: 5 TABLET, FILM COATED ORAL at 10:18

## 2024-01-16 RX ADMIN — METHYLPREDNISOLONE 4 MG: 4 TABLET ORAL at 18:09

## 2024-01-16 RX ADMIN — CYCLOSPORINE 1 DROP: 0.5 EMULSION OPHTHALMIC at 10:21

## 2024-01-16 RX ADMIN — PILOCARPINE HYDROCHLORIDE 5 MG: 5 TABLET, FILM COATED ORAL at 13:19

## 2024-01-16 RX ADMIN — SODIUM CHLORIDE, PRESERVATIVE FREE 2 ML: 5 INJECTION INTRAVENOUS at 21:43

## 2024-01-16 RX ADMIN — SODIUM CHLORIDE, PRESERVATIVE FREE 2 ML: 5 INJECTION INTRAVENOUS at 10:22

## 2024-01-16 RX ADMIN — CEFEPIME 1000 MG: 1 INJECTION, POWDER, FOR SOLUTION INTRAMUSCULAR; INTRAVENOUS at 16:20

## 2024-01-16 RX ADMIN — GABAPENTIN 900 MG: 300 CAPSULE ORAL at 12:41

## 2024-01-16 RX ADMIN — CYCLOSPORINE 1 DROP: 0.5 EMULSION OPHTHALMIC at 21:48

## 2024-01-16 RX ADMIN — PRAVASTATIN SODIUM 10 MG: 10 TABLET ORAL at 21:47

## 2024-01-16 RX ADMIN — BACLOFEN 10 MG: 10 TABLET ORAL at 10:18

## 2024-01-16 RX ADMIN — CEFEPIME 1000 MG: 1 INJECTION, POWDER, FOR SOLUTION INTRAMUSCULAR; INTRAVENOUS at 21:59

## 2024-01-16 RX ADMIN — BACLOFEN 10 MG: 10 TABLET ORAL at 21:46

## 2024-01-16 RX ADMIN — BACLOFEN 10 MG: 10 TABLET ORAL at 13:19

## 2024-01-16 RX ADMIN — TOPIRAMATE 50 MG: 25 TABLET, FILM COATED ORAL at 21:44

## 2024-01-16 RX ADMIN — MORPHINE SULFATE 15 MG: 15 TABLET, FILM COATED, EXTENDED RELEASE ORAL at 10:20

## 2024-01-16 RX ADMIN — FLUTICASONE FUROATE AND VILANTEROL TRIFENATATE 1 PUFF: 100; 25 POWDER RESPIRATORY (INHALATION) at 08:20

## 2024-01-16 RX ADMIN — PILOCARPINE HYDROCHLORIDE 5 MG: 5 TABLET, FILM COATED ORAL at 21:45

## 2024-01-16 RX ADMIN — BUDESONIDE 3 MG: 3 CAPSULE, GELATIN COATED ORAL at 07:07

## 2024-01-16 RX ADMIN — GABAPENTIN 1200 MG: 300 CAPSULE ORAL at 21:46

## 2024-01-16 RX ADMIN — VANCOMYCIN HYDROCHLORIDE 1000 MG: 1 INJECTION, POWDER, LYOPHILIZED, FOR SOLUTION INTRAVENOUS at 18:09

## 2024-01-16 RX ADMIN — SPIRONOLACTONE 25 MG: 25 TABLET, COATED ORAL at 10:19

## 2024-01-16 RX ADMIN — DOCUSATE SODIUM 100 MG: 100 CAPSULE, LIQUID FILLED ORAL at 10:18

## 2024-01-16 RX ADMIN — METHYLPREDNISOLONE 2 MG: 4 TABLET ORAL at 21:47

## 2024-01-16 RX ADMIN — TOLTERODINE 4 MG: 4 CAPSULE, EXTENDED RELEASE ORAL at 10:18

## 2024-01-16 RX ADMIN — HYDROXYCHLOROQUINE SULFATE 400 MG: 200 TABLET ORAL at 21:45

## 2024-01-16 RX ADMIN — GABAPENTIN 900 MG: 300 CAPSULE ORAL at 07:07

## 2024-01-16 RX ADMIN — MYCOPHENOLATE MOFETIL 1000 MG: 500 TABLET, FILM COATED ORAL at 10:19

## 2024-01-16 RX ADMIN — CEFEPIME 1000 MG: 1 INJECTION, POWDER, FOR SOLUTION INTRAMUSCULAR; INTRAVENOUS at 10:25

## 2024-01-16 RX ADMIN — WARFARIN SODIUM 2 MG: 2 TABLET ORAL at 18:10

## 2024-01-16 SDOH — SOCIAL STABILITY: SOCIAL INSECURITY: HOW OFTEN DOES ANYONE, INCLUDING FAMILY AND FRIENDS, SCREAM OR CURSE AT YOU?: NEVER

## 2024-01-16 SDOH — ECONOMIC STABILITY: HOUSING INSECURITY: WHAT IS YOUR LIVING SITUATION TODAY?: HOUSE

## 2024-01-16 SDOH — SOCIAL STABILITY: SOCIAL INSECURITY: HOW OFTEN DOES ANYONE, INCLUDING FAMILY AND FRIENDS, THREATEN YOU WITH HARM?: NEVER

## 2024-01-16 SDOH — ECONOMIC STABILITY: INCOME INSECURITY: IN THE PAST 12 MONTHS, HAS THE ELECTRIC, GAS, OIL, OR WATER COMPANY THREATENED TO SHUT OFF SERVICE IN YOUR HOME?: NO

## 2024-01-16 SDOH — ECONOMIC STABILITY: GENERAL

## 2024-01-16 SDOH — ECONOMIC STABILITY: FOOD INSECURITY: WITHIN THE PAST 12 MONTHS, THE FOOD YOU BOUGHT JUST DIDN'T LAST AND YOU DIDN'T HAVE MONEY TO GET MORE.: NEVER TRUE

## 2024-01-16 SDOH — SOCIAL STABILITY: SOCIAL NETWORK: SUPPORT SYSTEMS: SPOUSE

## 2024-01-16 SDOH — ECONOMIC STABILITY: HOUSING INSECURITY: WHAT IS YOUR LIVING SITUATION TODAY?: I HAVE A STEADY PLACE TO LIVE

## 2024-01-16 SDOH — SOCIAL STABILITY: SOCIAL NETWORK
HOW OFTEN DO YOU SEE OR TALK TO PEOPLE THAT YOU CARE ABOUT AND FEEL CLOSE TO? (FOR EXAMPLE: TALKING TO FRIENDS ON THE PHONE, VISITING FRIENDS OR FAMILY, GOING TO CHURCH OR CLUB MEETINGS): 5 OR MORE TIMES A WEEK

## 2024-01-16 SDOH — ECONOMIC STABILITY: HOUSING INSECURITY: WHAT IS YOUR LIVING SITUATION TODAY?: SPOUSE

## 2024-01-16 SDOH — HEALTH STABILITY: GENERAL: BECAUSE OF A PHYSICAL, MENTAL, OR EMOTIONAL CONDITION, DO YOU HAVE DIFFICULTY DOING ERRANDS ALONE?: NO

## 2024-01-16 SDOH — ECONOMIC STABILITY: GENERAL: WOULD YOU LIKE HELP WITH ANY OF THE FOLLOWING NEEDS?: I DON'T WANT HELP WITH ANY OF THESE

## 2024-01-16 SDOH — ECONOMIC STABILITY: HOUSING INSECURITY: DO YOU HAVE PROBLEMS WITH ANY OF THE FOLLOWING?: NONE OF THE ABOVE

## 2024-01-16 SDOH — SOCIAL STABILITY: SOCIAL INSECURITY: HOW OFTEN DOES ANYONE, INCLUDING FAMILY AND FRIENDS, INSULT OR TALK DOWN TO YOU?: NEVER

## 2024-01-16 SDOH — HEALTH STABILITY: GENERAL
BECAUSE OF A PHYSICAL, MENTAL, OR EMOTIONAL CONDITION, DO YOU HAVE SERIOUS DIFFICULTY CONCENTRATING, REMEMBERING OR MAKING DECISIONS?: NO

## 2024-01-16 SDOH — SOCIAL STABILITY: SOCIAL INSECURITY: HOW OFTEN DOES ANYONE, INCLUDING FAMILY AND FRIENDS, PHYSICALLY HURT YOU?: NEVER

## 2024-01-16 SDOH — ECONOMIC STABILITY: TRANSPORTATION INSECURITY
IN THE PAST 12 MONTHS, HAS LACK OF RELIABLE TRANSPORTATION KEPT YOU FROM MEDICAL APPOINTMENTS, MEETINGS, WORK OR FROM GETTING THINGS NEEDED FOR DAILY LIVING?: NO

## 2024-01-16 SDOH — HEALTH STABILITY: PHYSICAL HEALTH: DO YOU HAVE DIFFICULTY DRESSING OR BATHING?: NO

## 2024-01-16 SDOH — HEALTH STABILITY: PHYSICAL HEALTH: DO YOU HAVE SERIOUS DIFFICULTY WALKING OR CLIMBING STAIRS?: NO

## 2024-01-16 ASSESSMENT — LIFESTYLE VARIABLES
HOW OFTEN DO YOU HAVE A DRINK CONTAINING ALCOHOL: NEVER
HOW MANY STANDARD DRINKS CONTAINING ALCOHOL DO YOU HAVE ON A TYPICAL DAY: 0,1 OR 2
ALCOHOL_USE_STATUS: NO OR LOW RISK WITH VALIDATED TOOL
HOW OFTEN DO YOU HAVE 6 OR MORE DRINKS ON ONE OCCASION: NEVER
AUDIT-C TOTAL SCORE: 0

## 2024-01-16 ASSESSMENT — PATIENT HEALTH QUESTIONNAIRE - PHQ9
SUM OF ALL RESPONSES TO PHQ9 QUESTIONS 1 AND 2: 0
1. LITTLE INTEREST OR PLEASURE IN DOING THINGS: NOT AT ALL
IS PATIENT ABLE TO COMPLETE PHQ2 OR PHQ9: YES
2. FEELING DOWN, DEPRESSED OR HOPELESS: NOT AT ALL
SUM OF ALL RESPONSES TO PHQ9 QUESTIONS 1 AND 2: 0
CLINICAL INTERPRETATION OF PHQ2 SCORE: NO FURTHER SCREENING NEEDED

## 2024-01-16 ASSESSMENT — ACTIVITIES OF DAILY LIVING (ADL)
ADL_BEFORE_ADMISSION: INDEPENDENT
ADL_SHORT_OF_BREATH: NO
RECENT_DECLINE_ADL: NO
ADL_SCORE: 12

## 2024-01-16 ASSESSMENT — COLUMBIA-SUICIDE SEVERITY RATING SCALE - C-SSRS
6. HAVE YOU EVER DONE ANYTHING, STARTED TO DO ANYTHING, OR PREPARED TO DO ANYTHING TO END YOUR LIFE?: NO
2. HAVE YOU ACTUALLY HAD ANY THOUGHTS OF KILLING YOURSELF?: NO
1. WITHIN THE PAST MONTH, HAVE YOU WISHED YOU WERE DEAD OR WISHED YOU COULD GO TO SLEEP AND NOT WAKE UP?: NO
IS THE PATIENT ABLE TO COMPLETE C-SSRS: YES

## 2024-01-16 ASSESSMENT — COGNITIVE AND FUNCTIONAL STATUS - GENERAL
BECAUSE OF A PHYSICAL, MENTAL, OR EMOTIONAL CONDITION, DO YOU HAVE SERIOUS DIFFICULTY CONCENTRATING, REMEMBERING OR MAKING DECISIONS: NO
BECAUSE OF A PHYSICAL, MENTAL, OR EMOTIONAL CONDITION, DO YOU HAVE DIFFICULTY DOING ERRANDS ALONE: NO

## 2024-01-16 ASSESSMENT — PAIN SCALES - GENERAL
PAINLEVEL_OUTOF10: 8
PAINLEVEL_OUTOF10: 9
PAINLEVEL_OUTOF10: 0

## 2024-01-17 LAB
DEPRECATED RDW RBC: 54.4 FL (ref 39–50)
ERYTHROCYTE [DISTWIDTH] IN BLOOD: 15.9 % (ref 11–15)
HCT VFR BLD CALC: 37.6 % (ref 36–46.5)
HGB BLD-MCNC: 11.6 G/DL (ref 12–15.5)
INR PPP: 2.3
MCH RBC QN AUTO: 29 PG (ref 26–34)
MCHC RBC AUTO-ENTMCNC: 30.9 G/DL (ref 32–36.5)
MCV RBC AUTO: 94 FL (ref 78–100)
NRBC BLD MANUAL-RTO: 0 /100 WBC
PLATELET # BLD AUTO: 234 K/MCL (ref 140–450)
PROTHROMBIN TIME: 23.1 SEC (ref 9.7–11.8)
RBC # BLD: 4 MIL/MCL (ref 4–5.2)
WBC # BLD: 10.7 K/MCL (ref 4.2–11)

## 2024-01-17 PROCEDURE — 94640 AIRWAY INHALATION TREATMENT: CPT

## 2024-01-17 PROCEDURE — 10004651 HB RX, NO CHARGE ITEM: Performed by: INTERNAL MEDICINE

## 2024-01-17 PROCEDURE — 10002807 HB RX 258: Performed by: INTERNAL MEDICINE

## 2024-01-17 PROCEDURE — 85027 COMPLETE CBC AUTOMATED: CPT | Performed by: INTERNAL MEDICINE

## 2024-01-17 PROCEDURE — 10004180 HB COUNTER-TRANSPORT

## 2024-01-17 PROCEDURE — 10000002 HB ROOM CHARGE MED SURG

## 2024-01-17 PROCEDURE — 13003291 HB INS MIDLINE W/GUIDE INCL CATH

## 2024-01-17 PROCEDURE — 10002800 HB RX 250 W HCPCS: Performed by: INTERNAL MEDICINE

## 2024-01-17 PROCEDURE — 10002801 HB RX 250 W/O HCPCS: Performed by: INTERNAL MEDICINE

## 2024-01-17 PROCEDURE — 85610 PROTHROMBIN TIME: CPT | Performed by: INTERNAL MEDICINE

## 2024-01-17 PROCEDURE — 10002803 HB RX 637: Performed by: HOSPITALIST

## 2024-01-17 PROCEDURE — 10004281 HB COUNTER-STAFF TIME PER 15 MIN

## 2024-01-17 PROCEDURE — 10002803 HB RX 637: Performed by: INTERNAL MEDICINE

## 2024-01-17 PROCEDURE — 36415 COLL VENOUS BLD VENIPUNCTURE: CPT | Performed by: INTERNAL MEDICINE

## 2024-01-17 RX ORDER — 0.9 % SODIUM CHLORIDE 0.9 %
10 VIAL (ML) INJECTION PRN
Status: DISCONTINUED | OUTPATIENT
Start: 2024-01-17 | End: 2024-01-18 | Stop reason: HOSPADM

## 2024-01-17 RX ORDER — 0.9 % SODIUM CHLORIDE 0.9 %
10 VIAL (ML) INJECTION EVERY 12 HOURS SCHEDULED
Status: DISCONTINUED | OUTPATIENT
Start: 2024-01-17 | End: 2024-01-18 | Stop reason: HOSPADM

## 2024-01-17 RX ORDER — WARFARIN SODIUM 2 MG/1
2 TABLET ORAL ONCE
Status: COMPLETED | OUTPATIENT
Start: 2024-01-17 | End: 2024-01-17

## 2024-01-17 RX ORDER — METHYLPREDNISOLONE 4 MG/1
2 TABLET ORAL
Status: DISCONTINUED | OUTPATIENT
Start: 2024-01-18 | End: 2024-01-18 | Stop reason: HOSPADM

## 2024-01-17 RX ADMIN — MORPHINE SULFATE 15 MG: 15 TABLET, FILM COATED, EXTENDED RELEASE ORAL at 22:58

## 2024-01-17 RX ADMIN — MONTELUKAST SODIUM 10 MG: 10 TABLET, FILM COATED ORAL at 21:15

## 2024-01-17 RX ADMIN — GABAPENTIN 900 MG: 300 CAPSULE ORAL at 06:16

## 2024-01-17 RX ADMIN — METHYLPREDNISOLONE 2 MG: 4 TABLET ORAL at 21:18

## 2024-01-17 RX ADMIN — CEFEPIME 1000 MG: 1 INJECTION, POWDER, FOR SOLUTION INTRAMUSCULAR; INTRAVENOUS at 18:00

## 2024-01-17 RX ADMIN — SPIRONOLACTONE 25 MG: 25 TABLET, COATED ORAL at 10:27

## 2024-01-17 RX ADMIN — GABAPENTIN 1200 MG: 300 CAPSULE ORAL at 21:15

## 2024-01-17 RX ADMIN — ACETAMINOPHEN 650 MG: 325 TABLET ORAL at 10:26

## 2024-01-17 RX ADMIN — PRAVASTATIN SODIUM 10 MG: 10 TABLET ORAL at 21:16

## 2024-01-17 RX ADMIN — PILOCARPINE HYDROCHLORIDE 5 MG: 5 TABLET, FILM COATED ORAL at 13:35

## 2024-01-17 RX ADMIN — WARFARIN SODIUM 2 MG: 2 TABLET ORAL at 17:57

## 2024-01-17 RX ADMIN — BACLOFEN 10 MG: 10 TABLET ORAL at 13:35

## 2024-01-17 RX ADMIN — SODIUM CHLORIDE, PRESERVATIVE FREE 10 ML: 5 INJECTION INTRAVENOUS at 12:57

## 2024-01-17 RX ADMIN — DOCUSATE SODIUM 100 MG: 100 CAPSULE, LIQUID FILLED ORAL at 10:26

## 2024-01-17 RX ADMIN — SODIUM CHLORIDE, PRESERVATIVE FREE 2 ML: 5 INJECTION INTRAVENOUS at 10:28

## 2024-01-17 RX ADMIN — VANCOMYCIN HYDROCHLORIDE 1000 MG: 1 INJECTION, POWDER, LYOPHILIZED, FOR SOLUTION INTRAVENOUS at 18:45

## 2024-01-17 RX ADMIN — BACLOFEN 10 MG: 10 TABLET ORAL at 10:27

## 2024-01-17 RX ADMIN — TOLTERODINE 4 MG: 4 CAPSULE, EXTENDED RELEASE ORAL at 10:28

## 2024-01-17 RX ADMIN — HYDROXYCHLOROQUINE SULFATE 400 MG: 200 TABLET ORAL at 21:16

## 2024-01-17 RX ADMIN — METHYLPREDNISOLONE 4 MG: 4 TABLET ORAL at 10:28

## 2024-01-17 RX ADMIN — CYCLOSPORINE 1 DROP: 0.5 EMULSION OPHTHALMIC at 10:26

## 2024-01-17 RX ADMIN — CYCLOSPORINE 1 DROP: 0.5 EMULSION OPHTHALMIC at 21:14

## 2024-01-17 RX ADMIN — FLUTICASONE FUROATE AND VILANTEROL TRIFENATATE 1 PUFF: 100; 25 POWDER RESPIRATORY (INHALATION) at 09:13

## 2024-01-17 RX ADMIN — TOPIRAMATE 100 MG: 100 TABLET ORAL at 10:28

## 2024-01-17 RX ADMIN — PILOCARPINE HYDROCHLORIDE 5 MG: 5 TABLET, FILM COATED ORAL at 10:27

## 2024-01-17 RX ADMIN — PILOCARPINE HYDROCHLORIDE 5 MG: 5 TABLET, FILM COATED ORAL at 21:17

## 2024-01-17 RX ADMIN — TOPIRAMATE 50 MG: 25 TABLET, FILM COATED ORAL at 21:15

## 2024-01-17 RX ADMIN — BUDESONIDE 3 MG: 3 CAPSULE, GELATIN COATED ORAL at 06:17

## 2024-01-17 RX ADMIN — FUROSEMIDE 20 MG: 20 TABLET ORAL at 10:28

## 2024-01-17 RX ADMIN — CEFEPIME 1000 MG: 1 INJECTION, POWDER, FOR SOLUTION INTRAMUSCULAR; INTRAVENOUS at 10:35

## 2024-01-17 RX ADMIN — METOPROLOL SUCCINATE 200 MG: 100 TABLET, EXTENDED RELEASE ORAL at 10:27

## 2024-01-17 RX ADMIN — BACLOFEN 10 MG: 10 TABLET ORAL at 21:17

## 2024-01-17 RX ADMIN — SODIUM CHLORIDE, PRESERVATIVE FREE 10 ML: 5 INJECTION INTRAVENOUS at 21:17

## 2024-01-17 RX ADMIN — PANTOPRAZOLE SODIUM 40 MG: 40 TABLET, DELAYED RELEASE ORAL at 06:17

## 2024-01-17 RX ADMIN — SODIUM CHLORIDE, PRESERVATIVE FREE 2 ML: 5 INJECTION INTRAVENOUS at 21:17

## 2024-01-17 RX ADMIN — GABAPENTIN 900 MG: 300 CAPSULE ORAL at 13:35

## 2024-01-17 ASSESSMENT — PAIN SCALES - GENERAL
PAINLEVEL_OUTOF10: 9
PAINLEVEL_OUTOF10: 8

## 2024-01-18 VITALS
DIASTOLIC BLOOD PRESSURE: 70 MMHG | RESPIRATION RATE: 16 BRPM | WEIGHT: 117.28 LBS | BODY MASS INDEX: 22.14 KG/M2 | TEMPERATURE: 97.7 F | HEIGHT: 61 IN | OXYGEN SATURATION: 99 % | HEART RATE: 55 BPM | SYSTOLIC BLOOD PRESSURE: 130 MMHG

## 2024-01-18 LAB
DEPRECATED RDW RBC: 55.8 FL (ref 39–50)
ERYTHROCYTE [DISTWIDTH] IN BLOOD: 16 % (ref 11–15)
HCT VFR BLD CALC: 41.3 % (ref 36–46.5)
HGB BLD-MCNC: 12.5 G/DL (ref 12–15.5)
INR PPP: 1.8
MCH RBC QN AUTO: 28.8 PG (ref 26–34)
MCHC RBC AUTO-ENTMCNC: 30.3 G/DL (ref 32–36.5)
MCV RBC AUTO: 95.2 FL (ref 78–100)
NRBC BLD MANUAL-RTO: 0 /100 WBC
PLATELET # BLD AUTO: 244 K/MCL (ref 140–450)
PROTHROMBIN TIME: 18.8 SEC (ref 9.7–11.8)
RBC # BLD: 4.34 MIL/MCL (ref 4–5.2)
WBC # BLD: 9.5 K/MCL (ref 4.2–11)

## 2024-01-18 PROCEDURE — 10002807 HB RX 258: Performed by: INTERNAL MEDICINE

## 2024-01-18 PROCEDURE — 10004651 HB RX, NO CHARGE ITEM: Performed by: INTERNAL MEDICINE

## 2024-01-18 PROCEDURE — 85610 PROTHROMBIN TIME: CPT | Performed by: INTERNAL MEDICINE

## 2024-01-18 PROCEDURE — 10002803 HB RX 637: Performed by: INTERNAL MEDICINE

## 2024-01-18 PROCEDURE — 94640 AIRWAY INHALATION TREATMENT: CPT

## 2024-01-18 PROCEDURE — 10002800 HB RX 250 W HCPCS: Performed by: INTERNAL MEDICINE

## 2024-01-18 PROCEDURE — 36415 COLL VENOUS BLD VENIPUNCTURE: CPT | Performed by: INTERNAL MEDICINE

## 2024-01-18 PROCEDURE — 10002803 HB RX 637: Performed by: HOSPITALIST

## 2024-01-18 PROCEDURE — 85027 COMPLETE CBC AUTOMATED: CPT | Performed by: INTERNAL MEDICINE

## 2024-01-18 PROCEDURE — 10004180 HB COUNTER-TRANSPORT

## 2024-01-18 RX ORDER — MYCOPHENOLATE MOFETIL 500 MG/1
1000 TABLET ORAL 2 TIMES DAILY
Status: SHIPPED | COMMUNITY
Start: 2024-01-18 | End: 2024-01-18

## 2024-01-18 RX ORDER — MYCOPHENOLATE MOFETIL 500 MG/1
1000 TABLET ORAL 2 TIMES DAILY
Status: SHIPPED | COMMUNITY
Start: 2024-01-18

## 2024-01-18 RX ORDER — METHYLPREDNISOLONE 4 MG/1
2 TABLET ORAL 2 TIMES DAILY
Status: SHIPPED | COMMUNITY
Start: 2024-01-18

## 2024-01-18 RX ORDER — WARFARIN SODIUM 3 MG/1
3 TABLET ORAL ONCE
Status: DISCONTINUED | OUTPATIENT
Start: 2024-01-18 | End: 2024-01-18 | Stop reason: HOSPADM

## 2024-01-18 RX ORDER — CEFADROXIL 500 MG/1
500 CAPSULE ORAL 2 TIMES DAILY
Qty: 20 CAPSULE | Refills: 0 | Status: SHIPPED | OUTPATIENT
Start: 2024-01-18 | End: 2024-01-28

## 2024-01-18 RX ORDER — ACETAMINOPHEN 325 MG/1
650 TABLET ORAL EVERY 6 HOURS PRN
Status: SHIPPED | COMMUNITY
Start: 2024-01-18

## 2024-01-18 RX ADMIN — CEFEPIME 1000 MG: 1 INJECTION, POWDER, FOR SOLUTION INTRAMUSCULAR; INTRAVENOUS at 10:16

## 2024-01-18 RX ADMIN — SODIUM CHLORIDE, PRESERVATIVE FREE 2 ML: 5 INJECTION INTRAVENOUS at 10:12

## 2024-01-18 RX ADMIN — SPIRONOLACTONE 25 MG: 25 TABLET, COATED ORAL at 10:11

## 2024-01-18 RX ADMIN — PILOCARPINE HYDROCHLORIDE 5 MG: 5 TABLET, FILM COATED ORAL at 13:16

## 2024-01-18 RX ADMIN — DOCUSATE SODIUM 100 MG: 100 CAPSULE, LIQUID FILLED ORAL at 10:11

## 2024-01-18 RX ADMIN — FLUTICASONE FUROATE AND VILANTEROL TRIFENATATE 1 PUFF: 100; 25 POWDER RESPIRATORY (INHALATION) at 10:05

## 2024-01-18 RX ADMIN — GABAPENTIN 900 MG: 300 CAPSULE ORAL at 06:10

## 2024-01-18 RX ADMIN — CEFEPIME 1000 MG: 1 INJECTION, POWDER, FOR SOLUTION INTRAMUSCULAR; INTRAVENOUS at 02:20

## 2024-01-18 RX ADMIN — FUROSEMIDE 20 MG: 20 TABLET ORAL at 10:11

## 2024-01-18 RX ADMIN — SODIUM CHLORIDE, PRESERVATIVE FREE 10 ML: 5 INJECTION INTRAVENOUS at 10:12

## 2024-01-18 RX ADMIN — BACLOFEN 10 MG: 10 TABLET ORAL at 10:11

## 2024-01-18 RX ADMIN — ACETAMINOPHEN 650 MG: 325 TABLET ORAL at 11:58

## 2024-01-18 RX ADMIN — METHYLPREDNISOLONE 2 MG: 4 TABLET ORAL at 10:10

## 2024-01-18 RX ADMIN — GABAPENTIN 900 MG: 300 CAPSULE ORAL at 11:58

## 2024-01-18 RX ADMIN — CYCLOSPORINE 1 DROP: 0.5 EMULSION OPHTHALMIC at 10:17

## 2024-01-18 RX ADMIN — METOPROLOL SUCCINATE 200 MG: 100 TABLET, EXTENDED RELEASE ORAL at 10:11

## 2024-01-18 RX ADMIN — PANTOPRAZOLE SODIUM 40 MG: 40 TABLET, DELAYED RELEASE ORAL at 06:10

## 2024-01-18 RX ADMIN — PILOCARPINE HYDROCHLORIDE 5 MG: 5 TABLET, FILM COATED ORAL at 10:11

## 2024-01-18 RX ADMIN — BACLOFEN 10 MG: 10 TABLET ORAL at 13:16

## 2024-01-18 RX ADMIN — TOPIRAMATE 100 MG: 100 TABLET ORAL at 10:11

## 2024-01-18 RX ADMIN — TOLTERODINE 4 MG: 4 CAPSULE, EXTENDED RELEASE ORAL at 10:11

## 2024-01-18 RX ADMIN — BUDESONIDE 3 MG: 3 CAPSULE, GELATIN COATED ORAL at 06:11

## 2024-01-18 ASSESSMENT — PAIN SCALES - GENERAL
PAINLEVEL_OUTOF10: 9
PAINLEVEL_OUTOF10: 0
PAINLEVEL_OUTOF10: 2

## 2024-01-20 LAB
BACTERIA BLD CULT: NORMAL
BACTERIA BLD CULT: NORMAL

## 2024-01-23 ENCOUNTER — APPOINTMENT (OUTPATIENT)
Dept: WOUND CARE | Age: 69
End: 2024-01-23
Attending: FAMILY MEDICINE

## 2024-01-23 ENCOUNTER — HOSPITAL ENCOUNTER (OUTPATIENT)
Dept: WOUND CARE | Age: 69
Discharge: STILL ADMITTED OR ADMINISTRATIVE | End: 2024-01-23
Attending: FAMILY MEDICINE

## 2024-01-23 VITALS — TEMPERATURE: 96.4 F

## 2024-01-23 DIAGNOSIS — L97.912 ULCER OF RIGHT LOWER EXTREMITY WITH FAT LAYER EXPOSED (CMD): ICD-10-CM

## 2024-01-23 DIAGNOSIS — L02.419 CELLULITIS AND ABSCESS OF LEG: ICD-10-CM

## 2024-01-23 DIAGNOSIS — S41.102D OPEN WOUND OF LEFT UPPER ARM, SUBSEQUENT ENCOUNTER: ICD-10-CM

## 2024-01-23 DIAGNOSIS — L03.119 CELLULITIS AND ABSCESS OF LEG: ICD-10-CM

## 2024-01-23 DIAGNOSIS — L97.922 ULCER OF LEFT LOWER EXTREMITY WITH FAT LAYER EXPOSED (CMD): ICD-10-CM

## 2024-01-23 DIAGNOSIS — I87.331 CHRONIC VENOUS HYPERTENSION (IDIOPATHIC) WITH ULCER AND INFLAMMATION OF RIGHT LOWER EXTREMITY (CMD): ICD-10-CM

## 2024-01-23 DIAGNOSIS — S81.801D OPEN WOUND OF RIGHT LOWER LEG, SUBSEQUENT ENCOUNTER: Primary | ICD-10-CM

## 2024-01-23 DIAGNOSIS — S81.801A TRAUMATIC OPEN WOUND OF RIGHT LOWER LEG, INITIAL ENCOUNTER: ICD-10-CM

## 2024-01-23 PROCEDURE — 11042 DBRDMT SUBQ TIS 1ST 20SQCM/<: CPT

## 2024-01-23 ASSESSMENT — PAIN SCALES - GENERAL: PAINLEVEL_OUTOF10: 0

## 2024-01-30 ENCOUNTER — HOSPITAL ENCOUNTER (OUTPATIENT)
Dept: WOUND CARE | Age: 69
Discharge: STILL ADMITTED OR ADMINISTRATIVE | End: 2024-01-30
Attending: FAMILY MEDICINE

## 2024-01-30 VITALS — TEMPERATURE: 97 F

## 2024-01-30 DIAGNOSIS — L02.419 CELLULITIS AND ABSCESS OF LEG: ICD-10-CM

## 2024-01-30 DIAGNOSIS — L03.119 CELLULITIS AND ABSCESS OF LEG: ICD-10-CM

## 2024-01-30 DIAGNOSIS — L97.912 ULCER OF RIGHT LOWER EXTREMITY WITH FAT LAYER EXPOSED (CMD): ICD-10-CM

## 2024-01-30 DIAGNOSIS — L97.922 ULCER OF LEFT LOWER EXTREMITY WITH FAT LAYER EXPOSED (CMD): Primary | ICD-10-CM

## 2024-01-30 PROCEDURE — 99213 OFFICE O/P EST LOW 20 MIN: CPT

## 2024-01-30 RX ORDER — SPIRONOLACTONE 25 MG/1
1 TABLET ORAL DAILY
COMMUNITY

## 2024-01-30 RX ORDER — CEFADROXIL 500 MG/1
500 CAPSULE ORAL
COMMUNITY
Start: 2024-01-18 | End: 2024-01-30

## 2024-01-30 ASSESSMENT — PAIN SCALES - GENERAL: PAINLEVEL_OUTOF10: 0

## 2024-02-06 ENCOUNTER — HOSPITAL ENCOUNTER (OUTPATIENT)
Dept: WOUND CARE | Age: 69
Discharge: STILL ADMITTED OR ADMINISTRATIVE | End: 2024-02-06
Attending: FAMILY MEDICINE

## 2024-02-06 VITALS — TEMPERATURE: 97 F

## 2024-02-06 DIAGNOSIS — L02.419 CELLULITIS AND ABSCESS OF LEG: Primary | ICD-10-CM

## 2024-02-06 DIAGNOSIS — L97.912 ULCER OF RIGHT LOWER EXTREMITY WITH FAT LAYER EXPOSED (CMD): ICD-10-CM

## 2024-02-06 DIAGNOSIS — L97.922 ULCER OF LEFT LOWER EXTREMITY WITH FAT LAYER EXPOSED (CMD): ICD-10-CM

## 2024-02-06 DIAGNOSIS — L03.119 CELLULITIS AND ABSCESS OF LEG: Primary | ICD-10-CM

## 2024-02-06 PROCEDURE — 99213 OFFICE O/P EST LOW 20 MIN: CPT

## 2024-02-06 ASSESSMENT — PAIN SCALES - GENERAL: PAINLEVEL_OUTOF10: 0

## 2024-02-20 ENCOUNTER — HOSPITAL ENCOUNTER (OUTPATIENT)
Dept: WOUND CARE | Age: 69
Discharge: STILL ADMITTED OR ADMINISTRATIVE | End: 2024-02-20
Attending: FAMILY MEDICINE

## 2024-02-20 VITALS — TEMPERATURE: 97.2 F

## 2024-02-20 DIAGNOSIS — L03.119 CELLULITIS AND ABSCESS OF LEG: Primary | ICD-10-CM

## 2024-02-20 DIAGNOSIS — L97.912 ULCER OF RIGHT LOWER EXTREMITY WITH FAT LAYER EXPOSED (CMD): ICD-10-CM

## 2024-02-20 DIAGNOSIS — L02.419 CELLULITIS AND ABSCESS OF LEG: Primary | ICD-10-CM

## 2024-02-20 PROCEDURE — 99213 OFFICE O/P EST LOW 20 MIN: CPT

## 2024-02-20 ASSESSMENT — PAIN SCALES - GENERAL: PAINLEVEL_OUTOF10: 0

## 2024-03-12 ENCOUNTER — HOSPITAL ENCOUNTER (OUTPATIENT)
Dept: WOUND CARE | Age: 69
Discharge: STILL ADMITTED OR ADMINISTRATIVE | End: 2024-03-12
Attending: FAMILY MEDICINE

## 2024-03-12 VITALS — TEMPERATURE: 96.4 F

## 2024-03-12 DIAGNOSIS — L97.922 ULCER OF LEFT LOWER EXTREMITY WITH FAT LAYER EXPOSED (CMD): ICD-10-CM

## 2024-03-12 DIAGNOSIS — L03.115 CELLULITIS OF LEG, RIGHT: ICD-10-CM

## 2024-03-12 DIAGNOSIS — L97.912 ULCER OF RIGHT LOWER EXTREMITY WITH FAT LAYER EXPOSED (CMD): ICD-10-CM

## 2024-03-12 DIAGNOSIS — L97.912 ULCER OF RIGHT LOWER EXTREMITY WITH FAT LAYER EXPOSED (CMD): Primary | ICD-10-CM

## 2024-03-12 DIAGNOSIS — L03.119 CELLULITIS AND ABSCESS OF LEG: ICD-10-CM

## 2024-03-12 DIAGNOSIS — L02.419 CELLULITIS AND ABSCESS OF LEG: ICD-10-CM

## 2024-03-12 PROCEDURE — 99213 OFFICE O/P EST LOW 20 MIN: CPT

## 2024-03-12 ASSESSMENT — PAIN SCALES - GENERAL: PAINLEVEL_OUTOF10: 10

## 2024-03-19 ENCOUNTER — HOSPITAL ENCOUNTER (OUTPATIENT)
Dept: WOUND CARE | Age: 69
Discharge: STILL ADMITTED OR ADMINISTRATIVE | End: 2024-03-19
Attending: FAMILY MEDICINE

## 2024-03-19 VITALS — TEMPERATURE: 96.8 F

## 2024-03-19 DIAGNOSIS — L97.912 ULCER OF RIGHT LOWER EXTREMITY WITH FAT LAYER EXPOSED (CMD): Primary | ICD-10-CM

## 2024-03-19 DIAGNOSIS — L03.115 CELLULITIS OF LEG, RIGHT: ICD-10-CM

## 2024-03-19 DIAGNOSIS — S81.801D OPEN WOUND OF RIGHT LOWER LEG, SUBSEQUENT ENCOUNTER: ICD-10-CM

## 2024-03-19 PROCEDURE — 11042 DBRDMT SUBQ TIS 1ST 20SQCM/<: CPT

## 2024-03-19 ASSESSMENT — PAIN SCALES - GENERAL: PAINLEVEL_OUTOF10: 10

## 2024-03-26 ENCOUNTER — HOSPITAL ENCOUNTER (OUTPATIENT)
Dept: WOUND CARE | Age: 69
Discharge: STILL ADMITTED OR ADMINISTRATIVE | End: 2024-03-26
Attending: FAMILY MEDICINE

## 2024-03-26 VITALS — TEMPERATURE: 96.6 F

## 2024-03-26 DIAGNOSIS — L03.119 CELLULITIS AND ABSCESS OF LEG: Primary | ICD-10-CM

## 2024-03-26 DIAGNOSIS — L02.419 CELLULITIS AND ABSCESS OF LEG: Primary | ICD-10-CM

## 2024-03-26 DIAGNOSIS — L97.922 ULCER OF LEFT LOWER EXTREMITY WITH FAT LAYER EXPOSED (CMD): ICD-10-CM

## 2024-03-26 DIAGNOSIS — L97.912 ULCER OF RIGHT LOWER EXTREMITY WITH FAT LAYER EXPOSED (CMD): ICD-10-CM

## 2024-03-26 PROCEDURE — 11042 DBRDMT SUBQ TIS 1ST 20SQCM/<: CPT

## 2024-03-26 ASSESSMENT — PAIN SCALES - GENERAL: PAINLEVEL_OUTOF10: 0

## 2024-04-02 ENCOUNTER — HOSPITAL ENCOUNTER (OUTPATIENT)
Dept: WOUND CARE | Age: 69
Discharge: STILL ADMITTED OR ADMINISTRATIVE | End: 2024-04-02
Attending: FAMILY MEDICINE

## 2024-04-02 VITALS — TEMPERATURE: 96.3 F

## 2024-04-02 DIAGNOSIS — L03.119 CELLULITIS AND ABSCESS OF LEG: Primary | ICD-10-CM

## 2024-04-02 DIAGNOSIS — L02.419 CELLULITIS AND ABSCESS OF LEG: Primary | ICD-10-CM

## 2024-04-02 DIAGNOSIS — L97.912 ULCER OF RIGHT LOWER EXTREMITY WITH FAT LAYER EXPOSED (CMD): ICD-10-CM

## 2024-04-02 DIAGNOSIS — S41.101A OPEN WOUND OF RIGHT UPPER ARM, INITIAL ENCOUNTER: ICD-10-CM

## 2024-04-02 PROCEDURE — 11042 DBRDMT SUBQ TIS 1ST 20SQCM/<: CPT

## 2024-04-02 ASSESSMENT — PAIN SCALES - GENERAL: PAINLEVEL_OUTOF10: 7

## 2024-04-09 ENCOUNTER — HOSPITAL ENCOUNTER (OUTPATIENT)
Dept: WOUND CARE | Age: 69
Discharge: STILL ADMITTED OR ADMINISTRATIVE | End: 2024-04-09
Attending: FAMILY MEDICINE

## 2024-04-09 VITALS — TEMPERATURE: 97 F

## 2024-04-09 DIAGNOSIS — L03.119 CELLULITIS AND ABSCESS OF LEG: Primary | ICD-10-CM

## 2024-04-09 DIAGNOSIS — L97.922 ULCER OF LEFT LOWER EXTREMITY WITH FAT LAYER EXPOSED (CMD): ICD-10-CM

## 2024-04-09 DIAGNOSIS — L97.912 ULCER OF RIGHT LOWER EXTREMITY WITH FAT LAYER EXPOSED (CMD): ICD-10-CM

## 2024-04-09 DIAGNOSIS — L02.419 CELLULITIS AND ABSCESS OF LEG: Primary | ICD-10-CM

## 2024-04-09 PROCEDURE — 11042 DBRDMT SUBQ TIS 1ST 20SQCM/<: CPT

## 2024-04-09 ASSESSMENT — PAIN SCALES - GENERAL: PAINLEVEL_OUTOF10: 9

## 2024-04-16 ENCOUNTER — HOSPITAL ENCOUNTER (OUTPATIENT)
Dept: WOUND CARE | Age: 69
Discharge: STILL ADMITTED OR ADMINISTRATIVE | End: 2024-04-16
Attending: FAMILY MEDICINE

## 2024-04-16 VITALS — TEMPERATURE: 96.4 F

## 2024-04-16 DIAGNOSIS — L03.119 CELLULITIS AND ABSCESS OF LEG: Primary | ICD-10-CM

## 2024-04-16 DIAGNOSIS — L97.922 ULCER OF LEFT LOWER EXTREMITY WITH FAT LAYER EXPOSED (CMD): ICD-10-CM

## 2024-04-16 DIAGNOSIS — L02.419 CELLULITIS AND ABSCESS OF LEG: Primary | ICD-10-CM

## 2024-04-16 DIAGNOSIS — L97.912 ULCER OF RIGHT LOWER EXTREMITY WITH FAT LAYER EXPOSED (CMD): ICD-10-CM

## 2024-04-16 PROCEDURE — 11042 DBRDMT SUBQ TIS 1ST 20SQCM/<: CPT

## 2024-04-16 ASSESSMENT — PAIN SCALES - GENERAL: PAINLEVEL_OUTOF10: 0

## 2024-04-23 ENCOUNTER — HOSPITAL ENCOUNTER (OUTPATIENT)
Dept: WOUND CARE | Age: 69
Discharge: STILL ADMITTED OR ADMINISTRATIVE | End: 2024-04-23
Attending: FAMILY MEDICINE

## 2024-04-23 VITALS — TEMPERATURE: 96.4 F

## 2024-04-23 DIAGNOSIS — L97.922 ULCER OF LEFT LOWER EXTREMITY WITH FAT LAYER EXPOSED (CMD): ICD-10-CM

## 2024-04-23 DIAGNOSIS — L02.419 CELLULITIS AND ABSCESS OF LEG: Primary | ICD-10-CM

## 2024-04-23 DIAGNOSIS — I87.331 CHRONIC VENOUS HYPERTENSION (IDIOPATHIC) WITH ULCER AND INFLAMMATION OF RIGHT LOWER EXTREMITY (CMD): ICD-10-CM

## 2024-04-23 DIAGNOSIS — L97.912 ULCER OF RIGHT LOWER EXTREMITY WITH FAT LAYER EXPOSED (CMD): ICD-10-CM

## 2024-04-23 DIAGNOSIS — L03.119 CELLULITIS AND ABSCESS OF LEG: Primary | ICD-10-CM

## 2024-04-23 PROCEDURE — 99213 OFFICE O/P EST LOW 20 MIN: CPT

## 2024-04-23 ASSESSMENT — PAIN SCALES - GENERAL
PAINLEVEL_OUTOF10: 0
PAINLEVEL_OUTOF10: 0

## 2024-04-30 ENCOUNTER — HOSPITAL ENCOUNTER (OUTPATIENT)
Dept: WOUND CARE | Age: 69
Discharge: STILL ADMITTED OR ADMINISTRATIVE | End: 2024-04-30
Attending: FAMILY MEDICINE

## 2024-04-30 VITALS — TEMPERATURE: 96.4 F

## 2024-04-30 DIAGNOSIS — I87.331: Primary | ICD-10-CM

## 2024-04-30 DIAGNOSIS — L97.912 ULCER OF RIGHT LOWER EXTREMITY WITH FAT LAYER EXPOSED  (CMD): ICD-10-CM

## 2024-04-30 PROCEDURE — 99213 OFFICE O/P EST LOW 20 MIN: CPT

## 2024-04-30 ASSESSMENT — PAIN SCALES - GENERAL: PAINLEVEL_OUTOF10: 0

## 2024-05-14 ENCOUNTER — HOSPITAL ENCOUNTER (OUTPATIENT)
Dept: WOUND CARE | Age: 69
Discharge: STILL ADMITTED OR ADMINISTRATIVE | End: 2024-05-14
Attending: FAMILY MEDICINE

## 2024-05-14 VITALS — TEMPERATURE: 96.8 F

## 2024-05-14 DIAGNOSIS — L03.119 CELLULITIS AND ABSCESS OF LEG: ICD-10-CM

## 2024-05-14 DIAGNOSIS — L02.419 CELLULITIS AND ABSCESS OF LEG: ICD-10-CM

## 2024-05-14 DIAGNOSIS — L97.912 ULCER OF RIGHT LOWER EXTREMITY WITH FAT LAYER EXPOSED  (CMD): Primary | ICD-10-CM

## 2024-05-14 PROCEDURE — 99213 OFFICE O/P EST LOW 20 MIN: CPT

## 2024-05-14 ASSESSMENT — PAIN SCALES - GENERAL: PAINLEVEL_OUTOF10: 0

## 2024-05-21 ENCOUNTER — HOSPITAL ENCOUNTER (OUTPATIENT)
Dept: WOUND CARE | Age: 69
Discharge: STILL ADMITTED OR ADMINISTRATIVE | End: 2024-05-21
Attending: FAMILY MEDICINE

## 2024-05-21 DIAGNOSIS — L97.912 ULCER OF RIGHT LOWER EXTREMITY WITH FAT LAYER EXPOSED  (CMD): Primary | ICD-10-CM

## 2024-06-15 ENCOUNTER — HOSPITAL ENCOUNTER (OUTPATIENT)
Age: 69
Setting detail: OBSERVATION
DRG: 871 | End: 2024-06-15
Attending: EMERGENCY MEDICINE | Admitting: HOSPITALIST

## 2024-06-15 ENCOUNTER — APPOINTMENT (OUTPATIENT)
Dept: CT IMAGING | Age: 69
DRG: 871 | End: 2024-06-15
Attending: EMERGENCY MEDICINE

## 2024-06-15 ENCOUNTER — APPOINTMENT (OUTPATIENT)
Dept: GENERAL RADIOLOGY | Age: 69
DRG: 871 | End: 2024-06-15
Attending: EMERGENCY MEDICINE

## 2024-06-15 VITALS
TEMPERATURE: 97.7 F | HEART RATE: 88 BPM | BODY MASS INDEX: 20.81 KG/M2 | OXYGEN SATURATION: 97 % | SYSTOLIC BLOOD PRESSURE: 138 MMHG | HEIGHT: 61 IN | DIASTOLIC BLOOD PRESSURE: 75 MMHG | WEIGHT: 110.23 LBS | RESPIRATION RATE: 18 BRPM

## 2024-06-15 DIAGNOSIS — N39.0 ACUTE UTI: Primary | ICD-10-CM

## 2024-06-15 DIAGNOSIS — J18.9 COMMUNITY ACQUIRED PNEUMONIA OF RIGHT LOWER LOBE OF LUNG: ICD-10-CM

## 2024-06-15 LAB
ALBUMIN SERPL-MCNC: 2.6 G/DL (ref 3.6–5.1)
ALBUMIN/GLOB SERPL: 0.6 {RATIO} (ref 1–2.4)
ALP SERPL-CCNC: 131 UNITS/L (ref 45–117)
ALT SERPL-CCNC: 61 UNITS/L
ANION GAP SERPL CALC-SCNC: 10 MMOL/L (ref 7–19)
APPEARANCE UR: ABNORMAL
APTT PPP: 47 SEC (ref 22–32)
AST SERPL-CCNC: 39 UNITS/L
ATRIAL RATE (BPM): 88
B PARAPERT DNA SPEC QL NAA+PROBE: NOT DETECTED
B PERT.PT PRMT NPH QL NAA+NON-PROBE: NOT DETECTED
BACTERIA #/AREA URNS HPF: ABNORMAL /HPF
BACTERIA BLD CULT: NORMAL
BACTERIA BLD CULT: NORMAL
BASOPHILS # BLD: 0.1 K/MCL (ref 0–0.3)
BASOPHILS NFR BLD: 0 %
BILIRUB SERPL-MCNC: 0.5 MG/DL (ref 0.2–1)
BILIRUB UR QL STRIP: NEGATIVE
BUN SERPL-MCNC: 22 MG/DL (ref 6–20)
BUN/CREAT SERPL: 37 (ref 7–25)
C PNEUM DNA NPH QL NAA+NON-PROBE: NOT DETECTED
CALCIUM SERPL-MCNC: 10.2 MG/DL (ref 8.4–10.2)
CHLORIDE SERPL-SCNC: 107 MMOL/L (ref 97–110)
CO2 SERPL-SCNC: 23 MMOL/L (ref 21–32)
COLOR UR: YELLOW
CREAT SERPL-MCNC: 0.6 MG/DL (ref 0.51–0.95)
DEPRECATED RDW RBC: 47.5 FL (ref 39–50)
EGFRCR SERPLBLD CKD-EPI 2021: >90 ML/MIN/{1.73_M2}
EOSINOPHIL # BLD: 0 K/MCL (ref 0–0.5)
EOSINOPHIL NFR BLD: 0 %
ERYTHROCYTE [DISTWIDTH] IN BLOOD: 13.8 % (ref 11–15)
FASTING DURATION TIME PATIENT: ABNORMAL H
FLUAV RNA NPH QL NAA+NON-PROBE: NOT DETECTED
FLUAV RNA RESP QL NAA+PROBE: NOT DETECTED
FLUBV RNA NPH QL NAA+NON-PROBE: NOT DETECTED
FLUBV RNA RESP QL NAA+PROBE: NOT DETECTED
GLOBULIN SER-MCNC: 4.6 G/DL (ref 2–4)
GLUCOSE SERPL-MCNC: 100 MG/DL (ref 70–99)
GLUCOSE UR STRIP-MCNC: NEGATIVE MG/DL
HADV DNA NPH QL NAA+NON-PROBE: NOT DETECTED
HCOV 229E RNA NPH QL NAA+NON-PROBE: NOT DETECTED
HCOV HKU1 RNA NPH QL NAA+NON-PROBE: NOT DETECTED
HCOV NL63 RNA NPH QL NAA+NON-PROBE: NOT DETECTED
HCOV OC43 RNA NPH QL NAA+NON-PROBE: NOT DETECTED
HCT VFR BLD CALC: 36.3 % (ref 36–46.5)
HGB BLD-MCNC: 11.2 G/DL (ref 12–15.5)
HGB UR QL STRIP: NEGATIVE
HMPV RNA NPH QL NAA+NON-PROBE: NOT DETECTED
HPIV1 RNA NPH QL NAA+NON-PROBE: NOT DETECTED
HPIV2 RNA NPH QL NAA+NON-PROBE: NOT DETECTED
HPIV3 RNA NPH QL NAA+NON-PROBE: NOT DETECTED
HPIV4 RNA NPH QL NAA+NON-PROBE: NOT DETECTED
HYALINE CASTS #/AREA URNS LPF: ABNORMAL /LPF
IMM GRANULOCYTES # BLD AUTO: 0.3 K/MCL (ref 0–0.2)
IMM GRANULOCYTES # BLD: 1 %
INR PPP: 5.3
KETONES UR STRIP-MCNC: NEGATIVE MG/DL
L PNEUMO1 AG UR QL IA.RAPID: NORMAL
LACTATE BLDV-SCNC: 0.9 MMOL/L (ref 0–2)
LACTATE BLDV-SCNC: 2.4 MMOL/L (ref 0–2)
LEUKOCYTE ESTERASE UR QL STRIP: ABNORMAL
LYMPHOCYTES # BLD: 0.3 K/MCL (ref 1–4)
LYMPHOCYTES NFR BLD: 1 %
M PNEUMO DNA NPH QL NAA+NON-PROBE: NOT DETECTED
MCH RBC QN AUTO: 29.2 PG (ref 26–34)
MCHC RBC AUTO-ENTMCNC: 30.9 G/DL (ref 32–36.5)
MCV RBC AUTO: 94.5 FL (ref 78–100)
MONOCYTES # BLD: 1.8 K/MCL (ref 0.3–0.9)
MONOCYTES NFR BLD: 10 %
NEUTROPHILS # BLD: 15.8 K/MCL (ref 1.8–7.7)
NEUTROPHILS NFR BLD: 88 %
NITRITE UR QL STRIP: NEGATIVE
NRBC BLD MANUAL-RTO: 0 /100 WBC
P AXIS (DEGREES): 22
PH UR STRIP: 7 [PH] (ref 5–7)
PLATELET # BLD AUTO: 299 K/MCL (ref 140–450)
POTASSIUM SERPL-SCNC: 4.9 MMOL/L (ref 3.4–5.1)
PR-INTERVAL (MSEC): 110
PROCALCITONIN SERPL IA-MCNC: 2.37 NG/ML
PROT SERPL-MCNC: 7.2 G/DL (ref 6.4–8.2)
PROT UR STRIP-MCNC: 30 MG/DL
PROTHROMBIN TIME: 51 SEC (ref 9.7–11.8)
QRS-INTERVAL (MSEC): 80
QT-INTERVAL (MSEC): 312
QTC: 377
R AXIS (DEGREES): 24
RAINBOW EXTRA TUBES HOLD SPECIMEN: NORMAL
RBC # BLD: 3.84 MIL/MCL (ref 4–5.2)
RBC #/AREA URNS HPF: ABNORMAL /HPF
REPORT TEXT: NORMAL
RSV AG NPH QL IA.RAPID: NOT DETECTED
RSV RNA NPH QL NAA+NON-PROBE: NOT DETECTED
RV+EV RNA NPH QL NAA+NON-PROBE: NOT DETECTED
SARS-COV-2 RNA RESP QL NAA+PROBE: NOT DETECTED
SARS-COV-2 RNA RESP QL NAA+PROBE: NOT DETECTED
SERVICE CMNT-IMP: NORMAL
SERVICE CMNT-IMP: NORMAL
SODIUM SERPL-SCNC: 135 MMOL/L (ref 135–145)
SP GR UR STRIP: 1.02 (ref 1–1.03)
SQUAMOUS #/AREA URNS HPF: ABNORMAL /HPF
T AXIS (DEGREES): 13
UROBILINOGEN UR STRIP-MCNC: 2 MG/DL
VENTRICULAR RATE EKG/MIN (BPM): 88
WBC # BLD: 18.2 K/MCL (ref 4.2–11)
WBC #/AREA URNS HPF: ABNORMAL /HPF

## 2024-06-15 PROCEDURE — 93010 ELECTROCARDIOGRAM REPORT: CPT | Performed by: INTERNAL MEDICINE

## 2024-06-15 PROCEDURE — 84145 PROCALCITONIN (PCT): CPT | Performed by: EMERGENCY MEDICINE

## 2024-06-15 PROCEDURE — G0378 HOSPITAL OBSERVATION PER HR: HCPCS

## 2024-06-15 PROCEDURE — 81001 URINALYSIS AUTO W/SCOPE: CPT | Performed by: EMERGENCY MEDICINE

## 2024-06-15 PROCEDURE — 10004651 HB RX, NO CHARGE ITEM: Performed by: EMERGENCY MEDICINE

## 2024-06-15 PROCEDURE — 80053 COMPREHEN METABOLIC PANEL: CPT | Performed by: EMERGENCY MEDICINE

## 2024-06-15 PROCEDURE — 83605 ASSAY OF LACTIC ACID: CPT | Performed by: EMERGENCY MEDICINE

## 2024-06-15 PROCEDURE — 0202U NFCT DS 22 TRGT SARS-COV-2: CPT | Performed by: EMERGENCY MEDICINE

## 2024-06-15 PROCEDURE — 99291 CRITICAL CARE FIRST HOUR: CPT

## 2024-06-15 PROCEDURE — 10002807 HB RX 258: Performed by: HOSPITALIST

## 2024-06-15 PROCEDURE — 87899 AGENT NOS ASSAY W/OPTIC: CPT | Performed by: EMERGENCY MEDICINE

## 2024-06-15 PROCEDURE — 85025 COMPLETE CBC W/AUTO DIFF WBC: CPT | Performed by: EMERGENCY MEDICINE

## 2024-06-15 PROCEDURE — 0241U COVID/FLU/RSV PANEL: CPT | Performed by: EMERGENCY MEDICINE

## 2024-06-15 PROCEDURE — 10002807 HB RX 258: Performed by: EMERGENCY MEDICINE

## 2024-06-15 PROCEDURE — 85610 PROTHROMBIN TIME: CPT | Performed by: EMERGENCY MEDICINE

## 2024-06-15 PROCEDURE — 96365 THER/PROPH/DIAG IV INF INIT: CPT

## 2024-06-15 PROCEDURE — 70450 CT HEAD/BRAIN W/O DYE: CPT

## 2024-06-15 PROCEDURE — 96375 TX/PRO/DX INJ NEW DRUG ADDON: CPT

## 2024-06-15 PROCEDURE — 85730 THROMBOPLASTIN TIME PARTIAL: CPT | Performed by: EMERGENCY MEDICINE

## 2024-06-15 PROCEDURE — 36415 COLL VENOUS BLD VENIPUNCTURE: CPT

## 2024-06-15 PROCEDURE — 10002800 HB RX 250 W HCPCS: Performed by: EMERGENCY MEDICINE

## 2024-06-15 PROCEDURE — 71045 X-RAY EXAM CHEST 1 VIEW: CPT

## 2024-06-15 PROCEDURE — 93005 ELECTROCARDIOGRAM TRACING: CPT | Performed by: EMERGENCY MEDICINE

## 2024-06-15 PROCEDURE — 87077 CULTURE AEROBIC IDENTIFY: CPT | Performed by: EMERGENCY MEDICINE

## 2024-06-15 PROCEDURE — 87040 BLOOD CULTURE FOR BACTERIA: CPT | Performed by: EMERGENCY MEDICINE

## 2024-06-15 RX ORDER — LANOLIN ALCOHOL/MO/W.PET/CERES
3 CREAM (GRAM) TOPICAL NIGHTLY PRN
Status: DISCONTINUED | OUTPATIENT
Start: 2024-06-15 | End: 2024-06-19 | Stop reason: HOSPADM

## 2024-06-15 RX ORDER — AZITHROMYCIN 250 MG/1
500 TABLET, FILM COATED ORAL EVERY 24 HOURS
Status: DISPENSED | OUTPATIENT
Start: 2024-06-16 | End: 2024-06-18

## 2024-06-15 RX ORDER — POLYETHYLENE GLYCOL 3350 17 G/17G
17 POWDER, FOR SOLUTION ORAL DAILY PRN
Status: DISCONTINUED | OUTPATIENT
Start: 2024-06-15 | End: 2024-06-19 | Stop reason: HOSPADM

## 2024-06-15 RX ORDER — TRAMADOL HYDROCHLORIDE 50 MG/1
50 TABLET ORAL EVERY 6 HOURS PRN
Status: DISCONTINUED | OUTPATIENT
Start: 2024-06-15 | End: 2024-06-19 | Stop reason: HOSPADM

## 2024-06-15 RX ORDER — ACETAMINOPHEN 325 MG/1
650 TABLET ORAL EVERY 4 HOURS PRN
Status: DISCONTINUED | OUTPATIENT
Start: 2024-06-15 | End: 2024-06-19 | Stop reason: HOSPADM

## 2024-06-15 RX ORDER — 0.9 % SODIUM CHLORIDE 0.9 %
2 VIAL (ML) INJECTION EVERY 12 HOURS SCHEDULED
Status: DISCONTINUED | OUTPATIENT
Start: 2024-06-15 | End: 2024-06-19 | Stop reason: HOSPADM

## 2024-06-15 RX ORDER — ACETAMINOPHEN 650 MG/1
650 SUPPOSITORY RECTAL EVERY 4 HOURS PRN
Status: DISCONTINUED | OUTPATIENT
Start: 2024-06-15 | End: 2024-06-19 | Stop reason: HOSPADM

## 2024-06-15 RX ORDER — CEFAZOLIN SODIUM/WATER 2 G/20 ML
2000 SYRINGE (ML) INTRAVENOUS EVERY 24 HOURS
Status: DISCONTINUED | OUTPATIENT
Start: 2024-06-16 | End: 2024-06-18

## 2024-06-15 RX ORDER — METHYLPREDNISOLONE 4 MG/1
2 TABLET ORAL EVERY EVENING
COMMUNITY

## 2024-06-15 RX ORDER — IBUPROFEN 400 MG/1
800 TABLET ORAL ONCE
Status: DISCONTINUED | OUTPATIENT
Start: 2024-06-15 | End: 2024-06-19 | Stop reason: HOSPADM

## 2024-06-15 RX ORDER — SODIUM CHLORIDE 9 MG/ML
INJECTION, SOLUTION INTRAVENOUS CONTINUOUS
Status: DISCONTINUED | OUTPATIENT
Start: 2024-06-15 | End: 2024-06-17

## 2024-06-15 RX ORDER — TOPIRAMATE 50 MG/1
50 TABLET, FILM COATED ORAL 2 TIMES DAILY
COMMUNITY
Start: 2024-06-21

## 2024-06-15 RX ORDER — FAMOTIDINE 40 MG/1
40 TABLET, FILM COATED ORAL DAILY
COMMUNITY

## 2024-06-15 RX ORDER — ACETAMINOPHEN 500 MG
1000 TABLET ORAL ONCE
Status: COMPLETED | OUTPATIENT
Start: 2024-06-15 | End: 2024-06-15

## 2024-06-15 RX ORDER — CEFAZOLIN SODIUM/WATER 2 G/20 ML
2000 SYRINGE (ML) INTRAVENOUS ONCE
Status: COMPLETED | OUTPATIENT
Start: 2024-06-15 | End: 2024-06-15

## 2024-06-15 RX ADMIN — SODIUM CHLORIDE: 9 INJECTION, SOLUTION INTRAVENOUS at 17:42

## 2024-06-15 RX ADMIN — CEFTRIAXONE SODIUM 2000 MG: 10 INJECTION, POWDER, FOR SOLUTION INTRAVENOUS at 15:15

## 2024-06-15 RX ADMIN — ACETAMINOPHEN 1000 MG: 500 TABLET ORAL at 13:24

## 2024-06-15 RX ADMIN — AZITHROMYCIN 500 MG: 500 INJECTION, POWDER, LYOPHILIZED, FOR SOLUTION INTRAVENOUS at 15:23

## 2024-06-15 SDOH — ECONOMIC STABILITY: GENERAL
IN THE PAST YEAR, HAVE YOU OR ANY FAMILY MEMBERS YOU LIVE WITH BEEN UNABLE TO GET ANY OF THE FOLLOWING WHEN IT WAS REALLY NEEDED? CHECK ALL THAT APPLY.: MEDICINE OR ANY HEALTH CARE (MEDICAL, DENTAL, MENTAL HEALTH, VISION)

## 2024-06-15 SDOH — HEALTH STABILITY: PHYSICAL HEALTH: DO YOU HAVE DIFFICULTY DRESSING OR BATHING?: NO

## 2024-06-15 SDOH — ECONOMIC STABILITY: FOOD INSECURITY: WITHIN THE PAST 12 MONTHS, THE FOOD YOU BOUGHT JUST DIDN'T LAST AND YOU DIDN'T HAVE MONEY TO GET MORE.: NEVER TRUE

## 2024-06-15 SDOH — SOCIAL STABILITY: SOCIAL INSECURITY: HOW OFTEN DOES ANYONE, INCLUDING FAMILY AND FRIENDS, PHYSICALLY HURT YOU?: NEVER

## 2024-06-15 SDOH — ECONOMIC STABILITY: HOUSING INSECURITY: WHAT IS YOUR LIVING SITUATION TODAY?: I HAVE A STEADY PLACE TO LIVE

## 2024-06-15 SDOH — ECONOMIC STABILITY: INCOME INSECURITY: IN THE PAST 12 MONTHS, HAS THE ELECTRIC, GAS, OIL, OR WATER COMPANY THREATENED TO SHUT OFF SERVICE IN YOUR HOME?: NO

## 2024-06-15 SDOH — ECONOMIC STABILITY: GENERAL: WOULD YOU LIKE HELP WITH ANY OF THE FOLLOWING NEEDS?: I DON'T WANT HELP WITH ANY OF THESE

## 2024-06-15 SDOH — SOCIAL STABILITY: SOCIAL NETWORK: SUPPORT SYSTEMS: SPOUSE

## 2024-06-15 SDOH — SOCIAL STABILITY: SOCIAL INSECURITY: HOW OFTEN DOES ANYONE, INCLUDING FAMILY AND FRIENDS, THREATEN YOU WITH HARM?: NEVER

## 2024-06-15 SDOH — ECONOMIC STABILITY: HOUSING INSECURITY: WHAT IS YOUR LIVING SITUATION TODAY?: SPOUSE

## 2024-06-15 SDOH — HEALTH STABILITY: PHYSICAL HEALTH: DO YOU HAVE SERIOUS DIFFICULTY WALKING OR CLIMBING STAIRS?: YES

## 2024-06-15 SDOH — HEALTH STABILITY: GENERAL: BECAUSE OF A PHYSICAL, MENTAL, OR EMOTIONAL CONDITION, DO YOU HAVE DIFFICULTY DOING ERRANDS ALONE?: NO

## 2024-06-15 SDOH — ECONOMIC STABILITY: HOUSING INSECURITY: DO YOU HAVE PROBLEMS WITH ANY OF THE FOLLOWING?: NONE OF THE ABOVE

## 2024-06-15 SDOH — SOCIAL STABILITY: SOCIAL INSECURITY: HOW OFTEN DOES ANYONE, INCLUDING FAMILY AND FRIENDS, SCREAM OR CURSE AT YOU?: NEVER

## 2024-06-15 SDOH — ECONOMIC STABILITY: HOUSING INSECURITY: WHAT IS YOUR LIVING SITUATION TODAY?: HOUSE

## 2024-06-15 SDOH — SOCIAL STABILITY: SOCIAL INSECURITY: HOW OFTEN DOES ANYONE, INCLUDING FAMILY AND FRIENDS, INSULT OR TALK DOWN TO YOU?: NEVER

## 2024-06-15 ASSESSMENT — ENCOUNTER SYMPTOMS
NUMBNESS: 0
COUGH: 1
FATIGUE: 0
CHILLS: 1
SHORTNESS OF BREATH: 1
NAUSEA: 1
HALLUCINATIONS: 0
VOMITING: 1
SORE THROAT: 0
FEVER: 1
WEAKNESS: 0
ADENOPATHY: 0
HEADACHES: 0
FATIGUE: 1
DIARRHEA: 0
SINUS PRESSURE: 0
RHINORRHEA: 0
WHEEZING: 0
ABDOMINAL PAIN: 0
PHOTOPHOBIA: 0

## 2024-06-15 ASSESSMENT — LIFESTYLE VARIABLES
HOW OFTEN DO YOU HAVE A DRINK CONTAINING ALCOHOL: NEVER
HOW OFTEN DO YOU HAVE 6 OR MORE DRINKS ON ONE OCCASION: NEVER
AUDIT-C TOTAL SCORE: 0
ALCOHOL_USE_STATUS: NO OR LOW RISK WITH VALIDATED TOOL
HOW MANY STANDARD DRINKS CONTAINING ALCOHOL DO YOU HAVE ON A TYPICAL DAY: 0,1 OR 2

## 2024-06-15 ASSESSMENT — ORIENTATION MEMORY CONCENTRATION TEST (OMCT)
WHAT YEAR IS IT NOW (MUST BE EXACT): CORRECT
OMCT INTERPRETATION: 0-6: NO SIGNIFICANT IMPAIRMENT
WHAT MONTH IS IT NOW: CORRECT
REPEAT THE NAME AND ADDRESS I ASKED YOU TO REMEMBER: CORRECT
OMCT SCORE: 0
SAY THE MONTHS IN REVERSE ORDER STARTING WITH LAST MONTH: CORRECT
WHAT TIME IS IT (NO WATCH OR CLOCK): CORRECT
COUNT BACKWARDS FROM 20 TO 1: CORRECT

## 2024-06-15 ASSESSMENT — PAIN SCALES - GENERAL
PAINLEVEL_OUTOF10: 0
PAINLEVEL_OUTOF10: 9

## 2024-06-15 ASSESSMENT — ACTIVITIES OF DAILY LIVING (ADL)
ADL_SHORT_OF_BREATH: NO
RECENT_DECLINE_ADL: YES, ACUTE ILLNESS WITHOUT THERAPY NEEDS
ADL_BEFORE_ADMISSION: INDEPENDENT
ADL_SCORE: 12

## 2024-06-15 ASSESSMENT — PATIENT HEALTH QUESTIONNAIRE - PHQ9
IS PATIENT ABLE TO COMPLETE PHQ2 OR PHQ9: YES
CLINICAL INTERPRETATION OF PHQ2 SCORE: NO FURTHER SCREENING NEEDED
SUM OF ALL RESPONSES TO PHQ9 QUESTIONS 1 AND 2: 0
2. FEELING DOWN, DEPRESSED OR HOPELESS: NOT AT ALL
1. LITTLE INTEREST OR PLEASURE IN DOING THINGS: NOT AT ALL
SUM OF ALL RESPONSES TO PHQ9 QUESTIONS 1 AND 2: 0

## 2024-06-16 LAB
ANION GAP SERPL CALC-SCNC: 11 MMOL/L (ref 7–19)
BUN SERPL-MCNC: 17 MG/DL (ref 6–20)
BUN/CREAT SERPL: 34 (ref 7–25)
C DIFF TOX B TCDB STL QL NAA+PROBE: NOT DETECTED
CALCIUM SERPL-MCNC: 9.4 MG/DL (ref 8.4–10.2)
CHLORIDE SERPL-SCNC: 110 MMOL/L (ref 97–110)
CO2 SERPL-SCNC: 20 MMOL/L (ref 21–32)
CREAT SERPL-MCNC: 0.5 MG/DL (ref 0.51–0.95)
DEPRECATED RDW RBC: 47.6 FL (ref 39–50)
EGFRCR SERPLBLD CKD-EPI 2021: >90 ML/MIN/{1.73_M2}
ERYTHROCYTE [DISTWIDTH] IN BLOOD: 13.8 % (ref 11–15)
FASTING DURATION TIME PATIENT: ABNORMAL H
GLUCOSE SERPL-MCNC: 94 MG/DL (ref 70–99)
HCT VFR BLD CALC: 33.2 % (ref 36–46.5)
HGB BLD-MCNC: 10.4 G/DL (ref 12–15.5)
INR PPP: 4.8
MCH RBC QN AUTO: 29.1 PG (ref 26–34)
MCHC RBC AUTO-ENTMCNC: 31.3 G/DL (ref 32–36.5)
MCV RBC AUTO: 92.7 FL (ref 78–100)
NRBC BLD MANUAL-RTO: 0 /100 WBC
PLATELET # BLD AUTO: 293 K/MCL (ref 140–450)
POTASSIUM SERPL-SCNC: 3 MMOL/L (ref 3.4–5.1)
POTASSIUM SERPL-SCNC: 3.4 MMOL/L (ref 3.4–5.1)
PROTHROMBIN TIME: 46.6 SEC (ref 9.7–11.8)
RBC # BLD: 3.58 MIL/MCL (ref 4–5.2)
SODIUM SERPL-SCNC: 138 MMOL/L (ref 135–145)
WBC # BLD: 20.7 K/MCL (ref 4.2–11)

## 2024-06-16 PROCEDURE — 10002803 HB RX 637: Performed by: HOSPITALIST

## 2024-06-16 PROCEDURE — 10002800 HB RX 250 W HCPCS: Performed by: HOSPITALIST

## 2024-06-16 PROCEDURE — 87493 C DIFF AMPLIFIED PROBE: CPT | Performed by: HOSPITALIST

## 2024-06-16 PROCEDURE — G0378 HOSPITAL OBSERVATION PER HR: HCPCS

## 2024-06-16 PROCEDURE — 85027 COMPLETE CBC AUTOMATED: CPT | Performed by: HOSPITALIST

## 2024-06-16 PROCEDURE — 84132 ASSAY OF SERUM POTASSIUM: CPT | Performed by: HOSPITALIST

## 2024-06-16 PROCEDURE — 36415 COLL VENOUS BLD VENIPUNCTURE: CPT | Performed by: HOSPITALIST

## 2024-06-16 PROCEDURE — 10004651 HB RX, NO CHARGE ITEM: Performed by: EMERGENCY MEDICINE

## 2024-06-16 PROCEDURE — 87507 IADNA-DNA/RNA PROBE TQ 12-25: CPT | Performed by: HOSPITALIST

## 2024-06-16 PROCEDURE — 80048 BASIC METABOLIC PNL TOTAL CA: CPT | Performed by: HOSPITALIST

## 2024-06-16 PROCEDURE — 10006031 HB ROOM CHARGE TELEMETRY

## 2024-06-16 PROCEDURE — 10002801 HB RX 250 W/O HCPCS: Performed by: HOSPITALIST

## 2024-06-16 PROCEDURE — 85610 PROTHROMBIN TIME: CPT | Performed by: HOSPITALIST

## 2024-06-16 PROCEDURE — 10002807 HB RX 258: Performed by: HOSPITALIST

## 2024-06-16 RX ORDER — POTASSIUM CHLORIDE 14.9 MG/ML
20 INJECTION INTRAVENOUS ONCE
Status: COMPLETED | OUTPATIENT
Start: 2024-06-16 | End: 2024-06-16

## 2024-06-16 RX ORDER — MORPHINE SULFATE 15 MG/1
15 TABLET, FILM COATED, EXTENDED RELEASE ORAL DAILY PRN
Status: DISCONTINUED | OUTPATIENT
Start: 2024-06-16 | End: 2024-06-19 | Stop reason: HOSPADM

## 2024-06-16 RX ORDER — PILOCARPINE HYDROCHLORIDE 5 MG/1
5 TABLET, FILM COATED ORAL 3 TIMES DAILY
Status: DISCONTINUED | OUTPATIENT
Start: 2024-06-16 | End: 2024-06-19 | Stop reason: HOSPADM

## 2024-06-16 RX ORDER — FAMOTIDINE 20 MG/1
40 TABLET, FILM COATED ORAL DAILY
Status: DISCONTINUED | OUTPATIENT
Start: 2024-06-16 | End: 2024-06-19 | Stop reason: HOSPADM

## 2024-06-16 RX ORDER — PRAVASTATIN SODIUM 10 MG
10 TABLET ORAL NIGHTLY
Status: DISCONTINUED | OUTPATIENT
Start: 2024-06-16 | End: 2024-06-19 | Stop reason: HOSPADM

## 2024-06-16 RX ORDER — POTASSIUM CHLORIDE 20 MEQ/1
40 TABLET, EXTENDED RELEASE ORAL ONCE
Status: DISCONTINUED | OUTPATIENT
Start: 2024-06-16 | End: 2024-06-16

## 2024-06-16 RX ORDER — METOCLOPRAMIDE HYDROCHLORIDE 5 MG/ML
5 INJECTION INTRAMUSCULAR; INTRAVENOUS EVERY 6 HOURS PRN
Status: DISCONTINUED | OUTPATIENT
Start: 2024-06-16 | End: 2024-06-19 | Stop reason: HOSPADM

## 2024-06-16 RX ORDER — METHYLPREDNISOLONE 4 MG/1
4 TABLET ORAL DAILY
Status: DISCONTINUED | OUTPATIENT
Start: 2024-06-16 | End: 2024-06-19 | Stop reason: HOSPADM

## 2024-06-16 RX ORDER — TOLTERODINE 4 MG/1
4 CAPSULE, EXTENDED RELEASE ORAL DAILY
Status: DISCONTINUED | OUTPATIENT
Start: 2024-06-16 | End: 2024-06-19 | Stop reason: HOSPADM

## 2024-06-16 RX ORDER — GABAPENTIN 300 MG/1
1200 CAPSULE ORAL NIGHTLY
Status: DISCONTINUED | OUTPATIENT
Start: 2024-06-16 | End: 2024-06-19 | Stop reason: HOSPADM

## 2024-06-16 RX ORDER — TOPIRAMATE 100 MG/1
100 TABLET, FILM COATED ORAL DAILY
Status: DISCONTINUED | OUTPATIENT
Start: 2024-06-16 | End: 2024-06-19 | Stop reason: HOSPADM

## 2024-06-16 RX ORDER — PANTOPRAZOLE SODIUM 40 MG/1
40 TABLET, DELAYED RELEASE ORAL DAILY
Status: DISCONTINUED | OUTPATIENT
Start: 2024-06-16 | End: 2024-06-19 | Stop reason: HOSPADM

## 2024-06-16 RX ORDER — MORPHINE SULFATE 20 MG/1
20 CAPSULE, EXTENDED RELEASE ORAL
Status: DISCONTINUED | OUTPATIENT
Start: 2024-06-16 | End: 2024-06-16 | Stop reason: CLARIF

## 2024-06-16 RX ORDER — LOPERAMIDE HYDROCHLORIDE 2 MG/1
2 CAPSULE ORAL PRN
Status: DISCONTINUED | OUTPATIENT
Start: 2024-06-16 | End: 2024-06-19 | Stop reason: HOSPADM

## 2024-06-16 RX ORDER — MONTELUKAST SODIUM 10 MG/1
10 TABLET ORAL NIGHTLY
Status: DISCONTINUED | OUTPATIENT
Start: 2024-06-16 | End: 2024-06-19 | Stop reason: HOSPADM

## 2024-06-16 RX ORDER — BUDESONIDE 3 MG/1
3 CAPSULE, COATED PELLETS ORAL DAILY
Status: DISCONTINUED | OUTPATIENT
Start: 2024-06-16 | End: 2024-06-19 | Stop reason: HOSPADM

## 2024-06-16 RX ORDER — MYCOPHENOLATE MOFETIL 500 MG/1
1000 TABLET ORAL 2 TIMES DAILY
Status: DISCONTINUED | OUTPATIENT
Start: 2024-06-16 | End: 2024-06-19 | Stop reason: HOSPADM

## 2024-06-16 RX ORDER — NYSTATIN 100000 [USP'U]/G
1 POWDER TOPICAL DAILY
Status: DISCONTINUED | OUTPATIENT
Start: 2024-06-16 | End: 2024-06-19 | Stop reason: HOSPADM

## 2024-06-16 RX ORDER — FLUTICASONE FUROATE AND VILANTEROL 100; 25 UG/1; UG/1
1 POWDER RESPIRATORY (INHALATION)
Status: DISCONTINUED | OUTPATIENT
Start: 2024-06-16 | End: 2024-06-19 | Stop reason: HOSPADM

## 2024-06-16 RX ORDER — CYCLOSPORINE 0.5 MG/ML
1 EMULSION OPHTHALMIC 2 TIMES DAILY
Status: DISCONTINUED | OUTPATIENT
Start: 2024-06-16 | End: 2024-06-19 | Stop reason: HOSPADM

## 2024-06-16 RX ORDER — METOPROLOL SUCCINATE 50 MG/1
200 TABLET, EXTENDED RELEASE ORAL DAILY
Status: DISCONTINUED | OUTPATIENT
Start: 2024-06-16 | End: 2024-06-19 | Stop reason: HOSPADM

## 2024-06-16 RX ORDER — ONDANSETRON 2 MG/ML
4 INJECTION INTRAMUSCULAR; INTRAVENOUS EVERY 6 HOURS PRN
Status: DISCONTINUED | OUTPATIENT
Start: 2024-06-16 | End: 2024-06-19 | Stop reason: HOSPADM

## 2024-06-16 RX ORDER — TOPIRAMATE 25 MG/1
25 TABLET ORAL NIGHTLY
Status: DISCONTINUED | OUTPATIENT
Start: 2024-06-16 | End: 2024-06-19 | Stop reason: HOSPADM

## 2024-06-16 RX ORDER — GABAPENTIN 300 MG/1
900 CAPSULE ORAL 2 TIMES DAILY
Status: DISCONTINUED | OUTPATIENT
Start: 2024-06-16 | End: 2024-06-19 | Stop reason: HOSPADM

## 2024-06-16 RX ORDER — BACLOFEN 10 MG/1
10 TABLET ORAL 3 TIMES DAILY
Status: DISCONTINUED | OUTPATIENT
Start: 2024-06-16 | End: 2024-06-19 | Stop reason: HOSPADM

## 2024-06-16 RX ADMIN — POTASSIUM CHLORIDE 20 MEQ: 14.9 INJECTION, SOLUTION INTRAVENOUS at 18:06

## 2024-06-16 RX ADMIN — POTASSIUM CHLORIDE 20 MEQ: 14.9 INJECTION, SOLUTION INTRAVENOUS at 12:03

## 2024-06-16 RX ADMIN — ONDANSETRON 4 MG: 2 INJECTION INTRAMUSCULAR; INTRAVENOUS at 02:45

## 2024-06-16 RX ADMIN — HYDROCORTISONE SODIUM SUCCINATE 50 MG: 100 INJECTION, POWDER, FOR SOLUTION INTRAMUSCULAR; INTRAVENOUS at 18:00

## 2024-06-16 RX ADMIN — MYCOPHENOLATE MOFETIL 1000 MG: 500 TABLET, FILM COATED ORAL at 20:55

## 2024-06-16 RX ADMIN — ONDANSETRON 4 MG: 2 INJECTION INTRAMUSCULAR; INTRAVENOUS at 13:44

## 2024-06-16 RX ADMIN — SODIUM CHLORIDE: 9 INJECTION, SOLUTION INTRAVENOUS at 16:44

## 2024-06-16 RX ADMIN — BACLOFEN 10 MG: 10 TABLET ORAL at 20:55

## 2024-06-16 RX ADMIN — LOPERAMIDE HYDROCHLORIDE 2 MG: 2 CAPSULE ORAL at 16:56

## 2024-06-16 RX ADMIN — SODIUM CHLORIDE, PRESERVATIVE FREE 2 ML: 5 INJECTION INTRAVENOUS at 08:34

## 2024-06-16 RX ADMIN — PILOCARPINE HYDROCHLORIDE 5 MG: 5 TABLET, FILM COATED ORAL at 20:55

## 2024-06-16 RX ADMIN — POTASSIUM CHLORIDE 20 MEQ: 14.9 INJECTION, SOLUTION INTRAVENOUS at 09:20

## 2024-06-16 RX ADMIN — GABAPENTIN 1200 MG: 300 CAPSULE ORAL at 20:55

## 2024-06-16 RX ADMIN — PRAVASTATIN SODIUM 10 MG: 10 TABLET ORAL at 21:53

## 2024-06-16 RX ADMIN — TOPIRAMATE 25 MG: 25 TABLET ORAL at 21:52

## 2024-06-16 RX ADMIN — CEFTRIAXONE SODIUM 2000 MG: 10 INJECTION, POWDER, FOR SOLUTION INTRAVENOUS at 09:14

## 2024-06-16 RX ADMIN — MONTELUKAST SODIUM 10 MG: 10 TABLET, FILM COATED ORAL at 20:55

## 2024-06-16 RX ADMIN — METOCLOPRAMIDE HYDROCHLORIDE 5 MG: 5 INJECTION INTRAMUSCULAR; INTRAVENOUS at 16:49

## 2024-06-16 RX ADMIN — POTASSIUM CHLORIDE 20 MEQ: 14.9 INJECTION, SOLUTION INTRAVENOUS at 21:52

## 2024-06-16 ASSESSMENT — PAIN SCALES - GENERAL
PAINLEVEL_OUTOF10: 0
PAINLEVEL_OUTOF10: 0

## 2024-06-17 LAB
ADV 40+41 FIB PROT STL QL NAA+PROBE: NOT DETECTED
ANION GAP SERPL CALC-SCNC: 10 MMOL/L (ref 7–19)
ASTRO TYP 1-8 RNA STL QL NAA+NON-PROBE: NOT DETECTED
BUN SERPL-MCNC: 22 MG/DL (ref 6–20)
BUN/CREAT SERPL: 55 (ref 7–25)
C CAYETANENSIS DNA STL QL NAA+NON-PROBE: NOT DETECTED
C COLI+JEJ+LAR 16S RRNA STL QL NAA+PROBE: NOT DETECTED
C PARVUM+HOMINIS COWP STL QL NAA+PROBE: NOT DETECTED
CALCIUM SERPL-MCNC: 9 MG/DL (ref 8.4–10.2)
CHLORIDE SERPL-SCNC: 116 MMOL/L (ref 97–110)
CO2 SERPL-SCNC: 18 MMOL/L (ref 21–32)
CREAT SERPL-MCNC: 0.4 MG/DL (ref 0.51–0.95)
DEPRECATED RDW RBC: 49.1 FL (ref 39–50)
E HISTOLYTICA 18S RRNA STL QL NAA+PROBE: NOT DETECTED
EAEC PAA PLAS AGGR+AATA ST NAA+NON-PRB: NOT DETECTED
EC STX1+STX2 GENES STL QL NAA+NON-PROBE: NOT DETECTED
EGFRCR SERPLBLD CKD-EPI 2021: >90 ML/MIN/{1.73_M2}
EPEC EAE GENE STL QL NAA+NON-PROBE: NOT DETECTED
ERYTHROCYTE [DISTWIDTH] IN BLOOD: 14 % (ref 11–15)
ETEC ELTA+ESTB GENES STL QL NAA+PROBE: NOT DETECTED
FASTING DURATION TIME PATIENT: ABNORMAL H
G LAMBLIA 18S RRNA STL QL NAA+PROBE: NOT DETECTED
GLUCOSE SERPL-MCNC: 85 MG/DL (ref 70–99)
HCT VFR BLD CALC: 35 % (ref 36–46.5)
HGB BLD-MCNC: 10.6 G/DL (ref 12–15.5)
INR PPP: 5.3
MCH RBC QN AUTO: 28.6 PG (ref 26–34)
MCHC RBC AUTO-ENTMCNC: 30.3 G/DL (ref 32–36.5)
MCV RBC AUTO: 94.6 FL (ref 78–100)
NOROVIRUS GI+II RNA STL QL NAA+NON-PROBE: NOT DETECTED
NRBC BLD MANUAL-RTO: 0 /100 WBC
P SHIGELLOIDES DNA STL QL NAA+NON-PROBE: NOT DETECTED
PLATELET # BLD AUTO: 332 K/MCL (ref 140–450)
POTASSIUM SERPL-SCNC: 3.3 MMOL/L (ref 3.4–5.1)
POTASSIUM SERPL-SCNC: 3.9 MMOL/L (ref 3.4–5.1)
PROTHROMBIN TIME: 51.1 SEC (ref 9.7–11.8)
RAINBOW EXTRA TUBES HOLD SPECIMEN: NORMAL
RAINBOW EXTRA TUBES HOLD SPECIMEN: NORMAL
RBC # BLD: 3.7 MIL/MCL (ref 4–5.2)
RVA VP6 STL QL NAA+PROBE: NOT DETECTED
SALMONELLA SP INVA+FLIC STL QL NAA+PROBE: NOT DETECTED
SAPO I+II+IV+V RNA STL QL NAA+NON-PROBE: NOT DETECTED
SHIGELLA SP+EIEC IPAH ST NAA+NON-PROBE: NOT DETECTED
SODIUM SERPL-SCNC: 141 MMOL/L (ref 135–145)
V CHOL+PARA+VUL DNA STL QL NAA+NON-PROBE: NOT DETECTED
VIBRIO CHOL TOXIN CTXA STL QL NAA+PROBE: NOT DETECTED
WBC # BLD: 19.4 K/MCL (ref 4.2–11)
Y ENTEROCOL DNA STL QL NAA+NON-PROBE: NOT DETECTED

## 2024-06-17 PROCEDURE — 10002803 HB RX 637: Performed by: HOSPITALIST

## 2024-06-17 PROCEDURE — 10002801 HB RX 250 W/O HCPCS: Performed by: HOSPITALIST

## 2024-06-17 PROCEDURE — 97161 PT EVAL LOW COMPLEX 20 MIN: CPT

## 2024-06-17 PROCEDURE — 36415 COLL VENOUS BLD VENIPUNCTURE: CPT | Performed by: HOSPITALIST

## 2024-06-17 PROCEDURE — 97166 OT EVAL MOD COMPLEX 45 MIN: CPT

## 2024-06-17 PROCEDURE — 97530 THERAPEUTIC ACTIVITIES: CPT

## 2024-06-17 PROCEDURE — 10006031 HB ROOM CHARGE TELEMETRY

## 2024-06-17 PROCEDURE — 10002807 HB RX 258: Performed by: HOSPITALIST

## 2024-06-17 PROCEDURE — 10004651 HB RX, NO CHARGE ITEM: Performed by: EMERGENCY MEDICINE

## 2024-06-17 PROCEDURE — 84132 ASSAY OF SERUM POTASSIUM: CPT | Performed by: HOSPITALIST

## 2024-06-17 PROCEDURE — 97535 SELF CARE MNGMENT TRAINING: CPT

## 2024-06-17 PROCEDURE — 85027 COMPLETE CBC AUTOMATED: CPT | Performed by: HOSPITALIST

## 2024-06-17 PROCEDURE — 80048 BASIC METABOLIC PNL TOTAL CA: CPT | Performed by: HOSPITALIST

## 2024-06-17 PROCEDURE — 85610 PROTHROMBIN TIME: CPT | Performed by: HOSPITALIST

## 2024-06-17 PROCEDURE — 10002800 HB RX 250 W HCPCS: Performed by: HOSPITALIST

## 2024-06-17 PROCEDURE — 10004651 HB RX, NO CHARGE ITEM: Performed by: HOSPITALIST

## 2024-06-17 RX ORDER — POTASSIUM CHLORIDE 20 MEQ/1
40 TABLET, EXTENDED RELEASE ORAL ONCE
Status: COMPLETED | OUTPATIENT
Start: 2024-06-17 | End: 2024-06-17

## 2024-06-17 RX ORDER — HYDRALAZINE HYDROCHLORIDE 25 MG/1
25 TABLET, FILM COATED ORAL EVERY 6 HOURS PRN
Status: DISCONTINUED | OUTPATIENT
Start: 2024-06-17 | End: 2024-06-19 | Stop reason: HOSPADM

## 2024-06-17 RX ADMIN — PANTOPRAZOLE SODIUM 40 MG: 40 TABLET, DELAYED RELEASE ORAL at 08:06

## 2024-06-17 RX ADMIN — GABAPENTIN 900 MG: 300 CAPSULE ORAL at 08:06

## 2024-06-17 RX ADMIN — TOPIRAMATE 100 MG: 100 TABLET ORAL at 08:06

## 2024-06-17 RX ADMIN — CYCLOSPORINE 1 DROP: 0.5 EMULSION OPHTHALMIC at 08:07

## 2024-06-17 RX ADMIN — ACETAMINOPHEN 650 MG: 325 TABLET ORAL at 02:37

## 2024-06-17 RX ADMIN — PILOCARPINE HYDROCHLORIDE 5 MG: 5 TABLET, FILM COATED ORAL at 08:06

## 2024-06-17 RX ADMIN — AZITHROMYCIN DIHYDRATE 500 MG: 250 TABLET ORAL at 15:16

## 2024-06-17 RX ADMIN — BACLOFEN 10 MG: 10 TABLET ORAL at 20:57

## 2024-06-17 RX ADMIN — POTASSIUM CHLORIDE 40 MEQ: 1500 TABLET, EXTENDED RELEASE ORAL at 15:16

## 2024-06-17 RX ADMIN — BACLOFEN 10 MG: 10 TABLET ORAL at 08:07

## 2024-06-17 RX ADMIN — GABAPENTIN 900 MG: 300 CAPSULE ORAL at 14:04

## 2024-06-17 RX ADMIN — CEFTRIAXONE SODIUM 2000 MG: 10 INJECTION, POWDER, FOR SOLUTION INTRAVENOUS at 08:07

## 2024-06-17 RX ADMIN — LOPERAMIDE HYDROCHLORIDE 2 MG: 2 CAPSULE ORAL at 08:06

## 2024-06-17 RX ADMIN — MYCOPHENOLATE MOFETIL 1000 MG: 500 TABLET, FILM COATED ORAL at 20:57

## 2024-06-17 RX ADMIN — HYDROCORTISONE SODIUM SUCCINATE 50 MG: 100 INJECTION, POWDER, FOR SOLUTION INTRAMUSCULAR; INTRAVENOUS at 14:04

## 2024-06-17 RX ADMIN — PILOCARPINE HYDROCHLORIDE 5 MG: 5 TABLET, FILM COATED ORAL at 20:57

## 2024-06-17 RX ADMIN — TOLTERODINE 4 MG: 4 CAPSULE, EXTENDED RELEASE ORAL at 08:06

## 2024-06-17 RX ADMIN — HYDROCORTISONE SODIUM SUCCINATE 50 MG: 100 INJECTION, POWDER, FOR SOLUTION INTRAMUSCULAR; INTRAVENOUS at 21:06

## 2024-06-17 RX ADMIN — POTASSIUM CHLORIDE 40 MEQ: 1500 TABLET, EXTENDED RELEASE ORAL at 08:06

## 2024-06-17 RX ADMIN — PRAVASTATIN SODIUM 10 MG: 10 TABLET ORAL at 21:06

## 2024-06-17 RX ADMIN — ONDANSETRON 4 MG: 2 INJECTION INTRAMUSCULAR; INTRAVENOUS at 03:42

## 2024-06-17 RX ADMIN — LOPERAMIDE HYDROCHLORIDE 2 MG: 2 CAPSULE ORAL at 18:07

## 2024-06-17 RX ADMIN — SODIUM CHLORIDE, PRESERVATIVE FREE 2 ML: 5 INJECTION INTRAVENOUS at 20:58

## 2024-06-17 RX ADMIN — MONTELUKAST SODIUM 10 MG: 10 TABLET, FILM COATED ORAL at 20:57

## 2024-06-17 RX ADMIN — BACLOFEN 10 MG: 10 TABLET ORAL at 14:03

## 2024-06-17 RX ADMIN — METOPROLOL SUCCINATE 200 MG: 50 TABLET, EXTENDED RELEASE ORAL at 08:06

## 2024-06-17 RX ADMIN — MYCOPHENOLATE MOFETIL 1000 MG: 500 TABLET, FILM COATED ORAL at 08:06

## 2024-06-17 RX ADMIN — FAMOTIDINE 40 MG: 20 TABLET ORAL at 08:06

## 2024-06-17 RX ADMIN — TOPIRAMATE 25 MG: 25 TABLET ORAL at 20:57

## 2024-06-17 RX ADMIN — SODIUM CHLORIDE: 9 INJECTION, SOLUTION INTRAVENOUS at 02:42

## 2024-06-17 RX ADMIN — BUDESONIDE 3 MG: 3 CAPSULE ORAL at 08:05

## 2024-06-17 RX ADMIN — ACETAMINOPHEN 650 MG: 325 TABLET ORAL at 14:07

## 2024-06-17 RX ADMIN — LOPERAMIDE HYDROCHLORIDE 2 MG: 2 CAPSULE ORAL at 06:06

## 2024-06-17 RX ADMIN — GABAPENTIN 1200 MG: 300 CAPSULE ORAL at 20:57

## 2024-06-17 RX ADMIN — HYDROCORTISONE SODIUM SUCCINATE 50 MG: 100 INJECTION, POWDER, FOR SOLUTION INTRAMUSCULAR; INTRAVENOUS at 06:01

## 2024-06-17 RX ADMIN — PILOCARPINE HYDROCHLORIDE 5 MG: 5 TABLET, FILM COATED ORAL at 14:04

## 2024-06-17 SDOH — ECONOMIC STABILITY: HOUSING INSECURITY: DO YOU HAVE PROBLEMS WITH ANY OF THE FOLLOWING?: NONE OF THE ABOVE

## 2024-06-17 SDOH — ECONOMIC STABILITY: HOUSING INSECURITY: WHAT IS YOUR LIVING SITUATION TODAY?: I HAVE A STEADY PLACE TO LIVE

## 2024-06-17 ASSESSMENT — COGNITIVE AND FUNCTIONAL STATUS - GENERAL
BASIC_MOBILITY_RAW_SCORE: 17
HELP NEEDED DRESSING REGULAR LOWER BODY CLOTHING: A LOT
DAILY_ACTIVITY_RAW_SCORE: 17
HELP NEEDED FOR BATHING: A LOT
BASIC_MOBILITY_CONVERTED_SCORE: 39.67
BECAUSE OF A PHYSICAL, MENTAL, OR EMOTIONAL CONDITION, DO YOU HAVE DIFFICULTY DOING ERRANDS ALONE: NO
HELP NEEDED DRESSING REGULAR UPPER BODY CLOTHING: A LITTLE
BECAUSE OF A PHYSICAL, MENTAL, OR EMOTIONAL CONDITION, DO YOU HAVE SERIOUS DIFFICULTY CONCENTRATING, REMEMBERING OR MAKING DECISIONS: NO
HELP NEEDED FOR TOILETING: A LOT
DAILY_ACTIVITY_CONVERTED_SCORE: 37.26

## 2024-06-17 ASSESSMENT — ACTIVITIES OF DAILY LIVING (ADL)
EATING: INDEPENDENT
PRIOR_ADL_TOILETING: INDEPENDENT
HOME_MANAGEMENT_TIME_ENTRY: 8
GROOMING: INDEPENDENT
PRIOR_ADL_BATHING: INDEPENDENT
PRIOR_ADL: INDEPENDENT

## 2024-06-17 ASSESSMENT — PAIN SCALES - GENERAL
PAINLEVEL_OUTOF10: 5
PAINLEVEL_OUTOF10: 2
PAINLEVEL_OUTOF10: 4
PAINLEVEL_OUTOF10: 5
PAINLEVEL_OUTOF10: 5

## 2024-06-17 ASSESSMENT — ENCOUNTER SYMPTOMS: PAIN SEVERITY NOW: 0

## 2024-06-18 LAB
ANION GAP SERPL CALC-SCNC: 10 MMOL/L (ref 7–19)
BACTERIA UR CULT: ABNORMAL
BACTERIA UR CULT: ABNORMAL
BUN SERPL-MCNC: 20 MG/DL (ref 6–20)
BUN/CREAT SERPL: 44 (ref 7–25)
CALCIUM SERPL-MCNC: 8.9 MG/DL (ref 8.4–10.2)
CHLORIDE SERPL-SCNC: 116 MMOL/L (ref 97–110)
CO2 SERPL-SCNC: 17 MMOL/L (ref 21–32)
CREAT SERPL-MCNC: 0.45 MG/DL (ref 0.51–0.95)
DEPRECATED RDW RBC: 46.6 FL (ref 39–50)
EGFRCR SERPLBLD CKD-EPI 2021: >90 ML/MIN/{1.73_M2}
ERYTHROCYTE [DISTWIDTH] IN BLOOD: 14 % (ref 11–15)
FASTING DURATION TIME PATIENT: ABNORMAL H
GLUCOSE SERPL-MCNC: 125 MG/DL (ref 70–99)
HCT VFR BLD CALC: 34.3 % (ref 36–46.5)
HGB BLD-MCNC: 10.9 G/DL (ref 12–15.5)
INR PPP: 5.2
MCH RBC QN AUTO: 28.8 PG (ref 26–34)
MCHC RBC AUTO-ENTMCNC: 31.8 G/DL (ref 32–36.5)
MCV RBC AUTO: 90.7 FL (ref 78–100)
NRBC BLD MANUAL-RTO: 0 /100 WBC
PLATELET # BLD AUTO: 383 K/MCL (ref 140–450)
POTASSIUM SERPL-SCNC: 4.6 MMOL/L (ref 3.4–5.1)
PROTHROMBIN TIME: 49.8 SEC (ref 9.7–11.8)
RBC # BLD: 3.78 MIL/MCL (ref 4–5.2)
SODIUM SERPL-SCNC: 138 MMOL/L (ref 135–145)
WBC # BLD: 15.3 K/MCL (ref 4.2–11)

## 2024-06-18 PROCEDURE — 10004651 HB RX, NO CHARGE ITEM: Performed by: EMERGENCY MEDICINE

## 2024-06-18 PROCEDURE — 10002800 HB RX 250 W HCPCS: Performed by: HOSPITALIST

## 2024-06-18 PROCEDURE — 10006031 HB ROOM CHARGE TELEMETRY

## 2024-06-18 PROCEDURE — 80048 BASIC METABOLIC PNL TOTAL CA: CPT | Performed by: HOSPITALIST

## 2024-06-18 PROCEDURE — 36415 COLL VENOUS BLD VENIPUNCTURE: CPT | Performed by: HOSPITALIST

## 2024-06-18 PROCEDURE — 10002801 HB RX 250 W/O HCPCS: Performed by: HOSPITALIST

## 2024-06-18 PROCEDURE — 10002803 HB RX 637: Performed by: HOSPITALIST

## 2024-06-18 PROCEDURE — 85027 COMPLETE CBC AUTOMATED: CPT | Performed by: HOSPITALIST

## 2024-06-18 PROCEDURE — 97530 THERAPEUTIC ACTIVITIES: CPT

## 2024-06-18 PROCEDURE — 85610 PROTHROMBIN TIME: CPT | Performed by: HOSPITALIST

## 2024-06-18 RX ORDER — L. ACIDOPHILUS/PECTIN, CITRUS 25MM-100MG
1 TABLET ORAL DAILY
Status: DISCONTINUED | OUTPATIENT
Start: 2024-06-18 | End: 2024-06-19 | Stop reason: HOSPADM

## 2024-06-18 RX ORDER — METHYLPREDNISOLONE 4 MG/1
2 TABLET ORAL EVERY EVENING
Status: DISCONTINUED | OUTPATIENT
Start: 2024-06-19 | End: 2024-06-19 | Stop reason: HOSPADM

## 2024-06-18 RX ORDER — CEFUROXIME AXETIL 250 MG/1
500 TABLET ORAL EVERY 12 HOURS SCHEDULED
Status: DISCONTINUED | OUTPATIENT
Start: 2024-06-19 | End: 2024-06-19 | Stop reason: HOSPADM

## 2024-06-18 RX ADMIN — LOPERAMIDE HYDROCHLORIDE 2 MG: 2 CAPSULE ORAL at 03:10

## 2024-06-18 RX ADMIN — METOPROLOL SUCCINATE 200 MG: 50 TABLET, EXTENDED RELEASE ORAL at 08:19

## 2024-06-18 RX ADMIN — PILOCARPINE HYDROCHLORIDE 5 MG: 5 TABLET, FILM COATED ORAL at 14:33

## 2024-06-18 RX ADMIN — GABAPENTIN 1200 MG: 300 CAPSULE ORAL at 20:26

## 2024-06-18 RX ADMIN — PILOCARPINE HYDROCHLORIDE 5 MG: 5 TABLET, FILM COATED ORAL at 20:26

## 2024-06-18 RX ADMIN — SODIUM CHLORIDE, PRESERVATIVE FREE 2 ML: 5 INJECTION INTRAVENOUS at 20:29

## 2024-06-18 RX ADMIN — GABAPENTIN 900 MG: 300 CAPSULE ORAL at 11:49

## 2024-06-18 RX ADMIN — LOPERAMIDE HYDROCHLORIDE 2 MG: 2 CAPSULE ORAL at 08:26

## 2024-06-18 RX ADMIN — GABAPENTIN 900 MG: 300 CAPSULE ORAL at 08:21

## 2024-06-18 RX ADMIN — PANTOPRAZOLE SODIUM 40 MG: 40 TABLET, DELAYED RELEASE ORAL at 08:21

## 2024-06-18 RX ADMIN — TOLTERODINE 4 MG: 4 CAPSULE, EXTENDED RELEASE ORAL at 08:21

## 2024-06-18 RX ADMIN — HYDROCORTISONE SODIUM SUCCINATE 50 MG: 100 INJECTION, POWDER, FOR SOLUTION INTRAMUSCULAR; INTRAVENOUS at 14:33

## 2024-06-18 RX ADMIN — BACLOFEN 10 MG: 10 TABLET ORAL at 20:26

## 2024-06-18 RX ADMIN — PRAVASTATIN SODIUM 10 MG: 10 TABLET ORAL at 20:26

## 2024-06-18 RX ADMIN — BUDESONIDE 3 MG: 3 CAPSULE ORAL at 08:19

## 2024-06-18 RX ADMIN — PILOCARPINE HYDROCHLORIDE 5 MG: 5 TABLET, FILM COATED ORAL at 08:21

## 2024-06-18 RX ADMIN — SODIUM CHLORIDE, PRESERVATIVE FREE 2 ML: 5 INJECTION INTRAVENOUS at 08:33

## 2024-06-18 RX ADMIN — MYCOPHENOLATE MOFETIL 1000 MG: 500 TABLET, FILM COATED ORAL at 08:22

## 2024-06-18 RX ADMIN — MYCOPHENOLATE MOFETIL 1000 MG: 500 TABLET, FILM COATED ORAL at 20:26

## 2024-06-18 RX ADMIN — TOPIRAMATE 25 MG: 25 TABLET ORAL at 20:26

## 2024-06-18 RX ADMIN — TOPIRAMATE 100 MG: 100 TABLET ORAL at 08:22

## 2024-06-18 RX ADMIN — FAMOTIDINE 40 MG: 20 TABLET ORAL at 14:33

## 2024-06-18 RX ADMIN — Medication 1 TABLET: at 14:33

## 2024-06-18 RX ADMIN — CEFTRIAXONE SODIUM 2000 MG: 10 INJECTION, POWDER, FOR SOLUTION INTRAVENOUS at 08:18

## 2024-06-18 RX ADMIN — BACLOFEN 10 MG: 10 TABLET ORAL at 08:19

## 2024-06-18 RX ADMIN — HYDROCORTISONE SODIUM SUCCINATE 50 MG: 100 INJECTION, POWDER, FOR SOLUTION INTRAMUSCULAR; INTRAVENOUS at 05:27

## 2024-06-18 RX ADMIN — MONTELUKAST SODIUM 10 MG: 10 TABLET, FILM COATED ORAL at 20:26

## 2024-06-18 RX ADMIN — BACLOFEN 10 MG: 10 TABLET ORAL at 14:33

## 2024-06-18 ASSESSMENT — COGNITIVE AND FUNCTIONAL STATUS - GENERAL
HELP NEEDED FOR TOILETING: A LOT
HELP NEEDED DRESSING REGULAR LOWER BODY CLOTHING: A LOT
DAILY_ACTIVITY_RAW_SCORE: 17
HELP NEEDED FOR BATHING: A LOT
HELP NEEDED DRESSING REGULAR UPPER BODY CLOTHING: A LITTLE
DAILY_ACTIVITY_CONVERTED_SCORE: 37.26

## 2024-06-18 ASSESSMENT — PAIN SCALES - GENERAL
PAINLEVEL_OUTOF10: 0
PAINLEVEL_OUTOF10: 5

## 2024-06-19 VITALS
HEART RATE: 65 BPM | TEMPERATURE: 98.4 F | OXYGEN SATURATION: 96 % | RESPIRATION RATE: 18 BRPM | HEIGHT: 61 IN | WEIGHT: 104.72 LBS | SYSTOLIC BLOOD PRESSURE: 124 MMHG | BODY MASS INDEX: 19.77 KG/M2 | DIASTOLIC BLOOD PRESSURE: 66 MMHG

## 2024-06-19 LAB
ANION GAP SERPL CALC-SCNC: 9 MMOL/L (ref 7–19)
BUN SERPL-MCNC: 23 MG/DL (ref 6–20)
BUN/CREAT SERPL: 49 (ref 7–25)
CALCIUM SERPL-MCNC: 8.9 MG/DL (ref 8.4–10.2)
CHLORIDE SERPL-SCNC: 116 MMOL/L (ref 97–110)
CO2 SERPL-SCNC: 20 MMOL/L (ref 21–32)
CREAT SERPL-MCNC: 0.47 MG/DL (ref 0.51–0.95)
DEPRECATED RDW RBC: 48.2 FL (ref 39–50)
EGFRCR SERPLBLD CKD-EPI 2021: >90 ML/MIN/{1.73_M2}
ERYTHROCYTE [DISTWIDTH] IN BLOOD: 14.3 % (ref 11–15)
FASTING DURATION TIME PATIENT: ABNORMAL H
GLUCOSE SERPL-MCNC: 87 MG/DL (ref 70–99)
HCT VFR BLD CALC: 33.9 % (ref 36–46.5)
HGB BLD-MCNC: 10.9 G/DL (ref 12–15.5)
INR PPP: 2.9
MAGNESIUM SERPL-MCNC: 2.5 MG/DL (ref 1.7–2.4)
MCH RBC QN AUTO: 29.4 PG (ref 26–34)
MCHC RBC AUTO-ENTMCNC: 32.2 G/DL (ref 32–36.5)
MCV RBC AUTO: 91.4 FL (ref 78–100)
NRBC BLD MANUAL-RTO: 0 /100 WBC
PLATELET # BLD AUTO: 381 K/MCL (ref 140–450)
POTASSIUM SERPL-SCNC: 3.8 MMOL/L (ref 3.4–5.1)
PROTHROMBIN TIME: 28.6 SEC (ref 9.7–11.8)
RBC # BLD: 3.71 MIL/MCL (ref 4–5.2)
SODIUM SERPL-SCNC: 141 MMOL/L (ref 135–145)
WBC # BLD: 15.2 K/MCL (ref 4.2–11)

## 2024-06-19 PROCEDURE — 10004651 HB RX, NO CHARGE ITEM: Performed by: EMERGENCY MEDICINE

## 2024-06-19 PROCEDURE — 10002803 HB RX 637: Performed by: HOSPITALIST

## 2024-06-19 PROCEDURE — 36415 COLL VENOUS BLD VENIPUNCTURE: CPT | Performed by: HOSPITALIST

## 2024-06-19 PROCEDURE — 85027 COMPLETE CBC AUTOMATED: CPT | Performed by: HOSPITALIST

## 2024-06-19 PROCEDURE — 85610 PROTHROMBIN TIME: CPT | Performed by: HOSPITALIST

## 2024-06-19 PROCEDURE — 10004651 HB RX, NO CHARGE ITEM: Performed by: HOSPITALIST

## 2024-06-19 PROCEDURE — 83735 ASSAY OF MAGNESIUM: CPT | Performed by: HOSPITALIST

## 2024-06-19 PROCEDURE — 80048 BASIC METABOLIC PNL TOTAL CA: CPT | Performed by: HOSPITALIST

## 2024-06-19 RX ORDER — POTASSIUM CHLORIDE 20 MEQ/1
40 TABLET, EXTENDED RELEASE ORAL ONCE
Status: COMPLETED | OUTPATIENT
Start: 2024-06-19 | End: 2024-06-19

## 2024-06-19 RX ORDER — CEFUROXIME AXETIL 500 MG/1
500 TABLET ORAL EVERY 12 HOURS SCHEDULED
Qty: 5 TABLET | Refills: 0 | Status: SHIPPED | OUTPATIENT
Start: 2024-06-19 | End: 2024-06-22

## 2024-06-19 RX ORDER — SACCHAROMYCES BOULARDII 250 MG
250 CAPSULE ORAL 2 TIMES DAILY
Qty: 20 CAPSULE | Refills: 0 | Status: SHIPPED | OUTPATIENT
Start: 2024-06-19 | End: 2024-06-29

## 2024-06-19 RX ADMIN — PANTOPRAZOLE SODIUM 40 MG: 40 TABLET, DELAYED RELEASE ORAL at 08:24

## 2024-06-19 RX ADMIN — METHYLPREDNISOLONE 4 MG: 4 TABLET ORAL at 08:23

## 2024-06-19 RX ADMIN — ACETAMINOPHEN 650 MG: 325 TABLET ORAL at 08:22

## 2024-06-19 RX ADMIN — Medication 1 TABLET: at 08:23

## 2024-06-19 RX ADMIN — MYCOPHENOLATE MOFETIL 1000 MG: 500 TABLET, FILM COATED ORAL at 08:22

## 2024-06-19 RX ADMIN — PILOCARPINE HYDROCHLORIDE 5 MG: 5 TABLET, FILM COATED ORAL at 08:23

## 2024-06-19 RX ADMIN — LOPERAMIDE HYDROCHLORIDE 2 MG: 2 CAPSULE ORAL at 08:22

## 2024-06-19 RX ADMIN — BUDESONIDE 3 MG: 3 CAPSULE ORAL at 08:23

## 2024-06-19 RX ADMIN — GABAPENTIN 900 MG: 300 CAPSULE ORAL at 08:22

## 2024-06-19 RX ADMIN — TOPIRAMATE 100 MG: 100 TABLET ORAL at 08:22

## 2024-06-19 RX ADMIN — METOPROLOL SUCCINATE 200 MG: 50 TABLET, EXTENDED RELEASE ORAL at 08:23

## 2024-06-19 RX ADMIN — SODIUM CHLORIDE, PRESERVATIVE FREE 2 ML: 5 INJECTION INTRAVENOUS at 08:24

## 2024-06-19 RX ADMIN — POTASSIUM CHLORIDE 40 MEQ: 1500 TABLET, EXTENDED RELEASE ORAL at 08:23

## 2024-06-19 RX ADMIN — TOLTERODINE 4 MG: 4 CAPSULE, EXTENDED RELEASE ORAL at 08:22

## 2024-06-19 RX ADMIN — CEFUROXIME AXETIL 500 MG: 250 TABLET ORAL at 08:22

## 2024-06-19 RX ADMIN — BACLOFEN 10 MG: 10 TABLET ORAL at 08:23

## 2024-06-19 RX ADMIN — LOPERAMIDE HYDROCHLORIDE 2 MG: 2 CAPSULE ORAL at 04:19

## 2024-06-19 ASSESSMENT — PAIN SCALES - GENERAL
PAINLEVEL_OUTOF10: 0
PAINLEVEL_OUTOF10: 3

## 2024-06-20 LAB
BACTERIA BLD CULT: NORMAL
BACTERIA BLD CULT: NORMAL

## 2024-06-21 ENCOUNTER — TELEPHONE (OUTPATIENT)
Dept: CARE COORDINATION | Age: 69
End: 2024-06-21

## 2024-06-28 ENCOUNTER — TELEPHONE (OUTPATIENT)
Dept: CARE COORDINATION | Age: 69
End: 2024-06-28

## 2024-07-30 ENCOUNTER — HOSPITAL ENCOUNTER (OUTPATIENT)
Dept: WOUND CARE | Age: 69
Discharge: STILL ADMITTED OR ADMINISTRATIVE | End: 2024-07-30
Attending: FAMILY MEDICINE

## 2024-07-30 VITALS — TEMPERATURE: 96.8 F

## 2024-07-30 DIAGNOSIS — L02.419 CELLULITIS AND ABSCESS OF LEG: ICD-10-CM

## 2024-07-30 DIAGNOSIS — L97.912 ULCER OF RIGHT LOWER EXTREMITY WITH FAT LAYER EXPOSED  (CMD): Primary | ICD-10-CM

## 2024-07-30 DIAGNOSIS — L03.119 CELLULITIS AND ABSCESS OF LEG: ICD-10-CM

## 2024-07-30 DIAGNOSIS — L97.912 ULCER OF RIGHT LOWER EXTREMITY WITH FAT LAYER EXPOSED  (CMD): ICD-10-CM

## 2024-07-30 PROCEDURE — 11042 DBRDMT SUBQ TIS 1ST 20SQCM/<: CPT

## 2024-07-30 ASSESSMENT — PAIN SCALES - GENERAL: PAINLEVEL_OUTOF10: 7

## 2024-08-20 ENCOUNTER — HOSPITAL ENCOUNTER (OUTPATIENT)
Dept: WOUND CARE | Age: 69
Discharge: STILL ADMITTED OR ADMINISTRATIVE | End: 2024-08-20
Attending: FAMILY MEDICINE

## 2024-08-20 VITALS — TEMPERATURE: 97.9 F

## 2024-08-20 DIAGNOSIS — L02.419 CELLULITIS AND ABSCESS OF LEG: ICD-10-CM

## 2024-08-20 DIAGNOSIS — L97.922 ULCER OF LEFT LOWER EXTREMITY WITH FAT LAYER EXPOSED  (CMD): ICD-10-CM

## 2024-08-20 DIAGNOSIS — L97.912 ULCER OF RIGHT LOWER EXTREMITY WITH FAT LAYER EXPOSED  (CMD): Primary | ICD-10-CM

## 2024-08-20 DIAGNOSIS — L97.912 ULCER OF RIGHT LOWER EXTREMITY WITH FAT LAYER EXPOSED  (CMD): ICD-10-CM

## 2024-08-20 DIAGNOSIS — L03.119 CELLULITIS AND ABSCESS OF LEG: ICD-10-CM

## 2024-08-20 PROCEDURE — 11042 DBRDMT SUBQ TIS 1ST 20SQCM/<: CPT

## 2024-08-20 ASSESSMENT — PAIN SCALES - GENERAL: PAINLEVEL_OUTOF10: 0

## 2024-08-24 ENCOUNTER — HOSPITAL ENCOUNTER (INPATIENT)
Age: 69
LOS: 5 days | Discharge: HOME | DRG: 392 | End: 2024-08-30
Attending: STUDENT IN AN ORGANIZED HEALTH CARE EDUCATION/TRAINING PROGRAM | Admitting: HOSPITALIST

## 2024-08-24 DIAGNOSIS — R19.7 DIARRHEA, UNSPECIFIED TYPE: ICD-10-CM

## 2024-08-24 DIAGNOSIS — E87.6 HYPOKALEMIA: Primary | ICD-10-CM

## 2024-08-24 LAB
ALBUMIN SERPL-MCNC: 3.4 G/DL (ref 3.6–5.1)
ALBUMIN/GLOB SERPL: 1.1 {RATIO} (ref 1–2.4)
ALP SERPL-CCNC: 67 UNITS/L (ref 45–117)
ALT SERPL-CCNC: 26 UNITS/L
ANION GAP SERPL CALC-SCNC: 9 MMOL/L (ref 7–19)
AST SERPL-CCNC: 23 UNITS/L
BASOPHILS # BLD: 0 K/MCL (ref 0–0.3)
BASOPHILS NFR BLD: 0 %
BILIRUB SERPL-MCNC: 0.6 MG/DL (ref 0.2–1)
BUN SERPL-MCNC: 26 MG/DL (ref 6–20)
BUN/CREAT SERPL: 48 (ref 7–25)
CALCIUM SERPL-MCNC: 9.9 MG/DL (ref 8.4–10.2)
CHLORIDE SERPL-SCNC: 112 MMOL/L (ref 97–110)
CO2 SERPL-SCNC: 24 MMOL/L (ref 21–32)
CREAT SERPL-MCNC: 0.54 MG/DL (ref 0.51–0.95)
DEPRECATED RDW RBC: 59 FL (ref 39–50)
EGFRCR SERPLBLD CKD-EPI 2021: >90 ML/MIN/{1.73_M2}
EOSINOPHIL # BLD: 0 K/MCL (ref 0–0.5)
EOSINOPHIL NFR BLD: 0 %
ERYTHROCYTE [DISTWIDTH] IN BLOOD: 17.5 % (ref 11–15)
FASTING DURATION TIME PATIENT: ABNORMAL H
GLOBULIN SER-MCNC: 3.1 G/DL (ref 2–4)
GLUCOSE SERPL-MCNC: 99 MG/DL (ref 70–99)
HCT VFR BLD CALC: 39.6 % (ref 36–46.5)
HGB BLD-MCNC: 11.8 G/DL (ref 12–15.5)
IMM GRANULOCYTES # BLD AUTO: 0.1 K/MCL (ref 0–0.2)
IMM GRANULOCYTES # BLD: 1 %
INR PPP: 2.3
LIPASE SERPL-CCNC: 80 UNITS/L (ref 15–77)
LYMPHOCYTES # BLD: 0.7 K/MCL (ref 1–4)
LYMPHOCYTES NFR BLD: 7 %
MAGNESIUM SERPL-MCNC: 2.1 MG/DL (ref 1.7–2.4)
MCH RBC QN AUTO: 27.6 PG (ref 26–34)
MCHC RBC AUTO-ENTMCNC: 29.8 G/DL (ref 32–36.5)
MCV RBC AUTO: 92.5 FL (ref 78–100)
MONOCYTES # BLD: 0.8 K/MCL (ref 0.3–0.9)
MONOCYTES NFR BLD: 8 %
NEUTROPHILS # BLD: 7.9 K/MCL (ref 1.8–7.7)
NEUTROPHILS NFR BLD: 84 %
NRBC BLD MANUAL-RTO: 0 /100 WBC
PHOSPHATE SERPL-MCNC: 1.3 MG/DL (ref 2.4–4.7)
PLATELET # BLD AUTO: 154 K/MCL (ref 140–450)
POTASSIUM SERPL-SCNC: 3 MMOL/L (ref 3.4–5.1)
PROT SERPL-MCNC: 6.5 G/DL (ref 6.4–8.2)
PROTHROMBIN TIME: 22.7 SEC (ref 9.7–11.8)
RBC # BLD: 4.28 MIL/MCL (ref 4–5.2)
SODIUM SERPL-SCNC: 142 MMOL/L (ref 135–145)
WBC # BLD: 9.5 K/MCL (ref 4.2–11)

## 2024-08-24 PROCEDURE — 85025 COMPLETE CBC W/AUTO DIFF WBC: CPT | Performed by: STUDENT IN AN ORGANIZED HEALTH CARE EDUCATION/TRAINING PROGRAM

## 2024-08-24 PROCEDURE — 85610 PROTHROMBIN TIME: CPT | Performed by: HOSPITALIST

## 2024-08-24 PROCEDURE — 99284 EMERGENCY DEPT VISIT MOD MDM: CPT

## 2024-08-24 PROCEDURE — 87507 IADNA-DNA/RNA PROBE TQ 12-25: CPT | Performed by: HOSPITALIST

## 2024-08-24 PROCEDURE — 10002807 HB RX 258: Performed by: STUDENT IN AN ORGANIZED HEALTH CARE EDUCATION/TRAINING PROGRAM

## 2024-08-24 PROCEDURE — 96360 HYDRATION IV INFUSION INIT: CPT

## 2024-08-24 PROCEDURE — 10002803 HB RX 637: Performed by: HOSPITALIST

## 2024-08-24 PROCEDURE — 10002803 HB RX 637: Performed by: STUDENT IN AN ORGANIZED HEALTH CARE EDUCATION/TRAINING PROGRAM

## 2024-08-24 PROCEDURE — 87046 STOOL CULTR AEROBIC BACT EA: CPT | Performed by: HOSPITALIST

## 2024-08-24 PROCEDURE — 10004651 HB RX, NO CHARGE ITEM: Performed by: HOSPITALIST

## 2024-08-24 PROCEDURE — 87209 SMEAR COMPLEX STAIN: CPT | Performed by: HOSPITALIST

## 2024-08-24 PROCEDURE — G0378 HOSPITAL OBSERVATION PER HR: HCPCS

## 2024-08-24 PROCEDURE — 10002801 HB RX 250 W/O HCPCS: Performed by: HOSPITALIST

## 2024-08-24 PROCEDURE — 10002807 HB RX 258: Performed by: HOSPITALIST

## 2024-08-24 PROCEDURE — 87493 C DIFF AMPLIFIED PROBE: CPT | Performed by: STUDENT IN AN ORGANIZED HEALTH CARE EDUCATION/TRAINING PROGRAM

## 2024-08-24 PROCEDURE — 96361 HYDRATE IV INFUSION ADD-ON: CPT

## 2024-08-24 PROCEDURE — 84100 ASSAY OF PHOSPHORUS: CPT | Performed by: HOSPITALIST

## 2024-08-24 PROCEDURE — 83735 ASSAY OF MAGNESIUM: CPT | Performed by: STUDENT IN AN ORGANIZED HEALTH CARE EDUCATION/TRAINING PROGRAM

## 2024-08-24 PROCEDURE — 83690 ASSAY OF LIPASE: CPT | Performed by: STUDENT IN AN ORGANIZED HEALTH CARE EDUCATION/TRAINING PROGRAM

## 2024-08-24 PROCEDURE — 10002800 HB RX 250 W HCPCS: Performed by: HOSPITALIST

## 2024-08-24 PROCEDURE — 80053 COMPREHEN METABOLIC PANEL: CPT | Performed by: STUDENT IN AN ORGANIZED HEALTH CARE EDUCATION/TRAINING PROGRAM

## 2024-08-24 RX ORDER — GABAPENTIN 300 MG/1
1200 CAPSULE ORAL NIGHTLY
Status: DISCONTINUED | OUTPATIENT
Start: 2024-08-24 | End: 2024-08-30 | Stop reason: HOSPADM

## 2024-08-24 RX ORDER — GABAPENTIN 300 MG/1
900 CAPSULE ORAL DAILY
Status: DISCONTINUED | OUTPATIENT
Start: 2024-08-25 | End: 2024-08-30 | Stop reason: HOSPADM

## 2024-08-24 RX ORDER — CYCLOSPORINE 0.5 MG/ML
1 EMULSION OPHTHALMIC 2 TIMES DAILY
Status: DISCONTINUED | OUTPATIENT
Start: 2024-08-24 | End: 2024-08-30 | Stop reason: HOSPADM

## 2024-08-24 RX ORDER — MORPHINE SULFATE 20 MG/1
20 CAPSULE, EXTENDED RELEASE ORAL EVERY 12 HOURS PRN
Status: DISCONTINUED | OUTPATIENT
Start: 2024-08-24 | End: 2024-08-28

## 2024-08-24 RX ORDER — 0.9 % SODIUM CHLORIDE 0.9 %
2 VIAL (ML) INJECTION EVERY 12 HOURS SCHEDULED
Status: DISCONTINUED | OUTPATIENT
Start: 2024-08-24 | End: 2024-08-30 | Stop reason: HOSPADM

## 2024-08-24 RX ORDER — ACETAMINOPHEN 650 MG/1
650 SUPPOSITORY RECTAL EVERY 4 HOURS PRN
Status: DISCONTINUED | OUTPATIENT
Start: 2024-08-24 | End: 2024-08-30 | Stop reason: HOSPADM

## 2024-08-24 RX ORDER — LANOLIN ALCOHOL/MO/W.PET/CERES
3 CREAM (GRAM) TOPICAL NIGHTLY PRN
Status: DISCONTINUED | OUTPATIENT
Start: 2024-08-24 | End: 2024-08-30 | Stop reason: HOSPADM

## 2024-08-24 RX ORDER — MORPHINE SULFATE 20 MG/1
20 CAPSULE, EXTENDED RELEASE ORAL
Status: DISCONTINUED | OUTPATIENT
Start: 2024-08-24 | End: 2024-08-24

## 2024-08-24 RX ORDER — ONDANSETRON 2 MG/ML
4 INJECTION INTRAMUSCULAR; INTRAVENOUS EVERY 12 HOURS PRN
Status: DISCONTINUED | OUTPATIENT
Start: 2024-08-24 | End: 2024-08-25

## 2024-08-24 RX ORDER — PANTOPRAZOLE SODIUM 40 MG/1
40 TABLET, DELAYED RELEASE ORAL
Status: DISCONTINUED | OUTPATIENT
Start: 2024-08-25 | End: 2024-08-30 | Stop reason: HOSPADM

## 2024-08-24 RX ORDER — TOPIRAMATE 25 MG/1
50 TABLET, FILM COATED ORAL 2 TIMES DAILY
Status: DISCONTINUED | OUTPATIENT
Start: 2024-08-24 | End: 2024-08-30 | Stop reason: HOSPADM

## 2024-08-24 RX ORDER — TOLTERODINE 2 MG/1
4 CAPSULE, EXTENDED RELEASE ORAL DAILY
Status: DISCONTINUED | OUTPATIENT
Start: 2024-08-25 | End: 2024-08-30 | Stop reason: HOSPADM

## 2024-08-24 RX ORDER — WARFARIN SODIUM 2 MG/1
1 TABLET ORAL
COMMUNITY

## 2024-08-24 RX ORDER — FAMOTIDINE 20 MG/1
40 TABLET, FILM COATED ORAL DAILY
Status: DISCONTINUED | OUTPATIENT
Start: 2024-08-25 | End: 2024-08-24 | Stop reason: SDUPTHER

## 2024-08-24 RX ORDER — TOPIRAMATE 25 MG/1
25 TABLET, FILM COATED ORAL EVERY EVENING
Status: DISCONTINUED | OUTPATIENT
Start: 2024-08-24 | End: 2024-08-24

## 2024-08-24 RX ORDER — FLUTICASONE FUROATE AND VILANTEROL 100; 25 UG/1; UG/1
1 POWDER RESPIRATORY (INHALATION)
Status: DISCONTINUED | OUTPATIENT
Start: 2024-08-25 | End: 2024-08-30 | Stop reason: HOSPADM

## 2024-08-24 RX ORDER — PILOCARPINE HYDROCHLORIDE 5 MG/1
5 TABLET, FILM COATED ORAL 3 TIMES DAILY
Status: DISCONTINUED | OUTPATIENT
Start: 2024-08-24 | End: 2024-08-30 | Stop reason: HOSPADM

## 2024-08-24 RX ORDER — VANCOMYCIN HYDROCHLORIDE 125 MG/1
125 CAPSULE ORAL 4 TIMES DAILY
Status: DISCONTINUED | OUTPATIENT
Start: 2024-08-24 | End: 2024-08-28

## 2024-08-24 RX ORDER — PRAVASTATIN SODIUM 10 MG
10 TABLET ORAL NIGHTLY
Status: DISCONTINUED | OUTPATIENT
Start: 2024-08-24 | End: 2024-08-30 | Stop reason: HOSPADM

## 2024-08-24 RX ORDER — MONTELUKAST SODIUM 10 MG/1
10 TABLET ORAL NIGHTLY
Status: DISCONTINUED | OUTPATIENT
Start: 2024-08-24 | End: 2024-08-30 | Stop reason: HOSPADM

## 2024-08-24 RX ORDER — ONDANSETRON 4 MG/1
4 TABLET, ORALLY DISINTEGRATING ORAL EVERY 12 HOURS PRN
Status: DISCONTINUED | OUTPATIENT
Start: 2024-08-24 | End: 2024-08-25

## 2024-08-24 RX ORDER — GABAPENTIN 300 MG/1
600 CAPSULE ORAL 3 TIMES DAILY
Status: DISCONTINUED | OUTPATIENT
Start: 2024-08-24 | End: 2024-08-24

## 2024-08-24 RX ORDER — WARFARIN SODIUM 2 MG/1
2 TABLET ORAL ONCE
Status: COMPLETED | OUTPATIENT
Start: 2024-08-24 | End: 2024-08-24

## 2024-08-24 RX ORDER — HYDROXYCHLOROQUINE SULFATE 200 MG/1
400 TABLET, FILM COATED ORAL AT BEDTIME
Status: DISCONTINUED | OUTPATIENT
Start: 2024-08-24 | End: 2024-08-30 | Stop reason: HOSPADM

## 2024-08-24 RX ORDER — ACETAMINOPHEN 325 MG/1
650 TABLET ORAL EVERY 4 HOURS PRN
Status: DISCONTINUED | OUTPATIENT
Start: 2024-08-24 | End: 2024-08-30 | Stop reason: HOSPADM

## 2024-08-24 RX ORDER — GABAPENTIN 300 MG/1
900 CAPSULE ORAL
Status: DISCONTINUED | OUTPATIENT
Start: 2024-08-25 | End: 2024-08-30 | Stop reason: HOSPADM

## 2024-08-24 RX ORDER — METHYLPREDNISOLONE 4 MG/1
4 TABLET ORAL
Status: DISCONTINUED | OUTPATIENT
Start: 2024-08-25 | End: 2024-08-30 | Stop reason: HOSPADM

## 2024-08-24 RX ORDER — METOPROLOL SUCCINATE 50 MG/1
200 TABLET, EXTENDED RELEASE ORAL DAILY
Status: DISCONTINUED | OUTPATIENT
Start: 2024-08-24 | End: 2024-08-30 | Stop reason: HOSPADM

## 2024-08-24 RX ORDER — BACLOFEN 10 MG/1
10 TABLET ORAL 3 TIMES DAILY
Status: DISCONTINUED | OUTPATIENT
Start: 2024-08-24 | End: 2024-08-30 | Stop reason: HOSPADM

## 2024-08-24 RX ORDER — SODIUM CHLORIDE AND POTASSIUM CHLORIDE 150; 900 MG/100ML; MG/100ML
INJECTION, SOLUTION INTRAVENOUS CONTINUOUS
Status: DISCONTINUED | OUTPATIENT
Start: 2024-08-24 | End: 2024-08-25

## 2024-08-24 RX ORDER — POTASSIUM CHLORIDE 1.5 G/1.58G
40 POWDER, FOR SOLUTION ORAL ONCE
Status: COMPLETED | OUTPATIENT
Start: 2024-08-24 | End: 2024-08-24

## 2024-08-24 RX ADMIN — VANCOMYCIN HYDROCHLORIDE 125 MG: 125 CAPSULE ORAL at 21:37

## 2024-08-24 RX ADMIN — PRAVASTATIN SODIUM 10 MG: 10 TABLET ORAL at 21:40

## 2024-08-24 RX ADMIN — HYDROCORTISONE SODIUM SUCCINATE 50 MG: 100 INJECTION, POWDER, FOR SOLUTION INTRAMUSCULAR; INTRAVENOUS at 20:44

## 2024-08-24 RX ADMIN — SODIUM PHOSPHATE, MONOBASIC, MONOHYDRATE AND SODIUM PHOSPHATE, DIBASIC, ANHYDROUS 45 MMOL: 142; 276 INJECTION, SOLUTION INTRAVENOUS at 20:38

## 2024-08-24 RX ADMIN — METOPROLOL SUCCINATE 200 MG: 50 TABLET, EXTENDED RELEASE ORAL at 21:38

## 2024-08-24 RX ADMIN — HYDROXYCHLOROQUINE SULFATE 400 MG: 200 TABLET, FILM COATED ORAL at 21:37

## 2024-08-24 RX ADMIN — MONTELUKAST SODIUM 10 MG: 10 TABLET, FILM COATED ORAL at 21:37

## 2024-08-24 RX ADMIN — PILOCARPINE HYDROCHLORIDE 5 MG: 5 TABLET, FILM COATED ORAL at 21:43

## 2024-08-24 RX ADMIN — BACLOFEN 10 MG: 10 TABLET ORAL at 21:40

## 2024-08-24 RX ADMIN — SODIUM CHLORIDE, PRESERVATIVE FREE 2 ML: 5 INJECTION INTRAVENOUS at 21:45

## 2024-08-24 RX ADMIN — POTASSIUM CHLORIDE 40 MEQ: 1.5 POWDER, FOR SOLUTION ORAL at 17:28

## 2024-08-24 RX ADMIN — SODIUM CHLORIDE 1000 ML: 0.9 INJECTION, SOLUTION INTRAVENOUS at 17:29

## 2024-08-24 RX ADMIN — GABAPENTIN 1200 MG: 300 CAPSULE ORAL at 21:39

## 2024-08-24 RX ADMIN — POTASSIUM CHLORIDE AND SODIUM CHLORIDE: 900; 150 INJECTION, SOLUTION INTRAVENOUS at 20:47

## 2024-08-24 RX ADMIN — WARFARIN SODIUM 2 MG: 2 TABLET ORAL at 21:42

## 2024-08-24 RX ADMIN — TOPIRAMATE 50 MG: 25 TABLET ORAL at 21:39

## 2024-08-24 RX ADMIN — SODIUM CHLORIDE 1000 ML: 9 INJECTION, SOLUTION INTRAVENOUS at 16:19

## 2024-08-24 SDOH — ECONOMIC STABILITY: GENERAL

## 2024-08-24 SDOH — ECONOMIC STABILITY: HOUSING INSECURITY: WHAT IS YOUR LIVING SITUATION TODAY?: HOUSE

## 2024-08-24 SDOH — ECONOMIC STABILITY: HOUSING INSECURITY: WHAT IS YOUR LIVING SITUATION TODAY?: I HAVE A STEADY PLACE TO LIVE

## 2024-08-24 SDOH — HEALTH STABILITY: PHYSICAL HEALTH: DO YOU HAVE SERIOUS DIFFICULTY WALKING OR CLIMBING STAIRS?: NO

## 2024-08-24 SDOH — ECONOMIC STABILITY: INCOME INSECURITY: IN THE PAST 12 MONTHS, HAS THE ELECTRIC, GAS, OIL, OR WATER COMPANY THREATENED TO SHUT OFF SERVICE IN YOUR HOME?: NO

## 2024-08-24 SDOH — ECONOMIC STABILITY: FOOD INSECURITY: WITHIN THE PAST 12 MONTHS, THE FOOD YOU BOUGHT JUST DIDN'T LAST AND YOU DIDN'T HAVE MONEY TO GET MORE.: NEVER TRUE

## 2024-08-24 SDOH — ECONOMIC STABILITY: HOUSING INSECURITY: WHAT IS YOUR LIVING SITUATION TODAY?: SPOUSE

## 2024-08-24 SDOH — HEALTH STABILITY: PHYSICAL HEALTH: DO YOU HAVE DIFFICULTY DRESSING OR BATHING?: NO

## 2024-08-24 SDOH — SOCIAL STABILITY: SOCIAL NETWORK: SUPPORT SYSTEMS: SPOUSE

## 2024-08-24 SDOH — HEALTH STABILITY: GENERAL: BECAUSE OF A PHYSICAL, MENTAL, OR EMOTIONAL CONDITION, DO YOU HAVE DIFFICULTY DOING ERRANDS ALONE?: NO

## 2024-08-24 SDOH — ECONOMIC STABILITY: GENERAL: WOULD YOU LIKE HELP WITH ANY OF THE FOLLOWING NEEDS?: I DON'T WANT HELP WITH ANY OF THESE

## 2024-08-24 SDOH — ECONOMIC STABILITY: HOUSING INSECURITY: DO YOU HAVE PROBLEMS WITH ANY OF THE FOLLOWING?: NONE OF THE ABOVE

## 2024-08-24 SDOH — HEALTH STABILITY: GENERAL
BECAUSE OF A PHYSICAL, MENTAL, OR EMOTIONAL CONDITION, DO YOU HAVE SERIOUS DIFFICULTY CONCENTRATING, REMEMBERING OR MAKING DECISIONS?: YES

## 2024-08-24 ASSESSMENT — LIFESTYLE VARIABLES
AUDIT-C TOTAL SCORE: 0
HOW MANY STANDARD DRINKS CONTAINING ALCOHOL DO YOU HAVE ON A TYPICAL DAY: 0,1 OR 2
HOW OFTEN DO YOU HAVE A DRINK CONTAINING ALCOHOL: NEVER
HOW OFTEN DO YOU HAVE 6 OR MORE DRINKS ON ONE OCCASION: NEVER
ALCOHOL_USE_STATUS: NO OR LOW RISK WITH VALIDATED TOOL

## 2024-08-24 ASSESSMENT — ACTIVITIES OF DAILY LIVING (ADL)
ADL_SHORT_OF_BREATH: NO
ADL_SCORE: 12
RECENT_DECLINE_ADL: NO
ADL_BEFORE_ADMISSION: INDEPENDENT

## 2024-08-24 ASSESSMENT — PATIENT HEALTH QUESTIONNAIRE - PHQ9
2. FEELING DOWN, DEPRESSED OR HOPELESS: NOT AT ALL
IS PATIENT ABLE TO COMPLETE PHQ2 OR PHQ9: YES
SUM OF ALL RESPONSES TO PHQ9 QUESTIONS 1 AND 2: 0
1. LITTLE INTEREST OR PLEASURE IN DOING THINGS: NOT AT ALL
SUM OF ALL RESPONSES TO PHQ9 QUESTIONS 1 AND 2: 0
CLINICAL INTERPRETATION OF PHQ2 SCORE: NO FURTHER SCREENING NEEDED

## 2024-08-24 ASSESSMENT — COLUMBIA-SUICIDE SEVERITY RATING SCALE - C-SSRS
IS THE PATIENT ABLE TO COMPLETE C-SSRS: YES
6. HAVE YOU EVER DONE ANYTHING, STARTED TO DO ANYTHING, OR PREPARED TO DO ANYTHING TO END YOUR LIFE?: NO
2. HAVE YOU ACTUALLY HAD ANY THOUGHTS OF KILLING YOURSELF?: NO
1. WITHIN THE PAST MONTH, HAVE YOU WISHED YOU WERE DEAD OR WISHED YOU COULD GO TO SLEEP AND NOT WAKE UP?: NO

## 2024-08-24 ASSESSMENT — PAIN SCALES - GENERAL
PAINLEVEL_OUTOF10: 0
PAINLEVEL_OUTOF10: 0

## 2024-08-25 LAB
ADV 40+41 FIB PROT STL QL NAA+PROBE: NOT DETECTED
ALBUMIN SERPL-MCNC: 2.6 G/DL (ref 3.6–5.1)
ALBUMIN/GLOB SERPL: 1 {RATIO} (ref 1–2.4)
ALP SERPL-CCNC: 52 UNITS/L (ref 45–117)
ALT SERPL-CCNC: 24 UNITS/L
ANION GAP SERPL CALC-SCNC: 10 MMOL/L (ref 7–19)
AST SERPL-CCNC: 24 UNITS/L
ASTRO TYP 1-8 RNA STL QL NAA+NON-PROBE: NOT DETECTED
BASOPHILS # BLD: 0 K/MCL (ref 0–0.3)
BASOPHILS NFR BLD: 0 %
BILIRUB SERPL-MCNC: 0.5 MG/DL (ref 0.2–1)
BUN SERPL-MCNC: 17 MG/DL (ref 6–20)
BUN/CREAT SERPL: 38 (ref 7–25)
C CAYETANENSIS DNA STL QL NAA+NON-PROBE: NOT DETECTED
C COLI+JEJ+LAR 16S RRNA STL QL NAA+PROBE: NOT DETECTED
C DIFF TOX A+B STL QL IA: NOT DETECTED
C DIFF TOX B TCDB STL QL NAA+PROBE: DETECTED
C PARVUM+HOMINIS COWP STL QL NAA+PROBE: NOT DETECTED
CALCIUM SERPL-MCNC: 7.8 MG/DL (ref 8.4–10.2)
CHLORIDE SERPL-SCNC: 120 MMOL/L (ref 97–110)
CO2 SERPL-SCNC: 19 MMOL/L (ref 21–32)
CREAT SERPL-MCNC: 0.45 MG/DL (ref 0.51–0.95)
DEPRECATED RDW RBC: 59.8 FL (ref 39–50)
E HISTOLYTICA 18S RRNA STL QL NAA+PROBE: NOT DETECTED
EAEC PAA PLAS AGGR+AATA ST NAA+NON-PRB: NOT DETECTED
EC STX1+STX2 GENES STL QL NAA+NON-PROBE: NOT DETECTED
EGFRCR SERPLBLD CKD-EPI 2021: >90 ML/MIN/{1.73_M2}
EOSINOPHIL # BLD: 0 K/MCL (ref 0–0.5)
EOSINOPHIL NFR BLD: 0 %
EPEC EAE GENE STL QL NAA+NON-PROBE: NOT DETECTED
ERYTHROCYTE [DISTWIDTH] IN BLOOD: 17.5 % (ref 11–15)
ETEC ELTA+ESTB GENES STL QL NAA+PROBE: NOT DETECTED
FASTING DURATION TIME PATIENT: ABNORMAL H
G LAMBLIA 18S RRNA STL QL NAA+PROBE: NOT DETECTED
GLOBULIN SER-MCNC: 2.7 G/DL (ref 2–4)
GLUCOSE SERPL-MCNC: 71 MG/DL (ref 70–99)
HCT VFR BLD CALC: 34.7 % (ref 36–46.5)
HGB BLD-MCNC: 10.2 G/DL (ref 12–15.5)
IMM GRANULOCYTES # BLD AUTO: 0.1 K/MCL (ref 0–0.2)
IMM GRANULOCYTES # BLD: 1 %
INR PPP: 3.2
LYMPHOCYTES # BLD: 0.5 K/MCL (ref 1–4)
LYMPHOCYTES NFR BLD: 6 %
MAGNESIUM SERPL-MCNC: 1.8 MG/DL (ref 1.7–2.4)
MCH RBC QN AUTO: 27.4 PG (ref 26–34)
MCHC RBC AUTO-ENTMCNC: 29.4 G/DL (ref 32–36.5)
MCV RBC AUTO: 93.3 FL (ref 78–100)
MONOCYTES # BLD: 0.3 K/MCL (ref 0.3–0.9)
MONOCYTES NFR BLD: 4 %
NEUTROPHILS # BLD: 7.2 K/MCL (ref 1.8–7.7)
NEUTROPHILS NFR BLD: 89 %
NOROVIRUS GI+II RNA STL QL NAA+NON-PROBE: NOT DETECTED
NRBC BLD MANUAL-RTO: 0 /100 WBC
P SHIGELLOIDES DNA STL QL NAA+NON-PROBE: NOT DETECTED
PHOSPHATE SERPL-MCNC: 4.8 MG/DL (ref 2.4–4.7)
PHOSPHATE SERPL-MCNC: 4.8 MG/DL (ref 2.4–4.7)
PLATELET # BLD AUTO: 132 K/MCL (ref 140–450)
POTASSIUM SERPL-SCNC: 2.9 MMOL/L (ref 3.4–5.1)
POTASSIUM SERPL-SCNC: 4.8 MMOL/L (ref 3.4–5.1)
PROT SERPL-MCNC: 5.3 G/DL (ref 6.4–8.2)
PROTHROMBIN TIME: 30.8 SEC (ref 9.7–11.8)
RAINBOW EXTRA TUBES HOLD SPECIMEN: NORMAL
RBC # BLD: 3.72 MIL/MCL (ref 4–5.2)
RVA VP6 STL QL NAA+PROBE: NOT DETECTED
SALMONELLA SP INVA+FLIC STL QL NAA+PROBE: NOT DETECTED
SAPO I+II+IV+V RNA STL QL NAA+NON-PROBE: NOT DETECTED
SHIGELLA SP+EIEC IPAH ST NAA+NON-PROBE: NOT DETECTED
SODIUM SERPL-SCNC: 146 MMOL/L (ref 135–145)
V CHOL+PARA+VUL DNA STL QL NAA+NON-PROBE: NOT DETECTED
VIBRIO CHOL TOXIN CTXA STL QL NAA+PROBE: NOT DETECTED
WBC # BLD: 8 K/MCL (ref 4.2–11)
Y ENTEROCOL DNA STL QL NAA+NON-PROBE: NOT DETECTED

## 2024-08-25 PROCEDURE — 10002800 HB RX 250 W HCPCS: Performed by: HOSPITALIST

## 2024-08-25 PROCEDURE — 10002807 HB RX 258: Performed by: HOSPITALIST

## 2024-08-25 PROCEDURE — 36415 COLL VENOUS BLD VENIPUNCTURE: CPT | Performed by: HOSPITALIST

## 2024-08-25 PROCEDURE — 10002803 HB RX 637: Performed by: HOSPITALIST

## 2024-08-25 PROCEDURE — 85610 PROTHROMBIN TIME: CPT | Performed by: HOSPITALIST

## 2024-08-25 PROCEDURE — 10006031 HB ROOM CHARGE TELEMETRY

## 2024-08-25 PROCEDURE — 85025 COMPLETE CBC W/AUTO DIFF WBC: CPT | Performed by: HOSPITALIST

## 2024-08-25 PROCEDURE — 80053 COMPREHEN METABOLIC PANEL: CPT | Performed by: HOSPITALIST

## 2024-08-25 PROCEDURE — 84132 ASSAY OF SERUM POTASSIUM: CPT | Performed by: HOSPITALIST

## 2024-08-25 PROCEDURE — 10002801 HB RX 250 W/O HCPCS: Performed by: HOSPITALIST

## 2024-08-25 PROCEDURE — 84100 ASSAY OF PHOSPHORUS: CPT | Performed by: HOSPITALIST

## 2024-08-25 PROCEDURE — 10004180 HB COUNTER-TRANSPORT

## 2024-08-25 PROCEDURE — G0378 HOSPITAL OBSERVATION PER HR: HCPCS

## 2024-08-25 PROCEDURE — 83735 ASSAY OF MAGNESIUM: CPT | Performed by: HOSPITALIST

## 2024-08-25 PROCEDURE — 10004651 HB RX, NO CHARGE ITEM: Performed by: HOSPITALIST

## 2024-08-25 RX ORDER — POTASSIUM CHLORIDE 14.9 MG/ML
20 INJECTION INTRAVENOUS ONCE
Status: COMPLETED | OUTPATIENT
Start: 2024-08-25 | End: 2024-08-25

## 2024-08-25 RX ORDER — POTASSIUM CHLORIDE 1500 MG/1
40 TABLET, EXTENDED RELEASE ORAL ONCE
Status: COMPLETED | OUTPATIENT
Start: 2024-08-25 | End: 2024-08-25

## 2024-08-25 RX ORDER — DEXTROSE MONOHYDRATE, SODIUM CHLORIDE, AND POTASSIUM CHLORIDE 50; 1.49; 9 G/1000ML; G/1000ML; G/1000ML
INJECTION, SOLUTION INTRAVENOUS CONTINUOUS
Status: DISCONTINUED | OUTPATIENT
Start: 2024-08-25 | End: 2024-08-26

## 2024-08-25 RX ORDER — PROCHLORPERAZINE EDISYLATE 5 MG/ML
5 INJECTION INTRAMUSCULAR; INTRAVENOUS EVERY 6 HOURS PRN
Status: DISCONTINUED | OUTPATIENT
Start: 2024-08-25 | End: 2024-08-30 | Stop reason: HOSPADM

## 2024-08-25 RX ORDER — BUDESONIDE 3 MG/1
3 CAPSULE, COATED PELLETS ORAL EVERY MORNING
Status: DISCONTINUED | OUTPATIENT
Start: 2024-08-25 | End: 2024-08-30 | Stop reason: HOSPADM

## 2024-08-25 RX ORDER — ONDANSETRON 2 MG/ML
4 INJECTION INTRAMUSCULAR; INTRAVENOUS EVERY 6 HOURS PRN
Status: DISCONTINUED | OUTPATIENT
Start: 2024-08-25 | End: 2024-08-30 | Stop reason: HOSPADM

## 2024-08-25 RX ORDER — ONDANSETRON 4 MG/1
4 TABLET, ORALLY DISINTEGRATING ORAL EVERY 6 HOURS PRN
Status: DISCONTINUED | OUTPATIENT
Start: 2024-08-25 | End: 2024-08-30 | Stop reason: HOSPADM

## 2024-08-25 RX ADMIN — SODIUM CHLORIDE 25 ML: 9 INJECTION, SOLUTION INTRAVENOUS at 16:39

## 2024-08-25 RX ADMIN — POTASSIUM CHLORIDE 20 MEQ: 14.9 INJECTION, SOLUTION INTRAVENOUS at 11:26

## 2024-08-25 RX ADMIN — BACLOFEN 10 MG: 10 TABLET ORAL at 09:56

## 2024-08-25 RX ADMIN — GABAPENTIN 1200 MG: 300 CAPSULE ORAL at 22:24

## 2024-08-25 RX ADMIN — ONDANSETRON 4 MG: 2 INJECTION INTRAMUSCULAR; INTRAVENOUS at 08:15

## 2024-08-25 RX ADMIN — PRAVASTATIN SODIUM 10 MG: 10 TABLET ORAL at 22:21

## 2024-08-25 RX ADMIN — PILOCARPINE HYDROCHLORIDE 5 MG: 5 TABLET, FILM COATED ORAL at 13:18

## 2024-08-25 RX ADMIN — HYDROCORTISONE SODIUM SUCCINATE 50 MG: 100 INJECTION, POWDER, FOR SOLUTION INTRAMUSCULAR; INTRAVENOUS at 06:08

## 2024-08-25 RX ADMIN — MAGNESIUM SULFATE HEPTAHYDRATE 2 G: 40 INJECTION, SOLUTION INTRAVENOUS at 11:35

## 2024-08-25 RX ADMIN — HYDROCORTISONE SODIUM SUCCINATE 50 MG: 100 INJECTION, POWDER, FOR SOLUTION INTRAMUSCULAR; INTRAVENOUS at 22:28

## 2024-08-25 RX ADMIN — POTASSIUM CHLORIDE AND SODIUM CHLORIDE: 900; 150 INJECTION, SOLUTION INTRAVENOUS at 11:22

## 2024-08-25 RX ADMIN — VANCOMYCIN HYDROCHLORIDE 125 MG: 125 CAPSULE ORAL at 18:01

## 2024-08-25 RX ADMIN — PILOCARPINE HYDROCHLORIDE 5 MG: 5 TABLET, FILM COATED ORAL at 22:38

## 2024-08-25 RX ADMIN — SODIUM CHLORIDE 25 ML: 9 INJECTION, SOLUTION INTRAVENOUS at 16:32

## 2024-08-25 RX ADMIN — DEXTROSE MONOHYDRATE, SODIUM CHLORIDE, AND POTASSIUM CHLORIDE: 50; 9; 1.49 INJECTION, SOLUTION INTRAVENOUS at 18:07

## 2024-08-25 RX ADMIN — BACLOFEN 10 MG: 10 TABLET ORAL at 22:21

## 2024-08-25 RX ADMIN — HYDROCORTISONE SODIUM SUCCINATE 50 MG: 100 INJECTION, POWDER, FOR SOLUTION INTRAMUSCULAR; INTRAVENOUS at 16:27

## 2024-08-25 RX ADMIN — GABAPENTIN 900 MG: 300 CAPSULE ORAL at 16:25

## 2024-08-25 RX ADMIN — POTASSIUM CHLORIDE 20 MEQ: 14.9 INJECTION, SOLUTION INTRAVENOUS at 16:41

## 2024-08-25 RX ADMIN — TOPIRAMATE 50 MG: 25 TABLET ORAL at 22:23

## 2024-08-25 RX ADMIN — TOLTERODINE 4 MG: 4 CAPSULE, EXTENDED RELEASE ORAL at 09:56

## 2024-08-25 RX ADMIN — TOPIRAMATE 50 MG: 25 TABLET ORAL at 09:56

## 2024-08-25 RX ADMIN — POTASSIUM CHLORIDE 40 MEQ: 1500 TABLET, EXTENDED RELEASE ORAL at 07:07

## 2024-08-25 RX ADMIN — VANCOMYCIN HYDROCHLORIDE 125 MG: 125 CAPSULE ORAL at 09:56

## 2024-08-25 RX ADMIN — ACETAMINOPHEN 650 MG: 325 TABLET ORAL at 12:30

## 2024-08-25 RX ADMIN — BUDESONIDE 3 MG: 3 CAPSULE, COATED PELLETS ORAL at 18:30

## 2024-08-25 RX ADMIN — METRONIDAZOLE 500 MG: 500 INJECTION, SOLUTION INTRAVENOUS at 16:36

## 2024-08-25 RX ADMIN — PANTOPRAZOLE SODIUM 40 MG: 40 TABLET, DELAYED RELEASE ORAL at 06:06

## 2024-08-25 RX ADMIN — VANCOMYCIN HYDROCHLORIDE 125 MG: 125 CAPSULE ORAL at 22:20

## 2024-08-25 RX ADMIN — BACLOFEN 10 MG: 10 TABLET ORAL at 13:18

## 2024-08-25 RX ADMIN — CYCLOSPORINE 1 DROP: 0.5 EMULSION OPHTHALMIC at 10:00

## 2024-08-25 RX ADMIN — MONTELUKAST SODIUM 10 MG: 10 TABLET, FILM COATED ORAL at 22:19

## 2024-08-25 RX ADMIN — GABAPENTIN 900 MG: 300 CAPSULE ORAL at 09:56

## 2024-08-25 RX ADMIN — HYDROXYCHLOROQUINE SULFATE 400 MG: 200 TABLET, FILM COATED ORAL at 22:22

## 2024-08-25 RX ADMIN — PILOCARPINE HYDROCHLORIDE 5 MG: 5 TABLET, FILM COATED ORAL at 09:56

## 2024-08-25 RX ADMIN — METRONIDAZOLE 500 MG: 500 INJECTION, SOLUTION INTRAVENOUS at 22:44

## 2024-08-25 RX ADMIN — VANCOMYCIN HYDROCHLORIDE 125 MG: 125 CAPSULE ORAL at 13:18

## 2024-08-25 RX ADMIN — METHYLPREDNISOLONE 4 MG: 4 TABLET ORAL at 09:56

## 2024-08-25 RX ADMIN — SODIUM CHLORIDE, PRESERVATIVE FREE 2 ML: 5 INJECTION INTRAVENOUS at 22:29

## 2024-08-25 SDOH — SOCIAL STABILITY: SOCIAL INSECURITY: HOW OFTEN DOES ANYONE, INCLUDING FAMILY AND FRIENDS, PHYSICALLY HURT YOU?: NEVER

## 2024-08-25 SDOH — SOCIAL STABILITY: SOCIAL INSECURITY: HOW OFTEN DOES ANYONE, INCLUDING FAMILY AND FRIENDS, THREATEN YOU WITH HARM?: NEVER

## 2024-08-25 SDOH — SOCIAL STABILITY: SOCIAL INSECURITY: HOW OFTEN DOES ANYONE, INCLUDING FAMILY AND FRIENDS, SCREAM OR CURSE AT YOU?: NEVER

## 2024-08-25 SDOH — SOCIAL STABILITY: SOCIAL INSECURITY: HOW OFTEN DOES ANYONE, INCLUDING FAMILY AND FRIENDS, INSULT OR TALK DOWN TO YOU?: NEVER

## 2024-08-25 ASSESSMENT — PAIN SCALES - GENERAL
PAINLEVEL_OUTOF10: 0
PAINLEVEL_OUTOF10: 5

## 2024-08-25 ASSESSMENT — ORIENTATION MEMORY CONCENTRATION TEST (OMCT)
OMCT INTERPRETATION: 0-6: NO SIGNIFICANT IMPAIRMENT
WHAT MONTH IS IT NOW: CORRECT
OMCT SCORE: 0
REPEAT THE NAME AND ADDRESS I ASKED YOU TO REMEMBER: CORRECT
COUNT BACKWARDS FROM 20 TO 1: CORRECT
WHAT TIME IS IT (NO WATCH OR CLOCK): CORRECT
SAY THE MONTHS IN REVERSE ORDER STARTING WITH LAST MONTH: CORRECT
WHAT YEAR IS IT NOW (MUST BE EXACT): CORRECT

## 2024-08-25 ASSESSMENT — LIFESTYLE VARIABLES
AUDIT-C TOTAL SCORE: 0
HOW OFTEN DO YOU HAVE 6 OR MORE DRINKS ON ONE OCCASION: NEVER
HOW OFTEN DO YOU HAVE A DRINK CONTAINING ALCOHOL: NEVER
HOW MANY STANDARD DRINKS CONTAINING ALCOHOL DO YOU HAVE ON A TYPICAL DAY: 0,1 OR 2
ALCOHOL_USE_STATUS: NO OR LOW RISK WITH VALIDATED TOOL

## 2024-08-25 ASSESSMENT — ACTIVITIES OF DAILY LIVING (ADL)
ADL_BEFORE_ADMISSION: INDEPENDENT
ADL_SCORE: 12

## 2024-08-26 LAB
ANION GAP SERPL CALC-SCNC: 7 MMOL/L (ref 7–19)
BASOPHILS # BLD: 0 K/MCL (ref 0–0.3)
BASOPHILS NFR BLD: 0 %
BUN SERPL-MCNC: 15 MG/DL (ref 6–20)
BUN/CREAT SERPL: 35 (ref 7–25)
CALCIUM SERPL-MCNC: 7.5 MG/DL (ref 8.4–10.2)
CHLORIDE SERPL-SCNC: 120 MMOL/L (ref 97–110)
CO2 SERPL-SCNC: 20 MMOL/L (ref 21–32)
CREAT SERPL-MCNC: 0.43 MG/DL (ref 0.51–0.95)
DEPRECATED RDW RBC: 61.6 FL (ref 39–50)
EGFRCR SERPLBLD CKD-EPI 2021: >90 ML/MIN/{1.73_M2}
EOSINOPHIL # BLD: 0 K/MCL (ref 0–0.5)
EOSINOPHIL NFR BLD: 0 %
ERYTHROCYTE [DISTWIDTH] IN BLOOD: 17.8 % (ref 11–15)
FASTING DURATION TIME PATIENT: ABNORMAL H
GLUCOSE SERPL-MCNC: 159 MG/DL (ref 70–99)
HCT VFR BLD CALC: 33.9 % (ref 36–46.5)
HGB BLD-MCNC: 10.1 G/DL (ref 12–15.5)
IMM GRANULOCYTES # BLD AUTO: 0.1 K/MCL (ref 0–0.2)
IMM GRANULOCYTES # BLD: 1 %
INR PPP: 5.1
LYMPHOCYTES # BLD: 0.5 K/MCL (ref 1–4)
LYMPHOCYTES NFR BLD: 4 %
MAGNESIUM SERPL-MCNC: 2.5 MG/DL (ref 1.7–2.4)
MCH RBC QN AUTO: 27.9 PG (ref 26–34)
MCHC RBC AUTO-ENTMCNC: 29.8 G/DL (ref 32–36.5)
MCV RBC AUTO: 93.6 FL (ref 78–100)
MONOCYTES # BLD: 0.4 K/MCL (ref 0.3–0.9)
MONOCYTES NFR BLD: 4 %
NEUTROPHILS # BLD: 10 K/MCL (ref 1.8–7.7)
NEUTROPHILS NFR BLD: 91 %
NRBC BLD MANUAL-RTO: 0 /100 WBC
PLATELET # BLD AUTO: 122 K/MCL (ref 140–450)
POTASSIUM SERPL-SCNC: 4 MMOL/L (ref 3.4–5.1)
PROTHROMBIN TIME: 47.8 SEC (ref 9.7–11.8)
RBC # BLD: 3.62 MIL/MCL (ref 4–5.2)
SODIUM SERPL-SCNC: 143 MMOL/L (ref 135–145)
WBC # BLD: 11 K/MCL (ref 4.2–11)

## 2024-08-26 PROCEDURE — 99222 1ST HOSP IP/OBS MODERATE 55: CPT | Performed by: INTERNAL MEDICINE

## 2024-08-26 PROCEDURE — 10002803 HB RX 637: Performed by: HOSPITALIST

## 2024-08-26 PROCEDURE — 10002800 HB RX 250 W HCPCS: Performed by: HOSPITALIST

## 2024-08-26 PROCEDURE — 10002801 HB RX 250 W/O HCPCS: Performed by: HOSPITALIST

## 2024-08-26 PROCEDURE — 83735 ASSAY OF MAGNESIUM: CPT | Performed by: HOSPITALIST

## 2024-08-26 PROCEDURE — 36415 COLL VENOUS BLD VENIPUNCTURE: CPT | Performed by: HOSPITALIST

## 2024-08-26 PROCEDURE — 10002801 HB RX 250 W/O HCPCS: Performed by: INTERNAL MEDICINE

## 2024-08-26 PROCEDURE — 10002807 HB RX 258: Performed by: HOSPITALIST

## 2024-08-26 PROCEDURE — 10002803 HB RX 637: Performed by: INTERNAL MEDICINE

## 2024-08-26 PROCEDURE — 85610 PROTHROMBIN TIME: CPT | Performed by: HOSPITALIST

## 2024-08-26 PROCEDURE — 80048 BASIC METABOLIC PNL TOTAL CA: CPT | Performed by: HOSPITALIST

## 2024-08-26 PROCEDURE — 10004651 HB RX, NO CHARGE ITEM: Performed by: HOSPITALIST

## 2024-08-26 PROCEDURE — 10006031 HB ROOM CHARGE TELEMETRY

## 2024-08-26 PROCEDURE — 85025 COMPLETE CBC W/AUTO DIFF WBC: CPT | Performed by: HOSPITALIST

## 2024-08-26 RX ORDER — SILVER SULFADIAZINE 10 MG/G
CREAM TOPICAL DAILY
Status: DISCONTINUED | OUTPATIENT
Start: 2024-08-26 | End: 2024-08-30 | Stop reason: HOSPADM

## 2024-08-26 RX ORDER — DEXTROSE MONOHYDRATE AND SODIUM CHLORIDE 5; .45 G/100ML; G/100ML
INJECTION, SOLUTION INTRAVENOUS CONTINUOUS
Status: DISCONTINUED | OUTPATIENT
Start: 2024-08-26 | End: 2024-08-29

## 2024-08-26 RX ORDER — POTASSIUM CHLORIDE 1500 MG/1
40 TABLET, EXTENDED RELEASE ORAL ONCE
Status: COMPLETED | OUTPATIENT
Start: 2024-08-26 | End: 2024-08-26

## 2024-08-26 RX ADMIN — VANCOMYCIN HYDROCHLORIDE 125 MG: 125 CAPSULE ORAL at 08:09

## 2024-08-26 RX ADMIN — POTASSIUM CHLORIDE 40 MEQ: 1500 TABLET, EXTENDED RELEASE ORAL at 09:35

## 2024-08-26 RX ADMIN — HYDROCORTISONE SODIUM SUCCINATE 50 MG: 100 INJECTION, POWDER, FOR SOLUTION INTRAMUSCULAR; INTRAVENOUS at 23:17

## 2024-08-26 RX ADMIN — DEXTROSE AND SODIUM CHLORIDE: 5; 450 INJECTION, SOLUTION INTRAVENOUS at 23:26

## 2024-08-26 RX ADMIN — VANCOMYCIN HYDROCHLORIDE 125 MG: 125 CAPSULE ORAL at 14:00

## 2024-08-26 RX ADMIN — METOPROLOL SUCCINATE 200 MG: 50 TABLET, EXTENDED RELEASE ORAL at 17:24

## 2024-08-26 RX ADMIN — VANCOMYCIN HYDROCHLORIDE 125 MG: 125 CAPSULE ORAL at 22:17

## 2024-08-26 RX ADMIN — HYDROCORTISONE SODIUM SUCCINATE 50 MG: 100 INJECTION, POWDER, FOR SOLUTION INTRAMUSCULAR; INTRAVENOUS at 14:00

## 2024-08-26 RX ADMIN — PILOCARPINE HYDROCHLORIDE 5 MG: 5 TABLET, FILM COATED ORAL at 14:00

## 2024-08-26 RX ADMIN — HYDROXYCHLOROQUINE SULFATE 400 MG: 200 TABLET, FILM COATED ORAL at 22:18

## 2024-08-26 RX ADMIN — BACLOFEN 10 MG: 10 TABLET ORAL at 22:18

## 2024-08-26 RX ADMIN — VANCOMYCIN HYDROCHLORIDE 125 MG: 125 CAPSULE ORAL at 17:00

## 2024-08-26 RX ADMIN — TOPIRAMATE 50 MG: 25 TABLET ORAL at 22:18

## 2024-08-26 RX ADMIN — GABAPENTIN 900 MG: 300 CAPSULE ORAL at 08:09

## 2024-08-26 RX ADMIN — METRONIDAZOLE 500 MG: 500 INJECTION, SOLUTION INTRAVENOUS at 13:33

## 2024-08-26 RX ADMIN — TOPIRAMATE 50 MG: 25 TABLET ORAL at 08:09

## 2024-08-26 RX ADMIN — GABAPENTIN 900 MG: 300 CAPSULE ORAL at 16:02

## 2024-08-26 RX ADMIN — BUDESONIDE 3 MG: 3 CAPSULE, COATED PELLETS ORAL at 06:19

## 2024-08-26 RX ADMIN — ONDANSETRON 4 MG: 2 INJECTION INTRAMUSCULAR; INTRAVENOUS at 23:20

## 2024-08-26 RX ADMIN — SILVER SULFADIAZINE: 10 CREAM TOPICAL at 14:00

## 2024-08-26 RX ADMIN — PILOCARPINE HYDROCHLORIDE 5 MG: 5 TABLET, FILM COATED ORAL at 08:09

## 2024-08-26 RX ADMIN — PANTOPRAZOLE SODIUM 40 MG: 40 TABLET, DELAYED RELEASE ORAL at 05:38

## 2024-08-26 RX ADMIN — SODIUM CHLORIDE, PRESERVATIVE FREE 2 ML: 5 INJECTION INTRAVENOUS at 08:14

## 2024-08-26 RX ADMIN — DEXTROSE AND SODIUM CHLORIDE: 5; 450 INJECTION, SOLUTION INTRAVENOUS at 16:04

## 2024-08-26 RX ADMIN — PRAVASTATIN SODIUM 10 MG: 10 TABLET ORAL at 22:17

## 2024-08-26 RX ADMIN — BACLOFEN 10 MG: 10 TABLET ORAL at 08:09

## 2024-08-26 RX ADMIN — HYDROCORTISONE SODIUM SUCCINATE 50 MG: 100 INJECTION, POWDER, FOR SOLUTION INTRAMUSCULAR; INTRAVENOUS at 05:39

## 2024-08-26 RX ADMIN — PILOCARPINE HYDROCHLORIDE 5 MG: 5 TABLET, FILM COATED ORAL at 22:18

## 2024-08-26 RX ADMIN — TOLTERODINE 4 MG: 4 CAPSULE, EXTENDED RELEASE ORAL at 08:09

## 2024-08-26 RX ADMIN — METRONIDAZOLE 500 MG: 500 INJECTION, SOLUTION INTRAVENOUS at 05:50

## 2024-08-26 RX ADMIN — MONTELUKAST SODIUM 10 MG: 10 TABLET, FILM COATED ORAL at 22:17

## 2024-08-26 RX ADMIN — BACLOFEN 10 MG: 10 TABLET ORAL at 14:00

## 2024-08-26 RX ADMIN — DEXTROSE MONOHYDRATE, SODIUM CHLORIDE, AND POTASSIUM CHLORIDE: 50; 9; 1.49 INJECTION, SOLUTION INTRAVENOUS at 08:12

## 2024-08-26 RX ADMIN — GABAPENTIN 1200 MG: 300 CAPSULE ORAL at 22:17

## 2024-08-26 RX ADMIN — Medication 3 MG: at 22:17

## 2024-08-26 SDOH — ECONOMIC STABILITY: HOUSING INSECURITY: WHAT IS YOUR LIVING SITUATION TODAY?: I HAVE A STEADY PLACE TO LIVE

## 2024-08-26 ASSESSMENT — ACTIVITIES OF DAILY LIVING (ADL)
PRIOR_ADL: MODIFIED INDEPENDENT
GROOMING: INDEPENDENT
EATING: INDEPENDENT
PRIOR_ADL_BATHING: MODIFIED INDEPENDENT
PRIOR_ADL_TOILETING: MODIFIED INDEPENDENT

## 2024-08-26 ASSESSMENT — COGNITIVE AND FUNCTIONAL STATUS - GENERAL
BECAUSE OF A PHYSICAL, MENTAL, OR EMOTIONAL CONDITION, DO YOU HAVE DIFFICULTY DOING ERRANDS ALONE: NO
DO YOU HAVE SERIOUS DIFFICULTY WALKING OR CLIMBING STAIRS: NO
DO YOU HAVE DIFFICULTY DRESSING OR BATHING: NO
BECAUSE OF A PHYSICAL, MENTAL, OR EMOTIONAL CONDITION, DO YOU HAVE SERIOUS DIFFICULTY CONCENTRATING, REMEMBERING OR MAKING DECISIONS: NO

## 2024-08-26 ASSESSMENT — PAIN SCALES - GENERAL
PAINLEVEL_OUTOF10: 8
PAINLEVEL_OUTOF10: 0

## 2024-08-27 LAB
AEROMONAS SPEC QL CULT: NORMAL
ANION GAP SERPL CALC-SCNC: 9 MMOL/L (ref 7–19)
BASOPHILS # BLD: 0 K/MCL (ref 0–0.3)
BASOPHILS NFR BLD: 0 %
BUN SERPL-MCNC: 16 MG/DL (ref 6–20)
BUN/CREAT SERPL: 39 (ref 7–25)
CALCIUM SERPL-MCNC: 7.8 MG/DL (ref 8.4–10.2)
CHLORIDE SERPL-SCNC: 117 MMOL/L (ref 97–110)
CO2 SERPL-SCNC: 20 MMOL/L (ref 21–32)
CREAT SERPL-MCNC: 0.41 MG/DL (ref 0.51–0.95)
DEPRECATED RDW RBC: 60.3 FL (ref 39–50)
EGFRCR SERPLBLD CKD-EPI 2021: >90 ML/MIN/{1.73_M2}
EOSINOPHIL # BLD: 0 K/MCL (ref 0–0.5)
EOSINOPHIL NFR BLD: 0 %
ERYTHROCYTE [DISTWIDTH] IN BLOOD: 17.6 % (ref 11–15)
FASTING DURATION TIME PATIENT: ABNORMAL H
GLUCOSE SERPL-MCNC: 147 MG/DL (ref 70–99)
HCT VFR BLD CALC: 33.8 % (ref 36–46.5)
HGB BLD-MCNC: 10.1 G/DL (ref 12–15.5)
IMM GRANULOCYTES # BLD AUTO: 0.1 K/MCL (ref 0–0.2)
IMM GRANULOCYTES # BLD: 1 %
INR PPP: 4.3
LYMPHOCYTES # BLD: 0.4 K/MCL (ref 1–4)
LYMPHOCYTES NFR BLD: 4 %
MAGNESIUM SERPL-MCNC: 2.2 MG/DL (ref 1.7–2.4)
MCH RBC QN AUTO: 27.7 PG (ref 26–34)
MCHC RBC AUTO-ENTMCNC: 29.9 G/DL (ref 32–36.5)
MCV RBC AUTO: 92.9 FL (ref 78–100)
MONOCYTES # BLD: 0.4 K/MCL (ref 0.3–0.9)
MONOCYTES NFR BLD: 4 %
NEUTROPHILS # BLD: 8.6 K/MCL (ref 1.8–7.7)
NEUTROPHILS NFR BLD: 91 %
NRBC BLD MANUAL-RTO: 0 /100 WBC
PLATELET # BLD AUTO: 112 K/MCL (ref 140–450)
POTASSIUM SERPL-SCNC: 3.9 MMOL/L (ref 3.4–5.1)
PROTHROMBIN TIME: 40.8 SEC (ref 9.7–11.8)
RBC # BLD: 3.64 MIL/MCL (ref 4–5.2)
SODIUM SERPL-SCNC: 142 MMOL/L (ref 135–145)
WBC # BLD: 9.4 K/MCL (ref 4.2–11)

## 2024-08-27 PROCEDURE — 10004651 HB RX, NO CHARGE ITEM: Performed by: HOSPITALIST

## 2024-08-27 PROCEDURE — 10002801 HB RX 250 W/O HCPCS: Performed by: HOSPITALIST

## 2024-08-27 PROCEDURE — 10002800 HB RX 250 W HCPCS: Performed by: HOSPITALIST

## 2024-08-27 PROCEDURE — 99233 SBSQ HOSP IP/OBS HIGH 50: CPT | Performed by: INTERNAL MEDICINE

## 2024-08-27 PROCEDURE — 10002803 HB RX 637: Performed by: INTERNAL MEDICINE

## 2024-08-27 PROCEDURE — 10002801 HB RX 250 W/O HCPCS: Performed by: INTERNAL MEDICINE

## 2024-08-27 PROCEDURE — 82653 EL-1 FECAL QUANTITATIVE: CPT | Performed by: INTERNAL MEDICINE

## 2024-08-27 PROCEDURE — 85610 PROTHROMBIN TIME: CPT | Performed by: HOSPITALIST

## 2024-08-27 PROCEDURE — 83735 ASSAY OF MAGNESIUM: CPT | Performed by: HOSPITALIST

## 2024-08-27 PROCEDURE — 85025 COMPLETE CBC W/AUTO DIFF WBC: CPT | Performed by: HOSPITALIST

## 2024-08-27 PROCEDURE — 83993 ASSAY FOR CALPROTECTIN FECAL: CPT | Performed by: INTERNAL MEDICINE

## 2024-08-27 PROCEDURE — 80048 BASIC METABOLIC PNL TOTAL CA: CPT | Performed by: HOSPITALIST

## 2024-08-27 PROCEDURE — 10002803 HB RX 637: Performed by: HOSPITALIST

## 2024-08-27 PROCEDURE — 10006031 HB ROOM CHARGE TELEMETRY

## 2024-08-27 PROCEDURE — 36415 COLL VENOUS BLD VENIPUNCTURE: CPT | Performed by: HOSPITALIST

## 2024-08-27 RX ORDER — POTASSIUM CHLORIDE 1500 MG/1
40 TABLET, EXTENDED RELEASE ORAL ONCE
Status: COMPLETED | OUTPATIENT
Start: 2024-08-27 | End: 2024-08-27

## 2024-08-27 RX ORDER — CHOLESTYRAMINE LIGHT 4 G/5.7G
4 POWDER, FOR SUSPENSION ORAL DAILY
Status: DISCONTINUED | OUTPATIENT
Start: 2024-08-27 | End: 2024-08-30 | Stop reason: HOSPADM

## 2024-08-27 RX ADMIN — METRONIDAZOLE 500 MG: 500 INJECTION, SOLUTION INTRAVENOUS at 21:52

## 2024-08-27 RX ADMIN — PANTOPRAZOLE SODIUM 40 MG: 40 TABLET, DELAYED RELEASE ORAL at 05:18

## 2024-08-27 RX ADMIN — VANCOMYCIN HYDROCHLORIDE 125 MG: 125 CAPSULE ORAL at 20:53

## 2024-08-27 RX ADMIN — BACLOFEN 10 MG: 10 TABLET ORAL at 13:20

## 2024-08-27 RX ADMIN — TOPIRAMATE 50 MG: 25 TABLET ORAL at 08:16

## 2024-08-27 RX ADMIN — TOLTERODINE 4 MG: 4 CAPSULE, EXTENDED RELEASE ORAL at 08:16

## 2024-08-27 RX ADMIN — SILVER SULFADIAZINE: 10 CREAM TOPICAL at 08:21

## 2024-08-27 RX ADMIN — PILOCARPINE HYDROCHLORIDE 5 MG: 5 TABLET, FILM COATED ORAL at 08:16

## 2024-08-27 RX ADMIN — BUDESONIDE 3 MG: 3 CAPSULE, COATED PELLETS ORAL at 06:37

## 2024-08-27 RX ADMIN — ACETAMINOPHEN 650 MG: 325 TABLET ORAL at 21:57

## 2024-08-27 RX ADMIN — GABAPENTIN 900 MG: 300 CAPSULE ORAL at 08:16

## 2024-08-27 RX ADMIN — SODIUM CHLORIDE, PRESERVATIVE FREE 2 ML: 5 INJECTION INTRAVENOUS at 20:53

## 2024-08-27 RX ADMIN — HYDROXYCHLOROQUINE SULFATE 400 MG: 200 TABLET, FILM COATED ORAL at 20:52

## 2024-08-27 RX ADMIN — BACLOFEN 10 MG: 10 TABLET ORAL at 20:53

## 2024-08-27 RX ADMIN — VANCOMYCIN HYDROCHLORIDE 125 MG: 125 CAPSULE ORAL at 17:00

## 2024-08-27 RX ADMIN — BACLOFEN 10 MG: 10 TABLET ORAL at 08:16

## 2024-08-27 RX ADMIN — VANCOMYCIN HYDROCHLORIDE 125 MG: 125 CAPSULE ORAL at 08:16

## 2024-08-27 RX ADMIN — HYDROCORTISONE SODIUM SUCCINATE 50 MG: 100 INJECTION, POWDER, FOR SOLUTION INTRAMUSCULAR; INTRAVENOUS at 13:20

## 2024-08-27 RX ADMIN — GABAPENTIN 1200 MG: 300 CAPSULE ORAL at 20:53

## 2024-08-27 RX ADMIN — PILOCARPINE HYDROCHLORIDE 5 MG: 5 TABLET, FILM COATED ORAL at 13:20

## 2024-08-27 RX ADMIN — PRAVASTATIN SODIUM 10 MG: 10 TABLET ORAL at 20:53

## 2024-08-27 RX ADMIN — PILOCARPINE HYDROCHLORIDE 5 MG: 5 TABLET, FILM COATED ORAL at 21:44

## 2024-08-27 RX ADMIN — METRONIDAZOLE 500 MG: 500 INJECTION, SOLUTION INTRAVENOUS at 05:22

## 2024-08-27 RX ADMIN — Medication 3 MG: at 21:57

## 2024-08-27 RX ADMIN — GABAPENTIN 900 MG: 300 CAPSULE ORAL at 16:23

## 2024-08-27 RX ADMIN — POTASSIUM CHLORIDE 40 MEQ: 1500 TABLET, EXTENDED RELEASE ORAL at 11:11

## 2024-08-27 RX ADMIN — CHOLESTYRAMINE 4 G: 4 POWDER, FOR SUSPENSION ORAL at 16:22

## 2024-08-27 RX ADMIN — VANCOMYCIN HYDROCHLORIDE 125 MG: 125 CAPSULE ORAL at 13:20

## 2024-08-27 RX ADMIN — MONTELUKAST SODIUM 10 MG: 10 TABLET, FILM COATED ORAL at 20:53

## 2024-08-27 RX ADMIN — HYDROCORTISONE SODIUM SUCCINATE 50 MG: 100 INJECTION, POWDER, FOR SOLUTION INTRAMUSCULAR; INTRAVENOUS at 05:23

## 2024-08-27 RX ADMIN — METOPROLOL SUCCINATE 200 MG: 50 TABLET, EXTENDED RELEASE ORAL at 18:00

## 2024-08-27 RX ADMIN — TOPIRAMATE 50 MG: 25 TABLET ORAL at 20:53

## 2024-08-27 RX ADMIN — HYDROCORTISONE SODIUM SUCCINATE 50 MG: 100 INJECTION, POWDER, FOR SOLUTION INTRAMUSCULAR; INTRAVENOUS at 21:44

## 2024-08-27 RX ADMIN — ONDANSETRON 4 MG: 2 INJECTION INTRAMUSCULAR; INTRAVENOUS at 21:44

## 2024-08-27 RX ADMIN — METRONIDAZOLE 500 MG: 500 INJECTION, SOLUTION INTRAVENOUS at 13:21

## 2024-08-27 RX ADMIN — ACETAMINOPHEN 650 MG: 325 TABLET ORAL at 05:18

## 2024-08-27 RX ADMIN — DEXTROSE AND SODIUM CHLORIDE: 5; 450 INJECTION, SOLUTION INTRAVENOUS at 21:54

## 2024-08-27 ASSESSMENT — PAIN SCALES - GENERAL
PAINLEVEL_OUTOF10: 8
PAINLEVEL_OUTOF10: 4
PAINLEVEL_OUTOF10: 9
PAINLEVEL_OUTOF10: 3

## 2024-08-28 LAB
ANION GAP SERPL CALC-SCNC: 11 MMOL/L (ref 7–19)
BASOPHILS # BLD: 0 K/MCL (ref 0–0.3)
BASOPHILS NFR BLD: 0 %
BUN SERPL-MCNC: 12 MG/DL (ref 6–20)
BUN/CREAT SERPL: 26 (ref 7–25)
CALCIUM SERPL-MCNC: 8 MG/DL (ref 8.4–10.2)
CALPROTECTIN STL-MCNT: <27 UG/G
CHLORIDE SERPL-SCNC: 116 MMOL/L (ref 97–110)
CO2 SERPL-SCNC: 21 MMOL/L (ref 21–32)
CREAT SERPL-MCNC: 0.47 MG/DL (ref 0.51–0.95)
DEPRECATED RDW RBC: 60 FL (ref 39–50)
EGFRCR SERPLBLD CKD-EPI 2021: >90 ML/MIN/{1.73_M2}
ELASTASE PANC STL-MCNT: 765 UG/G
EOSINOPHIL # BLD: 0 K/MCL (ref 0–0.5)
EOSINOPHIL NFR BLD: 0 %
ERYTHROCYTE [DISTWIDTH] IN BLOOD: 17.5 % (ref 11–15)
FASTING DURATION TIME PATIENT: ABNORMAL H
GLUCOSE SERPL-MCNC: 133 MG/DL (ref 70–99)
HCT VFR BLD CALC: 34.8 % (ref 36–46.5)
HGB BLD-MCNC: 10.3 G/DL (ref 12–15.5)
IMM GRANULOCYTES # BLD AUTO: 0.1 K/MCL (ref 0–0.2)
IMM GRANULOCYTES # BLD: 1 %
INR PPP: 2.7
LYMPHOCYTES # BLD: 0.4 K/MCL (ref 1–4)
LYMPHOCYTES NFR BLD: 5 %
MAGNESIUM SERPL-MCNC: 2 MG/DL (ref 1.7–2.4)
MCH RBC QN AUTO: 27.2 PG (ref 26–34)
MCHC RBC AUTO-ENTMCNC: 29.6 G/DL (ref 32–36.5)
MCV RBC AUTO: 92.1 FL (ref 78–100)
MONOCYTES # BLD: 0.5 K/MCL (ref 0.3–0.9)
MONOCYTES NFR BLD: 5 %
NEUTROPHILS # BLD: 8.2 K/MCL (ref 1.8–7.7)
NEUTROPHILS NFR BLD: 89 %
NRBC BLD MANUAL-RTO: 0 /100 WBC
PLATELET # BLD AUTO: 113 K/MCL (ref 140–450)
POTASSIUM SERPL-SCNC: 3.5 MMOL/L (ref 3.4–5.1)
POTASSIUM SERPL-SCNC: 3.6 MMOL/L (ref 3.4–5.1)
PROTHROMBIN TIME: 26.7 SEC (ref 9.7–11.8)
RBC # BLD: 3.78 MIL/MCL (ref 4–5.2)
SODIUM SERPL-SCNC: 144 MMOL/L (ref 135–145)
WBC # BLD: 9.2 K/MCL (ref 4.2–11)

## 2024-08-28 PROCEDURE — 10002803 HB RX 637: Performed by: HOSPITALIST

## 2024-08-28 PROCEDURE — 85025 COMPLETE CBC W/AUTO DIFF WBC: CPT | Performed by: HOSPITALIST

## 2024-08-28 PROCEDURE — 36415 COLL VENOUS BLD VENIPUNCTURE: CPT | Performed by: HOSPITALIST

## 2024-08-28 PROCEDURE — 10002803 HB RX 637: Performed by: INTERNAL MEDICINE

## 2024-08-28 PROCEDURE — 10004651 HB RX, NO CHARGE ITEM: Performed by: HOSPITALIST

## 2024-08-28 PROCEDURE — 10006031 HB ROOM CHARGE TELEMETRY

## 2024-08-28 PROCEDURE — 10002801 HB RX 250 W/O HCPCS: Performed by: INTERNAL MEDICINE

## 2024-08-28 PROCEDURE — 10002800 HB RX 250 W HCPCS: Performed by: HOSPITALIST

## 2024-08-28 PROCEDURE — 83735 ASSAY OF MAGNESIUM: CPT | Performed by: HOSPITALIST

## 2024-08-28 PROCEDURE — 10002801 HB RX 250 W/O HCPCS: Performed by: HOSPITALIST

## 2024-08-28 PROCEDURE — 99233 SBSQ HOSP IP/OBS HIGH 50: CPT | Performed by: INTERNAL MEDICINE

## 2024-08-28 PROCEDURE — 84132 ASSAY OF SERUM POTASSIUM: CPT | Performed by: INTERNAL MEDICINE

## 2024-08-28 PROCEDURE — 85610 PROTHROMBIN TIME: CPT | Performed by: HOSPITALIST

## 2024-08-28 PROCEDURE — 10004180 HB COUNTER-TRANSPORT

## 2024-08-28 PROCEDURE — 94640 AIRWAY INHALATION TREATMENT: CPT

## 2024-08-28 PROCEDURE — 80048 BASIC METABOLIC PNL TOTAL CA: CPT | Performed by: HOSPITALIST

## 2024-08-28 RX ORDER — VANCOMYCIN HYDROCHLORIDE 125 MG/1
125 CAPSULE ORAL DAILY
Status: COMPLETED | OUTPATIENT
Start: 2024-08-29 | End: 2024-08-30

## 2024-08-28 RX ORDER — WARFARIN SODIUM 1 MG/1
1 TABLET ORAL ONCE
Status: COMPLETED | OUTPATIENT
Start: 2024-08-28 | End: 2024-08-28

## 2024-08-28 RX ORDER — MORPHINE SULFATE 15 MG/1
15 TABLET, FILM COATED, EXTENDED RELEASE ORAL EVERY 12 HOURS PRN
Status: DISCONTINUED | OUTPATIENT
Start: 2024-08-28 | End: 2024-08-30 | Stop reason: HOSPADM

## 2024-08-28 RX ORDER — POTASSIUM CHLORIDE 1500 MG/1
40 TABLET, EXTENDED RELEASE ORAL ONCE
Status: COMPLETED | OUTPATIENT
Start: 2024-08-28 | End: 2024-08-28

## 2024-08-28 RX ADMIN — DEXTROSE AND SODIUM CHLORIDE: 5; 450 INJECTION, SOLUTION INTRAVENOUS at 21:44

## 2024-08-28 RX ADMIN — TOPIRAMATE 50 MG: 25 TABLET ORAL at 09:30

## 2024-08-28 RX ADMIN — HYDROCORTISONE SODIUM SUCCINATE 50 MG: 100 INJECTION, POWDER, FOR SOLUTION INTRAMUSCULAR; INTRAVENOUS at 14:07

## 2024-08-28 RX ADMIN — VANCOMYCIN HYDROCHLORIDE 125 MG: 125 CAPSULE ORAL at 09:31

## 2024-08-28 RX ADMIN — GABAPENTIN 1200 MG: 300 CAPSULE ORAL at 21:44

## 2024-08-28 RX ADMIN — PILOCARPINE HYDROCHLORIDE 5 MG: 5 TABLET, FILM COATED ORAL at 11:11

## 2024-08-28 RX ADMIN — FLUTICASONE FUROATE AND VILANTEROL TRIFENATATE 1 PUFF: 100; 25 POWDER RESPIRATORY (INHALATION) at 09:51

## 2024-08-28 RX ADMIN — MORPHINE SULFATE 15 MG: 15 TABLET, FILM COATED, EXTENDED RELEASE ORAL at 05:06

## 2024-08-28 RX ADMIN — HYDROCORTISONE SODIUM SUCCINATE 50 MG: 100 INJECTION, POWDER, FOR SOLUTION INTRAMUSCULAR; INTRAVENOUS at 05:07

## 2024-08-28 RX ADMIN — SODIUM CHLORIDE, PRESERVATIVE FREE 2 ML: 5 INJECTION INTRAVENOUS at 21:47

## 2024-08-28 RX ADMIN — WARFARIN SODIUM 1 MG: 1 TABLET ORAL at 14:04

## 2024-08-28 RX ADMIN — MORPHINE SULFATE 15 MG: 15 TABLET, FILM COATED, EXTENDED RELEASE ORAL at 21:45

## 2024-08-28 RX ADMIN — POTASSIUM CHLORIDE 40 MEQ: 1500 TABLET, EXTENDED RELEASE ORAL at 09:31

## 2024-08-28 RX ADMIN — PILOCARPINE HYDROCHLORIDE 5 MG: 5 TABLET, FILM COATED ORAL at 16:33

## 2024-08-28 RX ADMIN — BACLOFEN 10 MG: 10 TABLET ORAL at 09:30

## 2024-08-28 RX ADMIN — VANCOMYCIN HYDROCHLORIDE 125 MG: 125 CAPSULE ORAL at 16:32

## 2024-08-28 RX ADMIN — GABAPENTIN 900 MG: 300 CAPSULE ORAL at 09:31

## 2024-08-28 RX ADMIN — HYDROXYCHLOROQUINE SULFATE 400 MG: 200 TABLET, FILM COATED ORAL at 21:45

## 2024-08-28 RX ADMIN — HYDROCORTISONE SODIUM SUCCINATE 50 MG: 100 INJECTION, POWDER, FOR SOLUTION INTRAMUSCULAR; INTRAVENOUS at 21:44

## 2024-08-28 RX ADMIN — VANCOMYCIN HYDROCHLORIDE 125 MG: 125 CAPSULE ORAL at 14:03

## 2024-08-28 RX ADMIN — PANTOPRAZOLE SODIUM 40 MG: 40 TABLET, DELAYED RELEASE ORAL at 05:06

## 2024-08-28 RX ADMIN — SODIUM CHLORIDE, PRESERVATIVE FREE 2 ML: 5 INJECTION INTRAVENOUS at 09:37

## 2024-08-28 RX ADMIN — MONTELUKAST SODIUM 10 MG: 10 TABLET, FILM COATED ORAL at 21:44

## 2024-08-28 RX ADMIN — TOPIRAMATE 50 MG: 25 TABLET ORAL at 21:45

## 2024-08-28 RX ADMIN — METOPROLOL SUCCINATE 200 MG: 50 TABLET, EXTENDED RELEASE ORAL at 16:28

## 2024-08-28 RX ADMIN — BUDESONIDE 3 MG: 3 CAPSULE, COATED PELLETS ORAL at 06:21

## 2024-08-28 RX ADMIN — PRAVASTATIN SODIUM 10 MG: 10 TABLET ORAL at 21:44

## 2024-08-28 RX ADMIN — TOLTERODINE 4 MG: 4 CAPSULE, EXTENDED RELEASE ORAL at 09:30

## 2024-08-28 RX ADMIN — POTASSIUM CHLORIDE 40 MEQ: 1500 TABLET, EXTENDED RELEASE ORAL at 16:32

## 2024-08-28 RX ADMIN — PILOCARPINE HYDROCHLORIDE 5 MG: 5 TABLET, FILM COATED ORAL at 21:58

## 2024-08-28 RX ADMIN — GABAPENTIN 900 MG: 300 CAPSULE ORAL at 16:32

## 2024-08-28 RX ADMIN — DEXTROSE AND SODIUM CHLORIDE: 5; 450 INJECTION, SOLUTION INTRAVENOUS at 11:24

## 2024-08-28 RX ADMIN — CHOLESTYRAMINE 4 G: 4 POWDER, FOR SUSPENSION ORAL at 09:31

## 2024-08-28 RX ADMIN — BACLOFEN 10 MG: 10 TABLET ORAL at 21:44

## 2024-08-28 RX ADMIN — METRONIDAZOLE 500 MG: 500 INJECTION, SOLUTION INTRAVENOUS at 05:17

## 2024-08-28 RX ADMIN — BACLOFEN 10 MG: 10 TABLET ORAL at 14:04

## 2024-08-28 RX ADMIN — METRONIDAZOLE 500 MG: 500 INJECTION, SOLUTION INTRAVENOUS at 14:13

## 2024-08-28 ASSESSMENT — PAIN SCALES - GENERAL
PAINLEVEL_OUTOF10: 0
PAINLEVEL_OUTOF10: 10
PAINLEVEL_OUTOF10: 9

## 2024-08-29 LAB
ANION GAP SERPL CALC-SCNC: 10 MMOL/L (ref 7–19)
BASOPHILS # BLD: 0 K/MCL (ref 0–0.3)
BASOPHILS NFR BLD: 0 %
BUN SERPL-MCNC: 17 MG/DL (ref 6–20)
BUN/CREAT SERPL: 43 (ref 7–25)
CALCIUM SERPL-MCNC: 8.1 MG/DL (ref 8.4–10.2)
CHLORIDE SERPL-SCNC: 114 MMOL/L (ref 97–110)
CO2 SERPL-SCNC: 20 MMOL/L (ref 21–32)
CREAT SERPL-MCNC: 0.4 MG/DL (ref 0.51–0.95)
DEPRECATED RDW RBC: 60.2 FL (ref 39–50)
EGFRCR SERPLBLD CKD-EPI 2021: >90 ML/MIN/{1.73_M2}
EOSINOPHIL # BLD: 0 K/MCL (ref 0–0.5)
EOSINOPHIL NFR BLD: 0 %
ERYTHROCYTE [DISTWIDTH] IN BLOOD: 17.5 % (ref 11–15)
FASTING DURATION TIME PATIENT: ABNORMAL H
GLUCOSE SERPL-MCNC: 121 MG/DL (ref 70–99)
HCT VFR BLD CALC: 35.9 % (ref 36–46.5)
HGB BLD-MCNC: 10.7 G/DL (ref 12–15.5)
IMM GRANULOCYTES # BLD AUTO: 0.1 K/MCL (ref 0–0.2)
IMM GRANULOCYTES # BLD: 1 %
INR PPP: 2.8
LYMPHOCYTES # BLD: 0.5 K/MCL (ref 1–4)
LYMPHOCYTES NFR BLD: 5 %
MAGNESIUM SERPL-MCNC: 2.1 MG/DL (ref 1.7–2.4)
MCH RBC QN AUTO: 27.8 PG (ref 26–34)
MCHC RBC AUTO-ENTMCNC: 29.8 G/DL (ref 32–36.5)
MCV RBC AUTO: 93.2 FL (ref 78–100)
MONOCYTES # BLD: 0.5 K/MCL (ref 0.3–0.9)
MONOCYTES NFR BLD: 5 %
NEUTROPHILS # BLD: 8.8 K/MCL (ref 1.8–7.7)
NEUTROPHILS NFR BLD: 89 %
NRBC BLD MANUAL-RTO: 0 /100 WBC
PLATELET # BLD AUTO: 112 K/MCL (ref 140–450)
POTASSIUM SERPL-SCNC: 4.2 MMOL/L (ref 3.4–5.1)
PROTHROMBIN TIME: 27.6 SEC (ref 9.7–11.8)
RBC # BLD: 3.85 MIL/MCL (ref 4–5.2)
SODIUM SERPL-SCNC: 140 MMOL/L (ref 135–145)
WBC # BLD: 9.9 K/MCL (ref 4.2–11)

## 2024-08-29 PROCEDURE — 97161 PT EVAL LOW COMPLEX 20 MIN: CPT

## 2024-08-29 PROCEDURE — 10002801 HB RX 250 W/O HCPCS: Performed by: HOSPITALIST

## 2024-08-29 PROCEDURE — 80048 BASIC METABOLIC PNL TOTAL CA: CPT | Performed by: HOSPITALIST

## 2024-08-29 PROCEDURE — 10004180 HB COUNTER-TRANSPORT

## 2024-08-29 PROCEDURE — 94640 AIRWAY INHALATION TREATMENT: CPT

## 2024-08-29 PROCEDURE — 97535 SELF CARE MNGMENT TRAINING: CPT

## 2024-08-29 PROCEDURE — 10006031 HB ROOM CHARGE TELEMETRY

## 2024-08-29 PROCEDURE — 85025 COMPLETE CBC W/AUTO DIFF WBC: CPT | Performed by: HOSPITALIST

## 2024-08-29 PROCEDURE — 10002801 HB RX 250 W/O HCPCS: Performed by: INTERNAL MEDICINE

## 2024-08-29 PROCEDURE — 10004651 HB RX, NO CHARGE ITEM: Performed by: HOSPITALIST

## 2024-08-29 PROCEDURE — 97116 GAIT TRAINING THERAPY: CPT

## 2024-08-29 PROCEDURE — 99233 SBSQ HOSP IP/OBS HIGH 50: CPT | Performed by: STUDENT IN AN ORGANIZED HEALTH CARE EDUCATION/TRAINING PROGRAM

## 2024-08-29 PROCEDURE — 10002803 HB RX 637: Performed by: INTERNAL MEDICINE

## 2024-08-29 PROCEDURE — 83735 ASSAY OF MAGNESIUM: CPT | Performed by: HOSPITALIST

## 2024-08-29 PROCEDURE — 10002800 HB RX 250 W HCPCS: Performed by: HOSPITALIST

## 2024-08-29 PROCEDURE — 97165 OT EVAL LOW COMPLEX 30 MIN: CPT

## 2024-08-29 PROCEDURE — 10002803 HB RX 637: Performed by: HOSPITALIST

## 2024-08-29 PROCEDURE — 36415 COLL VENOUS BLD VENIPUNCTURE: CPT | Performed by: HOSPITALIST

## 2024-08-29 PROCEDURE — 85610 PROTHROMBIN TIME: CPT | Performed by: HOSPITALIST

## 2024-08-29 RX ORDER — WARFARIN SODIUM 1 MG/1
1 TABLET ORAL ONCE
Status: COMPLETED | OUTPATIENT
Start: 2024-08-29 | End: 2024-08-29

## 2024-08-29 RX ADMIN — WARFARIN SODIUM 1 MG: 1 TABLET ORAL at 14:55

## 2024-08-29 RX ADMIN — BACLOFEN 10 MG: 10 TABLET ORAL at 14:54

## 2024-08-29 RX ADMIN — HYDROXYCHLOROQUINE SULFATE 400 MG: 200 TABLET, FILM COATED ORAL at 21:45

## 2024-08-29 RX ADMIN — PILOCARPINE HYDROCHLORIDE 5 MG: 5 TABLET, FILM COATED ORAL at 21:47

## 2024-08-29 RX ADMIN — SILVER SULFADIAZINE: 10 CREAM TOPICAL at 11:21

## 2024-08-29 RX ADMIN — GABAPENTIN 900 MG: 300 CAPSULE ORAL at 10:03

## 2024-08-29 RX ADMIN — HYDROCORTISONE SODIUM SUCCINATE 50 MG: 100 INJECTION, POWDER, FOR SOLUTION INTRAMUSCULAR; INTRAVENOUS at 14:58

## 2024-08-29 RX ADMIN — PILOCARPINE HYDROCHLORIDE 5 MG: 5 TABLET, FILM COATED ORAL at 14:55

## 2024-08-29 RX ADMIN — SODIUM CHLORIDE, PRESERVATIVE FREE 2 ML: 5 INJECTION INTRAVENOUS at 10:01

## 2024-08-29 RX ADMIN — CHOLESTYRAMINE 4 G: 4 POWDER, FOR SUSPENSION ORAL at 17:46

## 2024-08-29 RX ADMIN — GABAPENTIN 1200 MG: 300 CAPSULE ORAL at 21:46

## 2024-08-29 RX ADMIN — FLUTICASONE FUROATE AND VILANTEROL TRIFENATATE 1 PUFF: 100; 25 POWDER RESPIRATORY (INHALATION) at 10:12

## 2024-08-29 RX ADMIN — BACLOFEN 10 MG: 10 TABLET ORAL at 10:03

## 2024-08-29 RX ADMIN — BUDESONIDE 3 MG: 3 CAPSULE, COATED PELLETS ORAL at 06:21

## 2024-08-29 RX ADMIN — SODIUM CHLORIDE, PRESERVATIVE FREE 2 ML: 5 INJECTION INTRAVENOUS at 21:43

## 2024-08-29 RX ADMIN — MONTELUKAST SODIUM 10 MG: 10 TABLET, FILM COATED ORAL at 21:47

## 2024-08-29 RX ADMIN — GABAPENTIN 900 MG: 300 CAPSULE ORAL at 14:55

## 2024-08-29 RX ADMIN — ACETAMINOPHEN 650 MG: 325 TABLET ORAL at 14:53

## 2024-08-29 RX ADMIN — DEXTROSE AND SODIUM CHLORIDE: 5; 450 INJECTION, SOLUTION INTRAVENOUS at 09:58

## 2024-08-29 RX ADMIN — BACLOFEN 10 MG: 10 TABLET ORAL at 21:46

## 2024-08-29 RX ADMIN — PANTOPRAZOLE SODIUM 40 MG: 40 TABLET, DELAYED RELEASE ORAL at 06:21

## 2024-08-29 RX ADMIN — PILOCARPINE HYDROCHLORIDE 5 MG: 5 TABLET, FILM COATED ORAL at 10:04

## 2024-08-29 RX ADMIN — HYDROCORTISONE SODIUM SUCCINATE 50 MG: 100 INJECTION, POWDER, FOR SOLUTION INTRAMUSCULAR; INTRAVENOUS at 21:47

## 2024-08-29 RX ADMIN — METOPROLOL SUCCINATE 200 MG: 50 TABLET, EXTENDED RELEASE ORAL at 15:53

## 2024-08-29 RX ADMIN — MORPHINE SULFATE 15 MG: 15 TABLET, FILM COATED, EXTENDED RELEASE ORAL at 21:46

## 2024-08-29 RX ADMIN — HYDROCORTISONE SODIUM SUCCINATE 50 MG: 100 INJECTION, POWDER, FOR SOLUTION INTRAMUSCULAR; INTRAVENOUS at 06:21

## 2024-08-29 RX ADMIN — VANCOMYCIN HYDROCHLORIDE 125 MG: 125 CAPSULE ORAL at 10:04

## 2024-08-29 RX ADMIN — PRAVASTATIN SODIUM 10 MG: 10 TABLET ORAL at 21:47

## 2024-08-29 RX ADMIN — TOPIRAMATE 50 MG: 25 TABLET ORAL at 11:05

## 2024-08-29 RX ADMIN — TOLTERODINE 4 MG: 4 CAPSULE, EXTENDED RELEASE ORAL at 10:03

## 2024-08-29 RX ADMIN — TOPIRAMATE 50 MG: 25 TABLET ORAL at 21:46

## 2024-08-29 ASSESSMENT — COGNITIVE AND FUNCTIONAL STATUS - GENERAL
DAILY_ACTIVITY_CONVERTED_SCORE: 57.54
BASIC_MOBILITY_CONVERTED_SCORE: 43.99
DAILY_ACTIVITY_RAW_SCORE: 24
BASIC_MOBILITY_RAW_SCORE: 20

## 2024-08-29 ASSESSMENT — PAIN SCALES - GENERAL
PAINLEVEL_OUTOF10: 10
PAINLEVEL_OUTOF10: 9
PAINLEVEL_OUTOF10: 0
PAINLEVEL_OUTOF10: 9

## 2024-08-29 ASSESSMENT — ENCOUNTER SYMPTOMS: PAIN SEVERITY NOW: 10

## 2024-08-29 ASSESSMENT — ACTIVITIES OF DAILY LIVING (ADL): HOME_MANAGEMENT_TIME_ENTRY: 15

## 2024-08-30 VITALS
OXYGEN SATURATION: 99 % | HEART RATE: 64 BPM | HEIGHT: 61 IN | RESPIRATION RATE: 12 BRPM | BODY MASS INDEX: 18.77 KG/M2 | TEMPERATURE: 97.5 F | WEIGHT: 99.43 LBS | DIASTOLIC BLOOD PRESSURE: 69 MMHG | SYSTOLIC BLOOD PRESSURE: 144 MMHG

## 2024-08-30 LAB
ANION GAP SERPL CALC-SCNC: 10 MMOL/L (ref 7–19)
BASOPHILS # BLD: 0 K/MCL (ref 0–0.3)
BASOPHILS NFR BLD: 0 %
BUN SERPL-MCNC: 18 MG/DL (ref 6–20)
BUN/CREAT SERPL: 46 (ref 7–25)
CALCIUM SERPL-MCNC: 8.2 MG/DL (ref 8.4–10.2)
CHLORIDE SERPL-SCNC: 113 MMOL/L (ref 97–110)
CO2 SERPL-SCNC: 22 MMOL/L (ref 21–32)
CREAT SERPL-MCNC: 0.39 MG/DL (ref 0.51–0.95)
DEPRECATED RDW RBC: 59.5 FL (ref 39–50)
EGFRCR SERPLBLD CKD-EPI 2021: >90 ML/MIN/{1.73_M2}
EOSINOPHIL # BLD: 0 K/MCL (ref 0–0.5)
EOSINOPHIL NFR BLD: 0 %
ERYTHROCYTE [DISTWIDTH] IN BLOOD: 17.7 % (ref 11–15)
FASTING DURATION TIME PATIENT: ABNORMAL H
GLUCOSE SERPL-MCNC: 98 MG/DL (ref 70–99)
HCT VFR BLD CALC: 37.3 % (ref 36–46.5)
HGB BLD-MCNC: 11 G/DL (ref 12–15.5)
IMM GRANULOCYTES # BLD AUTO: 0.2 K/MCL (ref 0–0.2)
IMM GRANULOCYTES # BLD: 2 %
INR PPP: 2.7
LYMPHOCYTES # BLD: 0.5 K/MCL (ref 1–4)
LYMPHOCYTES NFR BLD: 6 %
MAGNESIUM SERPL-MCNC: 2.1 MG/DL (ref 1.7–2.4)
MCH RBC QN AUTO: 27 PG (ref 26–34)
MCHC RBC AUTO-ENTMCNC: 29.5 G/DL (ref 32–36.5)
MCV RBC AUTO: 91.4 FL (ref 78–100)
MONOCYTES # BLD: 0.4 K/MCL (ref 0.3–0.9)
MONOCYTES NFR BLD: 5 %
NEUTROPHILS # BLD: 7.3 K/MCL (ref 1.8–7.7)
NEUTROPHILS NFR BLD: 87 %
NRBC BLD MANUAL-RTO: 0 /100 WBC
PLATELET # BLD AUTO: 115 K/MCL (ref 140–450)
POTASSIUM SERPL-SCNC: 3.4 MMOL/L (ref 3.4–5.1)
PROTHROMBIN TIME: 26.4 SEC (ref 9.7–11.8)
RBC # BLD: 4.08 MIL/MCL (ref 4–5.2)
SODIUM SERPL-SCNC: 142 MMOL/L (ref 135–145)
WBC # BLD: 8.4 K/MCL (ref 4.2–11)

## 2024-08-30 PROCEDURE — 10004651 HB RX, NO CHARGE ITEM: Performed by: HOSPITALIST

## 2024-08-30 PROCEDURE — 10002803 HB RX 637: Performed by: HOSPITALIST

## 2024-08-30 PROCEDURE — 10002800 HB RX 250 W HCPCS: Performed by: HOSPITALIST

## 2024-08-30 PROCEDURE — 10002801 HB RX 250 W/O HCPCS: Performed by: INTERNAL MEDICINE

## 2024-08-30 PROCEDURE — 85025 COMPLETE CBC W/AUTO DIFF WBC: CPT | Performed by: HOSPITALIST

## 2024-08-30 PROCEDURE — 80048 BASIC METABOLIC PNL TOTAL CA: CPT | Performed by: HOSPITALIST

## 2024-08-30 PROCEDURE — 10004180 HB COUNTER-TRANSPORT

## 2024-08-30 PROCEDURE — 94640 AIRWAY INHALATION TREATMENT: CPT

## 2024-08-30 PROCEDURE — 85610 PROTHROMBIN TIME: CPT | Performed by: HOSPITALIST

## 2024-08-30 PROCEDURE — 83735 ASSAY OF MAGNESIUM: CPT | Performed by: HOSPITALIST

## 2024-08-30 PROCEDURE — 36415 COLL VENOUS BLD VENIPUNCTURE: CPT | Performed by: HOSPITALIST

## 2024-08-30 RX ORDER — WARFARIN SODIUM 1 MG/1
1 TABLET ORAL ONCE
Status: DISCONTINUED | OUTPATIENT
Start: 2024-08-30 | End: 2024-08-30 | Stop reason: HOSPADM

## 2024-08-30 RX ADMIN — FLUTICASONE FUROATE AND VILANTEROL TRIFENATATE 1 PUFF: 100; 25 POWDER RESPIRATORY (INHALATION) at 11:00

## 2024-08-30 RX ADMIN — CYCLOSPORINE 1 DROP: 0.5 EMULSION OPHTHALMIC at 08:30

## 2024-08-30 RX ADMIN — HYDROCORTISONE SODIUM SUCCINATE 50 MG: 100 INJECTION, POWDER, FOR SOLUTION INTRAMUSCULAR; INTRAVENOUS at 06:08

## 2024-08-30 RX ADMIN — BUDESONIDE 3 MG: 3 CAPSULE, COATED PELLETS ORAL at 06:08

## 2024-08-30 RX ADMIN — TOPIRAMATE 50 MG: 25 TABLET ORAL at 08:29

## 2024-08-30 RX ADMIN — SODIUM CHLORIDE, PRESERVATIVE FREE 2 ML: 5 INJECTION INTRAVENOUS at 08:34

## 2024-08-30 RX ADMIN — TOLTERODINE 4 MG: 4 CAPSULE, EXTENDED RELEASE ORAL at 08:29

## 2024-08-30 RX ADMIN — PILOCARPINE HYDROCHLORIDE 5 MG: 5 TABLET, FILM COATED ORAL at 08:29

## 2024-08-30 RX ADMIN — GABAPENTIN 900 MG: 300 CAPSULE ORAL at 08:29

## 2024-08-30 RX ADMIN — ACETAMINOPHEN 650 MG: 325 TABLET ORAL at 00:49

## 2024-08-30 RX ADMIN — BACLOFEN 10 MG: 10 TABLET ORAL at 08:29

## 2024-08-30 RX ADMIN — PANTOPRAZOLE SODIUM 40 MG: 40 TABLET, DELAYED RELEASE ORAL at 06:08

## 2024-08-30 RX ADMIN — VANCOMYCIN HYDROCHLORIDE 125 MG: 125 CAPSULE ORAL at 08:30

## 2024-08-30 ASSESSMENT — PAIN SCALES - GENERAL
PAINLEVEL_OUTOF10: 9
PAINLEVEL_OUTOF10: 3

## 2024-09-01 ENCOUNTER — TELEPHONE (OUTPATIENT)
Dept: CARE COORDINATION | Age: 69
End: 2024-09-01

## 2024-09-01 LAB — O+P STL MICRO: NEGATIVE

## 2024-09-03 ENCOUNTER — HOSPITAL ENCOUNTER (OUTPATIENT)
Dept: WOUND CARE | Age: 69
Discharge: STILL ADMITTED OR ADMINISTRATIVE | End: 2024-09-03
Attending: FAMILY MEDICINE

## 2024-09-03 VITALS — TEMPERATURE: 95.7 F

## 2024-09-03 DIAGNOSIS — L02.419 CELLULITIS AND ABSCESS OF LEG: ICD-10-CM

## 2024-09-03 DIAGNOSIS — L97.922 ULCER OF LEFT LOWER EXTREMITY WITH FAT LAYER EXPOSED  (CMD): ICD-10-CM

## 2024-09-03 DIAGNOSIS — L97.912 ULCER OF RIGHT LOWER EXTREMITY WITH FAT LAYER EXPOSED  (CMD): ICD-10-CM

## 2024-09-03 DIAGNOSIS — S51.011A SKIN TEAR OF RIGHT ELBOW WITHOUT COMPLICATION, INITIAL ENCOUNTER: ICD-10-CM

## 2024-09-03 DIAGNOSIS — L97.912 ULCER OF RIGHT LOWER EXTREMITY WITH FAT LAYER EXPOSED  (CMD): Primary | ICD-10-CM

## 2024-09-03 DIAGNOSIS — L03.119 CELLULITIS AND ABSCESS OF LEG: ICD-10-CM

## 2024-09-03 PROCEDURE — 99213 OFFICE O/P EST LOW 20 MIN: CPT

## 2024-09-03 ASSESSMENT — PAIN SCALES - GENERAL: PAINLEVEL_OUTOF10: 0

## 2024-09-08 ENCOUNTER — TELEPHONE (OUTPATIENT)
Dept: CARE COORDINATION | Age: 69
End: 2024-09-08

## 2024-09-15 ENCOUNTER — TELEPHONE (OUTPATIENT)
Dept: CARE COORDINATION | Age: 69
End: 2024-09-15

## 2024-09-22 ENCOUNTER — TELEPHONE (OUTPATIENT)
Dept: CARE COORDINATION | Age: 69
End: 2024-09-22

## 2024-09-29 ENCOUNTER — TELEPHONE (OUTPATIENT)
Dept: CARE COORDINATION | Age: 69
End: 2024-09-29

## 2024-12-05 ENCOUNTER — TELEPHONE (OUTPATIENT)
Dept: WOUND CARE | Age: 69
End: 2024-12-05

## 2025-01-15 ENCOUNTER — OFFICE VISIT (OUTPATIENT)
Dept: WOUND CARE | Facility: HOSPITAL | Age: 70
End: 2025-01-15
Attending: INTERNAL MEDICINE
Payer: MEDICARE

## 2025-01-15 VITALS
HEART RATE: 61 BPM | HEIGHT: 61 IN | RESPIRATION RATE: 14 BRPM | SYSTOLIC BLOOD PRESSURE: 128 MMHG | WEIGHT: 100 LBS | TEMPERATURE: 98 F | DIASTOLIC BLOOD PRESSURE: 63 MMHG | BODY MASS INDEX: 18.88 KG/M2

## 2025-01-15 DIAGNOSIS — S51.801A OPEN WOUND OF RIGHT FOREARM, INITIAL ENCOUNTER: ICD-10-CM

## 2025-01-15 DIAGNOSIS — D68.51 FACTOR V LEIDEN (HCC): ICD-10-CM

## 2025-01-15 DIAGNOSIS — R23.8 SLOUGHING OF WOUND: ICD-10-CM

## 2025-01-15 DIAGNOSIS — M79.89 LEFT LEG SWELLING: ICD-10-CM

## 2025-01-15 DIAGNOSIS — D68.59 PRIMARY HYPERCOAGULABLE STATE (HCC): ICD-10-CM

## 2025-01-15 DIAGNOSIS — L97.922 NON-PRESSURE CHRONIC ULCER OF LEFT LOWER LEG WITH FAT LAYER EXPOSED (HCC): Primary | ICD-10-CM

## 2025-01-15 DIAGNOSIS — I10 ESSENTIAL HYPERTENSION: ICD-10-CM

## 2025-01-15 DIAGNOSIS — M05.79 RHEUMATOID ARTHRITIS INVOLVING MULTIPLE SITES WITH POSITIVE RHEUMATOID FACTOR (HCC): ICD-10-CM

## 2025-01-15 DIAGNOSIS — S61.200A OPEN WOUND OF RIGHT INDEX FINGER: ICD-10-CM

## 2025-01-15 DIAGNOSIS — L97.512 RIGHT FOOT ULCER, WITH FAT LAYER EXPOSED (HCC): ICD-10-CM

## 2025-01-15 DIAGNOSIS — T14.8XXD DELAYED WOUND HEALING: ICD-10-CM

## 2025-01-15 DIAGNOSIS — I73.9 PERIPHERAL VASCULAR DISEASE, UNSPECIFIED (HCC): ICD-10-CM

## 2025-01-15 DIAGNOSIS — Z79.01 LONG TERM (CURRENT) USE OF ANTICOAGULANTS: ICD-10-CM

## 2025-01-15 DIAGNOSIS — G62.9 SMALL FIBER NEUROPATHY: ICD-10-CM

## 2025-01-15 PROCEDURE — 97597 DBRDMT OPN WND 1ST 20 CM/<: CPT | Performed by: INTERNAL MEDICINE

## 2025-01-15 PROCEDURE — 99214 OFFICE O/P EST MOD 30 MIN: CPT

## 2025-01-15 NOTE — PROGRESS NOTES
[facility] WOUND CLINIC CONSULTATION NOTE  ALEXSI LUZ MD  1/15/2025    Subjective   Vero Rodriguez is a 69 year old female.    Chief Complaint   Patient presents with    Wound Care     Patient is here for an initial consult. She presents with a traumatic wound on her left lower leg, and wounds on her feet that she is unsure the cause of. She also has a wound on her right hand that she is unsure of the cause of as well. She has been using Silvadene on the wounds. She does have intermittent pain that she rates at 8/10.      HPI    70 yo CF here for eval and management of multiple wounds.     Left ankle ulcer - dropped ice cream box on her leg  few weeks ago - seen in UC - treated with antibiotics.   Skin flap adhered to wound bed.   Denies periwound redness / swelling / purulent drainage / fever.     Right foot / heel crack present - unclear duration - drom dry skin - no s/o infection     Right second finger - crack present - again seems to be from dry skin per pt - no s/o infection.     Right forearm wound - unclear etiology and duration - no s/o infection.   Diabetes status: no  Last A1c value was 4.9% done 5/15/2018.      Smoker status: former    Past Medical history, Surgical history, Social history, Family history reviewed with patient.   Medications reviewed.   Epic chart notes including provider notes, labs, imaging etc. Reviewed.   [facility] care everywhere queried and results reviewed.     Past Medical Hx:  Past Medical History:    Cataract    both    Cervical radiculitis    Cervical spondylitic cord compression    Chronic pulmonary embolism (HCC)    Collagenous colitis    COPD (chronic obstructive pulmonary disease) ([facility])    DDD (degenerative disc disease), cervical    Degenerative disc disease    DVT (deep venous thrombosis) ([facility])    Factor 5 Leiden mutation, heterozygous (HCC)    Fibromyalgia    Fusion of spine, thoracolumbar region    History of pulmonary embolism    Migraine    Neuropathy    GRACE  (obstructive sleep apnea)    AHI 16    Osteoarthrosis, unspecified whether generalized or localized, unspecified site    Osteoporosis    PE (pulmonary embolism)    RA (rheumatoid arthritis) (HCC)    Rheumatoid arthritis(714.0)    Spinal stenosis in cervical region    Transfusion history    Unspecified essential hypertension     Past Surgical Hx:  Past Surgical History:   Procedure Laterality Date    Back surgery  5/6/15    Spinal fusion [id number]    Hysterectomy      Other surgical history      LOPES    Other surgical history      disc fusion    Other surgical history      lammy     Other surgical history      lammy , ,     Other surgical history      spinal fusion T3-sacrum    Other surgical history Right     Right shoulder arthroscopy w/ rotator cuff debridment, subacromial decompression, distal clavicle excision and athroscopic acromioplasty    Revise median n/carpal tunnel surg Left 2014    Procedure: CARPAL TUNNEL RELEASE;  Surgeon: Seth Cedeno MD;  Location: [facility]    Revise ulnar nerve at elbow Left 2014    Procedure: CUBITAL TUNNEL RELEASE;  Surgeon: Seth Cedeno MD;  Location: [facility]     Problem List:  Patient Active Problem List   Diagnosis    COPD (chronic obstructive pulmonary disease) (HCC)    Essential hypertension    Rheumatoid arthritis involving multiple sites with positive rheumatoid factor ([facility])    Small fiber neuropathy    Chronic, continuous use of opioids    Chronic pain syndrome    Mixed hyperlipidemia    Primary hypercoagulable state ([facility])    Monitoring for long-term anticoagulant use    Factor V Leiden ([facility])     Social History:  Social History     Socioeconomic History    Marital status:    Tobacco Use    Smoking status: Former     Current packs/day: 0.00     Average packs/day: 0.5 packs/day for 15.0 years (7.5 ttl pk-yrs)     Types: Cigarettes     Start date: 1979     Quit date: 1994     Years since quittin.5     Smokeless tobacco: Never   Substance and Sexual Activity    Alcohol use: No    Drug use: No     Social Drivers of Health     Financial Resource Strain: Low Risk  (8/24/2024)    Received from [facility]    Financial Resource Strain     In the past year, have you or any family members you live with been unable to get any of the following when it was really needed? Check all that apply.: None   Recent Concern: Financial Resource Strain - Medium Risk (6/15/2024)    Received from [facility]    Financial Resource Strain     In the past year, have you or any family members you live with been unable to get any of the following when it was really needed? Check all that apply.: Medicine or Any Health Care (Medical, Dental, Mental Health, Vision)   Food Insecurity: Low Risk  (8/24/2024)    Received from [facility]    Food Insecurity     Within the past 12 months, you worried that your food would run out before you got money to buy more.  : Never true     Within the past 12 months, the food you bought just didn't last and you didn't have money to get more. : Never true   Transportation Needs: No Transportation Needs (9/17/2024)    Received from [facility]    OASIS : Transportation     Lack of Transportation (Medical): No     Lack of Transportation (Non-Medical): No     Patient Unable or Declines to Respond: No   Social Connections: Feeling Socially Integrated (9/17/2024)    Received from [facility] : Social Isolation     Frequency of experiencing loneliness or isolation: Never    Received from [facility]     Family History:  Family History   Problem Relation Age of Onset    Cancer Mother         liver    Hypertension Father     Breast Cancer Sister 52        Dx at age 52     Allergies:  Allergies[1]  Current Meds:  Current Outpatient Medications   Medication Sig Dispense Refill    lidocaine (LIDODERM) 5 % External Patch Place 1 patch  onto the skin daily. 30 patch 2    baclofen 10 MG Oral Tab Take 1 tablet (10 mg total) by mouth 3 (three) times daily. 90 tablet 2    warfarin 2 MG Oral Tab Take 1 tablet (2 mg total) by mouth nightly. 90 tablet 3    enoxaparin 60 MG/0.6ML Subcutaneous Solution INJECT 60MG SUB Q INTO ABDOMINAL AREA TWICE DAILY under direction of coumadin clinic 20 each 1    gabapentin 600 MG Oral Tab       tolterodine tartrate 4 MG Oral Capsule SR 24 Hr Take 1 capsule (4 mg total) by mouth daily. 90 capsule 3    simvastatin 5 MG Oral Tab Take 1 tablet (5 mg total) by mouth nightly. 90 tablet 3    RESTASIS MULTIDOSE 0.05 % Ophthalmic Emulsion       methylPREDNISolone 4 MG Oral Tab       Cholecalciferol 25 MCG (1000 UT) Oral Tab Every Day      Mycophenolate Mofetil 500 MG Oral Tab Take 2 tablets (1,000 mg total) by mouth 2 (two) times daily.      potassium chloride 20 MEQ Oral Tab CR Take 1 tablet (20 mEq total) by mouth 2 (two) times daily. 180 tablet 3    spironolactone 25 MG Oral Tab Take 1 tablet (25 mg total) by mouth daily. 90 tablet 3    Nystatin 413819 UNIT/GM External Powder Apply 1 Application topically 2 (two) times daily as needed. 60 g 2    MONTELUKAST SODIUM 10 MG Oral Tab TAKE 1 TABLET(10 MG) BY MOUTH EVERY DAY 90 tablet 3    PULMICORT FLEXHALER 180 MCG/ACT Inhalation Aerosol Powder, Breath Activated INHALE 2 PUFFS INTO THE LUNGS TWICE DAILY 1 each 11    GABAPENTIN 400 MG Oral Cap TAKE 3 CAPSULES BY MOUTH THREE TIMES DAILY 270 capsule 5    Pilocarpine HCl 5 MG Oral Tab Take 1 tablet (5 mg total) by mouth 3 (three) times daily.      Albuterol Sulfate  (90 Base) MCG/ACT Inhalation Aero Soln Inhale 2 puffs into the lungs every 6 (six) hours as needed for Wheezing or Shortness of Breath. 18 g 3    Probiotic Product (PROBIOTIC DAILY OR) Take by mouth.      Diphenoxylate-Atropine (LOMOTIL OR) Take by mouth.      hydrocortisone (ANUSOL-HC) 2.5 % Rectal Cream Place 1 Application rectally 2 (two) times daily as needed for  Hemorrhoids. 60 g 1    Ammonium Lactate (LAC-HYDRIN) 12 % External Cream Apply qd prn to afected area 140 g 3    leflunomide (ARAVA) 20 MG Oral Tab Take 1 tablet (20 mg total) by mouth daily.      Hydroxychloroquine Sulfate 200 MG Oral Tab Take  by mouth 2 (two) times daily.      SILVER SULFADIAZINE 1 % External Cream APPLY TOPICALLY TO THE AFFECTED AREA DAILY (Patient not taking: Reported on 1/15/2025) 25 g 0    Wound Dressings (MEDIHONEY WOUND/BURN DRESSING) External Gel Apply 1 Application topically daily as needed. (Patient not taking: Reported on 1/15/2025) 44 mL 1    RECTASMOOTHE 5 % External Cream APPLY A SMALL AMOUNT TO THE AFFECTED AREA UP TWICE DAILY (Patient not taking: Reported on 1/15/2025) 30 g 0    Wound Dressings (ADAPTIC NON-ADHERING DRESSING) External Pads Apply 1 each topically daily as needed. (Patient not taking: Reported on 1/15/2025) 30 each 1    Omeprazole 40 MG Oral Capsule Delayed Release Take 1 capsule (40 mg total) by mouth daily. (Patient not taking: Reported on 1/15/2025) 90 capsule 3    KEVZARA 200 MG/1.14ML Subcutaneous Solution Auto-injector  (Patient not taking: Reported on 1/15/2025)      metroNIDAZOLE 1 % External Gel Apply to face nightly (Patient not taking: Reported on 1/15/2025) 60 g 1    Zolpidem Tartrate (AMBIEN) 10 MG Oral Tab Take 1 tablet (10 mg total) by mouth nightly as needed. (Patient not taking: Reported on 1/15/2025) 30 tablet 1     Tobacco Counseling:  Counseling given: Not Answered       REVIEW OF SYSTEMS:   CONSTITUTIONAL:  Denies unusual weight gain/loss, fever, chills, or fatigue.  EENT:  Eyes:  Denies eye pain, visual loss, blurred vision, double vision or yellow sclerae.   CARDIOVASCULAR:  Denies chest pain, chest pressure, chest discomfort, palpitations, dyspnea on exertion or at rest.  RESPIRATORY:  Denies shortness of breath, wheezing, cough or sputum.  GASTROINTESTINAL:  Denies abdominal pain, nausea, vomiting, constipation, diarrhea, or blood in  stool.  MUSCULOSKELETAL:  Denies weakness  NEUROLOGICAL:  Denies headache, seizures, dizziness, syncope      Objective   Objective  Physical Exam    Wound Assessment  Wound 01/15/25 #1 Left medial lower leg Leg Left;Medial;Lower (Active)   Date First Assessed/Time First Assessed: 01/15/25 1420    Wound Number (Wound Clinic Only): #1 Left medial lower leg  Primary Wound Type: Traumatic  Location: Leg  Wound Location Orientation: Left;Medial;Lower      Assessments 1/15/2025  2:24 PM 1/15/2025  2:45 PM   Wound Image       Drainage Amount Small --   Drainage Description Serosanguineous --   Wound Length (cm) 3 cm (slight angle) --   Wound Width (cm) 3.8 cm --   Wound Surface Area (cm^2) 11.4 cm^2 --   Wound Depth (cm) 0.1 cm --   Wound Volume (cm^3) 1.14 cm^3 --   Margins Well-defined edges --   Non-staged Wound Description Full thickness --   [place]-wound Assessment Edema;Ecchymosis --   Wound Granulation Tissue Firm;Pink --   Wound Bed Granulation (%) 5 % --   Wound Bed Slough (%) 95 % --   Wound Odor None --   Tunneling? No --   Undermining? No --   Sinus Tracts? No --       Active Orders   Date Order Priority Status Authorizing Provider   01/15/25 1455 Debridement Traumatic Left;Medial;Lower Leg Routine Active Deysi Pulliam MD       Wound 01/15/25 #2 Right lateral heel Heel Right;Lateral (Active)   Date First Assessed/Time First Assessed: 01/15/25 1421    Wound Number (Wound Clinic Only): #2 Right lateral heel  Primary Wound Type: Other (comment)  Location: Heel  Wound Location Orientation: Right;Lateral      Assessments 1/15/2025  2:25 PM   Wound Image     Drainage Amount None   Wound Length (cm) 1.5 cm   Wound Width (cm) 0.1 cm   Wound Surface Area (cm^2) 0.15 cm^2   Wound Depth (cm) 0.1 cm   Wound Volume (cm^3) 0.015 cm^3   Margins Well-defined edges   Non-staged Wound Description Full thickness   [place]-wound Assessment Dry   Wound Granulation Tissue Firm;Pink   Wound Bed Granulation (%) 100 %   Wound Odor  None   Tunneling? No   Undermining? No   Sinus Tracts? No       No associated orders.       Wound 01/15/25 #3 Right second finger Finger (Comment which one) Anterior;Right (Active)   Date First Assessed/Time First Assessed: 01/15/25 1421    Wound Number (Wound Clinic Only): #3 Right second finger  Primary Wound Type: Other (comment)  Location: Finger (Comment which one)  Wound Location Orientation: Anterior;Right      Assessments 1/15/2025  2:25 PM   Wound Image     Drainage Amount Unable to assess   Wound Length (cm) 0.2 cm   Wound Width (cm) 1 cm   Wound Surface Area (cm^2) 0.2 cm^2   Wound Depth (cm) 0.1 cm   Wound Volume (cm^3) 0.02 cm^3   Margins Well-defined edges   Non-staged Wound Description Full thickness   [place]-wound Assessment Dry   Wound Granulation Tissue Firm;Pink   Wound Bed Granulation (%) 100 %   Wound Odor None   Tunneling? No   Undermining? No   Sinus Tracts? No       No associated orders.       Wound 01/15/25 #4 Right forearm Arm Anterior;Lower;Right (Active)   Date First Assessed/Time First Assessed: 01/15/25 1422    Wound Number (Wound Clinic Only): #4 Right forearm  Primary Wound Type: Other (comment)  Location: Arm  Wound Location Orientation: Anterior;Lower;Right      Assessments 1/15/2025  2:26 PM   Wound Image     Drainage Amount Scant   Drainage Description Serous;Yellow   Wound Length (cm) 1.5 cm   Wound Width (cm) 1.5 cm   Wound Surface Area (cm^2) 2.25 cm^2   Wound Depth (cm) 0.1 cm   Wound Volume (cm^3) 0.225 cm^3   Margins Well-defined edges   Non-staged Wound Description Full thickness   [place]-wound Assessment Edema;Moist   Wound Granulation Tissue Firm;Red   Wound Bed Granulation (%) 100 %   Wound Odor None   Tunneling? No   Undermining? No   Sinus Tracts? No       No associated orders.               PHYSICAL EXAM:   /63   Pulse 61   Temp 97.7 °F (36.5 °C)   Resp 14   Ht 61\"   Wt 100 lb (45.4 kg)   BMI 18.89 kg/m²  Estimated body mass index is 18.89 kg/m² as calculated  from the following:    Height as of this encounter: 61\".    Weight as of this encounter: 100 lb (45.4 kg).   Vital signs reviewed.Appears stated age, well groomed.  Physical Exam:  GEN:  Patient is alert, awake and oriented, well developed, well nourished, no apparent distress.  HEENT:  Head:  Normocephalic, atraumatic   Eyes: EOMI, PERRLA, no scleral icterus, conjunctivae clear bilaterally, no eye discharge  NECK: Supple, no CLAD, no JVD, no thyromegaly.  HEART:  Regular rate and rhythm, no murmurs, rubs or gallops.  LUNGS: Clear to auscultation bilterally, no rales/rhonchi/wheezing.  ABDOMEN:  Soft, nondistended, nontender,   EXTREMITIES:  monophasic pulse wave form left DP  NEURO:  No deficit, normal gait, strength grossly intact.     Assessment   Assessment    Encounter Diagnosis  1. 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 Right foot ulcer, with fat layer exposed (HCC)    3. Open wound of right index finger    4. Open wound of right forearm, initial encounter    5. Peripheral vascular disease, unspecified (HCC)    6. Rheumatoid arthritis involving multiple sites with positive rheumatoid factor (HCC)    7. Small fiber neuropathy    8. Primary hypercoagulable state (HCC)    9. Factor V Leiden (HCC)    10. Long term (current) use of anticoagulants    11. Left leg swelling    12. Delayed wound healing    13. Sloughing of wound    14. Essential hypertension        Problem List  Patient Active Problem List   Diagnosis    COPD (chronic obstructive pulmonary disease) (HCC)    Essential hypertension    Rheumatoid arthritis involving multiple sites with positive rheumatoid factor (HCC)    Small fiber neuropathy    Chronic, continuous use of opioids    Chronic pain syndrome    Mixed hyperlipidemia    Primary hypercoagulable state (HCC)    Monitoring for long-term anticoagulant use    Factor V Leiden (HCC)       Plan    Check REY LE.   Serial debridements as needed  Honey gel on all  lower extremity wounds.   Silver foam on right forearm wound.   Start compression garment.   Return one week      PROCEDURES:     Debridement Traumatic Left;Medial;Lower Leg   Wound 01/15/25 #1 Left medial lower leg Leg Left;Medial;Lower     Performed by: Deysi Pulliam MD  Authorized by: Deysi Pulliam MD       Consent   Consent obtained? verbal  Consent given by: patient  Risks discussed? procedural risks discussed     Debridement Details  Performed by: physician  Debridement type: conservative sharp  Pain control: lidocaine 4%  Pain control administration type: topical     Pre-debridement measurements  Length (cm): 3 (slight angle)  Width (cm): 3.8  Depth (cm): 0.1  Surface Area (cm^2): 11.4     Post-debridement measurements  Length (cm): 3  Width (cm): 3.8  Depth (cm): 0.1  Percent debrided: 75%  Surface Area (cm^2): 11.4  Area Debrided (cm^2): 8.55  Volume (cm^3): 1.14     Devitalized tissue debrided: biofilm and slough  Instrument(s) utilized: curette  Comment regarding bleeding: minimal  Hemostasis obtained with: pressure  Procedural pain (0-10): 4  Post-procedural pain: 0   Response to treatment: procedure was tolerated well           Patient Instructions     Check REY- order placed.     Orders Placed This Encounter    Debridement Traumatic Left;Medial;Lower Leg     This order was created via procedure documentation     Wound Cleaning and Dressings:    Wash your hands with soap and water. Always wear gloves while changing dressings. Donot touch wound / kailyn-wound skin with un-gloved hands. Remove old dressing, discard and place into trash.      DRESSINGS:   Left leg wound: honey gel / honey alginate / kerramax  Right foot wound: honey gel  / gauze.   Right second finger - honey gel and band-aid  Right forearm: silver foam.   Change dressing daily.    Compression Therapy : double spandagrip  Compression Therapy Instructions:  1.  Put on first thing in the morning and may remove at bedside.  Okay  to wear overnight      if comfortable.  Do not let stockings roll up/down and kink.  Hand wash stockings and      hang dry as needed.    2.  Avoid prolonged standing in one place.  It is better to have your calf muscles moving       to pump fluid out of the legs.    3.  Elevate leg(s) above the level of the heart when sitting or as much as possible.    4.  Take your diuretics as directed by your provider.  Do not skip doses or change doses      unless instructed to do so by your provider.    5. Do not get leg(s) with compression wrap wet. If wraps are too tight as indicated        By pain, numbness/tingling or discoloration of toes remove wrap completely       and call the   wound center.     Off-Loading:  Offload wounded areas.     Miscellaneous Instructions:  Supplement with a daily multivitamin   Low salt diet  Increase protein intake / consider protein supplements - see below  Elevate extremities at all times when sitting / laying down.    DIETARY MODIFICATIONS TO HELP WITH WOUND HEALING:    Protein: Meats, beans, eggs, milk and yogurt particularly Greek yogurt), tofu, soy nuts, soy protein products    Vitamin C: Citrus fruits and juices, strawberries, tomatoes, tomato juice, peppers, baked potatoes, spinach, broccoli, cauliflower, [place] sprouts, cabbage    Vitamin A: Dark green, leafy vegetables, orange or yellow vegetables, cantaloupe, fortified dairy products, liver, fortified cereals    Zinc: Fortified cereals, red meats, seafood    Consider Jason by [employer] (These are essential branch chain amino acids that help with tissue building and wound healing) and take 2 packets/day. you can order online at [employer] or [employer]    ADDITIONAL REMINDERS:    The treatment plan has been discussed at length with you and your provider. Follow all instructions carefully, it is very important. If you do not follow all instructions, you are at  risk of your wound not healing, infection, possible loss of limb and even end  of life.  Please call the clinic during regular business hours ( 7:30 AM - 5:30 PM) if you notice increased bleeding, redness, warmth, pain or pus like drainage or start running a fever greater than 100.3.    For after hour emergencies, please call your primary physician or go to the nearest emergency room.          Orders  Orders Placed This Encounter   Procedures    Debridement Traumatic Left;Medial;Lower Leg       Patient/Caregiver Education: There are no barriers to learning. Medical education for above diagnosis given.   Answered all questions.    Outcome: Patient verbalizes understanding. Patient is notified to call with any questions, complications, allergies, or worsening or changing symptoms.  Patient is to call with any side effects or complications as a result of the treatments today.      DOCUMENTATION OF TIME SPENT: Code selection for this visit was based on time spent : 60 min on date of service in preparing to see the patient, obtaining and/or reviewing separately obtained history, performing a medically appropriate examination, counseling and educating the patient/family/caregiver, ordering medications or testing, referring and communicating with other healthcare providers, documenting clinical information in the E HR, independently interpreting results and communicating results to the patient/family/caregiver and care coordination with the patient's other providers.    Followup: Return in about 1 week (around 1/22/2025) for Wound followup.      Note to Patient:  The 21st Century Cures Act makes medical notes like these available to patients in the interest of transparency. However, be advised this is a medical document and is intended as [id number] communication; it is written in medical language and may appear blunt, direct, or contain abbreviations or verbiage that are unfamiliar. Medical documents are intended to carry relevant information, facts as evident, and the clinical opinion of the  practitioner.    Also, please note that this report has been produced using speech recognition software and may contain errors related to that system including, but not limited to, errors in grammar, punctuation, and spelling, as well as words and phrases that possibly may have been recognized inappropriately.  If there are any questions or concerns, contact the dictating provider for clarification.      Deysi Zhou MD  1/15/2025  2:59 PM              [1]   Allergies  Allergen Reactions    Penicillins ANAPHYLAXIS and HYPOTENSION     Cardiac arrest      Pregabalin SWELLING    Raspberry NAUSEA AND VOMITING and SWELLING     Throat closes    Trimethobenzamide ANAPHYLAXIS     Cardiac arrest    Sulfa Antibiotics ITCHING    Pcn [Bicillin L-A] ANAPHYLAXIS     .    Tigan [Trimethobenzamide Hcl] ANAPHYLAXIS     Pass out and cardiac arrest.

## 2025-01-15 NOTE — PATIENT INSTRUCTIONS
Check REY- order placed.     Orders Placed This Encounter    Debridement Traumatic Left;Medial;Lower Leg     This order was created via procedure documentation     Wound Cleaning and Dressings:    Wash your hands with soap and water. Always wear gloves while changing dressings. Donot touch wound / kailyn-wound skin with un-gloved hands. Remove old dressing, discard and place into trash.      DRESSINGS:   Left leg wound: honey gel / honey alginate / kerramax  Right foot wound: honey gel  / gauze.   Right second finger - honey gel and band-aid  Right forearm: silver foam.   Change dressing daily.    Compression Therapy : double spandagrip  Compression Therapy Instructions:  1.  Put on first thing in the morning and may remove at bedside.  Okay to wear overnight      if comfortable.  Do not let stockings roll up/down and kink.  Hand wash stockings and      hang dry as needed.    2.  Avoid prolonged standing in one place.  It is better to have your calf muscles moving       to pump fluid out of the legs.    3.  Elevate leg(s) above the level of the heart when sitting or as much as possible.    4.  Take your diuretics as directed by your provider.  Do not skip doses or change doses      unless instructed to do so by your provider.    5. Do not get leg(s) with compression wrap wet. If wraps are too tight as indicated        By pain, numbness/tingling or discoloration of toes remove wrap completely       and call the   wound center.     Off-Loading:  Offload wounded areas.     Miscellaneous Instructions:  Supplement with a daily multivitamin   Low salt diet  Increase protein intake / consider protein supplements - see below  Elevate extremities at all times when sitting / laying down.    DIETARY MODIFICATIONS TO HELP WITH WOUND HEALING:    Protein: Meats, beans, eggs, milk and yogurt particularly Greek yogurt), tofu, soy nuts, soy protein products    Vitamin C: Citrus fruits and juices, strawberries, tomatoes, tomato  juice, peppers, baked potatoes, spinach, broccoli, cauliflower, [place] sprouts, cabbage    Vitamin A: Dark green, leafy vegetables, orange or yellow vegetables, cantaloupe, fortified dairy products, liver, fortified cereals    Zinc: Fortified cereals, red meats, seafood    Consider Jason by [employer] (These are essential branch chain amino acids that help with tissue building and wound healing) and take 2 packets/day. you can order online at [employer] or [employer]    ADDITIONAL REMINDERS:    The treatment plan has been discussed at length with you and your provider. Follow all instructions carefully, it is very important. If you do not follow all instructions, you are at  risk of your wound not healing, infection, possible loss of limb and even end of life.  Please call the clinic during regular business hours ( 7:30 AM - 5:30 PM) if you notice increased bleeding, redness, warmth, pain or pus like drainage or start running a fever greater than 100.3.    For after hour emergencies, please call your primary physician or go to the nearest emergency room.

## 2025-01-15 NOTE — PROGRESS NOTES
Weekly Wound Education Note    Teaching Provided To: Patient  Training Topics: Cleasing and general instructions;Discharge instructions;Dressing  Training Method: Explain/Verbal  Training Response: Patient responds and understands;Reinforcement needed        Notes: Initial visit: left ankle: honey gel, honey gauze, triad paste to periwound, kerramax, conforming gauze, tape, spndagrip E x2. Right finger and heel: honey gel and bordered gauze. Right arm: silver foam Educated on all dressing and ordered supplies. REY ordered, advised pt to schedule test.

## 2025-01-15 NOTE — PROGRESS NOTES
Patient ID: Vero Rodriguez is a 69 year old female.    Debridement Traumatic Left;Medial;Lower Leg   Wound 01/15/25 #1 Left medial lower leg Leg Left;Medial;Lower    Performed by: Deysi Pulliam MD  Authorized by: Deysi Pulliam MD      Consent   Consent obtained? verbal  Consent given by: patient  Risks discussed? procedural risks discussed    Debridement Details  Performed by: physician  Debridement type: conservative sharp  Pain control: lidocaine 4%  Pain control administration type: topical    Pre-debridement measurements  Length (cm): 3 (slight angle)  Width (cm): 3.8  Depth (cm): 0.1  Surface Area (cm^2): 11.4    Post-debridement measurements  Length (cm): 3  Width (cm): 3.8  Depth (cm): 0.1  Percent debrided: 75%  Surface Area (cm^2): 11.4  Area Debrided (cm^2): 8.55  Volume (cm^3): 1.14    Devitalized tissue debrided: biofilm and slough  Instrument(s) utilized: curette  Comment regarding bleeding: minimal  Hemostasis obtained with: pressure  Procedural pain (0-10): 4  Post-procedural pain: 0   Response to treatment: procedure was tolerated well

## 2025-01-27 ENCOUNTER — OFFICE VISIT (OUTPATIENT)
Dept: WOUND CARE | Facility: HOSPITAL | Age: 70
End: 2025-01-27
Attending: INTERNAL MEDICINE
Payer: MEDICARE

## 2025-01-27 VITALS
RESPIRATION RATE: 14 BRPM | TEMPERATURE: 98 F | HEART RATE: 61 BPM | DIASTOLIC BLOOD PRESSURE: 62 MMHG | SYSTOLIC BLOOD PRESSURE: 115 MMHG

## 2025-01-27 DIAGNOSIS — M05.79 RHEUMATOID ARTHRITIS INVOLVING MULTIPLE SITES WITH POSITIVE RHEUMATOID FACTOR (HCC): ICD-10-CM

## 2025-01-27 DIAGNOSIS — S51.801D OPEN WOUND OF RIGHT FOREARM, SUBSEQUENT ENCOUNTER: ICD-10-CM

## 2025-01-27 DIAGNOSIS — Z79.01 LONG TERM (CURRENT) USE OF ANTICOAGULANTS: ICD-10-CM

## 2025-01-27 DIAGNOSIS — D68.51 FACTOR V LEIDEN (HCC): ICD-10-CM

## 2025-01-27 DIAGNOSIS — D68.59 PRIMARY HYPERCOAGULABLE STATE (HCC): ICD-10-CM

## 2025-01-27 DIAGNOSIS — S61.200A OPEN WOUND OF RIGHT INDEX FINGER: ICD-10-CM

## 2025-01-27 DIAGNOSIS — I73.9 PERIPHERAL VASCULAR DISEASE, UNSPECIFIED: ICD-10-CM

## 2025-01-27 DIAGNOSIS — R23.8 SLOUGHING OF WOUND: ICD-10-CM

## 2025-01-27 DIAGNOSIS — L97.512 RIGHT FOOT ULCER, WITH FAT LAYER EXPOSED (HCC): ICD-10-CM

## 2025-01-27 DIAGNOSIS — G62.9 SMALL FIBER NEUROPATHY: ICD-10-CM

## 2025-01-27 DIAGNOSIS — L97.922 NON-PRESSURE CHRONIC ULCER OF LEFT LOWER LEG WITH FAT LAYER EXPOSED (HCC): Primary | ICD-10-CM

## 2025-01-27 DIAGNOSIS — T14.8XXD DELAYED WOUND HEALING: ICD-10-CM

## 2025-01-27 PROCEDURE — 29581 APPL MULTLAYER CMPRN SYS LEG: CPT

## 2025-01-27 PROCEDURE — 11042 DBRDMT SUBQ TIS 1ST 20SQCM/<: CPT | Performed by: INTERNAL MEDICINE

## 2025-01-27 NOTE — PROGRESS NOTES
[facility] PROGRESS NOTE  ALEXIS LUZ MD  1/27/2025    Chief Complaint:   Chief Complaint   Patient presents with    Wound Care     Follow up for bilateral leg wounds and right arm wound. Has been putting polysporin and cover dressing to all wounds.        HPI:   Subjective   Vero Rodriguez is a 69 year old female coming in for a follow-up visit.    HPI    Arm wound and finger wounds closed - skin mature.     Lateral heel wound stable.     Ulcer left leg - skin flap taken well.     New wound left leg / heel - mechanical trauma - hit the stairs.     REY scheduled for Feb 4th.     She has not been sing honey gel as recommended.     Review of Systems  Negative except HPI   Denies chest pain / SOB / palpitations  Denies fever.     Allergies  Allergies[1]    Current Meds:  Current Outpatient Medications   Medication Sig Dispense Refill    SILVER SULFADIAZINE 1 % External Cream APPLY TOPICALLY TO THE AFFECTED AREA DAILY (Patient not taking: Reported on 1/15/2025) 25 g 0    lidocaine (LIDODERM) 5 % External Patch Place 1 patch onto the skin daily. 30 patch 2    baclofen 10 MG Oral Tab Take 1 tablet (10 mg total) by mouth 3 (three) times daily. 90 tablet 2    Wound Dressings (MEDIHONEY WOUND/BURN DRESSING) External Gel Apply 1 Application topically daily as needed. (Patient not taking: Reported on 1/15/2025) 44 mL 1    RECTASMOOTHE 5 % External Cream APPLY A SMALL AMOUNT TO THE AFFECTED AREA UP TWICE DAILY (Patient not taking: Reported on 1/15/2025) 30 g 0    warfarin 2 MG Oral Tab Take 1 tablet (2 mg total) by mouth nightly. 90 tablet 3    Wound Dressings (ADAPTIC NON-ADHERING DRESSING) External Pads Apply 1 each topically daily as needed. (Patient not taking: Reported on 1/15/2025) 30 each 1    Omeprazole 40 MG Oral Capsule Delayed Release Take 1 capsule (40 mg total) by mouth daily. (Patient not taking: Reported on 1/15/2025) 90 capsule 3    enoxaparin 60 MG/0.6ML Subcutaneous Solution INJECT 60MG SUB Q  INTO ABDOMINAL AREA TWICE DAILY under direction of coumadin clinic 20 each 1    gabapentin 600 MG Oral Tab       tolterodine tartrate 4 MG Oral Capsule SR 24 Hr Take 1 capsule (4 mg total) by mouth daily. 90 capsule 3    simvastatin 5 MG Oral Tab Take 1 tablet (5 mg total) by mouth nightly. 90 tablet 3    RESTASIS MULTIDOSE 0.05 % Ophthalmic Emulsion       methylPREDNISolone 4 MG Oral Tab       Cholecalciferol 25 MCG (1000 UT) Oral Tab Every Day      Mycophenolate Mofetil 500 MG Oral Tab Take 2 tablets (1,000 mg total) by mouth 2 (two) times daily.      KEVZARA 200 MG/1.14ML Subcutaneous Solution Auto-injector  (Patient not taking: Reported on 1/15/2025)      potassium chloride 20 MEQ Oral Tab CR Take 1 tablet (20 mEq total) by mouth 2 (two) times daily. 180 tablet 3    spironolactone 25 MG Oral Tab Take 1 tablet (25 mg total) by mouth daily. 90 tablet 3    Nystatin 215146 UNIT/GM External Powder Apply 1 Application topically 2 (two) times daily as needed. 60 g 2    MONTELUKAST SODIUM 10 MG Oral Tab TAKE 1 TABLET(10 MG) BY MOUTH EVERY DAY 90 tablet 3    metroNIDAZOLE 1 % External Gel Apply to face nightly (Patient not taking: Reported on 1/15/2025) 60 g 1    PULMICORT FLEXHALER 180 MCG/ACT Inhalation Aerosol Powder, Breath Activated INHALE 2 PUFFS INTO THE LUNGS TWICE DAILY 1 each 11    GABAPENTIN 400 MG Oral Cap TAKE 3 CAPSULES BY MOUTH THREE TIMES DAILY 270 capsule 5    Pilocarpine HCl 5 MG Oral Tab Take 1 tablet (5 mg total) by mouth 3 (three) times daily.      Albuterol Sulfate  (90 Base) MCG/ACT Inhalation Aero Soln Inhale 2 puffs into the lungs every 6 (six) hours as needed for Wheezing or Shortness of Breath. 18 g 3    Probiotic Product (PROBIOTIC DAILY OR) Take by mouth.      Diphenoxylate-Atropine (LOMOTIL OR) Take by mouth.      hydrocortisone (ANUSOL-HC) 2.5 % Rectal Cream Place 1 Application rectally 2 (two) times daily as needed for Hemorrhoids. 60 g 1    Zolpidem Tartrate (AMBIEN) 10 MG Oral Tab  Take 1 tablet (10 mg total) by mouth nightly as needed. (Patient not taking: Reported on 1/15/2025) 30 tablet 1    Ammonium Lactate (LAC-HYDRIN) 12 % External Cream Apply qd prn to afected area 140 g 3    leflunomide (ARAVA) 20 MG Oral Tab Take 1 tablet (20 mg total) by mouth daily.      Hydroxychloroquine Sulfate 200 MG Oral Tab Take  by mouth 2 (two) times daily.           EXAM:   Objective   Objective    Physical Exam    Vital Signs  Vitals:    01/27/25 0700   BP: 115/62   Pulse: 61   Resp: 14   Temp: 98.4 °F (36.9 °C)       Wound Assessment  Wound 01/15/25 #1 Left medial lower leg Leg Left;Medial;Lower (Active)   Date First Assessed/Time First Assessed: 01/15/25 1420    Wound Number (Wound Clinic Only): #1 Left medial lower leg  Primary Wound Type: Traumatic  Location: Leg  Wound Location Orientation: Left;Medial;Lower      Assessments 1/15/2025  2:24 PM 1/27/2025 10:35 AM   Wound Image         Drainage Amount Small Small   Drainage Description Serosanguineous Serosanguineous   Treatments Compression (spandagrip E x2) --   Wound Length (cm) 3 cm (slight angle) 2.1 cm (slight angle)   Wound Width (cm) 3.8 cm 2.6 cm   Wound Surface Area (cm^2) 11.4 cm^2 5.46 cm^2   Wound Depth (cm) 0.1 cm 0.1 cm   Wound Volume (cm^3) 1.14 cm^3 0.546 cm^3   Wound Healing % -- 52   Margins Well-defined edges Epibole (Rolled edges)   Non-staged Wound Description Full thickness Full thickness   [place]-wound Assessment Edema;Ecchymosis Edema;Hemosiderin staining   Wound Granulation Tissue Firm;Pink Pink;Firm   Wound Bed Granulation (%) 5 % 5 %   Wound Bed Slough (%) 95 % 95 %   Wound Odor None None   Tunneling? No --   Undermining? No --   Sinus Tracts? No --       Active Orders   Date Order Priority Status Authorizing Provider   01/27/25 1133 Debridement Traumatic Left;Medial;Lower Leg Routine Active Deysi Pulliam MD       Inactive Orders   Date Order Priority Status Authorizing Provider   01/15/25 1455 Debridement Traumatic  Left;Medial;Lower Leg Routine Completed Deysi Pulliam MD       Wound 01/15/25 #2 Right lateral heel Heel Right;Lateral (Active)   Date First Assessed/Time First Assessed: 01/15/25 1421    Wound Number (Wound Clinic Only): #2 Right lateral heel  Primary Wound Type: Other (comment)  Location: Heel  Wound Location Orientation: Right;Lateral      Assessments 1/15/2025  2:25 PM 1/27/2025 10:37 AM   Wound Image       Drainage Amount None Unable to assess   Wound Length (cm) 1.5 cm 1.5 cm   Wound Width (cm) 0.1 cm 0.1 cm   Wound Surface Area (cm^2) 0.15 cm^2 0.15 cm^2   Wound Depth (cm) 0.1 cm 0.1 cm   Wound Volume (cm^3) 0.015 cm^3 0.015 cm^3   Wound Healing % -- 0   Margins Well-defined edges Well-defined edges   Non-staged Wound Description Full thickness Full thickness   [place]-wound Assessment Dry Dry   Wound Granulation Tissue Firm;Pink Pink;Firm   Wound Bed Granulation (%) 100 % 100 %   Wound Odor None None   Tunneling? No --   Undermining? No --   Sinus Tracts? No --       No associated orders.       Wound 01/15/25 #3 Right second finger Finger (Comment which one) Anterior;Right (Active)   Date First Assessed/Time First Assessed: 01/15/25 1421    Wound Number (Wound Clinic Only): #3 Right second finger  Primary Wound Type: Other (comment)  Location: Finger (Comment which one)  Wound Location Orientation: Anterior;Right      Assessments 1/15/2025  2:25 PM 1/27/2025 10:39 AM   Wound Image       Drainage Amount Unable to assess Unable to assess   Wound Length (cm) 0.2 cm 0.1 cm   Wound Width (cm) 1 cm 0.5 cm   Wound Surface Area (cm^2) 0.2 cm^2 0.05 cm^2   Wound Depth (cm) 0.1 cm 0.1 cm   Wound Volume (cm^3) 0.02 cm^3 0.005 cm^3   Wound Healing % -- 75   Margins Well-defined edges Well-defined edges   Non-staged Wound Description Full thickness Full thickness   [place]-wound Assessment Dry Dry   Wound Granulation Tissue Firm;Pink Pink;Firm   Wound Bed Granulation (%) 100 % 100 %   Wound Odor None None   Tunneling?  No --   Undermining? No --   Sinus Tracts? No --       No associated orders.       Wound 01/15/25 #4 Right forearm Arm Anterior;Lower;Right (Active)   Date First Assessed/Time First Assessed: 01/15/25 1422    Wound Number (Wound Clinic Only): #4 Right forearm  Primary Wound Type: Other (comment)  Location: Arm  Wound Location Orientation: Anterior;Lower;Right      Assessments 1/15/2025  2:26 PM 1/27/2025 10:39 AM   Wound Image       Drainage Amount Scant None   Drainage Description Serous;Yellow --   Wound Length (cm) 1.5 cm 0 cm   Wound Width (cm) 1.5 cm 0 cm   Wound Surface Area (cm^2) 2.25 cm^2 0 cm^2   Wound Depth (cm) 0.1 cm 0 cm   Wound Volume (cm^3) 0.225 cm^3 0 cm^3   Wound Healing % -- 100   Margins Well-defined edges Flat and Intact   Non-staged Wound Description Full thickness --   [place]-wound Assessment Edema;Moist Clean   Wound Granulation Tissue Firm;Red --   Wound Bed Granulation (%) 100 % --   Wound Bed Epithelium (%) -- 100 %   Wound Odor None None   Tunneling? No --   Undermining? No --   Sinus Tracts? No --       No associated orders.       Wound 01/27/25 #5 Left posterior leg Leg Lower;Posterior;Left (Active)   Date First Assessed/Time First Assessed: 01/27/25 1033    Wound Number (Wound Clinic Only): #5 Left posterior leg  Primary Wound Type: Venous Ulcer  Location: Leg  Wound Location Orientation: Lower;Posterior;Left      Assessments 1/27/2025 10:36 AM   Wound Image     Drainage Amount None   Wound Length (cm) 1.8 cm   Wound Width (cm) 0.4 cm   Wound Surface Area (cm^2) 0.72 cm^2   Wound Depth (cm) 0.1 cm   Wound Volume (cm^3) 0.072 cm^3   Margins Well-defined edges   Non-staged Wound Description Full thickness   [place]-wound Assessment Dry;Edema   Wound Granulation Tissue Pink;[place];Firm   Wound Bed Granulation (%) 90 %   Wound Odor None   Shape 10% scab       No associated orders.          ASSESSMENT AND PLAN:     Assessment     Encounter Diagnosis  1. Non-pressure chronic ulcer of left  lower leg with fat layer exposed (HCC)    2. Right foot ulcer, with fat layer exposed (HCC)    3. Open wound of right index finger    4. Open wound of right forearm, subsequent encounter    5. Peripheral vascular disease, unspecified    6. Small fiber neuropathy    7. Primary hypercoagulable state (HCC)    8. Rheumatoid arthritis involving multiple sites with positive rheumatoid factor (HCC)    9. Factor V Leiden (HCC)    10. Long term (current) use of anticoagulants    11. Delayed wound healing    12. Sloughing of wound      PROCEDURES:    Compression wrap application      PLAN OF CARE:    Start collagen /absorptive dressings left leg ulcer.   Xeroform on new leg heel wound.   Honey gel on right heel crack.   Edema management.   Check REY  Start low compression - advised pt to remove it in case of pain.   Watch out for signs of early infection - counseled.   Plan of care discussed with patient in detail - All questions answered   Return in one week.     Orders  Orders Placed This Encounter   Procedures    Debridement Traumatic Left;Medial;Lower Leg     Patient Instructions     Check REY- Feb 4th.     Wound Cleaning and Dressings:    Shower with protection - use Shower boot     DRESSINGS:   Left leg wound: Myrna / Enluxtra / kerramax  Right foot wound: honey gel  / gauze.   Right second finger - honey gel and band-aid  Right forearm: silver foam.   Change dressing daily.    Compression Therapy : double spandagrip  Compression Therapy Instructions:  1.  Put on first thing in the morning and may remove at bedside.  Okay to wear overnight      if comfortable.  Do not let stockings roll up/down and kink.  Hand wash stockings and      hang dry as needed.    2.  Avoid prolonged standing in one place.  It is better to have your calf muscles moving       to pump fluid out of the legs.    3.  Elevate leg(s) above the level of the heart when sitting or as much as possible.    4.  Take your diuretics as directed by your  provider.  Do not skip doses or change doses      unless instructed to do so by your provider.    5. Do not get leg(s) with compression wrap wet. If wraps are too tight as indicated        By pain, numbness/tingling or discoloration of toes remove wrap completely       and call the   wound center.     Off-Loading:  Offload wounded areas.     Miscellaneous Instructions:  Supplement with a daily multivitamin   Low salt diet  Increase protein intake / consider protein supplements - see below  Elevate extremities at all times when sitting / laying down.    DIETARY MODIFICATIONS TO HELP WITH WOUND HEALING:    Protein: Meats, beans, eggs, milk and yogurt particularly Greek yogurt), tofu, soy nuts, soy protein products    Vitamin C: Citrus fruits and juices, strawberries, tomatoes, tomato juice, peppers, baked potatoes, spinach, broccoli, cauliflower, [place] sprouts, cabbage    Vitamin A: Dark green, leafy vegetables, orange or yellow vegetables, cantaloupe, fortified dairy products, liver, fortified cereals    Zinc: Fortified cereals, red meats, seafood    Consider Jason by [employer] (These are essential branch chain amino acids that help with tissue building and wound healing) and take 2 packets/day. you can order online at [employer] or [employer]    ADDITIONAL REMINDERS:    The treatment plan has been discussed at length with you and your provider. Follow all instructions carefully, it is very important. If you do not follow all instructions, you are at  risk of your wound not healing, infection, possible loss of limb and even end of life.  Please call the clinic during regular business hours ( 7:30 AM - 5:30 PM) if you notice increased bleeding, redness, warmth, pain or pus like drainage or start running a fever greater than 100.3.    For after hour emergencies, please call your primary physician or go to the nearest emergency room.        Patient/Caregiver Education: There are no barriers to learning. Medical education  for above diagnosis given.   Answered all questions.    Outcome: Patient verbalizes understanding. Patient is notified to call with any questions, complications, allergies, or worsening or changing symptoms.  Patient is to call with any side effects or complications as a result of the treatments today.      DOCUMENTATION OF TIME SPENT: Code selection for this visit was based on time spent : 35 min on date of service in preparing to see the patient, obtaining and/or reviewing separately obtained history, performing a medically appropriate examination, counseling and educating the patient/family/caregiver, ordering medications or testing, referring and communicating with other healthcare providers, documenting clinical information in the E HR, independently interpreting results and communicating results to the patient/family/caregiver and care coordination with the patient's other providers.    Followup: Return in about 1 week (around 2/3/2025) for Wound followup.      Note to Patient:  The 21st Century Cures Act makes medical notes like these available to patients in the interest of transparency. However, be advised this is a medical document and is intended as [id number] communication; it is written in medical language and may appear blunt, direct, or contain abbreviations or verbiage that are unfamiliar. Medical documents are intended to carry relevant information, facts as evident, and the clinical opinion of the practitioner.    Also, please note that this report has been produced using speech recognition software and may contain errors related to that system including, but not limited to, errors in grammar, punctuation, and spelling, as well as words and phrases that possibly may have been recognized inappropriately.  If there are any questions or concerns, contact the dictating provider for clarification.      Deysi Zhou MD  1/27/2025  10:40 AM                      [1]   Allergies  Allergen Reactions     Penicillins ANAPHYLAXIS and HYPOTENSION     Cardiac arrest      Pregabalin SWELLING    Raspberry NAUSEA AND VOMITING and SWELLING     Throat closes    Trimethobenzamide ANAPHYLAXIS     Cardiac arrest    Sulfa Antibiotics ITCHING    Pcn [Bicillin L-A] ANAPHYLAXIS     .    Tigan [Trimethobenzamide Hcl] ANAPHYLAXIS     Pass out and cardiac arrest.

## 2025-01-27 NOTE — PROGRESS NOTES
Weekly Wound Education Note    Teaching Provided To: Patient  Training Topics: Dressing;Edema control;Discharge instructions;Compression  Training Method: Explain/Verbal;Written  Training Response: Patient responds and understands            Myrna Ag, Enluxtra to left leg wound.  Honey gel, xeroform to left heel.  Xeroform to right posterior ankle/leg.  Calamine unna boot 20-30mmHg to BLE.    Patient to continue apply Aquaphor lotion to hands and fingers.

## 2025-01-27 NOTE — PROGRESS NOTES
Patient ID: Vero Rodriguez is a 69 year old female.    Debridement Traumatic Left;Medial;Lower Leg   Wound 01/15/25 #1 Left medial lower leg Leg Left;Medial;Lower    Performed by: Deysi Pulliam MD  Authorized by: Deysi Pulliam MD      Consent   Consent obtained? verbal  Consent given by: patient    Debridement Details  Performed by: physician  Debridement type: surgical  Level of debridement: subcutaneous tissue  Pain control: lidocaine 4%  Pain control administration type: topical    Pre-debridement measurements  Length (cm): 2.1 (slight angle)  Width (cm): 2.6  Depth (cm): 0.1  Surface Area (cm^2): 5.46    Post-debridement measurements  Length (cm): 2.1  Width (cm): 2.6  Depth (cm): 0.2  Percent debrided: 100%  Surface Area (cm^2): 5.46  Area Debrided (cm^2): 5.46  Volume (cm^3): 1.09    Tissue and other material debrided: subcutaneous tissue  Devitalized tissue debrided: biofilm and slough  Instrument(s) utilized: curette  Bleeding: small  Hemostasis obtained with: pressure  Procedural pain (0-10): 4  Post-procedural pain: 2   Response to treatment: procedure was tolerated well

## 2025-01-27 NOTE — PATIENT INSTRUCTIONS
Check REY- Feb 4th.     Wound Cleaning and Dressings:    Shower with protection - use Shower boot     DRESSINGS:   Left leg wound: Myrna / Enluxtra / kerramax  Right foot wound: honey gel  / gauze.   Right second finger - honey gel and band-aid  Right forearm: silver foam.   Change dressing daily.    Compression Therapy : double spandagrip  Compression Therapy Instructions:  1.  Put on first thing in the morning and may remove at bedside.  Okay to wear overnight      if comfortable.  Do not let stockings roll up/down and kink.  Hand wash stockings and      hang dry as needed.    2.  Avoid prolonged standing in one place.  It is better to have your calf muscles moving       to pump fluid out of the legs.    3.  Elevate leg(s) above the level of the heart when sitting or as much as possible.    4.  Take your diuretics as directed by your provider.  Do not skip doses or change doses      unless instructed to do so by your provider.    5. Do not get leg(s) with compression wrap wet. If wraps are too tight as indicated        By pain, numbness/tingling or discoloration of toes remove wrap completely       and call the   wound center.     Off-Loading:  Offload wounded areas.     Miscellaneous Instructions:  Supplement with a daily multivitamin   Low salt diet  Increase protein intake / consider protein supplements - see below  Elevate extremities at all times when sitting / laying down.    DIETARY MODIFICATIONS TO HELP WITH WOUND HEALING:    Protein: Meats, beans, eggs, milk and yogurt particularly Greek yogurt), tofu, soy nuts, soy protein products    Vitamin C: Citrus fruits and juices, strawberries, tomatoes, tomato juice, peppers, baked potatoes, spinach, broccoli, cauliflower, [place] sprouts, cabbage    Vitamin A: Dark green, leafy vegetables, orange or yellow vegetables, cantaloupe, fortified dairy products, liver, fortified cereals    Zinc: Fortified cereals, red meats, seafood    Consider Jason by [facility]  (These are essential branch chain amino acids that help with tissue building and wound healing) and take 2 packets/day. you can order online at [employer] or [employer]    ADDITIONAL REMINDERS:    The treatment plan has been discussed at length with you and your provider. Follow all instructions carefully, it is very important. If you do not follow all instructions, you are at  risk of your wound not healing, infection, possible loss of limb and even end of life.  Please call the clinic during regular business hours ( 7:30 AM - 5:30 PM) if you notice increased bleeding, redness, warmth, pain or pus like drainage or start running a fever greater than 100.3.    For after hour emergencies, please call your primary physician or go to the nearest emergency room.

## 2025-01-30 ENCOUNTER — OFFICE VISIT (OUTPATIENT)
Dept: WOUND CARE | Facility: HOSPITAL | Age: 70
End: 2025-01-30
Attending: INTERNAL MEDICINE
Payer: MEDICARE

## 2025-01-30 VITALS
SYSTOLIC BLOOD PRESSURE: 126 MMHG | TEMPERATURE: 98 F | HEART RATE: 69 BPM | RESPIRATION RATE: 16 BRPM | DIASTOLIC BLOOD PRESSURE: 78 MMHG

## 2025-01-30 DIAGNOSIS — L97.922 NON-PRESSURE CHRONIC ULCER OF LEFT LOWER LEG WITH FAT LAYER EXPOSED (HCC): Primary | ICD-10-CM

## 2025-01-30 DIAGNOSIS — S61.200A OPEN WOUND OF RIGHT INDEX FINGER: ICD-10-CM

## 2025-01-30 DIAGNOSIS — L97.512 RIGHT FOOT ULCER, WITH FAT LAYER EXPOSED (HCC): ICD-10-CM

## 2025-01-30 PROCEDURE — 29581 APPL MULTLAYER CMPRN SYS LEG: CPT

## 2025-01-30 NOTE — PROGRESS NOTES
Chief Complaint   Patient presents with    Wound Care     Patient arrives for a wound care nurse visit. She arrives with an unna wrap to both legs. There is xeroform and honey to the wounds. Patient reports no pain in the wounds themselves, but mild pain to the area around the wounds. Patient states the wraps felt tight.           Current Outpatient Medications:     SILVER SULFADIAZINE 1 % External Cream, APPLY TOPICALLY TO THE AFFECTED AREA DAILY (Patient not taking: Reported on 1/15/2025), Disp: 25 g, Rfl: 0    lidocaine (LIDODERM) 5 % External Patch, Place 1 patch onto the skin daily., Disp: 30 patch, Rfl: 2    baclofen 10 MG Oral Tab, Take 1 tablet (10 mg total) by mouth 3 (three) times daily., Disp: 90 tablet, Rfl: 2    Wound Dressings (MEDIHONEY WOUND/BURN DRESSING) External Gel, Apply 1 Application topically daily as needed. (Patient not taking: Reported on 1/15/2025), Disp: 44 mL, Rfl: 1    RECTASMOOTHE 5 % External Cream, APPLY A SMALL AMOUNT TO THE AFFECTED AREA UP TWICE DAILY (Patient not taking: Reported on 1/15/2025), Disp: 30 g, Rfl: 0    warfarin 2 MG Oral Tab, Take 1 tablet (2 mg total) by mouth nightly., Disp: 90 tablet, Rfl: 3    Wound Dressings (ADAPTIC NON-ADHERING DRESSING) External Pads, Apply 1 each topically daily as needed. (Patient not taking: Reported on 1/15/2025), Disp: 30 each, Rfl: 1    Omeprazole 40 MG Oral Capsule Delayed Release, Take 1 capsule (40 mg total) by mouth daily. (Patient not taking: Reported on 1/15/2025), Disp: 90 capsule, Rfl: 3    enoxaparin 60 MG/0.6ML Subcutaneous Solution, INJECT 60MG SUB Q INTO ABDOMINAL AREA TWICE DAILY under direction of coumadin clinic, Disp: 20 each, Rfl: 1    gabapentin 600 MG Oral Tab, , Disp: , Rfl:     tolterodine tartrate 4 MG Oral Capsule SR 24 Hr, Take 1 capsule (4 mg total) by mouth daily., Disp: 90 capsule, Rfl: 3    simvastatin 5 MG Oral Tab, Take 1 tablet (5 mg total) by mouth nightly., Disp: 90 tablet, Rfl: 3    RESTASIS MULTIDOSE  0.05 % Ophthalmic Emulsion, , Disp: , Rfl:     methylPREDNISolone 4 MG Oral Tab, , Disp: , Rfl:     Cholecalciferol 25 MCG (1000 UT) Oral Tab, Every Day, Disp: , Rfl:     Mycophenolate Mofetil 500 MG Oral Tab, Take 2 tablets (1,000 mg total) by mouth 2 (two) times daily., Disp: , Rfl:     KEVZARA 200 MG/1.14ML Subcutaneous Solution Auto-injector, , Disp: , Rfl:     potassium chloride 20 MEQ Oral Tab CR, Take 1 tablet (20 mEq total) by mouth 2 (two) times daily., Disp: 180 tablet, Rfl: 3    spironolactone 25 MG Oral Tab, Take 1 tablet (25 mg total) by mouth daily., Disp: 90 tablet, Rfl: 3    Nystatin 349708 UNIT/GM External Powder, Apply 1 Application topically 2 (two) times daily as needed., Disp: 60 g, Rfl: 2    MONTELUKAST SODIUM 10 MG Oral Tab, TAKE 1 TABLET(10 MG) BY MOUTH EVERY DAY, Disp: 90 tablet, Rfl: 3    metroNIDAZOLE 1 % External Gel, Apply to face nightly (Patient not taking: Reported on 1/15/2025), Disp: 60 g, Rfl: 1    PULMICORT FLEXHALER 180 MCG/ACT Inhalation Aerosol Powder, Breath Activated, INHALE 2 PUFFS INTO THE LUNGS TWICE DAILY, Disp: 1 each, Rfl: 11    GABAPENTIN 400 MG Oral Cap, TAKE 3 CAPSULES BY MOUTH THREE TIMES DAILY, Disp: 270 capsule, Rfl: 5    Pilocarpine HCl 5 MG Oral Tab, Take 1 tablet (5 mg total) by mouth 3 (three) times daily., Disp: , Rfl:     Albuterol Sulfate  (90 Base) MCG/ACT Inhalation Aero Soln, Inhale 2 puffs into the lungs every 6 (six) hours as needed for Wheezing or Shortness of Breath., Disp: 18 g, Rfl: 3    Probiotic Product (PROBIOTIC DAILY OR), Take by mouth., Disp: , Rfl:     Diphenoxylate-Atropine (LOMOTIL OR), Take by mouth., Disp: , Rfl:     hydrocortisone (ANUSOL-HC) 2.5 % Rectal Cream, Place 1 Application rectally 2 (two) times daily as needed for Hemorrhoids., Disp: 60 g, Rfl: 1    Zolpidem Tartrate (AMBIEN) 10 MG Oral Tab, Take 1 tablet (10 mg total) by mouth nightly as needed. (Patient not taking: Reported on 1/15/2025), Disp: 30 tablet, Rfl: 1     Ammonium Lactate (LAC-HYDRIN) 12 % External Cream, Apply qd prn to afected area, Disp: 140 g, Rfl: 3    leflunomide (ARAVA) 20 MG Oral Tab, Take 1 tablet (20 mg total) by mouth daily., Disp: , Rfl:     Hydroxychloroquine Sulfate 200 MG Oral Tab, Take  by mouth 2 (two) times daily., Disp: , Rfl:     Allergies[1]       HISTORY:     Past medical, surgical, family and social history updated where appropriate.    PHYSICAL EXAM:   /78   Pulse 69   Temp 98 °F (36.7 °C)   Resp 16        Vital signs reviewed.      Calf  Point of Measurement - Left Calf: 30  Point of Measurement - Right Calf: 30  Left Calf from:: Heel  Calf Left cm:: 25  Right Calf from:: Heel  Right Calf cm:: 25    Ankle  Point of Measurement - Left Ankle: 11  Point of Measurement - Right Ankle: 11  Left Ankle from:: Heel  Left Ankle cm:: 18     Right Ankle from:: Heel  Right Ankle cm:: 17       Wound 01/15/25 #1 Left medial lower leg Leg Left;Medial;Lower (Active)   Date First Assessed/Time First Assessed: 01/15/25 1420    Wound Number (Wound Clinic Only): #1 Left medial lower leg  Primary Wound Type: Traumatic  Location: Leg  Wound Location Orientation: Left;Medial;Lower      Assessments 1/15/2025  2:24 PM 1/30/2025  4:27 PM   Wound Image        Drainage Amount Small Small   Drainage Description Serosanguineous Serosanguineous   Treatments Compression Compression   Wound Length (cm) 3 cm 2 cm   Wound Width (cm) 3.8 cm 3 cm   Wound Surface Area (cm^2) 11.4 cm^2 6 cm^2   Wound Depth (cm) 0.1 cm 0.1 cm   Wound Volume (cm^3) 1.14 cm^3 0.6 cm^3   Wound Healing % -- 47   Margins Well-defined edges Well-defined edges   Non-staged Wound Description Full thickness Full thickness   [place]-wound Assessment Edema;Ecchymosis Edema   Wound Granulation Tissue Firm;Pink Pink;Firm   Wound Bed Granulation (%) 5 % 20 %   Wound Bed Slough (%) 95 % 80 %   Wound Odor None None   Tunneling? No No   Undermining? No No   Sinus Tracts? No No       Inactive Orders   Date  Order Priority Status Authorizing Provider   01/27/25 1133 Debridement Traumatic Left;Medial;Lower Leg Routine Completed Deysi uPlliam MD   01/15/25 1455 Debridement Traumatic Left;Medial;Lower Leg Routine Completed Deysi Pulliam MD       Wound 01/15/25 #2 Right lateral heel Heel Right;Lateral (Active)   Date First Assessed/Time First Assessed: 01/15/25 1421    Wound Number (Wound Clinic Only): #2 Right lateral heel  Primary Wound Type: Other (comment)  Location: Heel  Wound Location Orientation: Right;Lateral      Assessments 1/15/2025  2:25 PM 1/30/2025  4:25 PM   Wound Image       Drainage Amount None Scant   Drainage Description -- Yellow;Serous   Treatments -- Compression   Wound Length (cm) 1.5 cm 1.5 cm   Wound Width (cm) 0.1 cm 0.1 cm   Wound Surface Area (cm^2) 0.15 cm^2 0.15 cm^2   Wound Depth (cm) 0.1 cm 0.1 cm   Wound Volume (cm^3) 0.015 cm^3 0.015 cm^3   Wound Healing % -- 0   Margins Well-defined edges Well-defined edges   Non-staged Wound Description Full thickness Full thickness   [place]-wound Assessment Dry Dry   Wound Granulation Tissue Firm;Pink Pink;Firm   Wound Bed Granulation (%) 100 % 100 %   Wound Odor None None   Tunneling? No No   Undermining? No No   Sinus Tracts? No No       No associated orders.       Wound 01/15/25 #3 Right second finger Finger (Comment which one) Anterior;Right (Active)   Date First Assessed/Time First Assessed: 01/15/25 1421    Wound Number (Wound Clinic Only): #3 Right second finger  Primary Wound Type: Other (comment)  Location: Finger (Comment which one)  Wound Location Orientation: Anterior;Right      Assessments 1/15/2025  2:25 PM 1/30/2025  4:24 PM   Wound Image       Drainage Amount Unable to assess Unable to assess   Wound Length (cm) 0.2 cm 0.1 cm   Wound Width (cm) 1 cm 0.1 cm   Wound Surface Area (cm^2) 0.2 cm^2 0.01 cm^2   Wound Depth (cm) 0.1 cm 0.1 cm   Wound Volume (cm^3) 0.02 cm^3 0.001 cm^3   Wound Healing % -- 95   Margins Well-defined  edges Well-defined edges   Non-staged Wound Description Full thickness Full thickness   [place]-wound Assessment Dry Dry   Wound Granulation Tissue Firm;Pink Pink;Firm   Wound Bed Granulation (%) 100 % 100 %   Wound Odor None None   Tunneling? No No   Undermining? No No   Sinus Tracts? No No       No associated orders.       Wound 01/27/25 #5 Left posterior leg Leg Lower;Posterior;Left (Active)   Date First Assessed/Time First Assessed: 01/27/25 1033    Wound Number (Wound Clinic Only): #5 Left posterior leg  Primary Wound Type: Venous Ulcer  Location: Leg  Wound Location Orientation: Lower;Posterior;Left      Assessments 1/27/2025 10:36 AM 1/30/2025  4:26 PM   Wound Image       Drainage Amount None Scant   Drainage Description -- Serous;Yellow   Treatments Compression Compression   Wound Length (cm) 1.8 cm 1.8 cm   Wound Width (cm) 0.4 cm 0.4 cm   Wound Surface Area (cm^2) 0.72 cm^2 0.72 cm^2   Wound Depth (cm) 0.1 cm 0.1 cm   Wound Volume (cm^3) 0.072 cm^3 0.072 cm^3   Wound Healing % -- 0   Margins Well-defined edges Well-defined edges   Non-staged Wound Description Full thickness Full thickness   [place]-wound Assessment Dry;Edema Dry   Wound Granulation Tissue Pink;Pale Grey;Firm --   Wound Bed Granulation (%) 90 % --   Wound Odor None None   Shape 10% scab 100 scab   Tunneling? -- No   Undermining? -- No   Sinus Tracts? -- No       No associated orders.       Compression Wrap 01/27/25 Leg Left (Active)   Placement Date/Time: 01/27/25 1144   Location: Leg  Wound Location Orientation: Left      Assessments 1/27/2025 11:44 AM 1/30/2025  4:26 PM   Response to Treatment Well tolerated Well tolerated   Compression Layers Multilayer Multilayer   Compression Product Type Unna Boot Unna Boot   Dressing Applied Yes Yes   Compression Wrap Location Toes to Knee Toes to Knee   Compression Wrap Status Clean;Dry Clean;Dry       No associated orders.       Compression Wrap 01/27/25 Heel Right (Active)   Placement Date/Time:  01/27/25 1146   Location: Heel  Wound Location Orientation: Right      Assessments 1/27/2025 11:46 AM 1/30/2025  4:26 PM   Response to Treatment Well tolerated Well tolerated   Compression Layers Multilayer Multilayer   Compression Product Type Unna Boot Unna Boot   Dressing Applied Yes Yes   Compression Wrap Location Toes to Knee Toes to Knee   Compression Wrap Status Dry;Clean Dry;Clean       No associated orders.          ASSESSMENT AND PLAN:        Risks, benefits, and alternatives of current treatment plan discussed in detail.  Questions and concerns addressed. Red flags to RTC or ED reviewed.  Patient (or parent) agrees to plan.      No follow-ups on file.  Weekly Wound Education Note    Teaching Provided To: Patient  Training Topics: Dressing;Edema control;Cleasing and general instructions;Compression;Discharge instructions  Training Method: Explain/Verbal;Written  Training Response: Patient responds and understands        Notes: Wounds stable. Dressing applied as follows...  -Left medial leg-  yoni, Enluxtra  -Left posterior leg/ankle- yoni, folded xeroform  -Right heel- honey gel, folded xeroform  -Right 2nd finger- moisturizer    Both legs wrapped in calamine unna boot 20-30mmHg with ABD pads to anterior legs for padding.              Karina HINES RN   1/30/2025  5:04 PM             [1]   Allergies  Allergen Reactions    Penicillins ANAPHYLAXIS and HYPOTENSION     Cardiac arrest      Pregabalin SWELLING    Raspberry NAUSEA AND VOMITING and SWELLING     Throat closes    Trimethobenzamide ANAPHYLAXIS     Cardiac arrest    Sulfa Antibiotics ITCHING    Pcn [Bicillin L-A] ANAPHYLAXIS     .    Tigan [Trimethobenzamide Hcl] ANAPHYLAXIS     Pass out and cardiac arrest.

## 2025-02-03 ENCOUNTER — OFFICE VISIT (OUTPATIENT)
Dept: WOUND CARE | Facility: HOSPITAL | Age: 70
End: 2025-02-03
Attending: INTERNAL MEDICINE
Payer: MEDICARE

## 2025-02-03 VITALS
HEART RATE: 58 BPM | DIASTOLIC BLOOD PRESSURE: 68 MMHG | TEMPERATURE: 98 F | SYSTOLIC BLOOD PRESSURE: 135 MMHG | RESPIRATION RATE: 14 BRPM

## 2025-02-03 DIAGNOSIS — I73.9 PERIPHERAL VASCULAR DISEASE, UNSPECIFIED: ICD-10-CM

## 2025-02-03 DIAGNOSIS — L97.922 NON-PRESSURE CHRONIC ULCER OF LEFT LOWER LEG WITH FAT LAYER EXPOSED (HCC): Primary | ICD-10-CM

## 2025-02-03 DIAGNOSIS — D68.59 PRIMARY HYPERCOAGULABLE STATE (HCC): ICD-10-CM

## 2025-02-03 DIAGNOSIS — L97.512 RIGHT FOOT ULCER, WITH FAT LAYER EXPOSED (HCC): ICD-10-CM

## 2025-02-03 DIAGNOSIS — S51.801D OPEN WOUND OF RIGHT FOREARM, SUBSEQUENT ENCOUNTER: ICD-10-CM

## 2025-02-03 DIAGNOSIS — S61.200A OPEN WOUND OF RIGHT INDEX FINGER: ICD-10-CM

## 2025-02-03 DIAGNOSIS — G62.9 SMALL FIBER NEUROPATHY: ICD-10-CM

## 2025-02-03 PROCEDURE — 99214 OFFICE O/P EST MOD 30 MIN: CPT

## 2025-02-03 NOTE — PROGRESS NOTES
.Weekly Wound Education Note    Teaching Provided To: Patient  Training Topics: Dressing;Discharge instructions;Cleasing and general instructions;Compression;Edema control;Skin care  Training Method: Explain/Verbal;Written  Training Response: Patient responds and understands            Patient will continue to moisturize her heel and fingers.  Treat left leg wounds with honey gel, silver alginate, gauze and secure with conforming gauze roll daily or every other day.  Patient declined spanda  to right leg, size D applied to left leg.

## 2025-02-03 NOTE — PATIENT INSTRUCTIONS
Hold compression wrap for now - replace with compression garment.   Check REY soon    Wound Cleaning and Dressings:    Shower with protection - use Shower boot     DRESSINGS:   Left leg wound:honey gel / gauze  Right foot wound: honey gel  / gauze.   Change dressing daily.    Compression Therapy : double spandagrip  Compression Therapy Instructions:  1.  Put on first thing in the morning and may remove at bedside.  Okay to wear overnight      if comfortable.  Do not let stockings roll up/down and kink.  Hand wash stockings and      hang dry as needed.    2.  Avoid prolonged standing in one place.  It is better to have your calf muscles moving       to pump fluid out of the legs.    3.  Elevate leg(s) above the level of the heart when sitting or as much as possible.    4.  Take your diuretics as directed by your provider.  Do not skip doses or change doses      unless instructed to do so by your provider.    5. Do not get leg(s) with compression wrap wet. If wraps are too tight as indicated        By pain, numbness/tingling or discoloration of toes remove wrap completely       and call the   wound center.     Off-Loading:  Offload wounded areas.     Miscellaneous Instructions:  Supplement with a daily multivitamin   Low salt diet  Increase protein intake / consider protein supplements - see below  Elevate extremities at all times when sitting / laying down.    DIETARY MODIFICATIONS TO HELP WITH WOUND HEALING:    Protein: Meats, beans, eggs, milk and yogurt particularly Greek yogurt), tofu, soy nuts, soy protein products    Vitamin C: Citrus fruits and juices, strawberries, tomatoes, tomato juice, peppers, baked potatoes, spinach, broccoli, cauliflower, [place] sprouts, cabbage    Vitamin A: Dark green, leafy vegetables, orange or yellow vegetables, cantaloupe, fortified dairy products, liver, fortified cereals    Zinc: Fortified cereals, red meats, seafood    Consider Jason by [employer] (These are essential  branch chain amino acids that help with tissue building and wound healing) and take 2 packets/day. you can order online at [employer] or [employer]    ADDITIONAL REMINDERS:    The treatment plan has been discussed at length with you and your provider. Follow all instructions carefully, it is very important. If you do not follow all instructions, you are at  risk of your wound not healing, infection, possible loss of limb and even end of life.  Please call the clinic during regular business hours ( 7:30 AM - 5:30 PM) if you notice increased bleeding, redness, warmth, pain or pus like drainage or start running a fever greater than 100.3.    For after hour emergencies, please call your primary physician or go to the nearest emergency room.

## 2025-02-03 NOTE — PROGRESS NOTES
[facility] PROGRESS NOTE  ALEXIS LUZ MD  2/3/2025    Chief Complaint:   Chief Complaint   Patient presents with    Wound Care     Follow-up wounds for multiple wounds. Patient states she has a lot of pain on left leg since Thursday 7 out 10. States she is getting her ultrasound to her legs done tomorrow.       HPI:   Subjective   Vero Rodriguez is a 69 year old female coming in for a follow-up visit.    HPI    Wound on right leg stable - slough present - some granulation present.   No s/o infection.   She is not tolerating wrap very well.   Edema down.     Right heel wound improving  Left heel crack improved.   Hand / finger wound closed    REY scheduled for wednesday    Review of Systems  Negative except HPI   Denies chest pain / SOB / palpitations  Denies fever.     Allergies  Allergies[1]    Current Meds:  Current Outpatient Medications   Medication Sig Dispense Refill    SILVER SULFADIAZINE 1 % External Cream APPLY TOPICALLY TO THE AFFECTED AREA DAILY (Patient not taking: Reported on 1/15/2025) 25 g 0    lidocaine (LIDODERM) 5 % External Patch Place 1 patch onto the skin daily. 30 patch 2    baclofen 10 MG Oral Tab Take 1 tablet (10 mg total) by mouth 3 (three) times daily. 90 tablet 2    Wound Dressings (MEDIHONEY WOUND/BURN DRESSING) External Gel Apply 1 Application topically daily as needed. (Patient not taking: Reported on 1/15/2025) 44 mL 1    RECTASMOOTHE 5 % External Cream APPLY A SMALL AMOUNT TO THE AFFECTED AREA UP TWICE DAILY (Patient not taking: Reported on 1/15/2025) 30 g 0    warfarin 2 MG Oral Tab Take 1 tablet (2 mg total) by mouth nightly. 90 tablet 3    Wound Dressings (ADAPTIC NON-ADHERING DRESSING) External Pads Apply 1 each topically daily as needed. (Patient not taking: Reported on 1/15/2025) 30 each 1    Omeprazole 40 MG Oral Capsule Delayed Release Take 1 capsule (40 mg total) by mouth daily. (Patient not taking: Reported on 1/15/2025) 90 capsule 3    enoxaparin 60 MG/0.6ML  Subcutaneous Solution INJECT 60MG SUB Q INTO ABDOMINAL AREA TWICE DAILY under direction of coumadin clinic 20 each 1    gabapentin 600 MG Oral Tab       tolterodine tartrate 4 MG Oral Capsule SR 24 Hr Take 1 capsule (4 mg total) by mouth daily. 90 capsule 3    simvastatin 5 MG Oral Tab Take 1 tablet (5 mg total) by mouth nightly. 90 tablet 3    RESTASIS MULTIDOSE 0.05 % Ophthalmic Emulsion       methylPREDNISolone 4 MG Oral Tab       Cholecalciferol 25 MCG (1000 UT) Oral Tab Every Day      Mycophenolate Mofetil 500 MG Oral Tab Take 2 tablets (1,000 mg total) by mouth 2 (two) times daily.      KEVZARA 200 MG/1.14ML Subcutaneous Solution Auto-injector  (Patient not taking: Reported on 1/15/2025)      potassium chloride 20 MEQ Oral Tab CR Take 1 tablet (20 mEq total) by mouth 2 (two) times daily. 180 tablet 3    spironolactone 25 MG Oral Tab Take 1 tablet (25 mg total) by mouth daily. 90 tablet 3    Nystatin 266375 UNIT/GM External Powder Apply 1 Application topically 2 (two) times daily as needed. 60 g 2    MONTELUKAST SODIUM 10 MG Oral Tab TAKE 1 TABLET(10 MG) BY MOUTH EVERY DAY 90 tablet 3    metroNIDAZOLE 1 % External Gel Apply to face nightly (Patient not taking: Reported on 1/15/2025) 60 g 1    PULMICORT FLEXHALER 180 MCG/ACT Inhalation Aerosol Powder, Breath Activated INHALE 2 PUFFS INTO THE LUNGS TWICE DAILY 1 each 11    GABAPENTIN 400 MG Oral Cap TAKE 3 CAPSULES BY MOUTH THREE TIMES DAILY 270 capsule 5    Pilocarpine HCl 5 MG Oral Tab Take 1 tablet (5 mg total) by mouth 3 (three) times daily.      Albuterol Sulfate  (90 Base) MCG/ACT Inhalation Aero Soln Inhale 2 puffs into the lungs every 6 (six) hours as needed for Wheezing or Shortness of Breath. 18 g 3    Probiotic Product (PROBIOTIC DAILY OR) Take by mouth.      Diphenoxylate-Atropine (LOMOTIL OR) Take by mouth.      hydrocortisone (ANUSOL-HC) 2.5 % Rectal Cream Place 1 Application rectally 2 (two) times daily as needed for Hemorrhoids. 60 g 1     Zolpidem Tartrate (AMBIEN) 10 MG Oral Tab Take 1 tablet (10 mg total) by mouth nightly as needed. (Patient not taking: Reported on 1/15/2025) 30 tablet 1    Ammonium Lactate (LAC-HYDRIN) 12 % External Cream Apply qd prn to afected area 140 g 3    leflunomide (ARAVA) 20 MG Oral Tab Take 1 tablet (20 mg total) by mouth daily.      Hydroxychloroquine Sulfate 200 MG Oral Tab Take  by mouth 2 (two) times daily.           EXAM:   Objective   Objective    Physical Exam    Vital Signs  Vitals:    02/03/25 0954   BP: 135/68   Pulse: 58   Resp: 14   Temp: 98 °F (36.7 °C)       Wound Assessment  Wound 01/15/25 #1 Left medial lower leg Leg Left;Medial;Lower (Active)   Date First Assessed/Time First Assessed: 01/15/25 1420    Wound Number (Wound Clinic Only): #1 Left medial lower leg  Primary Wound Type: Traumatic  Location: Leg  Wound Location Orientation: Left;Medial;Lower      Assessments 1/15/2025  2:24 PM 2/3/2025  9:50 AM   Wound Image        Drainage Amount Small Small   Drainage Description Serosanguineous Serosanguineous   Treatments Compression (spandagrip E x2) --   Wound Length (cm) 3 cm (slight angle) 1.8 cm   Wound Width (cm) 3.8 cm 2.5 cm   Wound Surface Area (cm^2) 11.4 cm^2 4.5 cm^2   Wound Depth (cm) 0.1 cm 0.1 cm   Wound Volume (cm^3) 1.14 cm^3 0.45 cm^3   Wound Healing % -- 61   Margins Well-defined edges Well-defined edges   Non-staged Wound Description Full thickness Full thickness   [place]-wound Assessment Edema;Ecchymosis Moist;Edema   Wound Granulation Tissue Firm;Pink Pink;Spongy   Wound Bed Granulation (%) 5 % 90 %   Wound Bed Slough (%) 95 % 10 %   Wound Odor None None   Tunneling? No No   Undermining? No No   Sinus Tracts? No No       Inactive Orders   Date Order Priority Status Authorizing Provider   01/27/25 1133 Debridement Traumatic Left;Medial;Lower Leg Routine Completed Deysi Pulliam MD   01/15/25 1455 Debridement Traumatic Left;Medial;Lower Leg Routine Completed Deysi Pulliam MD        Wound 01/15/25 #2 Right lateral heel Heel Right;Lateral (Active)   Date First Assessed/Time First Assessed: 01/15/25 1421    Wound Number (Wound Clinic Only): #2 Right lateral heel  Primary Wound Type: Other (comment)  Location: Heel  Wound Location Orientation: Right;Lateral      Assessments 1/15/2025  2:25 PM 2/3/2025  9:51 AM   Wound Image       Drainage Amount None None   Wound Length (cm) 1.5 cm 1.5 cm   Wound Width (cm) 0.1 cm 0.1 cm   Wound Surface Area (cm^2) 0.15 cm^2 0.15 cm^2   Wound Depth (cm) 0.1 cm 0.1 cm   Wound Volume (cm^3) 0.015 cm^3 0.015 cm^3   Wound Healing % -- 0   Margins Well-defined edges Well-defined edges   Non-staged Wound Description Full thickness Full thickness   [place]-wound Assessment Dry Dry   Wound Granulation Tissue Firm;Pink Pink;Firm   Wound Bed Granulation (%) 100 % 100 %   Wound Odor None None   Tunneling? No No   Undermining? No No   Sinus Tracts? No No       No associated orders.       Wound 01/15/25 #3 Right second finger Finger (Comment which one) Anterior;Right (Active)   Date First Assessed/Time First Assessed: 01/15/25 1421    Wound Number (Wound Clinic Only): #3 Right second finger  Primary Wound Type: Other (comment)  Location: Finger (Comment which one)  Wound Location Orientation: Anterior;Right      Assessments 1/15/2025  2:25 PM 2/3/2025  9:53 AM   Wound Image      Drainage Amount Unable to assess None   Wound Length (cm) 0.2 cm 0 cm   Wound Width (cm) 1 cm 0 cm   Wound Surface Area (cm^2) 0.2 cm^2 0 cm^2   Wound Depth (cm) 0.1 cm 0 cm   Wound Volume (cm^3) 0.02 cm^3 0 cm^3   Wound Healing % -- 100   Margins Well-defined edges Flat and Intact   Non-staged Wound Description Full thickness Full thickness   [place]-wound Assessment Dry Dry   Wound Granulation Tissue Firm;Pink --   Wound Bed Granulation (%) 100 % --   Wound Bed Epithelium (%) -- 100 %   Wound Odor None None   Tunneling? No No   Undermining? No No   Sinus Tracts? No No       No associated orders.        Wound 01/27/25 #5 Left posterior leg Leg Lower;Posterior;Left (Active)   Date First Assessed/Time First Assessed: 01/27/25 1033    Wound Number (Wound Clinic Only): #5 Left posterior leg  Primary Wound Type: Venous Ulcer  Location: Leg  Wound Location Orientation: Lower;Posterior;Left      Assessments 1/27/2025 10:36 AM 2/3/2025  9:52 AM   Wound Image       Drainage Amount None Scant   Drainage Description -- Serosanguineous   Treatments Compression (calamine unna boot 20-30mmHg, medipore tape) --   Wound Length (cm) 1.8 cm 1.2 cm   Wound Width (cm) 0.4 cm 0.7 cm   Wound Surface Area (cm^2) 0.72 cm^2 0.84 cm^2   Wound Depth (cm) 0.1 cm 0.1 cm   Wound Volume (cm^3) 0.072 cm^3 0.084 cm^3   Wound Healing % -- -17   Margins Well-defined edges Well-defined edges   Non-staged Wound Description Full thickness Full thickness   [place]-wound Assessment Dry;Edema Pink   Wound Granulation Tissue Pink;Pale Grey;Firm Pink;Firm   Wound Bed Granulation (%) 90 % 20 %   Wound Bed Slough (%) -- 80 %   Wound Odor None None   Shape 10% scab --   Tunneling? -- No   Undermining? -- No   Sinus Tracts? -- No       No associated orders.       Compression Wrap 01/27/25 Leg Left (Active)   Placement Date/Time: 01/27/25 1144   Location: Leg  Wound Location Orientation: Left      Assessments 1/27/2025 11:44 AM 1/30/2025  4:26 PM   Response to Treatment Well tolerated Well tolerated   Compression Layers Multilayer Multilayer   Compression Product Type Unna Boot Unna Boot   Dressing Applied Yes (Myrna Ag, Enluxtra) Yes   Compression Wrap Location Toes to Knee Toes to Knee   Compression Wrap Status Clean;Dry Clean;Dry       No associated orders.       Compression Wrap 01/27/25 Heel Right (Active)   Placement Date/Time: 01/27/25 1146   Location: Heel  Wound Location Orientation: Right      Assessments 1/27/2025 11:46 AM 1/30/2025  4:26 PM   Response to Treatment Well tolerated Well tolerated   Compression Layers Multilayer Multilayer   Compression  Product Type Unna Boot Unna Boot   Dressing Applied Yes (honey gel, xeroform) Yes   Compression Wrap Location Toes to Knee Toes to Knee   Compression Wrap Status Dry;Clean Dry;Clean       No associated orders.                ASSESSMENT AND PLAN:     Assessment     Encounter Diagnosis  1. 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 Right foot ulcer, with fat layer exposed (HCC)    3. Open wound of right index finger    4. Open wound of right forearm, subsequent encounter    5. Peripheral vascular disease, unspecified    6. Small fiber neuropathy    7. Primary hypercoagulable state (HCC)      PROCEDURES:    Dressing change done.       PLAN OF CARE:    Honey gel and gauze on open areas.   Use zinc barrier cream kailyn-wound.   Edema reduction discussed.   Hold wraps and use spandagrips instead.   Watch out for signs of early infection - counseled.   REY wednesday  Plan of care discussed with patient in detail - All questions answered   Return in one week.     Patient Instructions     Hold compression wrap for now - replace with compression garment.   Check REY soon    Wound Cleaning and Dressings:    Shower with protection - use Shower boot     DRESSINGS:   Left leg wound:honey gel / gauze  Right foot wound: honey gel  / gauze.   Change dressing daily.    Compression Therapy : double spandagrip  Compression Therapy Instructions:  1.  Put on first thing in the morning and may remove at bedside.  Okay to wear overnight      if comfortable.  Do not let stockings roll up/down and kink.  Hand wash stockings and      hang dry as needed.    2.  Avoid prolonged standing in one place.  It is better to have your calf muscles moving       to pump fluid out of the legs.    3.  Elevate leg(s) above the level of the heart when sitting or as much as possible.    4.  Take your diuretics as directed by your provider.  Do not skip doses or change doses      unless instructed to do so by your  provider.    5. Do not get leg(s) with compression wrap wet. If wraps are too tight as indicated        By pain, numbness/tingling or discoloration of toes remove wrap completely       and call the   wound center.     Off-Loading:  Offload wounded areas.     Miscellaneous Instructions:  Supplement with a daily multivitamin   Low salt diet  Increase protein intake / consider protein supplements - see below  Elevate extremities at all times when sitting / laying down.    DIETARY MODIFICATIONS TO HELP WITH WOUND HEALING:    Protein: Meats, beans, eggs, milk and yogurt particularly Greek yogurt), tofu, soy nuts, soy protein products    Vitamin C: Citrus fruits and juices, strawberries, tomatoes, tomato juice, peppers, baked potatoes, spinach, broccoli, cauliflower, [place] sprouts, cabbage    Vitamin A: Dark green, leafy vegetables, orange or yellow vegetables, cantaloupe, fortified dairy products, liver, fortified cereals    Zinc: Fortified cereals, red meats, seafood    Consider Jason by [employer] (These are essential branch chain amino acids that help with tissue building and wound healing) and take 2 packets/day. you can order online at [employer] or [email or web address]    ADDITIONAL REMINDERS:    The treatment plan has been discussed at length with you and your provider. Follow all instructions carefully, it is very important. If you do not follow all instructions, you are at  risk of your wound not healing, infection, possible loss of limb and even end of life.  Please call the clinic during regular business hours ( 7:30 AM - 5:30 PM) if you notice increased bleeding, redness, warmth, pain or pus like drainage or start running a fever greater than 100.3.    For after hour emergencies, please call your primary physician or go to the nearest emergency room.      Patient/Caregiver Education: There are no barriers to learning. Medical education for above diagnosis given.   Answered all questions.    Outcome: Patient verbalizes  understanding. Patient is notified to call with any questions, complications, allergies, or worsening or changing symptoms.  Patient is to call with any side effects or complications as a result of the treatments today.      DOCUMENTATION OF TIME SPENT: Code selection for this visit was based on time spent : 30 min on date of service in preparing to see the patient, obtaining and/or reviewing separately obtained history, performing a medically appropriate examination, counseling and educating the patient/family/caregiver, ordering medications or testing, referring and communicating with other healthcare providers, documenting clinical information in the E HR, independently interpreting results and communicating results to the patient/family/caregiver and care coordination with the patient's other providers.    Followup: Return in about 1 week (around 2/10/2025) for Wound followup.      Note to Patient:  The 21st Century Cures Act makes medical notes like these available to patients in the interest of transparency. However, be advised this is a medical document and is intended as [id number] communication; it is written in medical language and may appear blunt, direct, or contain abbreviations or verbiage that are unfamiliar. Medical documents are intended to carry relevant information, facts as evident, and the clinical opinion of the practitioner.    Also, please note that this report has been produced using speech recognition software and may contain errors related to that system including, but not limited to, errors in grammar, punctuation, and spelling, as well as words and phrases that possibly may have been recognized inappropriately.  If there are any questions or concerns, contact the dictating provider for clarification.      Deysi Zhou MD  2/3/2025  10:09 AM                      [1]   Allergies  Allergen Reactions    Penicillins ANAPHYLAXIS and HYPOTENSION     Cardiac arrest      Pregabalin  SWELLING    Raspberry NAUSEA AND VOMITING and SWELLING     Throat closes    Trimethobenzamide ANAPHYLAXIS     Cardiac arrest    Sulfa Antibiotics ITCHING    Pcn [Bicillin L-A] ANAPHYLAXIS     .    Tigan [Trimethobenzamide Hcl] ANAPHYLAXIS     Pass out and cardiac arrest.

## 2025-02-04 ENCOUNTER — HOSPITAL ENCOUNTER (OUTPATIENT)
Dept: ULTRASOUND IMAGING | Facility: HOSPITAL | Age: 70
Discharge: HOME | End: 2025-02-04
Attending: INTERNAL MEDICINE
Payer: MEDICARE

## 2025-02-04 DIAGNOSIS — L97.922 NON-PRESSURE CHRONIC ULCER OF LEFT LOWER LEG WITH FAT LAYER EXPOSED (HCC): ICD-10-CM

## 2025-02-04 DIAGNOSIS — I73.9 PERIPHERAL VASCULAR DISEASE, UNSPECIFIED: ICD-10-CM

## 2025-02-04 PROCEDURE — 93922 UPR/L XTREMITY ART 2 LEVELS: CPT | Performed by: INTERNAL MEDICINE

## 2025-02-04 NOTE — PROGRESS NOTES
Updated placed in blue sticky note so that staff can discuss with patient at visit on Monday 2/10/25.

## 2025-02-10 ENCOUNTER — OFFICE VISIT (OUTPATIENT)
Dept: WOUND CARE | Facility: HOSPITAL | Age: 70
End: 2025-02-10
Attending: INTERNAL MEDICINE
Payer: MEDICARE

## 2025-02-10 VITALS
DIASTOLIC BLOOD PRESSURE: 70 MMHG | RESPIRATION RATE: 14 BRPM | TEMPERATURE: 98 F | HEART RATE: 63 BPM | SYSTOLIC BLOOD PRESSURE: 148 MMHG

## 2025-02-10 DIAGNOSIS — L97.512 RIGHT FOOT ULCER, WITH FAT LAYER EXPOSED (HCC): ICD-10-CM

## 2025-02-10 DIAGNOSIS — Z79.01 LONG TERM (CURRENT) USE OF ANTICOAGULANTS: ICD-10-CM

## 2025-02-10 DIAGNOSIS — S61.200A OPEN WOUND OF RIGHT INDEX FINGER: ICD-10-CM

## 2025-02-10 DIAGNOSIS — D68.59 PRIMARY HYPERCOAGULABLE STATE (HCC): ICD-10-CM

## 2025-02-10 DIAGNOSIS — D68.51 FACTOR V LEIDEN (HCC): ICD-10-CM

## 2025-02-10 DIAGNOSIS — L97.922 NON-PRESSURE CHRONIC ULCER OF LEFT LOWER LEG WITH FAT LAYER EXPOSED (HCC): Primary | ICD-10-CM

## 2025-02-10 DIAGNOSIS — I73.9 PERIPHERAL VASCULAR DISEASE, UNSPECIFIED: ICD-10-CM

## 2025-02-10 DIAGNOSIS — S51.801D OPEN WOUND OF RIGHT FOREARM, SUBSEQUENT ENCOUNTER: ICD-10-CM

## 2025-02-10 DIAGNOSIS — M05.79 RHEUMATOID ARTHRITIS INVOLVING MULTIPLE SITES WITH POSITIVE RHEUMATOID FACTOR (HCC): ICD-10-CM

## 2025-02-10 DIAGNOSIS — G62.9 SMALL FIBER NEUROPATHY: ICD-10-CM

## 2025-02-10 PROCEDURE — 97597 DBRDMT OPN WND 1ST 20 CM/<: CPT | Performed by: INTERNAL MEDICINE

## 2025-02-10 NOTE — PROGRESS NOTES
[facility] PROGRESS NOTE  ALEXIS LUZ MD  2/10/2025    Chief Complaint:   Chief Complaint   Patient presents with    Wound Care     Follow-up for wounds to left leg and right heel. Pain 4/10. Arrives with spandagrip to left leg.        HPI:   Subjective   Vero Rodriguez is a 69 year old female coming in for a follow-up visit.    HPI    Ant leg wound left - improved.   Still with some slough.     All other wounds improving slowly - continues to have slough.     No s/o infection.     Edema persists.     REY normal  TBI slightly abnormal.     Will monitor clinically.         Review of Systems  Negative except HPI   Denies chest pain / SOB / palpitations  Denies fever.     Allergies  Allergies[1]    Current Meds:  Current Outpatient Medications   Medication Sig Dispense Refill    SILVER SULFADIAZINE 1 % External Cream APPLY TOPICALLY TO THE AFFECTED AREA DAILY (Patient not taking: Reported on 1/15/2025) 25 g 0    lidocaine (LIDODERM) 5 % External Patch Place 1 patch onto the skin daily. 30 patch 2    baclofen 10 MG Oral Tab Take 1 tablet (10 mg total) by mouth 3 (three) times daily. 90 tablet 2    Wound Dressings (MEDIHONEY WOUND/BURN DRESSING) External Gel Apply 1 Application topically daily as needed. (Patient not taking: Reported on 1/15/2025) 44 mL 1    RECTASMOOTHE 5 % External Cream APPLY A SMALL AMOUNT TO THE AFFECTED AREA UP TWICE DAILY (Patient not taking: Reported on 1/15/2025) 30 g 0    warfarin 2 MG Oral Tab Take 1 tablet (2 mg total) by mouth nightly. 90 tablet 3    Wound Dressings (ADAPTIC NON-ADHERING DRESSING) External Pads Apply 1 each topically daily as needed. (Patient not taking: Reported on 1/15/2025) 30 each 1    Omeprazole 40 MG Oral Capsule Delayed Release Take 1 capsule (40 mg total) by mouth daily. (Patient not taking: Reported on 1/15/2025) 90 capsule 3    enoxaparin 60 MG/0.6ML Subcutaneous Solution INJECT 60MG SUB Q INTO ABDOMINAL AREA TWICE DAILY under direction of coumadin  clinic 20 each 1    gabapentin 600 MG Oral Tab       tolterodine tartrate 4 MG Oral Capsule SR 24 Hr Take 1 capsule (4 mg total) by mouth daily. 90 capsule 3    simvastatin 5 MG Oral Tab Take 1 tablet (5 mg total) by mouth nightly. 90 tablet 3    RESTASIS MULTIDOSE 0.05 % Ophthalmic Emulsion       methylPREDNISolone 4 MG Oral Tab       Cholecalciferol 25 MCG (1000 UT) Oral Tab Every Day      Mycophenolate Mofetil 500 MG Oral Tab Take 2 tablets (1,000 mg total) by mouth 2 (two) times daily.      KEVZARA 200 MG/1.14ML Subcutaneous Solution Auto-injector  (Patient not taking: Reported on 1/15/2025)      potassium chloride 20 MEQ Oral Tab CR Take 1 tablet (20 mEq total) by mouth 2 (two) times daily. 180 tablet 3    spironolactone 25 MG Oral Tab Take 1 tablet (25 mg total) by mouth daily. 90 tablet 3    Nystatin 165081 UNIT/GM External Powder Apply 1 Application topically 2 (two) times daily as needed. 60 g 2    MONTELUKAST SODIUM 10 MG Oral Tab TAKE 1 TABLET(10 MG) BY MOUTH EVERY DAY 90 tablet 3    metroNIDAZOLE 1 % External Gel Apply to face nightly (Patient not taking: Reported on 1/15/2025) 60 g 1    PULMICORT FLEXHALER 180 MCG/ACT Inhalation Aerosol Powder, Breath Activated INHALE 2 PUFFS INTO THE LUNGS TWICE DAILY 1 each 11    GABAPENTIN 400 MG Oral Cap TAKE 3 CAPSULES BY MOUTH THREE TIMES DAILY 270 capsule 5    Pilocarpine HCl 5 MG Oral Tab Take 1 tablet (5 mg total) by mouth 3 (three) times daily.      Albuterol Sulfate  (90 Base) MCG/ACT Inhalation Aero Soln Inhale 2 puffs into the lungs every 6 (six) hours as needed for Wheezing or Shortness of Breath. 18 g 3    Probiotic Product (PROBIOTIC DAILY OR) Take by mouth.      Diphenoxylate-Atropine (LOMOTIL OR) Take by mouth.      hydrocortisone (ANUSOL-HC) 2.5 % Rectal Cream Place 1 Application rectally 2 (two) times daily as needed for Hemorrhoids. 60 g 1    Zolpidem Tartrate (AMBIEN) 10 MG Oral Tab Take 1 tablet (10 mg total) by mouth nightly as needed.  (Patient not taking: Reported on 1/15/2025) 30 tablet 1    Ammonium Lactate (LAC-HYDRIN) 12 % External Cream Apply qd prn to afected area 140 g 3    leflunomide (ARAVA) 20 MG Oral Tab Take 1 tablet (20 mg total) by mouth daily.      Hydroxychloroquine Sulfate 200 MG Oral Tab Take  by mouth 2 (two) times daily.           EXAM:   Objective   Objective    Physical Exam    Vital Signs  Vitals:    02/10/25 1100   BP: 148/70   Pulse: 63   Resp: 14   Temp: 98.2 °F (36.8 °C)       Wound Assessment  Wound 01/15/25 #1 Left medial lower leg Leg Left;Medial;Lower (Active)   Date First Assessed/Time First Assessed: 01/15/25 1420    Wound Number (Wound Clinic Only): #1 Left medial lower leg  Primary Wound Type: Traumatic  Location: Leg  Wound Location Orientation: Left;Medial;Lower      Assessments 1/15/2025  2:24 PM 2/10/2025 11:30 AM   Wound Image         Drainage Amount Small Small   Drainage Description Serosanguineous Serosanguineous   Treatments Compression (spandagrip E x2) Compression (calamine unna boot 20-30mmHg, medipore tape)   Wound Length (cm) 3 cm (slight angle) 2 cm   Wound Width (cm) 3.8 cm 2.5 cm   Wound Surface Area (cm^2) 11.4 cm^2 5 cm^2   Wound Depth (cm) 0.1 cm 0.1 cm   Wound Volume (cm^3) 1.14 cm^3 0.5 cm^3   Wound Healing % -- 56   Margins Well-defined edges Well-defined edges   Non-staged Wound Description Full thickness Full thickness   [place]-wound Assessment Edema;Ecchymosis Edema;Hemosiderin staining;Dry exudate   Wound Granulation Tissue Firm;Pink Pink;Red;Firm   Wound Bed Granulation (%) 5 % 55 %   Wound Bed Slough (%) 95 % 45 %   Wound Odor None None   Tunneling? No No   Undermining? No No   Sinus Tracts? No No       Active Orders   Date Order Priority Status Authorizing Provider   02/10/25 1336 Debridement Traumatic Left;Medial;Lower Leg Routine Active Deysi Pulliam MD       Inactive Orders   Date Order Priority Status Authorizing Provider   01/27/25 1133 Debridement Traumatic  Left;Medial;Lower Leg Routine Completed Deysi Pulliam MD   01/15/25 1455 Debridement Traumatic Left;Medial;Lower Leg Routine Completed Deysi Pulliam MD       Wound 01/15/25 #2 Right lateral heel Heel Right;Lateral (Active)   Date First Assessed/Time First Assessed: 01/15/25 1421    Wound Number (Wound Clinic Only): #2 Right lateral heel  Primary Wound Type: Other (comment)  Location: Heel  Wound Location Orientation: Right;Lateral      Assessments 1/15/2025  2:25 PM 2/10/2025 11:33 AM   Wound Image        Drainage Amount None Unable to assess   Wound Length (cm) 1.5 cm 1.6 cm   Wound Width (cm) 0.1 cm 0.2 cm   Wound Surface Area (cm^2) 0.15 cm^2 0.32 cm^2   Wound Depth (cm) 0.1 cm --   Wound Volume (cm^3) 0.015 cm^3 --   Margins Well-defined edges Well-defined edges   Non-staged Wound Description Full thickness Full thickness   [place]-wound Assessment Dry Dry;Callous   Wound Granulation Tissue Firm;Pink Pink;Firm   Wound Bed Granulation (%) 100 % 100 %   Wound Odor None None   Tunneling? No No   Undermining? No No   Sinus Tracts? No No       No associated orders.       Wound 01/27/25 #5 Left posterior leg Leg Lower;Posterior;Left (Active)   Date First Assessed/Time First Assessed: 01/27/25 1033    Wound Number (Wound Clinic Only): #5 Left posterior leg  Primary Wound Type: Venous Ulcer  Location: Leg  Wound Location Orientation: Lower;Posterior;Left      Assessments 1/27/2025 10:36 AM 2/10/2025 11:31 AM   Wound Image       Drainage Amount None Small   Drainage Description -- Serosanguineous   Treatments Compression (calamine unna boot 20-30mmHg, medipore tape) Compression (calamine unna boot 20-30mmHg, medipore tape)   Wound Length (cm) 1.8 cm 1.4 cm   Wound Width (cm) 0.4 cm 0.6 cm   Wound Surface Area (cm^2) 0.72 cm^2 0.84 cm^2   Wound Depth (cm) 0.1 cm 0.1 cm   Wound Volume (cm^3) 0.072 cm^3 0.084 cm^3   Wound Healing % -- -17   Margins Well-defined edges Well-defined edges   Non-staged Wound  Description Full thickness Full thickness   [place]-wound Assessment Dry;Edema Blanchable erythema;Dry;Clean;Painful   Wound Granulation Tissue Pink;Pale Grey;Firm Red;Pink;Firm   Wound Bed Granulation (%) 90 % 50 %   Wound Bed Slough (%) -- 50 %   Wound Odor None None   Shape 10% scab --   Tunneling? -- No   Undermining? -- No   Sinus Tracts? -- No       No associated orders.                ASSESSMENT AND PLAN:     Assessment     Encounter Diagnosis  1. 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 Right foot ulcer, with fat layer exposed (HCC)    3. Open wound of right index finger    4. Open wound of right forearm, subsequent encounter    5. Peripheral vascular disease, unspecified    6. Small fiber neuropathy    7. Primary hypercoagulable state (Formerly [facility])    8. Rheumatoid arthritis involving multiple sites with positive rheumatoid factor (Former[facility])    9. Factor V Leiden (Former[facility])    10. Long term (current) use of anticoagulants      PROCEDURES:    Compression wrap application b/l    PLAN OF CARE:    Start collagen on left ant. Leg ulcer.   Honey gel for enzymatic debridement on all other wounds.   Continue compression wraps.   Edema management.   Watch out for signs of early infection - counseled.   Plan of care discussed with patient in detail - All questions answered   Return in one week.     Orders  Orders Placed This Encounter   Procedures    Debridement Traumatic Left;Medial;Lower Leg     Patient Instructions     Resume low level compression  Start collagen on anterior left leg wound.   Honey gel on all other wounds.   Consider vascular consult if wounds not healing.     Wound Cleaning and Dressings:    Shower with protection - use Shower boot     DRESSINGS:   Left leg wound, anterior : collagen / HF transfer/ gauze  Right foot wound: honey gel  / gauze.   Change dressing daily.    Compression Therapy : unna boot  Compression Therapy Instructions:  1.  Put on first thing in the morning and  may remove at bedside.  Okay to wear overnight      if comfortable.  Do not let stockings roll up/down and kink.  Hand wash stockings and      hang dry as needed.    2.  Avoid prolonged standing in one place.  It is better to have your calf muscles moving       to pump fluid out of the legs.    3.  Elevate leg(s) above the level of the heart when sitting or as much as possible.    4.  Take your diuretics as directed by your provider.  Do not skip doses or change doses      unless instructed to do so by your provider.    5. Do not get leg(s) with compression wrap wet. If wraps are too tight as indicated        By pain, numbness/tingling or discoloration of toes remove wrap completely       and call the   wound center.     Off-Loading:  Offload wounded areas.     Miscellaneous Instructions:  Supplement with a daily multivitamin   Low salt diet  Increase protein intake / consider protein supplements - see below  Elevate extremities at all times when sitting / laying down.    DIETARY MODIFICATIONS TO HELP WITH WOUND HEALING:    Protein: Meats, beans, eggs, milk and yogurt particularly Greek yogurt), tofu, soy nuts, soy protein products    Vitamin C: Citrus fruits and juices, strawberries, tomatoes, tomato juice, peppers, baked potatoes, spinach, broccoli, cauliflower, [place] sprouts, cabbage    Vitamin A: Dark green, leafy vegetables, orange or yellow vegetables, cantaloupe, fortified dairy products, liver, fortified cereals    Zinc: Fortified cereals, red meats, seafood    Consider Jason by [employer] (These are essential branch chain amino acids that help with tissue building and wound healing) and take 2 packets/day. you can order online at [employer] or [employer]    ADDITIONAL REMINDERS:    The treatment plan has been discussed at length with you and your provider. Follow all instructions carefully, it is very important. If you do not follow all instructions, you are at  risk of your wound not healing, infection,  possible loss of limb and even end of life.  Please call the clinic during regular business hours ( 7:30 AM - 5:30 PM) if you notice increased bleeding, redness, warmth, pain or pus like drainage or start running a fever greater than 100.3.    For after hour emergencies, please call your primary physician or go to the nearest emergency room.      Patient/Caregiver Education: There are no barriers to learning. Medical education for above diagnosis given.   Answered all questions.    Outcome: Patient verbalizes understanding. Patient is notified to call with any questions, complications, allergies, or worsening or changing symptoms.  Patient is to call with any side effects or complications as a result of the treatments today.      DOCUMENTATION OF TIME SPENT: Code selection for this visit was based on time spent : 30 min on date of service in preparing to see the patient, obtaining and/or reviewing separately obtained history, performing a medically appropriate examination, counseling and educating the patient/family/caregiver, ordering medications or testing, referring and communicating with other healthcare providers, documenting clinical information in the E HR, independently interpreting results and communicating results to the patient/family/caregiver and care coordination with the patient's other providers.    Followup: Return in about 1 week (around 2/17/2025) for Wound followup.      Note to Patient:  The 21st Century Cures Act makes medical notes like these available to patients in the interest of transparency. However, be advised this is a medical document and is intended as [id number] communication; it is written in medical language and may appear blunt, direct, or contain abbreviations or verbiage that are unfamiliar. Medical documents are intended to carry relevant information, facts as evident, and the clinical opinion of the practitioner.    Also, please note that this report has been produced using  speech recognition software and may contain errors related to that system including, but not limited to, errors in grammar, punctuation, and spelling, as well as words and phrases that possibly may have been recognized inappropriately.  If there are any questions or concerns, contact the dictating provider for clarification.      Deysi Zhou MD  2/10/2025  11:49 AM                      [1]   Allergies  Allergen Reactions    Penicillins ANAPHYLAXIS and HYPOTENSION     Cardiac arrest      Pregabalin SWELLING    Raspberry NAUSEA AND VOMITING and SWELLING     Throat closes    Trimethobenzamide ANAPHYLAXIS     Cardiac arrest    Sulfa Antibiotics ITCHING    Pcn [Bicillin L-A] ANAPHYLAXIS     .    Tigan [Trimethobenzamide Hcl] ANAPHYLAXIS     Pass out and cardiac arrest.

## 2025-02-10 NOTE — PROGRESS NOTES
.Weekly Wound Education Note    Teaching Provided To: Patient  Training Topics: Dressing;Edema control;Discharge instructions;Compression  Training Method: Explain/Verbal;Written  Training Response: Patient responds and understands            Honey gel and honey alginate to posterior ankle wound.  Myrna Ag, hydrofera transfer to anterior leg wound.  Cover with abd pads.  Calamine unna boot 20-30mmHg.  Honey gel to right heel and cover with border gauze.

## 2025-02-10 NOTE — PROGRESS NOTES
Patient ID: Vero Rodriguez is a 69 year old female.    Debridement Traumatic Left;Medial;Lower Leg   Wound 01/15/25 #1 Left medial lower leg Leg Left;Medial;Lower    Performed by: Deysi Pulliam MD  Authorized by: Deysi Pulliam MD      Consent   Consent obtained? verbal  Consent given by: patient    Debridement Details  Performed by: physician  Debridement type: conservative sharp  Pain control: lidocaine 4%  Pain control administration type: topical    Pre-debridement measurements  Length (cm): 2  Width (cm): 2.5  Depth (cm): 0.1  Surface Area (cm^2): 5    Post-debridement measurements  Length (cm): 2  Width (cm): 2.5  Depth (cm): 0.2  Percent debrided: 100%  Surface Area (cm^2): 5  Area Debrided (cm^2): 5  Volume (cm^3): 1    Devitalized tissue debrided: biofilm and slough  Instrument(s) utilized: curette  Bleeding: small  Hemostasis obtained with: pressure  Procedural pain (0-10): 4  Post-procedural pain: 2   Response to treatment: procedure was tolerated well

## 2025-02-10 NOTE — PATIENT INSTRUCTIONS
Resume low level compression  Start collagen on anterior left leg wound.   Honey gel on all other wounds.   Consider vascular consult if wounds not healing.     Wound Cleaning and Dressings:    Shower with protection - use Shower boot     DRESSINGS:   Left leg wound, anterior : collagen / HF transfer/ gauze  Right foot wound: honey gel  / gauze.   Change dressing daily.    Compression Therapy : unna boot  Compression Therapy Instructions:  1.  Put on first thing in the morning and may remove at bedside.  Okay to wear overnight      if comfortable.  Do not let stockings roll up/down and kink.  Hand wash stockings and      hang dry as needed.    2.  Avoid prolonged standing in one place.  It is better to have your calf muscles moving       to pump fluid out of the legs.    3.  Elevate leg(s) above the level of the heart when sitting or as much as possible.    4.  Take your diuretics as directed by your provider.  Do not skip doses or change doses      unless instructed to do so by your provider.    5. Do not get leg(s) with compression wrap wet. If wraps are too tight as indicated        By pain, numbness/tingling or discoloration of toes remove wrap completely       and call the   wound center.     Off-Loading:  Offload wounded areas.     Miscellaneous Instructions:  Supplement with a daily multivitamin   Low salt diet  Increase protein intake / consider protein supplements - see below  Elevate extremities at all times when sitting / laying down.    DIETARY MODIFICATIONS TO HELP WITH WOUND HEALING:    Protein: Meats, beans, eggs, milk and yogurt particularly Greek yogurt), tofu, soy nuts, soy protein products    Vitamin C: Citrus fruits and juices, strawberries, tomatoes, tomato juice, peppers, baked potatoes, spinach, broccoli, cauliflower, [place] sprouts, cabbage    Vitamin A: Dark green, leafy vegetables, orange or yellow vegetables, cantaloupe, fortified dairy products, liver, fortified cereals    Zinc:  Fortified cereals, red meats, seafood    Consider Jason by [employer] (These are essential branch chain amino acids that help with tissue building and wound healing) and take 2 packets/day. you can order online at [employer] or [employer]    ADDITIONAL REMINDERS:    The treatment plan has been discussed at length with you and your provider. Follow all instructions carefully, it is very important. If you do not follow all instructions, you are at  risk of your wound not healing, infection, possible loss of limb and even end of life.  Please call the clinic during regular business hours ( 7:30 AM - 5:30 PM) if you notice increased bleeding, redness, warmth, pain or pus like drainage or start running a fever greater than 100.3.    For after hour emergencies, please call your primary physician or go to the nearest emergency room.

## 2025-02-17 ENCOUNTER — APPOINTMENT (OUTPATIENT)
Dept: WOUND CARE | Facility: HOSPITAL | Age: 70
End: 2025-02-17
Attending: INTERNAL MEDICINE
Payer: MEDICARE

## 2025-02-17 VITALS
RESPIRATION RATE: 14 BRPM | TEMPERATURE: 98 F | DIASTOLIC BLOOD PRESSURE: 68 MMHG | SYSTOLIC BLOOD PRESSURE: 123 MMHG | HEART RATE: 72 BPM

## 2025-02-17 DIAGNOSIS — L97.512 RIGHT FOOT ULCER, WITH FAT LAYER EXPOSED (HCC): ICD-10-CM

## 2025-02-17 DIAGNOSIS — D68.59 PRIMARY HYPERCOAGULABLE STATE (HCC): ICD-10-CM

## 2025-02-17 DIAGNOSIS — D68.51 FACTOR V LEIDEN (HCC): ICD-10-CM

## 2025-02-17 DIAGNOSIS — S51.801D OPEN WOUND OF RIGHT FOREARM, SUBSEQUENT ENCOUNTER: ICD-10-CM

## 2025-02-17 DIAGNOSIS — I73.9 PERIPHERAL VASCULAR DISEASE, UNSPECIFIED: ICD-10-CM

## 2025-02-17 DIAGNOSIS — M05.79 RHEUMATOID ARTHRITIS INVOLVING MULTIPLE SITES WITH POSITIVE RHEUMATOID FACTOR (HCC): ICD-10-CM

## 2025-02-17 DIAGNOSIS — G62.9 SMALL FIBER NEUROPATHY: ICD-10-CM

## 2025-02-17 DIAGNOSIS — T14.8XXD DELAYED WOUND HEALING: ICD-10-CM

## 2025-02-17 DIAGNOSIS — L97.922 NON-PRESSURE CHRONIC ULCER OF LEFT LOWER LEG WITH FAT LAYER EXPOSED (HCC): Primary | ICD-10-CM

## 2025-02-17 DIAGNOSIS — Z79.01 LONG TERM (CURRENT) USE OF ANTICOAGULANTS: ICD-10-CM

## 2025-02-17 DIAGNOSIS — S61.200A OPEN WOUND OF RIGHT INDEX FINGER: ICD-10-CM

## 2025-02-17 PROCEDURE — 29581 APPL MULTLAYER CMPRN SYS LEG: CPT

## 2025-02-17 NOTE — PROGRESS NOTES
[facility] PROGRESS NOTE  ALEXIS LUZ MD  2/17/2025    Chief Complaint:   Chief Complaint   Patient presents with    Wound Care     Follow-up for wounds to BLE. Tolerating compression well. Pain 3/10 at this time.        HPI:   Subjective   Vero Rodriguez is a 69 year old female coming in for a follow-up visit.    HPI    Wounds all improving.     Right heel ulcer seems closed. Skin mature.   No s/o infection.     Left posterior heel ulcer - improving still with some slough - tendon immediatly beneath the ulcer - not exposed - no s/o infection- advised pt to offload that area as much as possible.     Left ant. Ankle / leg wound improving with more granulation and improved dimensions    Review of Systems  Negative except HPI   Denies chest pain / SOB / palpitations  Denies fever.     Allergies  Allergies[1]    Current Meds:  Current Outpatient Medications   Medication Sig Dispense Refill    SILVER SULFADIAZINE 1 % External Cream APPLY TOPICALLY TO THE AFFECTED AREA DAILY (Patient not taking: Reported on 1/15/2025) 25 g 0    lidocaine (LIDODERM) 5 % External Patch Place 1 patch onto the skin daily. 30 patch 2    baclofen 10 MG Oral Tab Take 1 tablet (10 mg total) by mouth 3 (three) times daily. 90 tablet 2    Wound Dressings (MEDIHONEY WOUND/BURN DRESSING) External Gel Apply 1 Application topically daily as needed. (Patient not taking: Reported on 1/15/2025) 44 mL 1    RECTASMOOTHE 5 % External Cream APPLY A SMALL AMOUNT TO THE AFFECTED AREA UP TWICE DAILY (Patient not taking: Reported on 1/15/2025) 30 g 0    warfarin 2 MG Oral Tab Take 1 tablet (2 mg total) by mouth nightly. 90 tablet 3    Wound Dressings (ADAPTIC NON-ADHERING DRESSING) External Pads Apply 1 each topically daily as needed. (Patient not taking: Reported on 1/15/2025) 30 each 1    Omeprazole 40 MG Oral Capsule Delayed Release Take 1 capsule (40 mg total) by mouth daily. (Patient not taking: Reported on 1/15/2025) 90 capsule 3     enoxaparin 60 MG/0.6ML Subcutaneous Solution INJECT 60MG SUB Q INTO ABDOMINAL AREA TWICE DAILY under direction of coumadin clinic 20 each 1    gabapentin 600 MG Oral Tab       tolterodine tartrate 4 MG Oral Capsule SR 24 Hr Take 1 capsule (4 mg total) by mouth daily. 90 capsule 3    simvastatin 5 MG Oral Tab Take 1 tablet (5 mg total) by mouth nightly. 90 tablet 3    RESTASIS MULTIDOSE 0.05 % Ophthalmic Emulsion       methylPREDNISolone 4 MG Oral Tab       Cholecalciferol 25 MCG (1000 UT) Oral Tab Every Day      Mycophenolate Mofetil 500 MG Oral Tab Take 2 tablets (1,000 mg total) by mouth 2 (two) times daily.      KEVZARA 200 MG/1.14ML Subcutaneous Solution Auto-injector  (Patient not taking: Reported on 1/15/2025)      potassium chloride 20 MEQ Oral Tab CR Take 1 tablet (20 mEq total) by mouth 2 (two) times daily. 180 tablet 3    spironolactone 25 MG Oral Tab Take 1 tablet (25 mg total) by mouth daily. 90 tablet 3    Nystatin 726539 UNIT/GM External Powder Apply 1 Application topically 2 (two) times daily as needed. 60 g 2    MONTELUKAST SODIUM 10 MG Oral Tab TAKE 1 TABLET(10 MG) BY MOUTH EVERY DAY 90 tablet 3    metroNIDAZOLE 1 % External Gel Apply to face nightly (Patient not taking: Reported on 1/15/2025) 60 g 1    PULMICORT FLEXHALER 180 MCG/ACT Inhalation Aerosol Powder, Breath Activated INHALE 2 PUFFS INTO THE LUNGS TWICE DAILY 1 each 11    GABAPENTIN 400 MG Oral Cap TAKE 3 CAPSULES BY MOUTH THREE TIMES DAILY 270 capsule 5    Pilocarpine HCl 5 MG Oral Tab Take 1 tablet (5 mg total) by mouth 3 (three) times daily.      Albuterol Sulfate  (90 Base) MCG/ACT Inhalation Aero Soln Inhale 2 puffs into the lungs every 6 (six) hours as needed for Wheezing or Shortness of Breath. 18 g 3    Probiotic Product (PROBIOTIC DAILY OR) Take by mouth.      Diphenoxylate-Atropine (LOMOTIL OR) Take by mouth.      hydrocortisone (ANUSOL-HC) 2.5 % Rectal Cream Place 1 Application rectally 2 (two) times daily as needed for  Hemorrhoids. 60 g 1    Zolpidem Tartrate (AMBIEN) 10 MG Oral Tab Take 1 tablet (10 mg total) by mouth nightly as needed. (Patient not taking: Reported on 1/15/2025) 30 tablet 1    Ammonium Lactate (LAC-HYDRIN) 12 % External Cream Apply qd prn to afected area 140 g 3    leflunomide (ARAVA) 20 MG Oral Tab Take 1 tablet (20 mg total) by mouth daily.      Hydroxychloroquine Sulfate 200 MG Oral Tab Take  by mouth 2 (two) times daily.           EXAM:   Objective   Objective    Physical Exam    Vital Signs  Vitals:    02/17/25 1100   BP: 123/68   Pulse: 72   Resp: 14   Temp: 98.1 °F (36.7 °C)       Wound Assessment  Wound 01/15/25 #1 Left medial lower leg Leg Left;Medial;Lower (Active)   Date First Assessed/Time First Assessed: 01/15/25 1420    Wound Number (Wound Clinic Only): #1 Left medial lower leg  Primary Wound Type: Traumatic  Location: Leg  Wound Location Orientation: Left;Medial;Lower      Assessments 1/15/2025  2:24 PM 2/17/2025  2:15 PM   Wound Image        Drainage Amount Small Small   Drainage Description Serosanguineous Serosanguineous   Treatments Compression (spandagrip E x2) --   Wound Length (cm) 3 cm (slight angle) 1.5 cm   Wound Width (cm) 3.8 cm 2.3 cm (slightly angle)   Wound Surface Area (cm^2) 11.4 cm^2 3.45 cm^2   Wound Depth (cm) 0.1 cm 0.1 cm   Wound Volume (cm^3) 1.14 cm^3 0.345 cm^3   Wound Healing % -- 70   Margins Well-defined edges Well-defined edges   Non-staged Wound Description Full thickness Full thickness   [place]-wound Assessment Edema;Ecchymosis Edema;Hemosiderin staining;Dry   Wound Granulation Tissue Firm;Pink Pink;Red;Firm   Wound Bed Granulation (%) 5 % 80 %   Wound Bed Slough (%) 95 % 20 %   Wound Odor None None   Tunneling? No No   Undermining? No No   Sinus Tracts? No No       Inactive Orders   Date Order Priority Status Authorizing Provider   02/10/25 1336 Debridement Traumatic Left;Medial;Lower Leg Routine Completed Deysi Pulliam MD   01/27/25 1133 Debridement Traumatic  Left;Medial;Lower Leg Routine Completed Deysi Pulliam MD   01/15/25 1455 Debridement Traumatic Left;Medial;Lower Leg Routine Completed Deysi Pulliam MD       Wound 01/15/25 #2 Right lateral heel Heel Right;Lateral (Active)   Date First Assessed/Time First Assessed: 01/15/25 1421    Wound Number (Wound Clinic Only): #2 Right lateral heel  Primary Wound Type: Other (comment)  Location: Heel  Wound Location Orientation: Right;Lateral      Assessments 1/15/2025  2:25 PM 2/17/2025  2:17 PM   Wound Image       Drainage Amount None Scant   Drainage Description -- Yellow;Serous   Wound Length (cm) 1.5 cm 1.2 cm   Wound Width (cm) 0.1 cm 0.1 cm   Wound Surface Area (cm^2) 0.15 cm^2 0.12 cm^2   Wound Depth (cm) 0.1 cm 0 cm   Wound Volume (cm^3) 0.015 cm^3 0 cm^3   Wound Healing % -- 100   Margins Well-defined edges Well-defined edges   Non-staged Wound Description Full thickness Full thickness   [place]-wound Assessment Dry Dry   Wound Granulation Tissue Firm;Pink --   Wound Bed Granulation (%) 100 % --   Wound Odor None None   Shape -- 100% scabbed   Tunneling? No No   Undermining? No No   Sinus Tracts? No No       No associated orders.       Wound 01/27/25 #5 Left posterior leg Leg Lower;Posterior;Left (Active)   Date First Assessed/Time First Assessed: 01/27/25 1033    Wound Number (Wound Clinic Only): #5 Left posterior leg  Primary Wound Type: Venous Ulcer  Location: Leg  Wound Location Orientation: Lower;Posterior;Left      Assessments 1/27/2025 10:36 AM 2/17/2025  2:16 PM   Wound Image       Drainage Amount None Small   Drainage Description -- Serosanguineous   Treatments Compression (calamine unna boot 20-30mmHg, medipore tape) --   Wound Length (cm) 1.8 cm 1.7 cm   Wound Width (cm) 0.4 cm 0.7 cm   Wound Surface Area (cm^2) 0.72 cm^2 1.19 cm^2   Wound Depth (cm) 0.1 cm 0.1 cm   Wound Volume (cm^3) 0.072 cm^3 0.119 cm^3   Wound Healing % -- -65   Margins Well-defined edges Well-defined edges   Non-staged  Wound Description Full thickness Full thickness   [place]-wound Assessment Dry;Edema Blanchable erythema;Clean;Painful   Wound Granulation Tissue Pink;Pale Grey;Firm Red;Pink;Firm   Wound Bed Granulation (%) 90 % 100 %   Wound Odor None None   Shape 10% scab --   Tunneling? -- No   Undermining? -- No   Sinus Tracts? -- No       No associated orders.                ASSESSMENT AND PLAN:     Assessment     Encounter Diagnosis  1. 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 Right foot ulcer, with fat layer exposed (HCC)    3. Open wound of right index finger    4. Open wound of right forearm, subsequent encounter    5. Peripheral vascular disease, unspecified    6. Small fiber neuropathy    7. Primary hypercoagulable state ([facility])    8. Rheumatoid arthritis involving multiple sites with positive rheumatoid factor ([facility])    9. Factor V Leiden ([facility])    10. Long term (current) use of anticoagulants    11. Delayed wound healing        PROCEDURES:    Compression wrap application       PLAN OF CARE:    Serial debridements PRN.   Collagen on ant. Leg wound  Continue honey gel on post leg wound.   Continue compression wraps.   Edema management.   Watch out for signs of early infection - counseled.   Plan of care discussed with patient in detail - All questions answered   Return in one week.       Patient Instructions     Return one week  Continue collagen on anterior left leg wound.   Consider vascular consult if wounds not healing.     Wound Cleaning and Dressings:    Shower with protection - use Shower boot     DRESSINGS:   Left leg wound, anterior : collagen / silver alginate / gauze  Left  foot wound: honey gel  /silver alginate  Change dressing daily.    Compression Therapy : unna boot  Compression Therapy Instructions:  1.  Put on first thing in the morning and may remove at bedside.  Okay to wear overnight      if comfortable.  Do not let stockings roll up/down and kink.  Hand wash stockings  and      hang dry as needed.    2.  Avoid prolonged standing in one place.  It is better to have your calf muscles moving       to pump fluid out of the legs.    3.  Elevate leg(s) above the level of the heart when sitting or as much as possible.    4.  Take your diuretics as directed by your provider.  Do not skip doses or change doses      unless instructed to do so by your provider.    5. Do not get leg(s) with compression wrap wet. If wraps are too tight as indicated        By pain, numbness/tingling or discoloration of toes remove wrap completely       and call the   wound center.     Off-Loading:  Offload wounded areas.     Miscellaneous Instructions:  Supplement with a daily multivitamin   Low salt diet  Increase protein intake / consider protein supplements - see below  Elevate extremities at all times when sitting / laying down.    DIETARY MODIFICATIONS TO HELP WITH WOUND HEALING:    Protein: Meats, beans, eggs, milk and yogurt particularly Greek yogurt), tofu, soy nuts, soy protein products    Vitamin C: Citrus fruits and juices, strawberries, tomatoes, tomato juice, peppers, baked potatoes, spinach, broccoli, cauliflower, [place] sprouts, cabbage    Vitamin A: Dark green, leafy vegetables, orange or yellow vegetables, cantaloupe, fortified dairy products, liver, fortified cereals    Zinc: Fortified cereals, red meats, seafood    Consider Jason by [employer] (These are essential branch chain amino acids that help with tissue building and wound healing) and take 2 packets/day. you can order online at [employer] or [employer]    ADDITIONAL REMINDERS:    The treatment plan has been discussed at length with you and your provider. Follow all instructions carefully, it is very important. If you do not follow all instructions, you are at  risk of your wound not healing, infection, possible loss of limb and even end of life.  Please call the clinic during regular business hours ( 7:30 AM - 5:30 PM) if you notice  increased bleeding, redness, warmth, pain or pus like drainage or start running a fever greater than 100.3.    For after hour emergencies, please call your primary physician or go to the nearest emergency room.      Patient/Caregiver Education: There are no barriers to learning. Medical education for above diagnosis given.   Answered all questions.    Outcome: Patient verbalizes understanding. Patient is notified to call with any questions, complications, allergies, or worsening or changing symptoms.  Patient is to call with any side effects or complications as a result of the treatments today.      DOCUMENTATION OF TIME SPENT: Code selection for this visit was based on time spent : 33 min on date of service in preparing to see the patient, obtaining and/or reviewing separately obtained history, performing a medically appropriate examination, counseling and educating the patient/family/caregiver, ordering medications or testing, referring and communicating with other healthcare providers, documenting clinical information in the E HR, independently interpreting results and communicating results to the patient/family/caregiver and care coordination with the patient's other providers.    Followup: Return in about 1 week (around 2/24/2025) for Wound followup.      Note to Patient:  The 21st Century Cures Act makes medical notes like these available to patients in the interest of transparency. However, be advised this is a medical document and is intended as [id number] communication; it is written in medical language and may appear blunt, direct, or contain abbreviations or verbiage that are unfamiliar. Medical documents are intended to carry relevant information, facts as evident, and the clinical opinion of the practitioner.    Also, please note that this report has been produced using speech recognition software and may contain errors related to that system including, but not limited to, errors in grammar,  punctuation, and spelling, as well as words and phrases that possibly may have been recognized inappropriately.  If there are any questions or concerns, contact the dictating provider for clarification.      Deysi Zhou MD  2/17/2025  2:52 PM                      [1]   Allergies  Allergen Reactions    Penicillins ANAPHYLAXIS and HYPOTENSION     Cardiac arrest      Pregabalin SWELLING    Raspberry NAUSEA AND VOMITING and SWELLING     Throat closes    Trimethobenzamide ANAPHYLAXIS     Cardiac arrest    Sulfa Antibiotics ITCHING    Pcn [Bicillin L-A] ANAPHYLAXIS     .    Tigan [Trimethobenzamide Hcl] ANAPHYLAXIS     Pass out and cardiac arrest.

## 2025-02-17 NOTE — PROGRESS NOTES
.Weekly Wound Education Note    Teaching Provided To: Patient  Training Topics: Dressing;Discharge instructions;Compression;Edema control;Off-loading  Training Method: Explain/Verbal;Written  Training Response: Patient responds and understands;Reinforcement needed            Right heel to be moisturized daily and offload.  Wound is healed.  Left posterior leg, honey gel, silver alginate, abd pad  Left anterior leg, Myrna Ag, silver alginate, abd pad  Calamine unna boot 20-30mmHg to left leg.

## 2025-02-17 NOTE — PATIENT INSTRUCTIONS
Return one week  Continue collagen on anterior left leg wound.   Consider vascular consult if wounds not healing.     Wound Cleaning and Dressings:    Shower with protection - use Shower boot     DRESSINGS:   Left leg wound, anterior : collagen / silver alginate / gauze  Left  foot wound: honey gel  /silver alginate  Change dressing daily.    Compression Therapy : unna boot  Compression Therapy Instructions:  1.  Put on first thing in the morning and may remove at bedside.  Okay to wear overnight      if comfortable.  Do not let stockings roll up/down and kink.  Hand wash stockings and      hang dry as needed.    2.  Avoid prolonged standing in one place.  It is better to have your calf muscles moving       to pump fluid out of the legs.    3.  Elevate leg(s) above the level of the heart when sitting or as much as possible.    4.  Take your diuretics as directed by your provider.  Do not skip doses or change doses      unless instructed to do so by your provider.    5. Do not get leg(s) with compression wrap wet. If wraps are too tight as indicated        By pain, numbness/tingling or discoloration of toes remove wrap completely       and call the   wound center.     Off-Loading:  Offload wounded areas.     Miscellaneous Instructions:  Supplement with a daily multivitamin   Low salt diet  Increase protein intake / consider protein supplements - see below  Elevate extremities at all times when sitting / laying down.    DIETARY MODIFICATIONS TO HELP WITH WOUND HEALING:    Protein: Meats, beans, eggs, milk and yogurt particularly Greek yogurt), tofu, soy nuts, soy protein products    Vitamin C: Citrus fruits and juices, strawberries, tomatoes, tomato juice, peppers, baked potatoes, spinach, broccoli, cauliflower, [place] sprouts, cabbage    Vitamin A: Dark green, leafy vegetables, orange or yellow vegetables, cantaloupe, fortified dairy products, liver, fortified cereals    Zinc: Fortified cereals, red meats,  seafood    Consider Jason by [employer] (These are essential branch chain amino acids that help with tissue building and wound healing) and take 2 packets/day. you can order online at [employer] or [employer]    ADDITIONAL REMINDERS:    The treatment plan has been discussed at length with you and your provider. Follow all instructions carefully, it is very important. If you do not follow all instructions, you are at  risk of your wound not healing, infection, possible loss of limb and even end of life.  Please call the clinic during regular business hours ( 7:30 AM - 5:30 PM) if you notice increased bleeding, redness, warmth, pain or pus like drainage or start running a fever greater than 100.3.    For after hour emergencies, please call your primary physician or go to the nearest emergency room.

## 2025-02-24 ENCOUNTER — OFFICE VISIT (OUTPATIENT)
Dept: WOUND CARE | Facility: HOSPITAL | Age: 70
End: 2025-02-24
Attending: INTERNAL MEDICINE
Payer: MEDICARE

## 2025-02-24 VITALS
SYSTOLIC BLOOD PRESSURE: 115 MMHG | DIASTOLIC BLOOD PRESSURE: 63 MMHG | RESPIRATION RATE: 16 BRPM | HEART RATE: 69 BPM | TEMPERATURE: 97 F

## 2025-02-24 DIAGNOSIS — L97.512 RIGHT FOOT ULCER, WITH FAT LAYER EXPOSED (HCC): ICD-10-CM

## 2025-02-24 DIAGNOSIS — L97.922 NON-PRESSURE CHRONIC ULCER OF LEFT LOWER LEG WITH FAT LAYER EXPOSED (HCC): Primary | ICD-10-CM

## 2025-02-24 DIAGNOSIS — S61.200A OPEN WOUND OF RIGHT INDEX FINGER: ICD-10-CM

## 2025-02-24 DIAGNOSIS — I73.9 PERIPHERAL VASCULAR DISEASE, UNSPECIFIED: ICD-10-CM

## 2025-02-24 DIAGNOSIS — D68.59 PRIMARY HYPERCOAGULABLE STATE (HCC): ICD-10-CM

## 2025-02-24 DIAGNOSIS — G62.9 SMALL FIBER NEUROPATHY: ICD-10-CM

## 2025-02-24 DIAGNOSIS — D68.51 FACTOR V LEIDEN (HCC): ICD-10-CM

## 2025-02-24 DIAGNOSIS — M05.79 RHEUMATOID ARTHRITIS INVOLVING MULTIPLE SITES WITH POSITIVE RHEUMATOID FACTOR (HCC): ICD-10-CM

## 2025-02-24 DIAGNOSIS — Z79.01 LONG TERM (CURRENT) USE OF ANTICOAGULANTS: ICD-10-CM

## 2025-02-24 PROCEDURE — 29581 APPL MULTLAYER CMPRN SYS LEG: CPT

## 2025-02-24 PROCEDURE — 97597 DBRDMT OPN WND 1ST 20 CM/<: CPT | Performed by: INTERNAL MEDICINE

## 2025-02-24 NOTE — PROGRESS NOTES
.Weekly Wound Education Note    Teaching Provided To: Patient  Training Topics: Dressing;Edema control;Discharge instructions;Compression  Training Method: Explain/Verbal;Written  Training Response: Patient responds and understands            Honey gel, silver alginate, abd pad to posterior leg wound.  Myrna Ag, silver alginate, abd pad to anterior leg wound.  Calamine unna boot 20-30mmHg.

## 2025-02-24 NOTE — PROGRESS NOTES
Patient ID: Vero Rodriguez is a 69 year old female.    Debridement Venous Ulcer Lower;Posterior;Left Leg   Wound 01/27/25 #5 Left posterior leg Leg Lower;Posterior;Left    Performed by: Deysi Pulliam MD  Authorized by: Deysi Pulliam MD      Consent   Consent obtained? verbal  Consent given by: patient    Debridement Details  Performed by: physician  Debridement type: conservative sharp  Pain control: lidocaine 4%  Pain control administration type: topical    Pre-debridement measurements  Length (cm): 1.7  Width (cm): 0.5  Depth (cm): 0.1  Surface Area (cm^2): 0.85    Post-debridement measurements  Length (cm): 1.7  Width (cm): 0.5  Depth (cm): 0.2  Percent debrided: 100%  Surface Area (cm^2): 0.85  Area Debrided (cm^2): 0.85  Volume (cm^3): 0.17    Devitalized tissue debrided: biofilm, fibrin and slough  Instrument(s) utilized: curette  Bleeding: small  Hemostasis obtained with: pressure  Procedural pain (0-10): 4  Post-procedural pain: 2   Response to treatment: procedure was tolerated well

## 2025-02-24 NOTE — PROGRESS NOTES
[facility] PROGRESS NOTE  ALEXIS LUZ MD  2/24/2025    Chief Complaint:   Chief Complaint   Patient presents with    Wound Care     Patient arrives for a wound care follow up appointment. Patient arrives with an unna wrap to the left leg. Patient states \"the dressing was digging into me.\" Patient states she has severe pain to the wounds.        HPI:   Subjective   Vero Rodriguez is a 69 year old female coming in for a follow-up visit.    HPI    Anterior leg Wound improved.   Dimensions smaller.  Tissue quality better.    Posterior leg wound stable - covered with slough.   Debridement done today - Removed slough with bone curette    No s/o infection.   Tolerating wrap OK.       Review of Systems  Negative except HPI   Denies chest pain / SOB / palpitations  Denies fever.     Allergies  Allergies[1]    Current Meds:  Current Outpatient Medications   Medication Sig Dispense Refill    SILVER SULFADIAZINE 1 % External Cream APPLY TOPICALLY TO THE AFFECTED AREA DAILY (Patient not taking: Reported on 1/15/2025) 25 g 0    lidocaine (LIDODERM) 5 % External Patch Place 1 patch onto the skin daily. 30 patch 2    baclofen 10 MG Oral Tab Take 1 tablet (10 mg total) by mouth 3 (three) times daily. 90 tablet 2    Wound Dressings (MEDIHONEY WOUND/BURN DRESSING) External Gel Apply 1 Application topically daily as needed. (Patient not taking: Reported on 1/15/2025) 44 mL 1    RECTASMOOTHE 5 % External Cream APPLY A SMALL AMOUNT TO THE AFFECTED AREA UP TWICE DAILY (Patient not taking: Reported on 1/15/2025) 30 g 0    warfarin 2 MG Oral Tab Take 1 tablet (2 mg total) by mouth nightly. 90 tablet 3    Wound Dressings (ADAPTIC NON-ADHERING DRESSING) External Pads Apply 1 each topically daily as needed. (Patient not taking: Reported on 1/15/2025) 30 each 1    Omeprazole 40 MG Oral Capsule Delayed Release Take 1 capsule (40 mg total) by mouth daily. (Patient not taking: Reported on 1/15/2025) 90 capsule 3    enoxaparin 60  MG/0.6ML Subcutaneous Solution INJECT 60MG SUB Q INTO ABDOMINAL AREA TWICE DAILY under direction of coumadin clinic 20 each 1    gabapentin 600 MG Oral Tab       tolterodine tartrate 4 MG Oral Capsule SR 24 Hr Take 1 capsule (4 mg total) by mouth daily. 90 capsule 3    simvastatin 5 MG Oral Tab Take 1 tablet (5 mg total) by mouth nightly. 90 tablet 3    RESTASIS MULTIDOSE 0.05 % Ophthalmic Emulsion       methylPREDNISolone 4 MG Oral Tab       Cholecalciferol 25 MCG (1000 UT) Oral Tab Every Day      Mycophenolate Mofetil 500 MG Oral Tab Take 2 tablets (1,000 mg total) by mouth 2 (two) times daily.      KEVZARA 200 MG/1.14ML Subcutaneous Solution Auto-injector  (Patient not taking: Reported on 1/15/2025)      potassium chloride 20 MEQ Oral Tab CR Take 1 tablet (20 mEq total) by mouth 2 (two) times daily. 180 tablet 3    spironolactone 25 MG Oral Tab Take 1 tablet (25 mg total) by mouth daily. 90 tablet 3    Nystatin 507219 UNIT/GM External Powder Apply 1 Application topically 2 (two) times daily as needed. 60 g 2    MONTELUKAST SODIUM 10 MG Oral Tab TAKE 1 TABLET(10 MG) BY MOUTH EVERY DAY 90 tablet 3    metroNIDAZOLE 1 % External Gel Apply to face nightly (Patient not taking: Reported on 1/15/2025) 60 g 1    PULMICORT FLEXHALER 180 MCG/ACT Inhalation Aerosol Powder, Breath Activated INHALE 2 PUFFS INTO THE LUNGS TWICE DAILY 1 each 11    GABAPENTIN 400 MG Oral Cap TAKE 3 CAPSULES BY MOUTH THREE TIMES DAILY 270 capsule 5    Pilocarpine HCl 5 MG Oral Tab Take 1 tablet (5 mg total) by mouth 3 (three) times daily.      Albuterol Sulfate  (90 Base) MCG/ACT Inhalation Aero Soln Inhale 2 puffs into the lungs every 6 (six) hours as needed for Wheezing or Shortness of Breath. 18 g 3    Probiotic Product (PROBIOTIC DAILY OR) Take by mouth.      Diphenoxylate-Atropine (LOMOTIL OR) Take by mouth.      hydrocortisone (ANUSOL-HC) 2.5 % Rectal Cream Place 1 Application rectally 2 (two) times daily as needed for Hemorrhoids. 60  g 1    Zolpidem Tartrate (AMBIEN) 10 MG Oral Tab Take 1 tablet (10 mg total) by mouth nightly as needed. (Patient not taking: Reported on 1/15/2025) 30 tablet 1    Ammonium Lactate (LAC-HYDRIN) 12 % External Cream Apply qd prn to afected area 140 g 3    leflunomide (ARAVA) 20 MG Oral Tab Take 1 tablet (20 mg total) by mouth daily.      Hydroxychloroquine Sulfate 200 MG Oral Tab Take  by mouth 2 (two) times daily.           EXAM:   Objective   Objective    Physical Exam    Vital Signs  Vitals:    02/24/25 1100   BP: 115/63   Pulse: 69   Resp: 16   Temp: 97.4 °F (36.3 °C)       Wound Assessment  Wound 01/15/25 #1 Left medial lower leg Leg Left;Medial;Lower (Active)   Date First Assessed/Time First Assessed: 01/15/25 1420    Wound Number (Wound Clinic Only): #1 Left medial lower leg  Primary Wound Type: Traumatic  Location: Leg  Wound Location Orientation: Left;Medial;Lower      Assessments 1/15/2025  2:24 PM 2/24/2025 11:20 AM   Wound Image        Drainage Amount Small Moderate   Drainage Description Serosanguineous Serosanguineous   Treatments Compression (spandagrip E x2) --   Wound Length (cm) 3 cm (slight angle) 1 cm   Wound Width (cm) 3.8 cm 2 cm   Wound Surface Area (cm^2) 11.4 cm^2 2 cm^2   Wound Depth (cm) 0.1 cm 0.1 cm   Wound Volume (cm^3) 1.14 cm^3 0.2 cm^3   Wound Healing % -- 82   Margins Well-defined edges Well-defined edges   Non-staged Wound Description Full thickness Full thickness   [place]-wound Assessment Edema;Ecchymosis Edema;Hemosiderin staining   Wound Granulation Tissue Firm;Pink Pink;Red;Firm   Wound Bed Granulation (%) 5 % 80 %   Wound Bed Slough (%) 95 % 20 %   Wound Odor None None   Tunneling? No --   Undermining? No --   Sinus Tracts? No --       Inactive Orders   Date Order Priority Status Authorizing Provider   02/10/25 1336 Debridement Traumatic Left;Medial;Lower Leg Routine Completed Deysi Pulliam MD   01/27/25 1133 Debridement Traumatic Left;Medial;Lower Leg Routine Completed  Deysi Pulliam MD   01/15/25 1455 Debridement Traumatic Left;Medial;Lower Leg Routine Completed Deysi Pulliam MD       Wound 01/27/25 #5 Left posterior leg Leg Lower;Posterior;Left (Active)   Date First Assessed/Time First Assessed: 01/27/25 1033    Wound Number (Wound Clinic Only): #5 Left posterior leg  Primary Wound Type: Venous Ulcer  Location: Leg  Wound Location Orientation: Lower;Posterior;Left      Assessments 1/27/2025 10:36 AM 2/24/2025 11:21 AM   Wound Image        Drainage Amount None Small   Drainage Description -- Serous;Yellow   Treatments Compression (calamine unna boot 20-30mmHg, medipore tape) --   Wound Length (cm) 1.8 cm 1.7 cm   Wound Width (cm) 0.4 cm 0.5 cm   Wound Surface Area (cm^2) 0.72 cm^2 0.85 cm^2   Wound Depth (cm) 0.1 cm 0.1 cm   Wound Volume (cm^3) 0.072 cm^3 0.085 cm^3   Wound Healing % -- -18   Margins Well-defined edges Well-defined edges   Non-staged Wound Description Full thickness Full thickness   [place]-wound Assessment Dry;Edema Moist   Wound Granulation Tissue Pink;Pale Grey;Firm Pink;Firm   Wound Bed Granulation (%) 90 % 25 %   Wound Bed Slough (%) -- 75 %   Wound Odor None None   Shape 10% scab --       Active Orders   Date Order Priority Status Authorizing Provider   02/24/25 1155 Debridement Venous Ulcer Lower;Posterior;Left Leg Routine Active Deysi Pulliam MD       Compression Wrap 02/17/25 Leg Left (Active)   Placement Date/Time: 02/17/25 1601   Location: Leg  Wound Location Orientation: Left      Assessments 2/17/2025  4:01 PM 2/24/2025 11:59 AM   Response to Treatment Well tolerated Well tolerated   Compression Layers Multilayer Multilayer   Compression Product Type Unna Boot Unna Boot   Dressing Applied Yes (Myrna Ag, silver alginate) Yes (Myrna Ag, silver alginate, abd pad)   Compression Wrap Location Toes to Knee Toes to Knee   Compression Wrap Status Clean;Dry Dry;Clean       No associated orders.                ASSESSMENT AND PLAN:      Assessment     Encounter Diagnosis  1. 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 Right foot ulcer, with fat layer exposed (HCC)    3. Open wound of right index finger    4. Peripheral vascular disease, unspecified    5. Small fiber neuropathy    6. Primary hypercoagulable state (HCC)    7. Rheumatoid arthritis involving multiple sites with positive rheumatoid factor (HCC)    8. Factor V Leiden (HCC)    9. Long term (current) use of anticoagulants            PROCEDURES:    Debridement Venous Ulcer Lower;Posterior;Left Leg   Wound 01/27/25 #5 Left posterior leg Leg Lower;Posterior;Left     Performed by: Deysi Pulliam MD  Authorized by: Deysi Pulliam MD       Consent   Consent obtained? verbal  Consent given by: patient     Debridement Details  Performed by: physician  Debridement type: conservative sharp  Pain control: lidocaine 4%  Pain control administration type: topical     Pre-debridement measurements  Length (cm): 1.7  Width (cm): 0.5  Depth (cm): 0.1  Surface Area (cm^2): 0.85     Post-debridement measurements  Length (cm): 1.7  Width (cm): 0.5  Depth (cm): 0.2  Percent debrided: 100%  Surface Area (cm^2): 0.85  Area Debrided (cm^2): 0.85  Volume (cm^3): 0.17     Devitalized tissue debrided: biofilm, fibrin and slough  Instrument(s) utilized: curette  Bleeding: small  Hemostasis obtained with: pressure  Procedural pain (0-10): 4  Post-procedural pain: 2   Response to treatment: procedure was tolerated well            Compression wrap application      PLAN OF CARE:    Continue collagen on ant. Leg wound and continue honey gel on posterior leg wound.   Watch out for signs of early infection - counseled.   Plan of care discussed with patient in detail - All questions answered   Return in one week.     Orders  Orders Placed This Encounter   Procedures    Debridement Venous Ulcer Lower;Posterior;Left Leg       Patient Instructions     Return one week  Continue  collagen on anterior left leg wound.   Continue honey gel on posterior leg wound.   Consider vascular consult if wounds not healing.     Wound Cleaning and Dressings:    Shower with protection - use Shower boot     DRESSINGS:   Left leg wound, anterior : collagen / silver alginate / gauze  Left  foot wound: honey gel  /silver alginate  Change dressing weekly.     Compression Therapy : unna boot  Compression Therapy Instructions:  1.  Put on first thing in the morning and may remove at bedside.  Okay to wear overnight      if comfortable.  Do not let stockings roll up/down and kink.  Hand wash stockings and      hang dry as needed.    2.  Avoid prolonged standing in one place.  It is better to have your calf muscles moving       to pump fluid out of the legs.    3.  Elevate leg(s) above the level of the heart when sitting or as much as possible.    4.  Take your diuretics as directed by your provider.  Do not skip doses or change doses      unless instructed to do so by your provider.    5. Do not get leg(s) with compression wrap wet. If wraps are too tight as indicated        By pain, numbness/tingling or discoloration of toes remove wrap completely       and call the   wound center.     Off-Loading:  Offload wounded areas.     Miscellaneous Instructions:  Supplement with a daily multivitamin   Low salt diet  Increase protein intake / consider protein supplements - see below  Elevate extremities at all times when sitting / laying down.    DIETARY MODIFICATIONS TO HELP WITH WOUND HEALING:    Protein: Meats, beans, eggs, milk and yogurt particularly Greek yogurt), tofu, soy nuts, soy protein products    Vitamin C: Citrus fruits and juices, strawberries, tomatoes, tomato juice, peppers, baked potatoes, spinach, broccoli, cauliflower, [place] sprouts, cabbage    Vitamin A: Dark green, leafy vegetables, orange or yellow vegetables, cantaloupe, fortified dairy products, liver, fortified cereals    Zinc: Fortified  cereals, red meats, seafood    Consider Jason by [employer] (These are essential branch chain amino acids that help with tissue building and wound healing) and take 2 packets/day. you can order online at [employer] or [employer]    ADDITIONAL REMINDERS:    The treatment plan has been discussed at length with you and your provider. Follow all instructions carefully, it is very important. If you do not follow all instructions, you are at  risk of your wound not healing, infection, possible loss of limb and even end of life.  Please call the clinic during regular business hours ( 7:30 AM - 5:30 PM) if you notice increased bleeding, redness, warmth, pain or pus like drainage or start running a fever greater than 100.3.    For after hour emergencies, please call your primary physician or go to the nearest emergency room.      Patient/Caregiver Education: There are no barriers to learning. Medical education for above diagnosis given.   Answered all questions.    Outcome: Patient verbalizes understanding. Patient is notified to call with any questions, complications, allergies, or worsening or changing symptoms.  Patient is to call with any side effects or complications as a result of the treatments today.      DOCUMENTATION OF TIME SPENT: Code selection for this visit was based on time spent : 30 min on date of service in preparing to see the patient, obtaining and/or reviewing separately obtained history, performing a medically appropriate examination, counseling and educating the patient/family/caregiver, ordering medications or testing, referring and communicating with other healthcare providers, documenting clinical information in the E HR, independently interpreting results and communicating results to the patient/family/caregiver and care coordination with the patient's other providers.    Followup: Return in about 1 week (around 3/3/2025) for Wound followup.      Note to Patient:  The 21st Century Cures Act makes medical  notes like these available to patients in the interest of transparency. However, be advised this is a medical document and is intended as [id number] communication; it is written in medical language and may appear blunt, direct, or contain abbreviations or verbiage that are unfamiliar. Medical documents are intended to carry relevant information, facts as evident, and the clinical opinion of the practitioner.    Also, please note that this report has been produced using speech recognition software and may contain errors related to that system including, but not limited to, errors in grammar, punctuation, and spelling, as well as words and phrases that possibly may have been recognized inappropriately.  If there are any questions or concerns, contact the dictating provider for clarification.      Deysi Zhou MD  2/24/2025  12:32 PM                      [1]   Allergies  Allergen Reactions    Penicillins ANAPHYLAXIS and HYPOTENSION     Cardiac arrest      Pregabalin SWELLING    Raspberry NAUSEA AND VOMITING and SWELLING     Throat closes    Trimethobenzamide ANAPHYLAXIS     Cardiac arrest    Sulfa Antibiotics ITCHING    Pcn [Bicillin L-A] ANAPHYLAXIS     .    Tigan [Trimethobenzamide Hcl] ANAPHYLAXIS     Pass out and cardiac arrest.

## 2025-02-24 NOTE — PATIENT INSTRUCTIONS
Return one week  Continue collagen on anterior left leg wound.   Continue honey gel on posterior leg wound.   Consider vascular consult if wounds not healing.     Wound Cleaning and Dressings:    Shower with protection - use Shower boot     DRESSINGS:   Left leg wound, anterior : collagen / silver alginate / gauze  Left  foot wound: honey gel  /silver alginate  Change dressing weekly.     Compression Therapy : unna boot  Compression Therapy Instructions:  1.  Put on first thing in the morning and may remove at bedside.  Okay to wear overnight      if comfortable.  Do not let stockings roll up/down and kink.  Hand wash stockings and      hang dry as needed.    2.  Avoid prolonged standing in one place.  It is better to have your calf muscles moving       to pump fluid out of the legs.    3.  Elevate leg(s) above the level of the heart when sitting or as much as possible.    4.  Take your diuretics as directed by your provider.  Do not skip doses or change doses      unless instructed to do so by your provider.    5. Do not get leg(s) with compression wrap wet. If wraps are too tight as indicated        By pain, numbness/tingling or discoloration of toes remove wrap completely       and call the   wound center.     Off-Loading:  Offload wounded areas.     Miscellaneous Instructions:  Supplement with a daily multivitamin   Low salt diet  Increase protein intake / consider protein supplements - see below  Elevate extremities at all times when sitting / laying down.    DIETARY MODIFICATIONS TO HELP WITH WOUND HEALING:    Protein: Meats, beans, eggs, milk and yogurt particularly Greek yogurt), tofu, soy nuts, soy protein products    Vitamin C: Citrus fruits and juices, strawberries, tomatoes, tomato juice, peppers, baked potatoes, spinach, broccoli, cauliflower, [place] sprouts, cabbage    Vitamin A: Dark green, leafy vegetables, orange or yellow vegetables, cantaloupe, fortified dairy products, liver, fortified  cereals    Zinc: Fortified cereals, red meats, seafood    Consider Jason by [employer] (These are essential branch chain amino acids that help with tissue building and wound healing) and take 2 packets/day. you can order online at [employer] or [employer]    ADDITIONAL REMINDERS:    The treatment plan has been discussed at length with you and your provider. Follow all instructions carefully, it is very important. If you do not follow all instructions, you are at  risk of your wound not healing, infection, possible loss of limb and even end of life.  Please call the clinic during regular business hours ( 7:30 AM - 5:30 PM) if you notice increased bleeding, redness, warmth, pain or pus like drainage or start running a fever greater than 100.3.    For after hour emergencies, please call your primary physician or go to the nearest emergency room.

## 2025-03-03 ENCOUNTER — OFFICE VISIT (OUTPATIENT)
Dept: WOUND CARE | Facility: HOSPITAL | Age: 70
End: 2025-03-03
Attending: INTERNAL MEDICINE
Payer: MEDICARE

## 2025-03-03 VITALS
RESPIRATION RATE: 16 BRPM | HEART RATE: 67 BPM | TEMPERATURE: 98 F | SYSTOLIC BLOOD PRESSURE: 136 MMHG | DIASTOLIC BLOOD PRESSURE: 63 MMHG

## 2025-03-03 DIAGNOSIS — I73.9 PERIPHERAL VASCULAR DISEASE, UNSPECIFIED: ICD-10-CM

## 2025-03-03 DIAGNOSIS — L97.922 NON-PRESSURE CHRONIC ULCER OF LEFT LOWER LEG WITH FAT LAYER EXPOSED (HCC): Primary | ICD-10-CM

## 2025-03-03 DIAGNOSIS — D68.59 PRIMARY HYPERCOAGULABLE STATE (HCC): ICD-10-CM

## 2025-03-03 DIAGNOSIS — L97.512 RIGHT FOOT ULCER, WITH FAT LAYER EXPOSED (HCC): ICD-10-CM

## 2025-03-03 DIAGNOSIS — D68.51 FACTOR V LEIDEN (HCC): ICD-10-CM

## 2025-03-03 DIAGNOSIS — M05.79 RHEUMATOID ARTHRITIS INVOLVING MULTIPLE SITES WITH POSITIVE RHEUMATOID FACTOR (HCC): ICD-10-CM

## 2025-03-03 DIAGNOSIS — G62.9 SMALL FIBER NEUROPATHY: ICD-10-CM

## 2025-03-03 PROCEDURE — 99214 OFFICE O/P EST MOD 30 MIN: CPT

## 2025-03-03 NOTE — PROGRESS NOTES
[facility] PROGRESS NOTE  ALEXIS LUZ MD  3/3/2025    Chief Complaint:   Chief Complaint   Patient presents with    Wound Care     Follow-up for wounds to left leg. Wrap tolerated well. Pain 2/10.       HPI:   Subjective   Vero Rodriguez is a 69 year old female coming in for a follow-up visit.    HPI    Wounds stable - slightly improved.     Some slough present.     No s/o infection.     Edema well controlled  Tolerating wrap OK, however she does not like the compression wraps mainly cos there is pain.     She doesn't have her pain meds as it is on UMER and hence she has pain because of that.       Review of Systems  Negative except HPI   Denies chest pain / SOB / palpitations  Denies fever.     Allergies  Allergies[1]    Current Meds:  Current Outpatient Medications   Medication Sig Dispense Refill    SILVER SULFADIAZINE 1 % External Cream APPLY TOPICALLY TO THE AFFECTED AREA DAILY (Patient not taking: Reported on 1/15/2025) 25 g 0    lidocaine (LIDODERM) 5 % External Patch Place 1 patch onto the skin daily. 30 patch 2    baclofen 10 MG Oral Tab Take 1 tablet (10 mg total) by mouth 3 (three) times daily. 90 tablet 2    Wound Dressings (MEDIHONEY WOUND/BURN DRESSING) External Gel Apply 1 Application topically daily as needed. (Patient not taking: Reported on 1/15/2025) 44 mL 1    RECTASMOOTHE 5 % External Cream APPLY A SMALL AMOUNT TO THE AFFECTED AREA UP TWICE DAILY (Patient not taking: Reported on 1/15/2025) 30 g 0    warfarin 2 MG Oral Tab Take 1 tablet (2 mg total) by mouth nightly. 90 tablet 3    Wound Dressings (ADAPTIC NON-ADHERING DRESSING) External Pads Apply 1 each topically daily as needed. (Patient not taking: Reported on 1/15/2025) 30 each 1    Omeprazole 40 MG Oral Capsule Delayed Release Take 1 capsule (40 mg total) by mouth daily. (Patient not taking: Reported on 1/15/2025) 90 capsule 3    enoxaparin 60 MG/0.6ML Subcutaneous Solution INJECT 60MG SUB Q INTO ABDOMINAL AREA TWICE DAILY  under direction of coumadin clinic 20 each 1    gabapentin 600 MG Oral Tab       tolterodine tartrate 4 MG Oral Capsule SR 24 Hr Take 1 capsule (4 mg total) by mouth daily. 90 capsule 3    simvastatin 5 MG Oral Tab Take 1 tablet (5 mg total) by mouth nightly. 90 tablet 3    RESTASIS MULTIDOSE 0.05 % Ophthalmic Emulsion       methylPREDNISolone 4 MG Oral Tab       Cholecalciferol 25 MCG (1000 UT) Oral Tab Every Day      Mycophenolate Mofetil 500 MG Oral Tab Take 2 tablets (1,000 mg total) by mouth 2 (two) times daily.      KEVZARA 200 MG/1.14ML Subcutaneous Solution Auto-injector  (Patient not taking: Reported on 1/15/2025)      potassium chloride 20 MEQ Oral Tab CR Take 1 tablet (20 mEq total) by mouth 2 (two) times daily. 180 tablet 3    spironolactone 25 MG Oral Tab Take 1 tablet (25 mg total) by mouth daily. 90 tablet 3    Nystatin 959365 UNIT/GM External Powder Apply 1 Application topically 2 (two) times daily as needed. 60 g 2    MONTELUKAST SODIUM 10 MG Oral Tab TAKE 1 TABLET(10 MG) BY MOUTH EVERY DAY 90 tablet 3    metroNIDAZOLE 1 % External Gel Apply to face nightly (Patient not taking: Reported on 1/15/2025) 60 g 1    PULMICORT FLEXHALER 180 MCG/ACT Inhalation Aerosol Powder, Breath Activated INHALE 2 PUFFS INTO THE LUNGS TWICE DAILY 1 each 11    GABAPENTIN 400 MG Oral Cap TAKE 3 CAPSULES BY MOUTH THREE TIMES DAILY 270 capsule 5    Pilocarpine HCl 5 MG Oral Tab Take 1 tablet (5 mg total) by mouth 3 (three) times daily.      Albuterol Sulfate  (90 Base) MCG/ACT Inhalation Aero Soln Inhale 2 puffs into the lungs every 6 (six) hours as needed for Wheezing or Shortness of Breath. 18 g 3    Probiotic Product (PROBIOTIC DAILY OR) Take by mouth.      Diphenoxylate-Atropine (LOMOTIL OR) Take by mouth.      hydrocortisone (ANUSOL-HC) 2.5 % Rectal Cream Place 1 Application rectally 2 (two) times daily as needed for Hemorrhoids. 60 g 1    Zolpidem Tartrate (AMBIEN) 10 MG Oral Tab Take 1 tablet (10 mg total) by  mouth nightly as needed. (Patient not taking: Reported on 1/15/2025) 30 tablet 1    Ammonium Lactate (LAC-HYDRIN) 12 % External Cream Apply qd prn to afected area 140 g 3    leflunomide (ARAVA) 20 MG Oral Tab Take 1 tablet (20 mg total) by mouth daily.      Hydroxychloroquine Sulfate 200 MG Oral Tab Take  by mouth 2 (two) times daily.           EXAM:   Objective   Objective    Physical Exam    Vital Signs  Vitals:    03/03/25 0700   BP: 136/63   Pulse: 67   Resp: 16   Temp: 97.9 °F (36.6 °C)       Wound Assessment  Wound 01/15/25 #1 Left medial lower leg Leg Left;Medial;Lower (Active)   Date First Assessed/Time First Assessed: 01/15/25 1420    Wound Number (Wound Clinic Only): #1 Left medial lower leg  Primary Wound Type: Traumatic  Location: Leg  Wound Location Orientation: Left;Medial;Lower      Assessments 1/15/2025  2:24 PM 3/3/2025 10:45 AM   Wound Image        Drainage Amount Small Moderate   Drainage Description Serosanguineous Serosanguineous   Treatments Compression (spandagrip E x2) Compression (spanda  size D)   Wound Length (cm) 3 cm (slight angle) 0.8 cm   Wound Width (cm) 3.8 cm 1.5 cm   Wound Surface Area (cm^2) 11.4 cm^2 1.2 cm^2   Wound Depth (cm) 0.1 cm 0.1 cm   Wound Volume (cm^3) 1.14 cm^3 0.12 cm^3   Wound Healing % -- 89   Margins Well-defined edges Well-defined edges   Non-staged Wound Description Full thickness Full thickness   [place]-wound Assessment Edema;Ecchymosis Edema;Hemosiderin staining   Wound Granulation Tissue Firm;Pink Pink;Red;Firm   Wound Bed Granulation (%) 5 % 80 %   Wound Bed Epithelium (%) -- 5 %   Wound Bed Slough (%) 95 % 15 %   Wound Odor None None   Tunneling? No --   Undermining? No --   Sinus Tracts? No --       Inactive Orders   Date Order Priority Status Authorizing Provider   02/10/25 1336 Debridement Traumatic Left;Medial;Lower Leg Routine Completed Deysi Pulliam MD   01/27/25 1133 Debridement Traumatic Left;Medial;Lower Leg Routine Completed Abelardo PLATA  MD Deysi   01/15/25 1455 Debridement Traumatic Left;Medial;Lower Leg Routine Completed Deysi Pulliam MD       Wound 01/27/25 #5 Left posterior leg Leg Lower;Posterior;Left (Active)   Date First Assessed/Time First Assessed: 01/27/25 1033    Wound Number (Wound Clinic Only): #5 Left posterior leg  Primary Wound Type: Venous Ulcer  Location: Leg  Wound Location Orientation: Lower;Posterior;Left      Assessments 1/27/2025 10:36 AM 3/3/2025 10:44 AM   Wound Image       Drainage Amount None Small   Drainage Description -- Serosanguineous   Treatments Compression (calamine unna boot 20-30mmHg, medipore tape) Compression (spanda  size D)   Wound Length (cm) 1.8 cm 1.3 cm   Wound Width (cm) 0.4 cm 0.5 cm   Wound Surface Area (cm^2) 0.72 cm^2 0.65 cm^2   Wound Depth (cm) 0.1 cm 0.1 cm   Wound Volume (cm^3) 0.072 cm^3 0.065 cm^3   Wound Healing % -- 10   Margins Well-defined edges Well-defined edges   Non-staged Wound Description Full thickness Full thickness   [place]-wound Assessment Dry;Edema Clean   Wound Granulation Tissue Pink;Pale Grey;Firm Pink;Firm   Wound Bed Granulation (%) 90 % 100 %   Wound Odor None None   Shape 10% scab --       Inactive Orders   Date Order Priority Status Authorizing Provider   02/24/25 1155 Debridement Venous Ulcer Lower;Posterior;Left Leg Routine Completed Deysi Pulliam MD       Compression Wrap 02/17/25 Leg Left (Active)   Placement Date/Time: 02/17/25 1601   Location: Leg  Wound Location Orientation: Left      Assessments 2/17/2025  4:01 PM 2/24/2025 11:59 AM   Response to Treatment Well tolerated Well tolerated   Compression Layers Multilayer Multilayer   Compression Product Type Unna Boot Unna Boot   Dressing Applied Yes (Myrna Ag, silver alginate) Yes (Myrna Ag, silver alginate, abd pad)   Compression Wrap Location Toes to Knee Toes to Knee   Compression Wrap Status Clean;Dry Dry;Clean       No associated orders.                ASSESSMENT AND PLAN:     Assessment      Encounter Diagnosis  1. 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 Right foot ulcer, with fat layer exposed (HCC)    3. Peripheral vascular disease, unspecified    4. Small fiber neuropathy    5. Primary hypercoagulable state (HCC)    6. Rheumatoid arthritis involving multiple sites with positive rheumatoid factor (HCC)    7. Factor V Leiden (HCC)      PROCEDURES:    Non selective debridement   to remove nonviable tissue      PLAN OF CARE:    Trial of daily honey gel application on all wounds with barrier cream periwound  Take a break from compression wrap due to pt request.   Use spandagrip - D  Watch out for signs of early infection - counseled.   Plan of care discussed with patient in detail - All questions answered   Return in 2 week.     Patient Instructions     Return 2 weeks  Honey gel on all wounds  Consider vascular consult if wounds not healing.     Wound Cleaning and Dressings:    Shower with protection - use Shower boot     DRESSINGS:   All wounds: honey gel  / gauze  Zinc barrier cream kailyn-wound  Change dressing daily     Compression Therapy : spandagrip  Compression Therapy Instructions:  1.  Put on first thing in the morning and may remove at bedside.  Okay to wear overnight      if comfortable.  Do not let stockings roll up/down and kink.  Hand wash stockings and      hang dry as needed.    2.  Avoid prolonged standing in one place.  It is better to have your calf muscles moving       to pump fluid out of the legs.    3.  Elevate leg(s) above the level of the heart when sitting or as much as possible.    4.  Take your diuretics as directed by your provider.  Do not skip doses or change doses      unless instructed to do so by your provider.    5. Do not get leg(s) with compression wrap wet. If wraps are too tight as indicated        By pain, numbness/tingling or discoloration of toes remove wrap completely       and call the   wound center.      Off-Loading:  Offload wounded areas.     Miscellaneous Instructions:  Supplement with a daily multivitamin   Low salt diet  Increase protein intake / consider protein supplements - see below  Elevate extremities at all times when sitting / laying down.    DIETARY MODIFICATIONS TO HELP WITH WOUND HEALING:    Protein: Meats, beans, eggs, milk and yogurt particularly Greek yogurt), tofu, soy nuts, soy protein products    Vitamin C: Citrus fruits and juices, strawberries, tomatoes, tomato juice, peppers, baked potatoes, spinach, broccoli, cauliflower, [place] sprouts, cabbage    Vitamin A: Dark green, leafy vegetables, orange or yellow vegetables, cantaloupe, fortified dairy products, liver, fortified cereals    Zinc: Fortified cereals, red meats, seafood    Consider Jason by [email or web address] (These are essential branch chain amino acids that help with tissue building and wound healing) and take 2 packets/day. you can order online at [employer] or [employer]    ADDITIONAL REMINDERS:    The treatment plan has been discussed at length with you and your provider. Follow all instructions carefully, it is very important. If you do not follow all instructions, you are at  risk of your wound not healing, infection, possible loss of limb and even end of life.  Please call the clinic during regular business hours ( 7:30 AM - 5:30 PM) if you notice increased bleeding, redness, warmth, pain or pus like drainage or start running a fever greater than 100.3.    For after hour emergencies, please call your primary physician or go to the nearest emergency room.      Patient/Caregiver Education: There are no barriers to learning. Medical education for above diagnosis given.   Answered all questions.    Outcome: Patient verbalizes understanding. Patient is notified to call with any questions, complications, allergies, or worsening or changing symptoms.  Patient is to call with any side effects or complications as a result of the treatments  today.      DOCUMENTATION OF TIME SPENT: Code selection for this visit was based on time spent : 30 min on date of service in preparing to see the patient, obtaining and/or reviewing separately obtained history, performing a medically appropriate examination, counseling and educating the patient/family/caregiver, ordering medications or testing, referring and communicating with other healthcare providers, documenting clinical information in the E HR, independently interpreting results and communicating results to the patient/family/caregiver and care coordination with the patient's other providers.    Followup: Return in about 2 weeks (around 3/17/2025) for Wound followup.      Note to Patient:  The 21st Century Cures Act makes medical notes like these available to patients in the interest of transparency. However, be advised this is a medical document and is intended as [id number] communication; it is written in medical language and may appear blunt, direct, or contain abbreviations or verbiage that are unfamiliar. Medical documents are intended to carry relevant information, facts as evident, and the clinical opinion of the practitioner.    Also, please note that this report has been produced using speech recognition software and may contain errors related to that system including, but not limited to, errors in grammar, punctuation, and spelling, as well as words and phrases that possibly may have been recognized inappropriately.  If there are any questions or concerns, contact the dictating provider for clarification.      Deysi Zhou MD  3/3/2025  11:00 AM                      [1]   Allergies  Allergen Reactions    Penicillins ANAPHYLAXIS and HYPOTENSION     Cardiac arrest      Pregabalin SWELLING    Raspberry NAUSEA AND VOMITING and SWELLING     Throat closes    Trimethobenzamide ANAPHYLAXIS     Cardiac arrest    Sulfa Antibiotics ITCHING    Pcn [Bicillin L-A] ANAPHYLAXIS     .    Tigan  [Trimethobenzamide Hcl] ANAPHYLAXIS     Pass out and cardiac arrest.

## 2025-03-03 NOTE — PATIENT INSTRUCTIONS
Return 2 weeks  Honey gel on all wounds  Consider vascular consult if wounds not healing.     Wound Cleaning and Dressings:    Shower with protection - use Shower boot     DRESSINGS:   All wounds: honey gel  / gauze  Zinc barrier cream kailyn-wound  Change dressing daily     Compression Therapy : spandagrip  Compression Therapy Instructions:  1.  Put on first thing in the morning and may remove at bedside.  Okay to wear overnight      if comfortable.  Do not let stockings roll up/down and kink.  Hand wash stockings and      hang dry as needed.    2.  Avoid prolonged standing in one place.  It is better to have your calf muscles moving       to pump fluid out of the legs.    3.  Elevate leg(s) above the level of the heart when sitting or as much as possible.    4.  Take your diuretics as directed by your provider.  Do not skip doses or change doses      unless instructed to do so by your provider.    5. Do not get leg(s) with compression wrap wet. If wraps are too tight as indicated        By pain, numbness/tingling or discoloration of toes remove wrap completely       and call the   wound center.     Off-Loading:  Offload wounded areas.     Miscellaneous Instructions:  Supplement with a daily multivitamin   Low salt diet  Increase protein intake / consider protein supplements - see below  Elevate extremities at all times when sitting / laying down.    DIETARY MODIFICATIONS TO HELP WITH WOUND HEALING:    Protein: Meats, beans, eggs, milk and yogurt particularly Greek yogurt), tofu, soy nuts, soy protein products    Vitamin C: Citrus fruits and juices, strawberries, tomatoes, tomato juice, peppers, baked potatoes, spinach, broccoli, cauliflower, [place] sprouts, cabbage    Vitamin A: Dark green, leafy vegetables, orange or yellow vegetables, cantaloupe, fortified dairy products, liver, fortified cereals    Zinc: Fortified cereals, red meats, seafood    Consider Jason by [employer] (These are essential branch chain  amino acids that help with tissue building and wound healing) and take 2 packets/day. you can order online at [employer] or "[employer]"    ADDITIONAL REMINDERS:    The treatment plan has been discussed at length with you and your provider. Follow all instructions carefully, it is very important. If you do not follow all instructions, you are at  risk of your wound not healing, infection, possible loss of limb and even end of life.  Please call the clinic during regular business hours ( 7:30 AM - 5:30 PM) if you notice increased bleeding, redness, warmth, pain or pus like drainage or start running a fever greater than 100.3.    For after hour emergencies, please call your primary physician or go to the nearest emergency room.

## 2025-03-03 NOTE — PROGRESS NOTES
.Weekly Wound Education Note    Teaching Provided To: Patient  Training Topics: Dressing;Edema control;Discharge instructions;Compression;Cleasing and general instructions  Training Method: Explain/Verbal;Written  Training Response: Patient responds and understands            Honey gel, border gauze to both wounds.  [place]  size D.  Patient stated she is able to change her dressings.  Will return in 2 weeks.

## 2025-03-17 ENCOUNTER — APPOINTMENT (OUTPATIENT)
Dept: WOUND CARE | Facility: HOSPITAL | Age: 70
End: 2025-03-17
Attending: INTERNAL MEDICINE
Payer: MEDICARE

## 2025-03-17 VITALS
TEMPERATURE: 98 F | DIASTOLIC BLOOD PRESSURE: 66 MMHG | HEART RATE: 67 BPM | SYSTOLIC BLOOD PRESSURE: 117 MMHG | RESPIRATION RATE: 14 BRPM

## 2025-03-17 DIAGNOSIS — L97.922 NON-PRESSURE CHRONIC ULCER OF LEFT LOWER LEG WITH FAT LAYER EXPOSED (HCC): Primary | ICD-10-CM

## 2025-03-17 DIAGNOSIS — T14.8XXD DELAYED WOUND HEALING: ICD-10-CM

## 2025-03-17 DIAGNOSIS — D68.51 FACTOR V LEIDEN (HCC): ICD-10-CM

## 2025-03-17 DIAGNOSIS — L97.512 RIGHT FOOT ULCER, WITH FAT LAYER EXPOSED (HCC): ICD-10-CM

## 2025-03-17 DIAGNOSIS — G62.9 SMALL FIBER NEUROPATHY: ICD-10-CM

## 2025-03-17 DIAGNOSIS — M05.79 RHEUMATOID ARTHRITIS INVOLVING MULTIPLE SITES WITH POSITIVE RHEUMATOID FACTOR (HCC): ICD-10-CM

## 2025-03-17 DIAGNOSIS — D68.59 PRIMARY HYPERCOAGULABLE STATE (HCC): ICD-10-CM

## 2025-03-17 DIAGNOSIS — Z79.01 LONG TERM (CURRENT) USE OF ANTICOAGULANTS: ICD-10-CM

## 2025-03-17 DIAGNOSIS — I73.9 PERIPHERAL VASCULAR DISEASE, UNSPECIFIED: ICD-10-CM

## 2025-03-17 PROCEDURE — 97597 DBRDMT OPN WND 1ST 20 CM/<: CPT | Performed by: INTERNAL MEDICINE

## 2025-03-17 NOTE — PROGRESS NOTES
.Weekly Wound Education Note    Teaching Provided To: Patient  Training Topics: Dressing, Edema control, Discharge instructions, Compression  Training Method: Explain/Verbal, Written  Training Response: Patient responds and understands, Reinforcement needed            Myrna Ag, hydrofera transfer, kerramax to wounds.  Calamine unna boot 20-30mmHg.

## 2025-03-17 NOTE — PROGRESS NOTES
Patient ID: Vero Rodriguez is a 69 year old female.    Debridement Venous Ulcer Lower;Posterior;Left Leg   Wound 01/27/25 #5 Left posterior leg Leg Lower;Posterior;Left    Performed by: Deysi Pulliam MD  Authorized by: Deysi Pulliam MD      Consent   Consent obtained? verbal  Consent given by: patient    Debridement Details  Performed by: physician  Debridement type: conservative sharp  Pain control: lidocaine 4%  Pain control administration type: topical    Pre-debridement measurements  Length (cm): 0.9  Width (cm): 0.6  Depth (cm): 0.1  Surface Area (cm^2): 0.54    Post-debridement measurements  Length (cm): 0.9  Width (cm): 0.6  Depth (cm): 0.2  Percent debrided: 100%  Surface Area (cm^2): 0.54  Area Debrided (cm^2): 0.54  Volume (cm^3): 0.11    Devitalized tissue debrided: biofilm and slough  Instrument(s) utilized: curette  Bleeding: small  Hemostasis obtained with: pressure  Procedural pain (0-10): 4  Post-procedural pain: 2   Response to treatment: procedure was tolerated well

## 2025-03-17 NOTE — PATIENT INSTRUCTIONS
Return 1 week    Wound Cleaning and Dressings:    Shower with protection - use Shower boot   DRESSINGS:   All wounds:collagen / HF transfer  Zinc barrier cream kailyn-wound  Change dressing weekly     Compression Therapy : UNNA 20-30 mm hg   Compression Therapy Instructions:  1.  Put on first thing in the morning and may remove at bedside.  Okay to wear overnight      if comfortable.  Do not let stockings roll up/down and kink.  Hand wash stockings and      hang dry as needed.    2.  Avoid prolonged standing in one place.  It is better to have your calf muscles moving       to pump fluid out of the legs.    3.  Elevate leg(s) above the level of the heart when sitting or as much as possible.    4.  Take your diuretics as directed by your provider.  Do not skip doses or change doses      unless instructed to do so by your provider.    5. Do not get leg(s) with compression wrap wet. If wraps are too tight as indicated        By pain, numbness/tingling or discoloration of toes remove wrap completely       and call the   wound center.     Off-Loading:  Offload wounded areas.     Miscellaneous Instructions:  Supplement with a daily multivitamin   Low salt diet  Increase protein intake / consider protein supplements - see below  Elevate extremities at all times when sitting / laying down.    DIETARY MODIFICATIONS TO HELP WITH WOUND HEALING:    Protein: Meats, beans, eggs, milk and yogurt particularly Greek yogurt), tofu, soy nuts, soy protein products    Vitamin C: Citrus fruits and juices, strawberries, tomatoes, tomato juice, peppers, baked potatoes, spinach, broccoli, cauliflower, [place] sprouts, cabbage    Vitamin A: Dark green, leafy vegetables, orange or yellow vegetables, cantaloupe, fortified dairy products, liver, fortified cereals    Zinc: Fortified cereals, red meats, seafood    Consider Jason by [employer] (These are essential branch chain amino acids that help with tissue building and wound healing) and  take 2 packets/day. you can order online at [employer] or [employer]    ADDITIONAL REMINDERS:    The treatment plan has been discussed at length with you and your provider. Follow all instructions carefully, it is very important. If you do not follow all instructions, you are at  risk of your wound not healing, infection, possible loss of limb and even end of life.  Please call the clinic during regular business hours ( 7:30 AM - 5:30 PM) if you notice increased bleeding, redness, warmth, pain or pus like drainage or start running a fever greater than 100.3.    For after hour emergencies, please call your primary physician or go to the nearest emergency room.

## 2025-03-24 ENCOUNTER — OFFICE VISIT (OUTPATIENT)
Dept: WOUND CARE | Facility: HOSPITAL | Age: 70
End: 2025-03-24
Attending: INTERNAL MEDICINE
Payer: MEDICARE

## 2025-03-24 VITALS
HEART RATE: 64 BPM | RESPIRATION RATE: 15 BRPM | DIASTOLIC BLOOD PRESSURE: 63 MMHG | SYSTOLIC BLOOD PRESSURE: 108 MMHG | TEMPERATURE: 98 F

## 2025-03-24 DIAGNOSIS — L97.922 NON-PRESSURE CHRONIC ULCER OF LEFT LOWER LEG WITH FAT LAYER EXPOSED (HCC): Primary | ICD-10-CM

## 2025-03-24 DIAGNOSIS — G62.9 SMALL FIBER NEUROPATHY: ICD-10-CM

## 2025-03-24 DIAGNOSIS — L97.512 RIGHT FOOT ULCER, WITH FAT LAYER EXPOSED (HCC): ICD-10-CM

## 2025-03-24 DIAGNOSIS — I73.9 PERIPHERAL VASCULAR DISEASE, UNSPECIFIED: ICD-10-CM

## 2025-03-24 DIAGNOSIS — D68.59 PRIMARY HYPERCOAGULABLE STATE (HCC): ICD-10-CM

## 2025-03-24 PROCEDURE — 29581 APPL MULTLAYER CMPRN SYS LEG: CPT

## 2025-03-24 NOTE — PROGRESS NOTES
[facility] PROGRESS NOTE  ALEXIS LUZ MD  3/24/2025    Chief Complaint:   Chief Complaint   Patient presents with    Wound Care     Patient here for follow up wound care visit to left medial lower leg and left posterior lower leg. Pt denies any concerns or issues, pt denies any pain.       HPI:   Subjective   Vero Rodriguez is a 69 year old female coming in for a follow-up visit.    HPI    Stephanie is doing ok.   Ant ankle wound seems improved - more epithelium   drainage  present on dressing.   Post ankle wound with slough - which was removed using gauze with some bleeding response.   No s/o infection.   Tolerated wrap OK.     Review of Systems  Negative except HPI   Denies chest pain / SOB / palpitations  Denies fever.     Allergies  Allergies[1]    Current Meds:  Current Outpatient Medications   Medication Sig Dispense Refill    SILVER SULFADIAZINE 1 % External Cream APPLY TOPICALLY TO THE AFFECTED AREA DAILY (Patient not taking: Reported on 1/15/2025) 25 g 0    lidocaine (LIDODERM) 5 % External Patch Place 1 patch onto the skin daily. 30 patch 2    baclofen 10 MG Oral Tab Take 1 tablet (10 mg total) by mouth 3 (three) times daily. 90 tablet 2    Wound Dressings (MEDIHONEY WOUND/BURN DRESSING) External Gel Apply 1 Application topically daily as needed. (Patient not taking: Reported on 1/15/2025) 44 mL 1    RECTASMOOTHE 5 % External Cream APPLY A SMALL AMOUNT TO THE AFFECTED AREA UP TWICE DAILY (Patient not taking: Reported on 1/15/2025) 30 g 0    warfarin 2 MG Oral Tab Take 1 tablet (2 mg total) by mouth nightly. 90 tablet 3    Wound Dressings (ADAPTIC NON-ADHERING DRESSING) External Pads Apply 1 each topically daily as needed. (Patient not taking: Reported on 1/15/2025) 30 each 1    Omeprazole 40 MG Oral Capsule Delayed Release Take 1 capsule (40 mg total) by mouth daily. (Patient not taking: Reported on 1/15/2025) 90 capsule 3    enoxaparin 60 MG/0.6ML Subcutaneous Solution INJECT 60MG SUB Q INTO  ABDOMINAL AREA TWICE DAILY under direction of coumadin clinic 20 each 1    gabapentin 600 MG Oral Tab       tolterodine tartrate 4 MG Oral Capsule SR 24 Hr Take 1 capsule (4 mg total) by mouth daily. 90 capsule 3    simvastatin 5 MG Oral Tab Take 1 tablet (5 mg total) by mouth nightly. 90 tablet 3    RESTASIS MULTIDOSE 0.05 % Ophthalmic Emulsion       methylPREDNISolone 4 MG Oral Tab       Cholecalciferol 25 MCG (1000 UT) Oral Tab Every Day      Mycophenolate Mofetil 500 MG Oral Tab Take 2 tablets (1,000 mg total) by mouth 2 (two) times daily.      KEVZARA 200 MG/1.14ML Subcutaneous Solution Auto-injector  (Patient not taking: Reported on 1/15/2025)      potassium chloride 20 MEQ Oral Tab CR Take 1 tablet (20 mEq total) by mouth 2 (two) times daily. 180 tablet 3    spironolactone 25 MG Oral Tab Take 1 tablet (25 mg total) by mouth daily. 90 tablet 3    Nystatin 449474 UNIT/GM External Powder Apply 1 Application topically 2 (two) times daily as needed. 60 g 2    MONTELUKAST SODIUM 10 MG Oral Tab TAKE 1 TABLET(10 MG) BY MOUTH EVERY DAY 90 tablet 3    metroNIDAZOLE 1 % External Gel Apply to face nightly (Patient not taking: Reported on 1/15/2025) 60 g 1    PULMICORT FLEXHALER 180 MCG/ACT Inhalation Aerosol Powder, Breath Activated INHALE 2 PUFFS INTO THE LUNGS TWICE DAILY 1 each 11    GABAPENTIN 400 MG Oral Cap TAKE 3 CAPSULES BY MOUTH THREE TIMES DAILY 270 capsule 5    Pilocarpine HCl 5 MG Oral Tab Take 1 tablet (5 mg total) by mouth 3 (three) times daily.      Albuterol Sulfate  (90 Base) MCG/ACT Inhalation Aero Soln Inhale 2 puffs into the lungs every 6 (six) hours as needed for Wheezing or Shortness of Breath. 18 g 3    Probiotic Product (PROBIOTIC DAILY OR) Take by mouth.      Diphenoxylate-Atropine (LOMOTIL OR) Take by mouth.      hydrocortisone (ANUSOL-HC) 2.5 % Rectal Cream Place 1 Application rectally 2 (two) times daily as needed for Hemorrhoids. 60 g 1    Zolpidem Tartrate (AMBIEN) 10 MG Oral Tab Take  1 tablet (10 mg total) by mouth nightly as needed. (Patient not taking: Reported on 1/15/2025) 30 tablet 1    Ammonium Lactate (LAC-HYDRIN) 12 % External Cream Apply qd prn to afected area 140 g 3    leflunomide (ARAVA) 20 MG Oral Tab Take 1 tablet (20 mg total) by mouth daily.      Hydroxychloroquine Sulfate 200 MG Oral Tab Take  by mouth 2 (two) times daily.           EXAM:   Objective   Objective    Physical Exam    Vital Signs  Vitals:    03/24/25 1100   BP: 108/63   Pulse: 64   Resp: 15   Temp: 97.8 °F (36.6 °C)       Wound Assessment  Wound 01/15/25 #1 Left medial lower leg Leg Left;Medial;Lower (Active)   Date First Assessed/Time First Assessed: 01/15/25 1420    Wound Number (Wound Clinic Only): #1 Left medial lower leg  Primary Wound Type: Traumatic  Location: Leg  Wound Location Orientation: Left;Medial;Lower      Assessments 1/15/2025  2:24 PM 3/24/2025  2:35 PM   Wound Image        Drainage Amount Small Large   Drainage Description Serosanguineous Serous;Yellow   Treatments Compression (spandagrip E x2) --   Wound Length (cm) 3 cm (slight angle) 1 cm   Wound Width (cm) 3.8 cm 0.5 cm   Wound Surface Area (cm^2) 11.4 cm^2 0.5 cm^2   Wound Depth (cm) 0.1 cm 0.1 cm   Wound Volume (cm^3) 1.14 cm^3 0.05 cm^3   Wound Healing % -- 96   Margins Well-defined edges Well-defined edges   Non-staged Wound Description Full thickness Full thickness   [place]-wound Assessment Edema;Ecchymosis Moist   Wound Granulation Tissue Firm;Pink Pink;Firm   Wound Bed Granulation (%) 5 % 95 %   Wound Bed Epithelium (%) -- 5 %   Wound Bed Slough (%) 95 % --   Wound Odor None None   Tunneling? No --   Undermining? No --   Sinus Tracts? No --       Inactive Orders   Date Order Priority Status Authorizing Provider   02/10/25 1336 Debridement Traumatic Left;Medial;Lower Leg Routine Completed Deysi Pulliam MD   01/27/25 1133 Debridement Traumatic Left;Medial;Lower Leg Routine Completed Deysi Pulliam MD   01/15/25 6115  Debridement Traumatic Left;Medial;Lower Leg Routine Completed Deysi Pulliam MD       Wound 01/27/25 #5 Left posterior leg Leg Lower;Posterior;Left (Active)   Date First Assessed/Time First Assessed: 01/27/25 1033    Wound Number (Wound Clinic Only): #5 Left posterior leg  Primary Wound Type: Venous Ulcer  Location: Leg  Wound Location Orientation: Lower;Posterior;Left      Assessments 1/27/2025 10:36 AM 3/24/2025  2:37 PM   Wound Image       Drainage Amount None Small   Drainage Description -- Yellow;Serous   Treatments Compression (calamine unna boot 20-30mmHg, medipore tape) --   Wound Length (cm) 1.8 cm 0.9 cm   Wound Width (cm) 0.4 cm 0.5 cm   Wound Surface Area (cm^2) 0.72 cm^2 0.45 cm^2   Wound Depth (cm) 0.1 cm 0.1 cm   Wound Volume (cm^3) 0.072 cm^3 0.045 cm^3   Wound Healing % -- 38   Margins Well-defined edges Well-defined edges   Non-staged Wound Description Full thickness Full thickness   [place]-wound Assessment Dry;Edema Dry;Edema   Wound Granulation Tissue Pink;Pale Grey;Firm Pink;Firm   Wound Bed Granulation (%) 90 % 5 %   Wound Bed Slough (%) -- 95 %   Wound Odor None None   Shape 10% scab --       Inactive Orders   Date Order Priority Status Authorizing Provider   03/17/25 1437 Debridement Venous Ulcer Lower;Posterior;Left Leg Routine Completed Deysi Pulliam MD   02/24/25 1155 Debridement Venous Ulcer Lower;Posterior;Left Leg Routine Completed Deysi Pulliam MD       Compression Wrap 02/17/25 Leg Left (Active)   Placement Date/Time: 02/17/25 1601   Location: Leg  Wound Location Orientation: Left      Assessments 2/17/2025  4:01 PM 3/17/2025  2:31 PM   Response to Treatment Well tolerated Well tolerated   Compression Layers Multilayer Multilayer   Compression Product Type Unna Boot Unna Boot   Dressing Applied Yes (Myrna Ag, silver alginate) Yes (Myrna Ag, hydrofera transfer, kerramax)   Compression Wrap Location Toes to Knee Toes to Knee   Compression Wrap Status Clean;Dry  Dry;Clean       No associated orders.                ASSESSMENT AND PLAN:     Assessment     Encounter Diagnosis  1. 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 Right foot ulcer, with fat layer exposed (HCC)    3. Peripheral vascular disease, unspecified    4. Primary hypercoagulable state (HCC)    5. Small fiber neuropathy      PROCEDURES:    Compression wrap application.     PLAN OF CARE:    HF transfer on ant wound and collagen / HF transfer on post wound.  Continue compression wraps  Will run PA for skin subs.   Watch out for signs of early infection - counseled.   Plan of care discussed with patient in detail - All questions answered   Return in one week.       Patient Instructions     Return 1 week    Wound Cleaning and Dressings:    Shower with protection - use Shower boot   DRESSINGS:   All wounds: collagen / HF transfer  Zinc barrier cream kailyn-wound  Change dressing weekly     Compression Therapy : UNNA 20-30 mm hg   Compression Therapy Instructions:  1.  Put on first thing in the morning and may remove at bedside.  Okay to wear overnight      if comfortable.  Do not let stockings roll up/down and kink.  Hand wash stockings and      hang dry as needed.    2.  Avoid prolonged standing in one place.  It is better to have your calf muscles moving       to pump fluid out of the legs.    3.  Elevate leg(s) above the level of the heart when sitting or as much as possible.    4.  Take your diuretics as directed by your provider.  Do not skip doses or change doses      unless instructed to do so by your provider.    5. Do not get leg(s) with compression wrap wet. If wraps are too tight as indicated        By pain, numbness/tingling or discoloration of toes remove wrap completely       and call the   wound center.     Off-Loading:  Offload wounded areas.     Miscellaneous Instructions:  Supplement with a daily multivitamin   Low salt diet  Increase protein intake / consider  protein supplements - see below  Elevate extremities at all times when sitting / laying down.    DIETARY MODIFICATIONS TO HELP WITH WOUND HEALING:    Protein: Meats, beans, eggs, milk and yogurt particularly Greek yogurt), tofu, soy nuts, soy protein products    Vitamin C: Citrus fruits and juices, strawberries, tomatoes, tomato juice, peppers, baked potatoes, spinach, broccoli, cauliflower, [place] sprouts, cabbage    Vitamin A: Dark green, leafy vegetables, orange or yellow vegetables, cantaloupe, fortified dairy products, liver, fortified cereals    Zinc: Fortified cereals, red meats, seafood    Consider Jason by [employer] (These are essential branch chain amino acids that help with tissue building and wound healing) and take 2 packets/day. you can order online at [employer] or [employer]    ADDITIONAL REMINDERS:    The treatment plan has been discussed at length with you and your provider. Follow all instructions carefully, it is very important. If you do not follow all instructions, you are at  risk of your wound not healing, infection, possible loss of limb and even end of life.  Please call the clinic during regular business hours ( 7:30 AM - 5:30 PM) if you notice increased bleeding, redness, warmth, pain or pus like drainage or start running a fever greater than 100.3.    For after hour emergencies, please call your primary physician or go to the nearest emergency room.      Patient/Caregiver Education: There are no barriers to learning. Medical education for above diagnosis given.   Answered all questions.    Outcome: Patient verbalizes understanding. Patient is notified to call with any questions, complications, allergies, or worsening or changing symptoms.  Patient is to call with any side effects or complications as a result of the treatments today.      DOCUMENTATION OF TIME SPENT: Code selection for this visit was based on time spent : 30 min on date of service in preparing to see the patient, obtaining  and/or reviewing separately obtained history, performing a medically appropriate examination, counseling and educating the patient/family/caregiver, ordering medications or testing, referring and communicating with other healthcare providers, documenting clinical information in the E HR, independently interpreting results and communicating results to the patient/family/caregiver and care coordination with the patient's other providers.    Followup: Return in 1 week (on 3/31/2025) for Wound followup.      Note to Patient:  The 21st Century Cures Act makes medical notes like these available to patients in the interest of transparency. However, be advised this is a medical document and is intended as [id number] communication; it is written in medical language and may appear blunt, direct, or contain abbreviations or verbiage that are unfamiliar. Medical documents are intended to carry relevant information, facts as evident, and the clinical opinion of the practitioner.    Also, please note that this report has been produced using speech recognition software and may contain errors related to that system including, but not limited to, errors in grammar, punctuation, and spelling, as well as words and phrases that possibly may have been recognized inappropriately.  If there are any questions or concerns, contact the dictating provider for clarification.      Deysi Zhou MD  3/24/2025  4:26 PM                      [1]   Allergies  Allergen Reactions    Penicillins ANAPHYLAXIS and HYPOTENSION     Cardiac arrest      Pregabalin SWELLING    Raspberry NAUSEA AND VOMITING and SWELLING     Throat closes    Trimethobenzamide ANAPHYLAXIS     Cardiac arrest    Sulfa Antibiotics ITCHING    Pcn [Bicillin L-A] ANAPHYLAXIS     .    Tigan [Trimethobenzamide Hcl] ANAPHYLAXIS     Pass out and cardiac arrest.

## 2025-03-24 NOTE — PATIENT INSTRUCTIONS
Return 1 week    Wound Cleaning and Dressings:    Shower with protection - use Shower boot   DRESSINGS:   All wounds: collagen / HF transfer  Zinc barrier cream kailyn-wound  Change dressing weekly     Compression Therapy : UNNA 20-30 mm hg   Compression Therapy Instructions:  1.  Put on first thing in the morning and may remove at bedside.  Okay to wear overnight      if comfortable.  Do not let stockings roll up/down and kink.  Hand wash stockings and      hang dry as needed.    2.  Avoid prolonged standing in one place.  It is better to have your calf muscles moving       to pump fluid out of the legs.    3.  Elevate leg(s) above the level of the heart when sitting or as much as possible.    4.  Take your diuretics as directed by your provider.  Do not skip doses or change doses      unless instructed to do so by your provider.    5. Do not get leg(s) with compression wrap wet. If wraps are too tight as indicated        By pain, numbness/tingling or discoloration of toes remove wrap completely       and call the   wound center.     Off-Loading:  Offload wounded areas.     Miscellaneous Instructions:  Supplement with a daily multivitamin   Low salt diet  Increase protein intake / consider protein supplements - see below  Elevate extremities at all times when sitting / laying down.    DIETARY MODIFICATIONS TO HELP WITH WOUND HEALING:    Protein: Meats, beans, eggs, milk and yogurt particularly Greek yogurt), tofu, soy nuts, soy protein products    Vitamin C: Citrus fruits and juices, strawberries, tomatoes, tomato juice, peppers, baked potatoes, spinach, broccoli, cauliflower, [place] sprouts, cabbage    Vitamin A: Dark green, leafy vegetables, orange or yellow vegetables, cantaloupe, fortified dairy products, liver, fortified cereals    Zinc: Fortified cereals, red meats, seafood    Consider Jason by [employer] (These are essential branch chain amino acids that help with tissue building and wound healing) and  take 2 packets/day. you can order online at [employer] or [employer]    ADDITIONAL REMINDERS:    The treatment plan has been discussed at length with you and your provider. Follow all instructions carefully, it is very important. If you do not follow all instructions, you are at  risk of your wound not healing, infection, possible loss of limb and even end of life.  Please call the clinic during regular business hours ( 7:30 AM - 5:30 PM) if you notice increased bleeding, redness, warmth, pain or pus like drainage or start running a fever greater than 100.3.    For after hour emergencies, please call your primary physician or go to the nearest emergency room.

## 2025-03-24 NOTE — PROGRESS NOTES
.Weekly Wound Education Note    Teaching Provided To: Patient  Training Topics: Dressing, Discharge instructions, Edema control, Compression  Training Method: Explain/Verbal, Written  Training Response: Patient responds and understands            Endoform, hydrofera transfer, abd pad to posterior wound.  Hydrofera transfer, abd pad to anterior wound.  Calamine unna boot 20-30mmHg.

## 2025-03-31 ENCOUNTER — OFFICE VISIT (OUTPATIENT)
Dept: WOUND CARE | Facility: HOSPITAL | Age: 70
End: 2025-03-31
Attending: INTERNAL MEDICINE
Payer: MEDICARE

## 2025-03-31 VITALS
TEMPERATURE: 97 F | SYSTOLIC BLOOD PRESSURE: 120 MMHG | RESPIRATION RATE: 16 BRPM | HEART RATE: 64 BPM | DIASTOLIC BLOOD PRESSURE: 67 MMHG

## 2025-03-31 DIAGNOSIS — G62.9 SMALL FIBER NEUROPATHY: ICD-10-CM

## 2025-03-31 DIAGNOSIS — M05.79 RHEUMATOID ARTHRITIS INVOLVING MULTIPLE SITES WITH POSITIVE RHEUMATOID FACTOR (HCC): ICD-10-CM

## 2025-03-31 DIAGNOSIS — L97.922 NON-PRESSURE CHRONIC ULCER OF LEFT LOWER LEG WITH FAT LAYER EXPOSED (HCC): Primary | ICD-10-CM

## 2025-03-31 DIAGNOSIS — Z79.01 LONG TERM (CURRENT) USE OF ANTICOAGULANTS: ICD-10-CM

## 2025-03-31 DIAGNOSIS — I73.9 PERIPHERAL VASCULAR DISEASE, UNSPECIFIED: ICD-10-CM

## 2025-03-31 DIAGNOSIS — D68.51 FACTOR V LEIDEN (HCC): ICD-10-CM

## 2025-03-31 DIAGNOSIS — L97.512 RIGHT FOOT ULCER, WITH FAT LAYER EXPOSED (HCC): ICD-10-CM

## 2025-03-31 DIAGNOSIS — T14.8XXD DELAYED WOUND HEALING: ICD-10-CM

## 2025-03-31 DIAGNOSIS — D68.59 PRIMARY HYPERCOAGULABLE STATE (HCC): ICD-10-CM

## 2025-03-31 PROCEDURE — 29581 APPL MULTLAYER CMPRN SYS LEG: CPT

## 2025-03-31 NOTE — PROGRESS NOTES
[facility] PROGRESS NOTE  ALEXIS LUZ MD  3/31/2025    Chief Complaint:   Chief Complaint   Patient presents with    Wound Care     Patient here for follow up wound care visit to left medial lower leg and left posterior lower leg. Pt denies any concerns or issues, pt denies any pain in wounds at this time. Pt arrived in [facility] to [facility].       HPI:   Subjective   Vero Rodriguez is a 69 year old female coming in for a follow-up visit.    HPI    Wound improved.   Anterior leg ulcer dimensions smaller - tissue quality improved.     Posterior ankle ulcer - dimensions smaller / tissue quality better.     No s/o infection    Edema down.     No debridement done today.     Review of Systems  Negative except HPI   Denies chest pain / SOB / palpitations  Denies fever.     Allergies  Allergies[1]    Current Meds:  Current Outpatient Medications   Medication Sig Dispense Refill    SILVER SULFADIAZINE 1 % External Cream APPLY TOPICALLY TO THE AFFECTED AREA DAILY (Patient not taking: Reported on 1/15/2025) 25 g 0    lidocaine (LIDODERM) 5 % External Patch Place 1 patch onto the skin daily. 30 patch 2    baclofen 10 MG Oral Tab Take 1 tablet (10 mg total) by mouth 3 (three) times daily. 90 tablet 2    Wound Dressings (MEDIHONEY WOUND/BURN DRESSING) External Gel Apply 1 Application topically daily as needed. (Patient not taking: Reported on 1/15/2025) 44 mL 1    RECTASMOOTHE 5 % External Cream APPLY A SMALL AMOUNT TO THE AFFECTED AREA UP TWICE DAILY (Patient not taking: Reported on 1/15/2025) 30 g 0    warfarin 2 MG Oral Tab Take 1 tablet (2 mg total) by mouth nightly. 90 tablet 3    Wound Dressings (ADAPTIC NON-ADHERING DRESSING) External Pads Apply 1 each topically daily as needed. (Patient not taking: Reported on 1/15/2025) 30 each 1    Omeprazole 40 MG Oral Capsule Delayed Release Take 1 capsule (40 mg total) by mouth daily. (Patient not taking: Reported on 1/15/2025) 90 capsule 3    enoxaparin 60  MG/0.6ML Subcutaneous Solution INJECT 60MG SUB Q INTO ABDOMINAL AREA TWICE DAILY under direction of coumadin clinic 20 each 1    gabapentin 600 MG Oral Tab       tolterodine tartrate 4 MG Oral Capsule SR 24 Hr Take 1 capsule (4 mg total) by mouth daily. 90 capsule 3    simvastatin 5 MG Oral Tab Take 1 tablet (5 mg total) by mouth nightly. 90 tablet 3    RESTASIS MULTIDOSE 0.05 % Ophthalmic Emulsion       methylPREDNISolone 4 MG Oral Tab       Cholecalciferol 25 MCG (1000 UT) Oral Tab Every Day      Mycophenolate Mofetil 500 MG Oral Tab Take 2 tablets (1,000 mg total) by mouth 2 (two) times daily.      KEVZARA 200 MG/1.14ML Subcutaneous Solution Auto-injector  (Patient not taking: Reported on 1/15/2025)      potassium chloride 20 MEQ Oral Tab CR Take 1 tablet (20 mEq total) by mouth 2 (two) times daily. 180 tablet 3    spironolactone 25 MG Oral Tab Take 1 tablet (25 mg total) by mouth daily. 90 tablet 3    Nystatin 582821 UNIT/GM External Powder Apply 1 Application topically 2 (two) times daily as needed. 60 g 2    MONTELUKAST SODIUM 10 MG Oral Tab TAKE 1 TABLET(10 MG) BY MOUTH EVERY DAY 90 tablet 3    metroNIDAZOLE 1 % External Gel Apply to face nightly (Patient not taking: Reported on 1/15/2025) 60 g 1    PULMICORT FLEXHALER 180 MCG/ACT Inhalation Aerosol Powder, Breath Activated INHALE 2 PUFFS INTO THE LUNGS TWICE DAILY 1 each 11    GABAPENTIN 400 MG Oral Cap TAKE 3 CAPSULES BY MOUTH THREE TIMES DAILY 270 capsule 5    Pilocarpine HCl 5 MG Oral Tab Take 1 tablet (5 mg total) by mouth 3 (three) times daily.      Albuterol Sulfate  (90 Base) MCG/ACT Inhalation Aero Soln Inhale 2 puffs into the lungs every 6 (six) hours as needed for Wheezing or Shortness of Breath. 18 g 3    Probiotic Product (PROBIOTIC DAILY OR) Take by mouth.      Diphenoxylate-Atropine (LOMOTIL OR) Take by mouth.      hydrocortisone (ANUSOL-HC) 2.5 % Rectal Cream Place 1 Application rectally 2 (two) times daily as needed for Hemorrhoids. 60  g 1    Zolpidem Tartrate (AMBIEN) 10 MG Oral Tab Take 1 tablet (10 mg total) by mouth nightly as needed. (Patient not taking: Reported on 1/15/2025) 30 tablet 1    Ammonium Lactate (LAC-HYDRIN) 12 % External Cream Apply qd prn to afected area 140 g 3    leflunomide (ARAVA) 20 MG Oral Tab Take 1 tablet (20 mg total) by mouth daily.      Hydroxychloroquine Sulfate 200 MG Oral Tab Take  by mouth 2 (two) times daily.           EXAM:   Objective   Objective    Physical Exam    Vital Signs  Vitals:    03/31/25 1500   BP: 120/67   Pulse: 64   Resp: 16   Temp: 97.4 °F (36.3 °C)       Wound Assessment  Wound 01/15/25 #1 Left medial lower leg Leg Left;Medial;Lower (Active)   Date First Assessed/Time First Assessed: 01/15/25 1420    Wound Number (Wound Clinic Only): #1 Left medial lower leg  Primary Wound Type: Traumatic  Location: Leg  Wound Location Orientation: Left;Medial;Lower      Assessments 1/15/2025  2:24 PM 3/31/2025  3:16 PM   Wound Image        Drainage Amount Small Small   Drainage Description Serosanguineous Serous;Yellow   Treatments Compression (spandagrip E x2) Compression (calamine unna boot 20-30mmHg, medipore tape)   Wound Length (cm) 3 cm (slight angle) 0.2 cm   Wound Width (cm) 3.8 cm 0.3 cm   Wound Surface Area (cm^2) 11.4 cm^2 0.06 cm^2   Wound Depth (cm) 0.1 cm 0.1 cm   Wound Volume (cm^3) 1.14 cm^3 0.006 cm^3   Wound Healing % -- 99   Margins Well-defined edges Well-defined edges   Non-staged Wound Description Full thickness Full thickness   [place]-wound Assessment Edema;Ecchymosis Clean   Wound Granulation Tissue Firm;Pink Pale Grey;Firm   Wound Bed Granulation (%) 5 % 100 %   Wound Bed Slough (%) 95 % --   Wound Odor None None   Tunneling? No --   Undermining? No --   Sinus Tracts? No --       Inactive Orders   Date Order Priority Status Authorizing Provider   02/10/25 1336 Debridement Traumatic Left;Medial;Lower Leg Routine Completed Deysi Pulliam MD   01/27/25 1133 Debridement Traumatic  Left;Medial;Lower Leg Routine Completed Deysi Pulliam MD   01/15/25 1455 Debridement Traumatic Left;Medial;Lower Leg Routine Completed Deysi Pulliam MD       Wound 01/27/25 #5 Left posterior leg Leg Lower;Posterior;Left (Active)   Date First Assessed/Time First Assessed: 01/27/25 1033    Wound Number (Wound Clinic Only): #5 Left posterior leg  Primary Wound Type: Venous Ulcer  Location: Leg  Wound Location Orientation: Lower;Posterior;Left      Assessments 1/27/2025 10:36 AM 3/31/2025  3:16 PM   Wound Image       Drainage Amount None Scant   Drainage Description -- Serosanguineous   Treatments Compression (calamine unna boot 20-30mmHg, medipore tape) Compression (calamine unna boot 20-30mmHg, medipore tape)   Wound Length (cm) 1.8 cm 0.8 cm   Wound Width (cm) 0.4 cm 0.5 cm   Wound Surface Area (cm^2) 0.72 cm^2 0.4 cm^2   Wound Depth (cm) 0.1 cm 0.1 cm   Wound Volume (cm^3) 0.072 cm^3 0.04 cm^3   Wound Healing % -- 44   Margins Well-defined edges Well-defined edges   Non-staged Wound Description Full thickness Full thickness   [place]-wound Assessment Dry;Edema Dry   Wound Granulation Tissue Pink;Pale Grey;Firm Pink;Firm   Wound Bed Granulation (%) 90 % 95 %   Wound Bed Slough (%) -- 5 %   Wound Odor None None   Shape 10% scab --       Inactive Orders   Date Order Priority Status Authorizing Provider   03/17/25 1437 Debridement Venous Ulcer Lower;Posterior;Left Leg Routine Completed Deysi Pulliam MD   02/24/25 1155 Debridement Venous Ulcer Lower;Posterior;Left Leg Routine Completed Deysi Pulliam MD       Compression Wrap 02/17/25 Leg Left (Active)   Placement Date/Time: 02/17/25 1601   Location: Leg  Wound Location Orientation: Left      Assessments 2/17/2025  4:01 PM 3/31/2025  5:03 PM   Response to Treatment Well tolerated Well tolerated   Compression Layers Multilayer Multilayer   Compression Product Type Unna Boot Unna Boot   Dressing Applied Yes (Myrna Ag, silver alginate) Yes  (Endoform, hydrofera transfer, abd pad)   Compression Wrap Location Toes to Knee Toes to Knee   Compression Wrap Status Clean;Dry Clean;Dry       No associated orders.                ASSESSMENT AND PLAN:     Assessment     Encounter Diagnosis  1. 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 Right foot ulcer, with fat layer exposed (HCC)    3. Peripheral vascular disease, unspecified    4. Primary hypercoagulable state (HCC)    5. Small fiber neuropathy    6. Rheumatoid arthritis involving multiple sites with positive rheumatoid factor (HCC)    7. Factor V Leiden (HCC)    8. Long term (current) use of anticoagulants    9. Delayed wound healing            PROCEDURES:    Compression wrap application    PLAN OF CARE:    Discussed transition to compression garment  Continue collagen and HF transfer and compression wraps for one more week.   Edema management stressed.   Watch out for signs of early infection - counseled.   Plan of care discussed with patient in detail - All questions answered   Return in one week.     Patient Instructions     Return 1 week    Wound Cleaning and Dressings:    Shower with protection - use Shower boot   DRESSINGS:   All wounds: collagen / HF transfer on all open areas.   Zinc barrier cream kailyn-wound  Change dressing weekly     Compression Therapy : UNNA 20-30 mm hg   Compression Therapy Instructions:  1.  Put on first thing in the morning and may remove at bedside.  Okay to wear overnight      if comfortable.  Do not let stockings roll up/down and kink.  Hand wash stockings and      hang dry as needed.    2.  Avoid prolonged standing in one place.  It is better to have your calf muscles moving       to pump fluid out of the legs.    3.  Elevate leg(s) above the level of the heart when sitting or as much as possible.    4.  Take your diuretics as directed by your provider.  Do not skip doses or change doses      unless instructed to do so by your  provider.    5. Do not get leg(s) with compression wrap wet. If wraps are too tight as indicated        By pain, numbness/tingling or discoloration of toes remove wrap completely       and call the   wound center.     Off-Loading:  Offload wounded areas.     Miscellaneous Instructions:  Supplement with a daily multivitamin   Low salt diet  Increase protein intake / consider protein supplements - see below  Elevate extremities at all times when sitting / laying down.    DIETARY MODIFICATIONS TO HELP WITH WOUND HEALING:    Protein: Meats, beans, eggs, milk and yogurt particularly Greek yogurt), tofu, soy nuts, soy protein products    Vitamin C: Citrus fruits and juices, strawberries, tomatoes, tomato juice, peppers, baked potatoes, spinach, broccoli, cauliflower, [place] sprouts, cabbage    Vitamin A: Dark green, leafy vegetables, orange or yellow vegetables, cantaloupe, fortified dairy products, liver, fortified cereals    Zinc: Fortified cereals, red meats, seafood    Consider Jason by [employer] (These are essential branch chain amino acids that help with tissue building and wound healing) and take 2 packets/day. you can order online at [employer] or [employer]    ADDITIONAL REMINDERS:    The treatment plan has been discussed at length with you and your provider. Follow all instructions carefully, it is very important. If you do not follow all instructions, you are at  risk of your wound not healing, infection, possible loss of limb and even end of life.  Please call the clinic during regular business hours ( 7:30 AM - 5:30 PM) if you notice increased bleeding, redness, warmth, pain or pus like drainage or start running a fever greater than 100.3.    For after hour emergencies, please call your primary physician or go to the nearest emergency room.      Patient/Caregiver Education: There are no barriers to learning. Medical education for above diagnosis given.   Answered all questions.    Outcome: Patient verbalizes  understanding. Patient is notified to call with any questions, complications, allergies, or worsening or changing symptoms.  Patient is to call with any side effects or complications as a result of the treatments today.      DOCUMENTATION OF TIME SPENT: Code selection for this visit was based on time spent : 30 min on date of service in preparing to see the patient, obtaining and/or reviewing separately obtained history, performing a medically appropriate examination, counseling and educating the patient/family/caregiver, ordering medications or testing, referring and communicating with other healthcare providers, documenting clinical information in the E HR, independently interpreting results and communicating results to the patient/family/caregiver and care coordination with the patient's other providers.    Followup: Return in about 1 week (around 4/7/2025) for Wound followup.      Note to Patient:  The 21st Century Cures Act makes medical notes like these available to patients in the interest of transparency. However, be advised this is a medical document and is intended as [id number] communication; it is written in medical language and may appear blunt, direct, or contain abbreviations or verbiage that are unfamiliar. Medical documents are intended to carry relevant information, facts as evident, and the clinical opinion of the practitioner.    Also, please note that this report has been produced using speech recognition software and may contain errors related to that system including, but not limited to, errors in grammar, punctuation, and spelling, as well as words and phrases that possibly may have been recognized inappropriately.  If there are any questions or concerns, contact the dictating provider for clarification.      Deysi Zhou MD  3/31/2025  3:29 PM                      [1]   Allergies  Allergen Reactions    Penicillins ANAPHYLAXIS and HYPOTENSION     Cardiac arrest      Pregabalin  SWELLING    Raspberry NAUSEA AND VOMITING and SWELLING     Throat closes    Trimethobenzamide ANAPHYLAXIS     Cardiac arrest    Sulfa Antibiotics ITCHING    Pcn [Bicillin L-A] ANAPHYLAXIS     .    Tigan [Trimethobenzamide Hcl] ANAPHYLAXIS     Pass out and cardiac arrest.

## 2025-03-31 NOTE — PROGRESS NOTES
.Weekly Wound Education Note    Teaching Provided To: Patient  Training Topics: Dressing, Edema control, Discharge instructions, Compression  Training Method: Explain/Verbal, Written  Training Response: Patient responds and understands            Endoform to posterior leg wound, cover with hydrofera transfer and abd pad.  Calamine unna boot 20-30mmHg.

## 2025-03-31 NOTE — PATIENT INSTRUCTIONS
Return 1 week    Wound Cleaning and Dressings:    Shower with protection - use Shower boot   DRESSINGS:   All wounds: collagen / HF transfer on all open areas.   Zinc barrier cream kailyn-wound  Change dressing weekly     Compression Therapy : UNNA 20-30 mm hg   Compression Therapy Instructions:  1.  Put on first thing in the morning and may remove at bedside.  Okay to wear overnight      if comfortable.  Do not let stockings roll up/down and kink.  Hand wash stockings and      hang dry as needed.    2.  Avoid prolonged standing in one place.  It is better to have your calf muscles moving       to pump fluid out of the legs.    3.  Elevate leg(s) above the level of the heart when sitting or as much as possible.    4.  Take your diuretics as directed by your provider.  Do not skip doses or change doses      unless instructed to do so by your provider.    5. Do not get leg(s) with compression wrap wet. If wraps are too tight as indicated        By pain, numbness/tingling or discoloration of toes remove wrap completely       and call the   wound center.     Off-Loading:  Offload wounded areas.     Miscellaneous Instructions:  Supplement with a daily multivitamin   Low salt diet  Increase protein intake / consider protein supplements - see below  Elevate extremities at all times when sitting / laying down.    DIETARY MODIFICATIONS TO HELP WITH WOUND HEALING:    Protein: Meats, beans, eggs, milk and yogurt particularly Greek yogurt), tofu, soy nuts, soy protein products    Vitamin C: Citrus fruits and juices, strawberries, tomatoes, tomato juice, peppers, baked potatoes, spinach, broccoli, cauliflower, [place] sprouts, cabbage    Vitamin A: Dark green, leafy vegetables, orange or yellow vegetables, cantaloupe, fortified dairy products, liver, fortified cereals    Zinc: Fortified cereals, red meats, seafood    Consider Jason by [employer] (These are essential branch chain amino acids that help with tissue building and  wound healing) and take 2 packets/day. you can order online at [employer] or [employer]    ADDITIONAL REMINDERS:    The treatment plan has been discussed at length with you and your provider. Follow all instructions carefully, it is very important. If you do not follow all instructions, you are at  risk of your wound not healing, infection, possible loss of limb and even end of life.  Please call the clinic during regular business hours ( 7:30 AM - 5:30 PM) if you notice increased bleeding, redness, warmth, pain or pus like drainage or start running a fever greater than 100.3.    For after hour emergencies, please call your primary physician or go to the nearest emergency room.

## 2025-04-07 ENCOUNTER — OFFICE VISIT (OUTPATIENT)
Dept: WOUND CARE | Facility: HOSPITAL | Age: 70
End: 2025-04-07
Attending: INTERNAL MEDICINE
Payer: MEDICARE

## 2025-04-07 VITALS
TEMPERATURE: 98 F | RESPIRATION RATE: 14 BRPM | DIASTOLIC BLOOD PRESSURE: 66 MMHG | SYSTOLIC BLOOD PRESSURE: 119 MMHG | HEART RATE: 65 BPM

## 2025-04-07 DIAGNOSIS — Z79.01 LONG TERM (CURRENT) USE OF ANTICOAGULANTS: ICD-10-CM

## 2025-04-07 DIAGNOSIS — G62.9 SMALL FIBER NEUROPATHY: ICD-10-CM

## 2025-04-07 DIAGNOSIS — D68.51 FACTOR V LEIDEN (HCC): ICD-10-CM

## 2025-04-07 DIAGNOSIS — L97.512 RIGHT FOOT ULCER, WITH FAT LAYER EXPOSED (HCC): ICD-10-CM

## 2025-04-07 DIAGNOSIS — T14.8XXD DELAYED WOUND HEALING: ICD-10-CM

## 2025-04-07 DIAGNOSIS — M05.79 RHEUMATOID ARTHRITIS INVOLVING MULTIPLE SITES WITH POSITIVE RHEUMATOID FACTOR (HCC): ICD-10-CM

## 2025-04-07 DIAGNOSIS — I73.9 PERIPHERAL VASCULAR DISEASE, UNSPECIFIED: ICD-10-CM

## 2025-04-07 DIAGNOSIS — L97.922 NON-PRESSURE CHRONIC ULCER OF LEFT LOWER LEG WITH FAT LAYER EXPOSED (HCC): Primary | ICD-10-CM

## 2025-04-07 DIAGNOSIS — D68.59 PRIMARY HYPERCOAGULABLE STATE (HCC): ICD-10-CM

## 2025-04-07 PROCEDURE — 29581 APPL MULTLAYER CMPRN SYS LEG: CPT

## 2025-04-07 NOTE — PATIENT INSTRUCTIONS
Return 1 week    Wound Cleaning and Dressings:    Shower with protection - use Shower boot   DRESSINGS:   All wounds: collagen / HF transfer on all open areas.   Zinc barrier cream kailyn-wound  Change dressing weekly     Compression Therapy : UNNA 20-30 mm hg   Compression Therapy Instructions:  1.  Put on first thing in the morning and may remove at bedside.  Okay to wear overnight      if comfortable.  Do not let stockings roll up/down and kink.  Hand wash stockings and      hang dry as needed.    2.  Avoid prolonged standing in one place.  It is better to have your calf muscles moving       to pump fluid out of the legs.    3.  Elevate leg(s) above the level of the heart when sitting or as much as possible.    4.  Take your diuretics as directed by your provider.  Do not skip doses or change doses      unless instructed to do so by your provider.    5. Do not get leg(s) with compression wrap wet. If wraps are too tight as indicated        By pain, numbness/tingling or discoloration of toes remove wrap completely       and call the   wound center.     Off-Loading:  Offload wounded areas.     Miscellaneous Instructions:  Supplement with a daily multivitamin   Low salt diet  Increase protein intake / consider protein supplements - see below  Elevate extremities at all times when sitting / laying down.    DIETARY MODIFICATIONS TO HELP WITH WOUND HEALING:    Protein: Meats, beans, eggs, milk and yogurt particularly Greek yogurt), tofu, soy nuts, soy protein products    Vitamin C: Citrus fruits and juices, strawberries, tomatoes, tomato juice, peppers, baked potatoes, spinach, broccoli, cauliflower, [place] sprouts, cabbage    Vitamin A: Dark green, leafy vegetables, orange or yellow vegetables, cantaloupe, fortified dairy products, liver, fortified cereals    Zinc: Fortified cereals, red meats, seafood    Consider Jason by [employer] (These are essential branch chain amino acids that help with tissue building and  wound healing) and take 2 packets/day. you can order online at [employer] or [email or web address]    ADDITIONAL REMINDERS:    The treatment plan has been discussed at length with you and your provider. Follow all instructions carefully, it is very important. If you do not follow all instructions, you are at  risk of your wound not healing, infection, possible loss of limb and even end of life.  Please call the clinic during regular business hours ( 7:30 AM - 5:30 PM) if you notice increased bleeding, redness, warmth, pain or pus like drainage or start running a fever greater than 100.3.    For after hour emergencies, please call your primary physician or go to the nearest emergency room.

## 2025-04-07 NOTE — PROGRESS NOTES
.Weekly Wound Education Note    Teaching Provided To: Patient  Training Topics: Dressing, Edema control, Discharge instructions, Compression  Training Method: Explain/Verbal, Written  Training Response: Patient responds and understands            Anterior leg wound continues to weep, silver nitrate applied, covered with hydrofera transfer, abd pad.  Myrna Ag, hydrofera transfer, abd pad to posterior wound.  Calamine unna boot 20-30mmHg.

## 2025-04-07 NOTE — PROGRESS NOTES
[facility] PROGRESS NOTE  ALEXIS LUZ MD  4/7/2025    Chief Complaint:   Chief Complaint   Patient presents with    Wound Care     Patient is here for a wound care follow up. She denies any pain or new wound concerns.       HPI:   Subjective   Vero Rodriguez is a 69 year old female coming in for a follow-up visit.    HPI    Wounds improving overall.   Posterior wound more granular - dimensions smaller.   No s/o infection.     Ant wound still not closed.   Continues to have serous fluid leaking from the wound base and swelling of legs despite compression wraps.     Tolerating wraps OK but does not want to be wrapped.     Review of Systems  Negative except HPI   Denies chest pain / SOB / palpitations  Denies fever.     Allergies  Allergies[1]    Current Meds:  Current Outpatient Medications   Medication Sig Dispense Refill    SILVER SULFADIAZINE 1 % External Cream APPLY TOPICALLY TO THE AFFECTED AREA DAILY (Patient not taking: Reported on 1/15/2025) 25 g 0    lidocaine (LIDODERM) 5 % External Patch Place 1 patch onto the skin daily. 30 patch 2    baclofen 10 MG Oral Tab Take 1 tablet (10 mg total) by mouth 3 (three) times daily. 90 tablet 2    Wound Dressings (MEDIHONEY WOUND/BURN DRESSING) External Gel Apply 1 Application topically daily as needed. (Patient not taking: Reported on 1/15/2025) 44 mL 1    RECTASMOOTHE 5 % External Cream APPLY A SMALL AMOUNT TO THE AFFECTED AREA UP TWICE DAILY (Patient not taking: Reported on 1/15/2025) 30 g 0    warfarin 2 MG Oral Tab Take 1 tablet (2 mg total) by mouth nightly. 90 tablet 3    Wound Dressings (ADAPTIC NON-ADHERING DRESSING) External Pads Apply 1 each topically daily as needed. (Patient not taking: Reported on 1/15/2025) 30 each 1    Omeprazole 40 MG Oral Capsule Delayed Release Take 1 capsule (40 mg total) by mouth daily. (Patient not taking: Reported on 1/15/2025) 90 capsule 3    enoxaparin 60 MG/0.6ML Subcutaneous Solution INJECT 60MG SUB Q INTO  ABDOMINAL AREA TWICE DAILY under direction of coumadin clinic 20 each 1    gabapentin 600 MG Oral Tab       tolterodine tartrate 4 MG Oral Capsule SR 24 Hr Take 1 capsule (4 mg total) by mouth daily. 90 capsule 3    simvastatin 5 MG Oral Tab Take 1 tablet (5 mg total) by mouth nightly. 90 tablet 3    RESTASIS MULTIDOSE 0.05 % Ophthalmic Emulsion       methylPREDNISolone 4 MG Oral Tab       Cholecalciferol 25 MCG (1000 UT) Oral Tab Every Day      Mycophenolate Mofetil 500 MG Oral Tab Take 2 tablets (1,000 mg total) by mouth 2 (two) times daily.      KEVZARA 200 MG/1.14ML Subcutaneous Solution Auto-injector  (Patient not taking: Reported on 1/15/2025)      potassium chloride 20 MEQ Oral Tab CR Take 1 tablet (20 mEq total) by mouth 2 (two) times daily. 180 tablet 3    spironolactone 25 MG Oral Tab Take 1 tablet (25 mg total) by mouth daily. 90 tablet 3    Nystatin 546083 UNIT/GM External Powder Apply 1 Application topically 2 (two) times daily as needed. 60 g 2    MONTELUKAST SODIUM 10 MG Oral Tab TAKE 1 TABLET(10 MG) BY MOUTH EVERY DAY 90 tablet 3    metroNIDAZOLE 1 % External Gel Apply to face nightly (Patient not taking: Reported on 1/15/2025) 60 g 1    PULMICORT FLEXHALER 180 MCG/ACT Inhalation Aerosol Powder, Breath Activated INHALE 2 PUFFS INTO THE LUNGS TWICE DAILY 1 each 11    GABAPENTIN 400 MG Oral Cap TAKE 3 CAPSULES BY MOUTH THREE TIMES DAILY 270 capsule 5    Pilocarpine HCl 5 MG Oral Tab Take 1 tablet (5 mg total) by mouth 3 (three) times daily.      Albuterol Sulfate  (90 Base) MCG/ACT Inhalation Aero Soln Inhale 2 puffs into the lungs every 6 (six) hours as needed for Wheezing or Shortness of Breath. 18 g 3    Probiotic Product (PROBIOTIC DAILY OR) Take by mouth.      Diphenoxylate-Atropine (LOMOTIL OR) Take by mouth.      hydrocortisone (ANUSOL-HC) 2.5 % Rectal Cream Place 1 Application rectally 2 (two) times daily as needed for Hemorrhoids. 60 g 1    Zolpidem Tartrate (AMBIEN) 10 MG Oral Tab Take  1 tablet (10 mg total) by mouth nightly as needed. (Patient not taking: Reported on 1/15/2025) 30 tablet 1    Ammonium Lactate (LAC-HYDRIN) 12 % External Cream Apply qd prn to afected area 140 g 3    leflunomide (ARAVA) 20 MG Oral Tab Take 1 tablet (20 mg total) by mouth daily.      Hydroxychloroquine Sulfate 200 MG Oral Tab Take  by mouth 2 (two) times daily.           EXAM:   Objective   Objective    Physical Exam    Vital Signs  Vitals:    04/07/25 1115   BP: 119/66   Pulse: 65   Resp: 14   Temp: 97.8 °F (36.6 °C)       Wound Assessment  Wound 01/15/25 #1 Left medial lower leg Leg Left;Medial;Lower (Active)   Date First Assessed/Time First Assessed: 01/15/25 1420    Wound Number (Wound Clinic Only): #1 Left medial lower leg  Primary Wound Type: Traumatic  Location: Leg  Wound Location Orientation: Left;Medial;Lower      Assessments 1/15/2025  2:24 PM 4/7/2025 11:20 AM   Wound Image        Drainage Amount Small Moderate   Drainage Description Serosanguineous Serous;Yellow   Treatments Compression (spandagrip E x2) --   Wound Length (cm) 3 cm (slight angle) 0.2 cm   Wound Width (cm) 3.8 cm 0.2 cm   Wound Surface Area (cm^2) 11.4 cm^2 0.04 cm^2   Wound Depth (cm) 0.1 cm 0.1 cm   Wound Volume (cm^3) 1.14 cm^3 0.004 cm^3   Wound Healing % -- 100   Margins Well-defined edges Well-defined edges   Non-staged Wound Description Full thickness Full thickness   [place]-wound Assessment Edema;Ecchymosis Clean   Wound Granulation Tissue Firm;Pink Firm;Pink   Wound Bed Granulation (%) 5 % 100 %   Wound Bed Slough (%) 95 % --   Wound Odor None None   Tunneling? No No   Undermining? No No   Sinus Tracts? No No       Inactive Orders   Date Order Priority Status Authorizing Provider   02/10/25 1336 Debridement Traumatic Left;Medial;Lower Leg Routine Completed Deysi Pulliam MD   01/27/25 1133 Debridement Traumatic Left;Medial;Lower Leg Routine Completed Deysi Pulliam MD   01/15/25 1455 Debridement Traumatic  Left;Medial;Lower Leg Routine Completed Deysi Pulliam MD       Wound 01/27/25 #5 Left posterior leg Leg Lower;Posterior;Left (Active)   Date First Assessed/Time First Assessed: 01/27/25 1033    Wound Number (Wound Clinic Only): #5 Left posterior leg  Primary Wound Type: Venous Ulcer  Location: Leg  Wound Location Orientation: Lower;Posterior;Left      Assessments 1/27/2025 10:36 AM 4/7/2025 11:21 AM   Wound Image       Drainage Amount None Scant   Drainage Description -- Yellow;Serous   Treatments Compression (calamine unna boot 20-30mmHg, medipore tape) --   Wound Length (cm) 1.8 cm 0.6 cm   Wound Width (cm) 0.4 cm 0.4 cm   Wound Surface Area (cm^2) 0.72 cm^2 0.24 cm^2   Wound Depth (cm) 0.1 cm 0.1 cm   Wound Volume (cm^3) 0.072 cm^3 0.024 cm^3   Wound Healing % -- 67   Margins Well-defined edges Well-defined edges   Non-staged Wound Description Full thickness Full thickness   [place]-wound Assessment Dry;Edema Dry   Wound Granulation Tissue Pink;Pale Grey;Firm Firm;Red   Wound Bed Granulation (%) 90 % 100 %   Wound Odor None None   Shape 10% scab --   Tunneling? -- No   Undermining? -- No   Sinus Tracts? -- No       Inactive Orders   Date Order Priority Status Authorizing Provider   03/17/25 1437 Debridement Venous Ulcer Lower;Posterior;Left Leg Routine Completed Deysi Pulliam MD   02/24/25 1155 Debridement Venous Ulcer Lower;Posterior;Left Leg Routine Completed Deysi Pulliam MD       Compression Wrap 02/17/25 Leg Left (Active)   Placement Date/Time: 02/17/25 1601   Location: Leg  Wound Location Orientation: Left      Assessments 2/17/2025  4:01 PM 3/31/2025  5:03 PM   Response to Treatment Well tolerated Well tolerated   Compression Layers Multilayer Multilayer   Compression Product Type Unna Boot Unna Boot   Dressing Applied Yes (Myrna Ag, silver alginate) Yes (Endoform, hydrofera transfer, abd pad)   Compression Wrap Location Toes to Knee Toes to Knee   Compression Wrap Status Clean;Dry  Clean;Dry       No associated orders.        ASSESSMENT AND PLAN:     Assessment     Encounter Diagnosis  1. 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 Right foot ulcer, with fat layer exposed (HCC)    3. Peripheral vascular disease, unspecified    4. Primary hypercoagulable state (HCC)    5. Small fiber neuropathy    6. Rheumatoid arthritis involving multiple sites with positive rheumatoid factor (HCC)    7. Factor V Leiden (HCC)    8. Long term (current) use of anticoagulants    9. Delayed wound healing        PROCEDURES:    Nonselective debridement of wound bse to remove surface nonviable tissue.     Compression wrap application      PLAN OF CARE:    Continue collagen, absorptive dressings, compression wraps.   Serial debridements to hasten healing.   Elevate legs more.   Watch out for signs of early infection - counseled.   Plan of care discussed with patient in detail - All questions answered   Return in one week.       Patient Instructions     Return 1 week    Wound Cleaning and Dressings:    Shower with protection - use Shower boot   DRESSINGS:   All wounds: collagen / HF transfer on all open areas.   Zinc barrier cream kailyn-wound  Change dressing weekly     Compression Therapy : UNNA 20-30 mm hg   Compression Therapy Instructions:  1.  Put on first thing in the morning and may remove at bedside.  Okay to wear overnight      if comfortable.  Do not let stockings roll up/down and kink.  Hand wash stockings and      hang dry as needed.    2.  Avoid prolonged standing in one place.  It is better to have your calf muscles moving       to pump fluid out of the legs.    3.  Elevate leg(s) above the level of the heart when sitting or as much as possible.    4.  Take your diuretics as directed by your provider.  Do not skip doses or change doses      unless instructed to do so by your provider.    5. Do not get leg(s) with compression wrap wet. If wraps are too tight as indicated         By pain, numbness/tingling or discoloration of toes remove wrap completely       and call the   wound center.     Off-Loading:  Offload wounded areas.     Miscellaneous Instructions:  Supplement with a daily multivitamin   Low salt diet  Increase protein intake / consider protein supplements - see below  Elevate extremities at all times when sitting / laying down.    DIETARY MODIFICATIONS TO HELP WITH WOUND HEALING:    Protein: Meats, beans, eggs, milk and yogurt particularly Greek yogurt), tofu, soy nuts, soy protein products    Vitamin C: Citrus fruits and juices, strawberries, tomatoes, tomato juice, peppers, baked potatoes, spinach, broccoli, cauliflower, [place] sprouts, cabbage    Vitamin A: Dark green, leafy vegetables, orange or yellow vegetables, cantaloupe, fortified dairy products, liver, fortified cereals    Zinc: Fortified cereals, red meats, seafood    Consider Jason by [employer] (These are essential branch chain amino acids that help with tissue building and wound healing) and take 2 packets/day. you can order online at [employer] or [employer]    ADDITIONAL REMINDERS:    The treatment plan has been discussed at length with you and your provider. Follow all instructions carefully, it is very important. If you do not follow all instructions, you are at  risk of your wound not healing, infection, possible loss of limb and even end of life.  Please call the clinic during regular business hours ( 7:30 AM - 5:30 PM) if you notice increased bleeding, redness, warmth, pain or pus like drainage or start running a fever greater than 100.3.    For after hour emergencies, please call your primary physician or go to the nearest emergency room.      Patient/Caregiver Education: There are no barriers to learning. Medical education for above diagnosis given.   Answered all questions.    Outcome: Patient verbalizes understanding. Patient is notified to call with any questions, complications, allergies, or worsening  or changing symptoms.  Patient is to call with any side effects or complications as a result of the treatments today.      DOCUMENTATION OF TIME SPENT: Code selection for this visit was based on time spent : 30 min on date of service in preparing to see the patient, obtaining and/or reviewing separately obtained history, performing a medically appropriate examination, counseling and educating the patient/family/caregiver, ordering medications or testing, referring and communicating with other healthcare providers, documenting clinical information in the E HR, independently interpreting results and communicating results to the patient/family/caregiver and care coordination with the patient's other providers.    Followup: Return in about 1 week (around 4/14/2025) for Wound followup.      Note to Patient:  The 21st Century Cures Act makes medical notes like these available to patients in the interest of transparency. However, be advised this is a medical document and is intended as [id number]-dy-[id number] communication; it is written in medical language and may appear blunt, direct, or contain abbreviations or verbiage that are unfamiliar. Medical documents are intended to carry relevant information, facts as evident, and the clinical opinion of the practitioner.    Also, please note that this report has been produced using speech recognition software and may contain errors related to that system including, but not limited to, errors in grammar, punctuation, and spelling, as well as words and phrases that possibly may have been recognized inappropriately.  If there are any questions or concerns, contact the dictating provider for clarification.      Deysi Zhou MD  4/7/2025  11:26 AM                      [1]   Allergies  Allergen Reactions    Penicillins ANAPHYLAXIS and HYPOTENSION     Cardiac arrest      Pregabalin SWELLING    Raspberry NAUSEA AND VOMITING and SWELLING     Throat closes    Trimethobenzamide ANAPHYLAXIS      Cardiac arrest    Sulfa Antibiotics ITCHING    Pcn [Bicillin L-A] ANAPHYLAXIS     .    Tigan [Trimethobenzamide Hcl] ANAPHYLAXIS     Pass out and cardiac arrest.

## 2025-04-14 ENCOUNTER — OFFICE VISIT (OUTPATIENT)
Dept: WOUND CARE | Facility: HOSPITAL | Age: 70
End: 2025-04-14
Attending: INTERNAL MEDICINE
Payer: MEDICARE

## 2025-04-14 VITALS
SYSTOLIC BLOOD PRESSURE: 133 MMHG | DIASTOLIC BLOOD PRESSURE: 65 MMHG | TEMPERATURE: 98 F | HEART RATE: 60 BPM | RESPIRATION RATE: 15 BRPM

## 2025-04-14 DIAGNOSIS — L97.922 NON-PRESSURE CHRONIC ULCER OF LEFT LOWER LEG WITH FAT LAYER EXPOSED (HCC): Primary | ICD-10-CM

## 2025-04-14 DIAGNOSIS — D68.51 FACTOR V LEIDEN (HCC): ICD-10-CM

## 2025-04-14 DIAGNOSIS — G62.9 SMALL FIBER NEUROPATHY: ICD-10-CM

## 2025-04-14 DIAGNOSIS — M05.79 RHEUMATOID ARTHRITIS INVOLVING MULTIPLE SITES WITH POSITIVE RHEUMATOID FACTOR (HCC): ICD-10-CM

## 2025-04-14 DIAGNOSIS — D68.59 PRIMARY HYPERCOAGULABLE STATE (HCC): ICD-10-CM

## 2025-04-14 DIAGNOSIS — L97.512 RIGHT FOOT ULCER, WITH FAT LAYER EXPOSED (HCC): ICD-10-CM

## 2025-04-14 DIAGNOSIS — T14.8XXD DELAYED WOUND HEALING: ICD-10-CM

## 2025-04-14 DIAGNOSIS — I73.9 PERIPHERAL VASCULAR DISEASE, UNSPECIFIED: ICD-10-CM

## 2025-04-14 DIAGNOSIS — Z79.01 LONG TERM (CURRENT) USE OF ANTICOAGULANTS: ICD-10-CM

## 2025-04-14 PROCEDURE — 29581 APPL MULTLAYER CMPRN SYS LEG: CPT

## 2025-04-14 NOTE — PROGRESS NOTES
.Weekly Wound Education Note    Teaching Provided To: Patient  Training Topics: Dressing, Edema control, Discharge instructions, Compression  Training Method: Explain/Verbal, Written  Training Response: Patient responds and understands, Reinforcement needed            Myrna Ag, hydrofera transfer to posterior wound.  Hydrofera to anterior wound.  Calamine unna boot 20-30mmHg with spanda  size E over ankle portion of wrap.

## 2025-04-14 NOTE — PROGRESS NOTES
[facility] PROGRESS NOTE  ALEXIS LUZ MD  4/14/2025    Chief Complaint:   Chief Complaint   Patient presents with    Wound Care     Patient arrives for a wound care follow up appointment.Patient arrives with an unna wrap to the left leg. Patient does not report pain to the wound. Wrap stayed up well.        HPI:   Subjective   Vero Rodriguez is a 69 year old female coming in for a follow-up visit.    HPI    Ant. Ankle wound seems to be improving.   Lot of epithelium in wound bed.     Posterior ankle wound with granular base but dimensions same.     Tolerating wrap OK.     Review of Systems  Negative except HPI   Denies chest pain / SOB / palpitations  Denies fever.     Allergies  Allergies[1]    Current Meds:  Current Medications[2]      EXAM:   Objective   Objective    Physical Exam    Vital Signs  Vitals:    04/14/25 1513   BP: 133/65   Pulse: 60   Resp: 15   Temp: 97.5 °F (36.4 °C)       Wound Assessment  Wound 01/15/25 #1 Left medial lower leg Leg Left;Medial;Lower (Active)   Date First Assessed/Time First Assessed: 01/15/25 1420    Wound Number (Wound Clinic Only): #1 Left medial lower leg  Primary Wound Type: Traumatic  Location: Leg  Wound Location Orientation: Left;Medial;Lower      Assessments 1/15/2025  2:24 PM 4/14/2025  3:10 PM   Wound Image        Drainage Amount Small Moderate   Drainage Description Serosanguineous Serous;Yellow   Treatments Compression (spandagrip E x2) --   Wound Length (cm) 3 cm (slight angle) 0.4 cm   Wound Width (cm) 3.8 cm 0.1 cm   Wound Surface Area (cm^2) 11.4 cm^2 0.03 cm^2   Wound Depth (cm) 0.1 cm 0.1 cm   Wound Volume (cm^3) 1.14 cm^3 0.002 cm^3   Wound Healing % -- 100   Margins Well-defined edges Well-defined edges   Non-staged Wound Description Full thickness Full thickness   [place]-wound Assessment Edema;Ecchymosis Clean   Wound Granulation Tissue Firm;Pink Pink;Firm   Wound Bed Granulation (%) 5 % 100 %   Wound Bed Slough (%) 95 % --   Wound Odor None None    Tunneling? No No   Undermining? No No   Sinus Tracts? No No       Inactive Orders   Date Order Priority Status Authorizing Provider   02/10/25 1336 Debridement Traumatic Left;Medial;Lower Leg Routine Completed Deysi Pulliam MD   01/27/25 1133 Debridement Traumatic Left;Medial;Lower Leg Routine Completed Deysi Pulliam MD   01/15/25 1455 Debridement Traumatic Left;Medial;Lower Leg Routine Completed Deysi Pulliam MD       Wound 01/27/25 #5 Left posterior leg Leg Lower;Posterior;Left (Active)   Date First Assessed/Time First Assessed: 01/27/25 1033    Wound Number (Wound Clinic Only): #5 Left posterior leg  Primary Wound Type: Venous Ulcer  Location: Leg  Wound Location Orientation: Lower;Posterior;Left      Assessments 1/27/2025 10:36 AM 4/14/2025  3:12 PM   Wound Image       Drainage Amount None Moderate   Drainage Description -- Serosanguineous   Treatments Compression (calamine unna boot 20-30mmHg, medipore tape) --   Wound Length (cm) 1.8 cm 0.9 cm   Wound Width (cm) 0.4 cm 0.6 cm   Wound Surface Area (cm^2) 0.72 cm^2 0.42 cm^2   Wound Depth (cm) 0.1 cm 0.1 cm   Wound Volume (cm^3) 0.072 cm^3 0.028 cm^3   Wound Healing % -- 61   Margins Well-defined edges Well-defined edges   Non-staged Wound Description Full thickness Full thickness   [place]-wound Assessment Dry;Edema --   Wound Granulation Tissue Pink;Pale Grey;Firm Pink;Firm   Wound Bed Granulation (%) 90 % 100 %   Wound Odor None None   Shape 10% scab --   Tunneling? -- No   Undermining? -- No   Sinus Tracts? -- No       Inactive Orders   Date Order Priority Status Authorizing Provider   03/17/25 1437 Debridement Venous Ulcer Lower;Posterior;Left Leg Routine Completed Deysi Pulliam MD   02/24/25 1155 Debridement Venous Ulcer Lower;Posterior;Left Leg Routine Completed Deysi Pulliam MD       Compression Wrap 02/17/25 Leg Left (Active)   Placement Date/Time: 02/17/25 1601   Location: Leg  Wound Location Orientation: Left       Assessments 2/17/2025  4:01 PM 4/7/2025  2:03 PM   Response to Treatment Well tolerated Well tolerated   Compression Layers Multilayer Multilayer   Compression Product Type Unna Boot Unna Boot   Dressing Applied Yes (Myrna Ag, silver alginate) Yes (Myrna Ag, hydrofera transfer, abd pad)   Compression Wrap Location Toes to Knee Toes to Knee   Compression Wrap Status Clean;Dry Dry;Clean       No associated orders.          ASSESSMENT AND PLAN:     Assessment     Encounter Diagnosis  1. 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 Right foot ulcer, with fat layer exposed (HCC)    3. Peripheral vascular disease, unspecified    4. Primary hypercoagulable state (HCC)    5. Small fiber neuropathy    6. Rheumatoid arthritis involving multiple sites with positive rheumatoid factor (HCC)    7. Factor V Leiden (HCC)    8. Long term (current) use of anticoagulants    9. Delayed wound healing      PROCEDURES:    Compression wrap application.       PLAN OF CARE:    Add extra layer spandagrip on the wounded area above the unna boot.   Run PA for skin substitutes.   Watch out for signs of early infection - counseled.   Plan of care discussed with patient in detail - All questions answered   Return in one week.     Patient Instructions     Run PA for skin substitutes.   Return 1 week    Wound Cleaning and Dressings:    Shower with protection - use Shower boot   DRESSINGS:   All wounds: collagen / HF transfer on all open areas.   Zinc barrier cream kailyn-wound  Change dressing weekly     Compression Therapy : UNNA 20-30 mm hg  + added spandagrip    Compression Therapy Instructions:  1.  Put on first thing in the morning and may remove at bedside.  Okay to wear overnight      if comfortable.  Do not let stockings roll up/down and kink.  Hand wash stockings and      hang dry as needed.    2.  Avoid prolonged standing in one place.  It is better to have your calf muscles moving       to pump fluid  out of the legs.    3.  Elevate leg(s) above the level of the heart when sitting or as much as possible.    4.  Take your diuretics as directed by your provider.  Do not skip doses or change doses      unless instructed to do so by your provider.    5. Do not get leg(s) with compression wrap wet. If wraps are too tight as indicated        By pain, numbness/tingling or discoloration of toes remove wrap completely       and call the   wound center.     Off-Loading:  Offload wounded areas.     Miscellaneous Instructions:  Supplement with a daily multivitamin   Low salt diet  Increase protein intake / consider protein supplements - see below  Elevate extremities at all times when sitting / laying down.    DIETARY MODIFICATIONS TO HELP WITH WOUND HEALING:    Protein: Meats, beans, eggs, milk and yogurt particularly Greek yogurt), tofu, soy nuts, soy protein products    Vitamin C: Citrus fruits and juices, strawberries, tomatoes, tomato juice, peppers, baked potatoes, spinach, broccoli, cauliflower, [place] sprouts, cabbage    Vitamin A: Dark green, leafy vegetables, orange or yellow vegetables, cantaloupe, fortified dairy products, liver, fortified cereals    Zinc: Fortified cereals, red meats, seafood    Consider Jason by [employer] (These are essential branch chain amino acids that help with tissue building and wound healing) and take 2 packets/day. you can order online at [employer] or [employer]    ADDITIONAL REMINDERS:    The treatment plan has been discussed at length with you and your provider. Follow all instructions carefully, it is very important. If you do not follow all instructions, you are at  risk of your wound not healing, infection, possible loss of limb and even end of life.  Please call the clinic during regular business hours ( 7:30 AM - 5:30 PM) if you notice increased bleeding, redness, warmth, pain or pus like drainage or start running a fever greater than 100.3.    For after hour emergencies, please  call your primary physician or go to the nearest emergency room.      Patient/Caregiver Education: There are no barriers to learning. Medical education for above diagnosis given.   Answered all questions.    Outcome: Patient verbalizes understanding. Patient is notified to call with any questions, complications, allergies, or worsening or changing symptoms.  Patient is to call with any side effects or complications as a result of the treatments today.      DOCUMENTATION OF TIME SPENT: Code selection for this visit was based on time spent : 35 min on date of service in preparing to see the patient, obtaining and/or reviewing separately obtained history, performing a medically appropriate examination, counseling and educating the patient/family/caregiver, ordering medications or testing, referring and communicating with other healthcare providers, documenting clinical information in the E HR, independently interpreting results and communicating results to the patient/family/caregiver and care coordination with the patient's other providers.    Followup: Return in about 1 week (around 4/21/2025) for Wound followup.      Note to Patient:  The 21st Century Cures Act makes medical notes like these available to patients in the interest of transparency. However, be advised this is a medical document and is intended as [id number] communication; it is written in medical language and may appear blunt, direct, or contain abbreviations or verbiage that are unfamiliar. Medical documents are intended to carry relevant information, facts as evident, and the clinical opinion of the practitioner.    Also, please note that this report has been produced using speech recognition software and may contain errors related to that system including, but not limited to, errors in grammar, punctuation, and spelling, as well as words and phrases that possibly may have been recognized inappropriately.  If there are any questions or concerns,  contact the dictating provider for clarification.      Deysi Zhou MD  4/14/2025  3:30 PM                      [1]   Allergies  Allergen Reactions    Penicillins ANAPHYLAXIS and HYPOTENSION     Cardiac arrest      Pregabalin SWELLING    Raspberry NAUSEA AND VOMITING and SWELLING     Throat closes    Trimethobenzamide ANAPHYLAXIS     Cardiac arrest    Sulfa Antibiotics ITCHING    Pcn [Bicillin L-A] ANAPHYLAXIS     .    Tigan [Trimethobenzamide Hcl] ANAPHYLAXIS     Pass out and cardiac arrest.    [2]   Current Outpatient Medications   Medication Sig Dispense Refill    SILVER SULFADIAZINE 1 % External Cream APPLY TOPICALLY TO THE AFFECTED AREA DAILY (Patient not taking: Reported on 1/15/2025) 25 g 0    lidocaine (LIDODERM) 5 % External Patch Place 1 patch onto the skin daily. 30 patch 2    baclofen 10 MG Oral Tab Take 1 tablet (10 mg total) by mouth 3 (three) times daily. 90 tablet 2    Wound Dressings (MEDIHONEY WOUND/BURN DRESSING) External Gel Apply 1 Application topically daily as needed. (Patient not taking: Reported on 1/15/2025) 44 mL 1    RECTASMOOTHE 5 % External Cream APPLY A SMALL AMOUNT TO THE AFFECTED AREA UP TWICE DAILY (Patient not taking: Reported on 1/15/2025) 30 g 0    warfarin 2 MG Oral Tab Take 1 tablet (2 mg total) by mouth nightly. 90 tablet 3    Wound Dressings (ADAPTIC NON-ADHERING DRESSING) External Pads Apply 1 each topically daily as needed. (Patient not taking: Reported on 1/15/2025) 30 each 1    Omeprazole 40 MG Oral Capsule Delayed Release Take 1 capsule (40 mg total) by mouth daily. (Patient not taking: Reported on 1/15/2025) 90 capsule 3    enoxaparin 60 MG/0.6ML Subcutaneous Solution INJECT 60MG SUB Q INTO ABDOMINAL AREA TWICE DAILY under direction of coumadin clinic 20 each 1    gabapentin 600 MG Oral Tab       tolterodine tartrate 4 MG Oral Capsule SR 24 Hr Take 1 capsule (4 mg total) by mouth daily. 90 capsule 3    simvastatin 5 MG Oral Tab Take 1 tablet (5 mg total) by mouth  nightly. 90 tablet 3    RESTASIS MULTIDOSE 0.05 % Ophthalmic Emulsion       methylPREDNISolone 4 MG Oral Tab       Cholecalciferol 25 MCG (1000 UT) Oral Tab Every Day      Mycophenolate Mofetil 500 MG Oral Tab Take 2 tablets (1,000 mg total) by mouth 2 (two) times daily.      KEVZARA 200 MG/1.14ML Subcutaneous Solution Auto-injector  (Patient not taking: Reported on 1/15/2025)      potassium chloride 20 MEQ Oral Tab CR Take 1 tablet (20 mEq total) by mouth 2 (two) times daily. 180 tablet 3    spironolactone 25 MG Oral Tab Take 1 tablet (25 mg total) by mouth daily. 90 tablet 3    Nystatin 411063 UNIT/GM External Powder Apply 1 Application topically 2 (two) times daily as needed. 60 g 2    MONTELUKAST SODIUM 10 MG Oral Tab TAKE 1 TABLET(10 MG) BY MOUTH EVERY DAY 90 tablet 3    metroNIDAZOLE 1 % External Gel Apply to face nightly (Patient not taking: Reported on 1/15/2025) 60 g 1    PULMICORT FLEXHALER 180 MCG/ACT Inhalation Aerosol Powder, Breath Activated INHALE 2 PUFFS INTO THE LUNGS TWICE DAILY 1 each 11    GABAPENTIN 400 MG Oral Cap TAKE 3 CAPSULES BY MOUTH THREE TIMES DAILY 270 capsule 5    Pilocarpine HCl 5 MG Oral Tab Take 1 tablet (5 mg total) by mouth 3 (three) times daily.      Albuterol Sulfate  (90 Base) MCG/ACT Inhalation Aero Soln Inhale 2 puffs into the lungs every 6 (six) hours as needed for Wheezing or Shortness of Breath. 18 g 3    Probiotic Product (PROBIOTIC DAILY OR) Take by mouth.      Diphenoxylate-Atropine (LOMOTIL OR) Take by mouth.      hydrocortisone (ANUSOL-HC) 2.5 % Rectal Cream Place 1 Application rectally 2 (two) times daily as needed for Hemorrhoids. 60 g 1    Zolpidem Tartrate (AMBIEN) 10 MG Oral Tab Take 1 tablet (10 mg total) by mouth nightly as needed. (Patient not taking: Reported on 1/15/2025) 30 tablet 1    Ammonium Lactate (LAC-HYDRIN) 12 % External Cream Apply qd prn to afected area 140 g 3    leflunomide (ARAVA) 20 MG Oral Tab Take 1 tablet (20 mg total) by mouth  daily.      Hydroxychloroquine Sulfate 200 MG Oral Tab Take  by mouth 2 (two) times daily.

## 2025-04-21 ENCOUNTER — OFFICE VISIT (OUTPATIENT)
Dept: WOUND CARE | Facility: HOSPITAL | Age: 70
End: 2025-04-21
Attending: INTERNAL MEDICINE
Payer: MEDICARE

## 2025-04-21 VITALS
HEART RATE: 59 BPM | TEMPERATURE: 98 F | RESPIRATION RATE: 14 BRPM | DIASTOLIC BLOOD PRESSURE: 75 MMHG | SYSTOLIC BLOOD PRESSURE: 149 MMHG

## 2025-04-21 DIAGNOSIS — I73.9 PERIPHERAL VASCULAR DISEASE, UNSPECIFIED: ICD-10-CM

## 2025-04-21 DIAGNOSIS — Z79.01 LONG TERM (CURRENT) USE OF ANTICOAGULANTS: ICD-10-CM

## 2025-04-21 DIAGNOSIS — L97.922 NON-PRESSURE CHRONIC ULCER OF LEFT LOWER LEG WITH FAT LAYER EXPOSED (HCC): Primary | ICD-10-CM

## 2025-04-21 DIAGNOSIS — D68.51 FACTOR V LEIDEN (HCC): ICD-10-CM

## 2025-04-21 DIAGNOSIS — G62.9 SMALL FIBER NEUROPATHY: ICD-10-CM

## 2025-04-21 DIAGNOSIS — L97.512 RIGHT FOOT ULCER, WITH FAT LAYER EXPOSED (HCC): ICD-10-CM

## 2025-04-21 DIAGNOSIS — D68.59 PRIMARY HYPERCOAGULABLE STATE (HCC): ICD-10-CM

## 2025-04-21 DIAGNOSIS — M05.79 RHEUMATOID ARTHRITIS INVOLVING MULTIPLE SITES WITH POSITIVE RHEUMATOID FACTOR (HCC): ICD-10-CM

## 2025-04-21 DIAGNOSIS — T14.8XXD DELAYED WOUND HEALING: ICD-10-CM

## 2025-04-21 PROCEDURE — 15271 SKIN SUB GRAFT TRNK/ARM/LEG: CPT | Performed by: INTERNAL MEDICINE

## 2025-04-21 NOTE — PROGRESS NOTES
.Weekly Wound Education Note    Teaching Provided To: Patient  Training Topics: Dressing, Edema control, Discharge instructions, Compression, Skin substitute  Training Method: Explain/Verbal, Written  Training Response: Patient responds and understands, Reinforcement needed            First application of Epifix, contact layer, hydrofera transfer, kerramax to both wounds.  Calamine unna boot 20-30mmHg, spanda  size E over ankle area.  Patient to bring ankle wrap to next appointment.

## 2025-04-21 NOTE — PROGRESS NOTES
[facility] PROGRESS NOTE  ALEXIS LUZ MD  4/21/2025    Chief Complaint:   Chief Complaint   Patient presents with    Wound Recheck     Patient arrives to wound care follow-up appointment on foot. She LLE is in a calamine unna wrap. Denies pain to wounds. Notes itching d/t wrap. Increased drainage noted.       HPI:   Subjective   Vero Rodriguez is a 69 year old female coming in for a follow-up visit.    HPI    Wound stable  But no improvement in dimensions.   No s/o infection.   Will start epifix skin substitute today.       Review of Systems  Negative except HPI   Denies chest pain / SOB / palpitations  Denies fever.     Allergies  Allergies[1]    Current Meds:  Current Medications[2]      EXAM:   Objective   Objective    Physical Exam    Vital Signs  Vitals:    04/21/25 0814   BP: 149/75   Pulse: 59   Resp: 14   Temp: 97.8 °F (36.6 °C)       Wound Assessment  Wound 01/15/25 #1 Left medial lower leg Leg Left;Medial;Lower (Active)   Date First Assessed/Time First Assessed: 01/15/25 1420    Wound Number (Wound Clinic Only): #1 Left medial lower leg  Primary Wound Type: Traumatic  Location: Leg  Wound Location Orientation: Left;Medial;Lower      Assessments 1/15/2025  2:24 PM 4/21/2025  8:17 AM   Wound Image        Wound Image Comment  Simultaneous filing. User may not have seen previous data.   Drainage Amount Small Copious   Drainage Description Serosanguineous Serous;Yellow   Treatments Compression (spandagrip E x2) --   Wound Length (cm) 3 cm (slight angle) 0.3 cm   Wound Width (cm) 3.8 cm 1.2 cm (slight anglr)   Wound Surface Area (cm^2) 11.4 cm^2 0.28 cm^2   Wound Depth (cm) 0.1 cm 0.1 cm   Wound Volume (cm^3) 1.14 cm^3 0.019 cm^3   Wound Healing % -- 98   Margins Well-defined edges Well-defined edges   Non-staged Wound Description Full thickness Full thickness   [place]-wound Assessment Edema;Ecchymosis Edema;Hemosiderin staining   Wound Granulation Tissue Firm;Pink Pink;Firm;Red   Wound Bed  Granulation (%) 5 % 100 %   Wound Bed Slough (%) 95 % --   Wound Odor None None   Tunneling? No No   Undermining? No No   Sinus Tracts? No No       Inactive Orders   Date Order Priority Status Authorizing Provider   02/10/25 1336 Debridement Traumatic Left;Medial;Lower Leg Routine Completed Deysi Pulliam MD   01/27/25 1133 Debridement Traumatic Left;Medial;Lower Leg Routine Completed Deysi Pulliam MD   01/15/25 1455 Debridement Traumatic Left;Medial;Lower Leg Routine Completed Deysi Pulliam MD       Wound 01/27/25 #5 Left posterior leg Leg Lower;Posterior;Left (Active)   Date First Assessed/Time First Assessed: 01/27/25 1033    Wound Number (Wound Clinic Only): #5 Left posterior leg  Primary Wound Type: Venous Ulcer  Location: Leg  Wound Location Orientation: Lower;Posterior;Left      Assessments 1/27/2025 10:36 AM 4/21/2025  8:20 AM   Wound Image       Drainage Amount None Scant   Drainage Description -- Yellow;Serous   Treatments Compression (calamine unna boot 20-30mmHg, medipore tape) --   Wound Length (cm) 1.8 cm 0.9 cm   Wound Width (cm) 0.4 cm 0.4 cm   Wound Surface Area (cm^2) 0.72 cm^2 0.28 cm^2   Wound Depth (cm) 0.1 cm 0.2 cm   Wound Volume (cm^3) 0.072 cm^3 0.038 cm^3   Wound Healing % -- 47   Margins Well-defined edges Well-defined edges   Non-staged Wound Description Full thickness Full thickness   [place]-wound Assessment Dry;Edema Dry;Pink   Wound Granulation Tissue Pink;Pale Grey;Firm Pink;Firm;Red   Wound Bed Granulation (%) 90 % 75 %   Wound Bed Slough (%) -- 25 %   Wound Odor None None   Shape 10% scab --   Tunneling? -- No   Undermining? -- No   Sinus Tracts? -- No       Active Orders   Date Order Priority Status Authorizing Provider   04/21/25 0937 Cellular tissue product application Venous Ulcer Lower;Posterior;Left Leg Routine Active Deysi Pulliam MD       Inactive Orders   Date Order Priority Status Authorizing Provider   03/17/25 1437 Debridement Venous Ulcer  Lower;Posterior;Left Leg Routine Completed Deysi Pulliam MD   02/24/25 1155 Debridement Venous Ulcer Lower;Posterior;Left Leg Routine Completed Deysi Pulliam MD       Compression Wrap 02/17/25 Leg Left (Active)   Placement Date/Time: 02/17/25 1601   Location: Leg  Wound Location Orientation: Left      Assessments 2/17/2025  4:01 PM 4/14/2025  4:58 PM   Response to Treatment Well tolerated Well tolerated   Compression Layers Multilayer Multilayer   Compression Product Type Unna Boot Unna Boot   Dressing Applied Yes (Myrna Ag, silver alginate) Yes (Myrna Ag, hydrofera transfer)   Compression Wrap Location Toes to Knee Toes to Knee   Compression Wrap Status Clean;Dry Dry;Clean       No associated orders.          ASSESSMENT AND PLAN:     Assessment     Encounter Diagnosis  1. 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 Right foot ulcer, with fat layer exposed (HCC)    3. Peripheral vascular disease, unspecified    4. Primary hypercoagulable state (HCC)    5. Small fiber neuropathy    6. Rheumatoid arthritis involving multiple sites with positive rheumatoid factor (HCC)    7. Factor V Leiden (HCC)    8. Long term (current) use of anticoagulants    9. Delayed wound healing        PROCEDURES:    Selective debridement of wound bed done to stimulate healing.     Cellular tissue product application Venous Ulcer Lower;Posterior;Left Leg     Date/Time: 4/21/2025 9:37 AM     Performed by: Deysi Pulliam MD  Authorized by: Deysi Pulliam MD  Associated wounds:   Wound 01/27/25 #5 Left posterior leg Leg Lower;Posterior;Left     Consent:     Consent obtained:  Verbal    Consent given by:  Patient  Anesthesia (see MAR for exact dosages):     Anesthesia method:  Topical application  Procedure details:     Location:  trunk/arms/legs    Product applied:  Epifix    Product lot #:  [id number]    Product expiration:  11/1/2029    Amount used (sq cm):  6    Amount  wasted (sq cm):  0    Secured/Fixated: Yes      Secured/Fixated with:  Contact layer, hydrofera transfer    Total wound surface area (sq cm):  1    Type of wound:  venous leg ulcer    Treatment number:  1  Post-procedure details:     Patient tolerance of procedure:  Tolerated well, no immediate complications  Comments:      Divided and stacked for both wounds      MEDICAL NECESSITY FOR SKIN SUBS.     Despite optimal treatment: management of co-morbidities DM and OM, edema control, off-loading, nutritional optimization with MVI and supplements, control of infection, and gold standard wound treatment wound continues to show delayed healing not meeting healing goals (10% current wound size per week).   To avoid further hospitalizations, infections, and loss of work days, this application is being performed in anticipation of staged, related and further necessary applications in attempt to achieve rapid wound closure.  Affixed by Non-Adherent, steri-strips and further secured with secondary dressing.  Amount of product wasted: none.  Patient tolerated procedure well without any complications, was instructed to leave non-adherent in place until next appointment and keep it dry.  Follow-up in 7 days.      Compression wrap application.       PLAN OF CARE:    Start epifix skin substitute application.   Continue absorptive dressings and compression wraps.   Recommend use of ankle brace to restrict ankle movement.   Consider switching to a shoe that does not rub on wounded area.   Decrease walking.   Elevate extremities  Consider getting knee roller  Watch out for signs of early infection - counseled.   Plan of care discussed with patient in detail - All questions answered   Return in one week.     Orders  Orders Placed This Encounter   Procedures    Cellular tissue product application Venous Ulcer Lower;Posterior;Left Leg         Patient Instructions     Return 1 week  Limit movement of ankle  Get ankle brace  Avoid wound  rubbing on foot wear.   Consider getting a knee roller.     Wound Cleaning and Dressings:    Shower with protection - use Shower boot   DRESSINGS:   All wounds:EPIFIX SKIN SUBSTITUTE / CONTACT LAYER / HF transfer on all open areas.   Zinc barrier cream kailyn-wound  Change dressing weekly     Compression Therapy : UNNA 20-30 mm hg  + added spandagrip    Compression Therapy Instructions:  1.  Put on first thing in the morning and may remove at bedside.  Okay to wear overnight      if comfortable.  Do not let stockings roll up/down and kink.  Hand wash stockings and      hang dry as needed.    2.  Avoid prolonged standing in one place.  It is better to have your calf muscles moving       to pump fluid out of the legs.    3.  Elevate leg(s) above the level of the heart when sitting or as much as possible.    4.  Take your diuretics as directed by your provider.  Do not skip doses or change doses      unless instructed to do so by your provider.    5. Do not get leg(s) with compression wrap wet. If wraps are too tight as indicated        By pain, numbness/tingling or discoloration of toes remove wrap completely       and call the   wound center.     Off-Loading:  Offload wounded areas.     Miscellaneous Instructions:  Supplement with a daily multivitamin   Low salt diet  Increase protein intake / consider protein supplements - see below  Elevate extremities at all times when sitting / laying down.    DIETARY MODIFICATIONS TO HELP WITH WOUND HEALING:    Protein: Meats, beans, eggs, milk and yogurt particularly Greek yogurt), tofu, soy nuts, soy protein products    Vitamin C: Citrus fruits and juices, strawberries, tomatoes, tomato juice, peppers, baked potatoes, spinach, broccoli, cauliflower, [place] sprouts, cabbage    Vitamin A: Dark green, leafy vegetables, orange or yellow vegetables, cantaloupe, fortified dairy products, liver, fortified cereals    Zinc: Fortified cereals, red meats, seafood    Consider Jason by  [employer] (These are essential branch chain amino acids that help with tissue building and wound healing) and take 2 packets/day. you can order online at [employer] or [employer]    ADDITIONAL REMINDERS:    The treatment plan has been discussed at length with you and your provider. Follow all instructions carefully, it is very important. If you do not follow all instructions, you are at  risk of your wound not healing, infection, possible loss of limb and even end of life.  Please call the clinic during regular business hours ( 7:30 AM - 5:30 PM) if you notice increased bleeding, redness, warmth, pain or pus like drainage or start running a fever greater than 100.3.    For after hour emergencies, please call your primary physician or go to the nearest emergency room.      Patient/Caregiver Education: There are no barriers to learning. Medical education for above diagnosis given.   Answered all questions.    Outcome: Patient verbalizes understanding. Patient is notified to call with any questions, complications, allergies, or worsening or changing symptoms.  Patient is to call with any side effects or complications as a result of the treatments today.      DOCUMENTATION OF TIME SPENT: Code selection for this visit was based on time spent : 40 min on date of service in preparing to see the patient, obtaining and/or reviewing separately obtained history, performing a medically appropriate examination, counseling and educating the patient/family/caregiver, ordering medications or testing, referring and communicating with other healthcare providers, documenting clinical information in the E HR, independently interpreting results and communicating results to the patient/family/caregiver and care coordination with the patient's other providers.    Followup: Return in about 1 week (around 4/28/2025) for Wound followup.      Note to Patient:  The 21st Century Cures Act makes medical notes like these available to patients in the  interest of transparency. However, be advised this is a medical document and is intended as khuo-ib-dmtz communication; it is written in medical language and may appear blunt, direct, or contain abbreviations or verbiage that are unfamiliar. Medical documents are intended to carry relevant information, facts as evident, and the clinical opinion of the practitioner.    Also, please note that this report has been produced using speech recognition software and may contain errors related to that system including, but not limited to, errors in grammar, punctuation, and spelling, as well as words and phrases that possibly may have been recognized inappropriately.  If there are any questions or concerns, contact the dictating provider for clarification.      Deysi Zhou MD  4/21/2025  8:42 AM                      [1]   Allergies  Allergen Reactions    Penicillins ANAPHYLAXIS and HYPOTENSION     Cardiac arrest      Pregabalin SWELLING    Raspberry NAUSEA AND VOMITING and SWELLING     Throat closes    Trimethobenzamide ANAPHYLAXIS     Cardiac arrest    Sulfa Antibiotics ITCHING    Pcn [Bicillin L-A] ANAPHYLAXIS     .    Tigan [Trimethobenzamide Hcl] ANAPHYLAXIS     Pass out and cardiac arrest.    [2]   Current Outpatient Medications   Medication Sig Dispense Refill    SILVER SULFADIAZINE 1 % External Cream APPLY TOPICALLY TO THE AFFECTED AREA DAILY (Patient not taking: Reported on 1/15/2025) 25 g 0    lidocaine (LIDODERM) 5 % External Patch Place 1 patch onto the skin daily. 30 patch 2    baclofen 10 MG Oral Tab Take 1 tablet (10 mg total) by mouth 3 (three) times daily. 90 tablet 2    Wound Dressings (MEDIHONEY WOUND/BURN DRESSING) External Gel Apply 1 Application topically daily as needed. (Patient not taking: Reported on 1/15/2025) 44 mL 1    RECTASMOOTHE 5 % External Cream APPLY A SMALL AMOUNT TO THE AFFECTED AREA UP TWICE DAILY (Patient not taking: Reported on 1/15/2025) 30 g 0    warfarin 2 MG Oral Tab Take 1  tablet (2 mg total) by mouth nightly. 90 tablet 3    Wound Dressings (ADAPTIC NON-ADHERING DRESSING) External Pads Apply 1 each topically daily as needed. (Patient not taking: Reported on 1/15/2025) 30 each 1    Omeprazole 40 MG Oral Capsule Delayed Release Take 1 capsule (40 mg total) by mouth daily. (Patient not taking: Reported on 1/15/2025) 90 capsule 3    enoxaparin 60 MG/0.6ML Subcutaneous Solution INJECT 60MG SUB Q INTO ABDOMINAL AREA TWICE DAILY under direction of coumadin clinic 20 each 1    gabapentin 600 MG Oral Tab       tolterodine tartrate 4 MG Oral Capsule SR 24 Hr Take 1 capsule (4 mg total) by mouth daily. 90 capsule 3    simvastatin 5 MG Oral Tab Take 1 tablet (5 mg total) by mouth nightly. 90 tablet 3    RESTASIS MULTIDOSE 0.05 % Ophthalmic Emulsion       methylPREDNISolone 4 MG Oral Tab       Cholecalciferol 25 MCG (1000 UT) Oral Tab Every Day      Mycophenolate Mofetil 500 MG Oral Tab Take 2 tablets (1,000 mg total) by mouth 2 (two) times daily.      KEVZARA 200 MG/1.14ML Subcutaneous Solution Auto-injector  (Patient not taking: Reported on 1/15/2025)      potassium chloride 20 MEQ Oral Tab CR Take 1 tablet (20 mEq total) by mouth 2 (two) times daily. 180 tablet 3    spironolactone 25 MG Oral Tab Take 1 tablet (25 mg total) by mouth daily. 90 tablet 3    Nystatin 167728 UNIT/GM External Powder Apply 1 Application topically 2 (two) times daily as needed. 60 g 2    MONTELUKAST SODIUM 10 MG Oral Tab TAKE 1 TABLET(10 MG) BY MOUTH EVERY DAY 90 tablet 3    metroNIDAZOLE 1 % External Gel Apply to face nightly (Patient not taking: Reported on 1/15/2025) 60 g 1    PULMICORT FLEXHALER 180 MCG/ACT Inhalation Aerosol Powder, Breath Activated INHALE 2 PUFFS INTO THE LUNGS TWICE DAILY 1 each 11    GABAPENTIN 400 MG Oral Cap TAKE 3 CAPSULES BY MOUTH THREE TIMES DAILY 270 capsule 5    Pilocarpine HCl 5 MG Oral Tab Take 1 tablet (5 mg total) by mouth 3 (three) times daily.      Albuterol Sulfate  (90  Base) MCG/ACT Inhalation Aero Soln Inhale 2 puffs into the lungs every 6 (six) hours as needed for Wheezing or Shortness of Breath. 18 g 3    Probiotic Product (PROBIOTIC DAILY OR) Take by mouth.      Diphenoxylate-Atropine (LOMOTIL OR) Take by mouth.      hydrocortisone (ANUSOL-HC) 2.5 % Rectal Cream Place 1 Application rectally 2 (two) times daily as needed for Hemorrhoids. 60 g 1    Zolpidem Tartrate (AMBIEN) 10 MG Oral Tab Take 1 tablet (10 mg total) by mouth nightly as needed. (Patient not taking: Reported on 1/15/2025) 30 tablet 1    Ammonium Lactate (LAC-HYDRIN) 12 % External Cream Apply qd prn to afected area 140 g 3    leflunomide (ARAVA) 20 MG Oral Tab Take 1 tablet (20 mg total) by mouth daily.      Hydroxychloroquine Sulfate 200 MG Oral Tab Take  by mouth 2 (two) times daily.

## 2025-04-21 NOTE — PROGRESS NOTES
Patient ID: Vero Rodriguez is a 69 year old female.    Cellular tissue product application Venous Ulcer Lower;Posterior;Left Leg    Date/Time: 4/21/2025 9:37 AM    Performed by: Deysi Pulliam MD  Authorized by: Deysi Pulliam MD  Associated wounds:   Wound 01/27/25 #5 Left posterior leg Leg Lower;Posterior;Left    Consent:     Consent obtained:  Verbal    Consent given by:  Patient  Anesthesia (see MAR for exact dosages):     Anesthesia method:  Topical application  Procedure details:     Location:  trunk/arms/legs    Product applied:  Epifix    Product lot #:  [id number]    Product expiration:  11/1/2029    Amount used (sq cm):  6    Amount wasted (sq cm):  0    Secured/Fixated: Yes      Secured/Fixated with:  Contact layer, hydrofera transfer    Total wound surface area (sq cm):  1    Type of wound:  venous leg ulcer    Treatment number:  1  Post-procedure details:     Patient tolerance of procedure:  Tolerated well, no immediate complications  Comments:      Divided and stacked for both wounds

## 2025-04-21 NOTE — PATIENT INSTRUCTIONS
Return 1 week  Limit movement of ankle  Get ankle brace  Avoid wound rubbing on foot wear.   Consider getting a knee roller.     Wound Cleaning and Dressings:    Shower with protection - use Shower boot   DRESSINGS:   All wounds:EPIFIX SKIN SUBSTITUTE / CONTACT LAYER / HF transfer on all open areas.   Zinc barrier cream kailyn-wound  Change dressing weekly     Compression Therapy : UNNA 20-30 mm hg  + added spandagrip    Compression Therapy Instructions:  1.  Put on first thing in the morning and may remove at bedside.  Okay to wear overnight      if comfortable.  Do not let stockings roll up/down and kink.  Hand wash stockings and      hang dry as needed.    2.  Avoid prolonged standing in one place.  It is better to have your calf muscles moving       to pump fluid out of the legs.    3.  Elevate leg(s) above the level of the heart when sitting or as much as possible.    4.  Take your diuretics as directed by your provider.  Do not skip doses or change doses      unless instructed to do so by your provider.    5. Do not get leg(s) with compression wrap wet. If wraps are too tight as indicated        By pain, numbness/tingling or discoloration of toes remove wrap completely       and call the   wound center.     Off-Loading:  Offload wounded areas.     Miscellaneous Instructions:  Supplement with a daily multivitamin   Low salt diet  Increase protein intake / consider protein supplements - see below  Elevate extremities at all times when sitting / laying down.    DIETARY MODIFICATIONS TO HELP WITH WOUND HEALING:    Protein: Meats, beans, eggs, milk and yogurt particularly Greek yogurt), tofu, soy nuts, soy protein products    Vitamin C: Citrus fruits and juices, strawberries, tomatoes, tomato juice, peppers, baked potatoes, spinach, broccoli, cauliflower, [place] sprouts, cabbage    Vitamin A: Dark green, leafy vegetables, orange or yellow vegetables, cantaloupe, fortified dairy products, liver, fortified  cereals    Zinc: Fortified cereals, red meats, seafood    Consider Jason by [employer] (These are essential branch chain amino acids that help with tissue building and wound healing) and take 2 packets/day. you can order online at [employer] or [employer]    ADDITIONAL REMINDERS:    The treatment plan has been discussed at length with you and your provider. Follow all instructions carefully, it is very important. If you do not follow all instructions, you are at  risk of your wound not healing, infection, possible loss of limb and even end of life.  Please call the clinic during regular business hours ( 7:30 AM - 5:30 PM) if you notice increased bleeding, redness, warmth, pain or pus like drainage or start running a fever greater than 100.3.    For after hour emergencies, please call your primary physician or go to the nearest emergency room.

## 2025-04-28 ENCOUNTER — OFFICE VISIT (OUTPATIENT)
Dept: WOUND CARE | Facility: HOSPITAL | Age: 70
End: 2025-04-28
Attending: INTERNAL MEDICINE
Payer: MEDICARE

## 2025-04-28 VITALS
HEART RATE: 55 BPM | SYSTOLIC BLOOD PRESSURE: 147 MMHG | TEMPERATURE: 98 F | DIASTOLIC BLOOD PRESSURE: 70 MMHG | RESPIRATION RATE: 14 BRPM

## 2025-04-28 DIAGNOSIS — D68.59 PRIMARY HYPERCOAGULABLE STATE (HCC): ICD-10-CM

## 2025-04-28 DIAGNOSIS — M05.79 RHEUMATOID ARTHRITIS INVOLVING MULTIPLE SITES WITH POSITIVE RHEUMATOID FACTOR (HCC): ICD-10-CM

## 2025-04-28 DIAGNOSIS — L97.922 NON-PRESSURE CHRONIC ULCER OF LEFT LOWER LEG WITH FAT LAYER EXPOSED (HCC): Primary | ICD-10-CM

## 2025-04-28 DIAGNOSIS — I73.9 PERIPHERAL VASCULAR DISEASE, UNSPECIFIED: ICD-10-CM

## 2025-04-28 DIAGNOSIS — L97.512 RIGHT FOOT ULCER, WITH FAT LAYER EXPOSED (HCC): ICD-10-CM

## 2025-04-28 DIAGNOSIS — G62.9 SMALL FIBER NEUROPATHY: ICD-10-CM

## 2025-04-28 PROCEDURE — 29581 APPL MULTLAYER CMPRN SYS LEG: CPT

## 2025-04-28 NOTE — PATIENT INSTRUCTIONS
Return 1 week  Limit movement of ankle  Wear  ankle brace  Avoid wound rubbing on foot wear.   Consider getting a knee roller.     Wound Cleaning and Dressings:    Shower with protection - use Shower boot   DRESSINGS:   All wounds: collagen  / HF transfer on all open areas.   Zinc barrier cream kailyn-wound  Change dressing weekly     Compression Therapy : UNNA 20-30 mm hg  + added spandagrip    Compression Therapy Instructions:  1.  Put on first thing in the morning and may remove at bedside.  Okay to wear overnight      if comfortable.  Do not let stockings roll up/down and kink.  Hand wash stockings and      hang dry as needed.    2.  Avoid prolonged standing in one place.  It is better to have your calf muscles moving       to pump fluid out of the legs.    3.  Elevate leg(s) above the level of the heart when sitting or as much as possible.    4.  Take your diuretics as directed by your provider.  Do not skip doses or change doses      unless instructed to do so by your provider.    5. Do not get leg(s) with compression wrap wet. If wraps are too tight as indicated        By pain, numbness/tingling or discoloration of toes remove wrap completely       and call the   wound center.     Off-Loading:  Offload wounded areas.     Miscellaneous Instructions:  Supplement with a daily multivitamin   Low salt diet  Increase protein intake / consider protein supplements - see below  Elevate extremities at all times when sitting / laying down.    DIETARY MODIFICATIONS TO HELP WITH WOUND HEALING:    Protein: Meats, beans, eggs, milk and yogurt particularly Greek yogurt), tofu, soy nuts, soy protein products    Vitamin C: Citrus fruits and juices, strawberries, tomatoes, tomato juice, peppers, baked potatoes, spinach, broccoli, cauliflower, [place] sprouts, cabbage    Vitamin A: Dark green, leafy vegetables, orange or yellow vegetables, cantaloupe, fortified dairy products, liver, fortified cereals    Zinc: Fortified  cereals, red meats, seafood    Consider Jason by [employer] (These are essential branch chain amino acids that help with tissue building and wound healing) and take 2 packets/day. you can order online at [employer] or [employer]    ADDITIONAL REMINDERS:    The treatment plan has been discussed at length with you and your provider. Follow all instructions carefully, it is very important. If you do not follow all instructions, you are at  risk of your wound not healing, infection, possible loss of limb and even end of life.  Please call the clinic during regular business hours ( 7:30 AM - 5:30 PM) if you notice increased bleeding, redness, warmth, pain or pus like drainage or start running a fever greater than 100.3.    For after hour emergencies, please call your primary physician or go to the nearest emergency room.

## 2025-04-28 NOTE — PROGRESS NOTES
.Weekly Wound Education Note    Teaching Provided To: Patient  Training Topics: Dressing, Edema control, Discharge instructions, Compression, Off-loading  Training Method: Explain/Verbal, Written  Training Response: Patient responds and understands            Myrna Ag, folded xeroform over posterior wound.  Folded xeroform over medial wound.  Calamine unna boot 20-30mmHg with abd pads to anterior leg for protection.  New ankle wrap applied over wrap.

## 2025-04-28 NOTE — PROGRESS NOTES
[facility] PROGRESS NOTE  ALEXIS LUZ MD  4/28/2025    Chief Complaint:   Chief Complaint   Patient presents with    Wound Care     Patient is here for a wound care follow up. She complains of increased pain with this wrap that she rates at 6/10.        HPI:   Subjective   Vero Rodriguez is a 69 year old female coming in for a follow-up visit.    HPI    Wound improved.     The anterior ankle wound may be closed.   Epifix still in place - incorporated.     Posterior ankle ulcer - stable - improved - dimension smaller.   No s/o infection.     Edema down.     There is new raw area on anterior shin higher up - possibly missed padding this area last week.   Has some pain - but no skin breakdown.        Review of Systems  Negative except HPI   Denies chest pain / SOB / palpitations  Denies fever.     Allergies  Allergies[1]    Current Meds:  Current Medications[2]      EXAM:   Objective   Objective    Physical Exam    Vital Signs  Vitals:    04/28/25 0700   BP: 147/70   Pulse: 55   Resp: 14   Temp: 97.7 °F (36.5 °C)       Wound Assessment  Wound 01/15/25 #1 Left medial lower leg Leg Left;Medial;Lower (Active)   Date First Assessed/Time First Assessed: 01/15/25 1420    Wound Number (Wound Clinic Only): #1 Left medial lower leg  Primary Wound Type: Traumatic  Location: Leg  Wound Location Orientation: Left;Medial;Lower      Assessments 1/15/2025  2:24 PM 4/28/2025  8:44 AM   Wound Image        Drainage Amount Small None   Drainage Description Serosanguineous --   Treatments Compression (spandagrip E x2) --   Wound Length (cm) 3 cm (slight angle) 0.1 cm   Wound Width (cm) 3.8 cm 0.1 cm   Wound Surface Area (cm^2) 11.4 cm^2 0.01 cm^2   Wound Depth (cm) 0.1 cm 0.1 cm   Wound Volume (cm^3) 1.14 cm^3 0.001 cm^3   Wound Healing % -- 100   Margins Well-defined edges Well-defined edges   Non-staged Wound Description Full thickness Full thickness   [place]-wound Assessment Edema;Ecchymosis Edema;Hemosiderin staining    Wound Granulation Tissue Firm;Pink Pink;Firm   Wound Bed Granulation (%) 5 % 100 %   Wound Bed Slough (%) 95 % --   Wound Odor None None   Tunneling? No No   Undermining? No No   Sinus Tracts? No No       Inactive Orders   Date Order Priority Status Authorizing Provider   02/10/25 1336 Debridement Traumatic Left;Medial;Lower Leg Routine Completed Deysi Pulliam MD   01/27/25 1133 Debridement Traumatic Left;Medial;Lower Leg Routine Completed Deysi Pulliam MD   01/15/25 1455 Debridement Traumatic Left;Medial;Lower Leg Routine Completed Deysi Pulliam MD       Wound 01/27/25 #5 Left posterior leg Leg Lower;Posterior;Left (Active)   Date First Assessed/Time First Assessed: 01/27/25 1033    Wound Number (Wound Clinic Only): #5 Left posterior leg  Primary Wound Type: Venous Ulcer  Location: Leg  Wound Location Orientation: Lower;Posterior;Left      Assessments 1/27/2025 10:36 AM 4/28/2025  8:43 AM   Wound Image       Drainage Amount None Scant   Drainage Description -- Yellow;Serous   Treatments Compression (calamine unna boot 20-30mmHg, medipore tape) --   Wound Length (cm) 1.8 cm 0.5 cm   Wound Width (cm) 0.4 cm 0.4 cm   Wound Surface Area (cm^2) 0.72 cm^2 0.16 cm^2   Wound Depth (cm) 0.1 cm 0.1 cm   Wound Volume (cm^3) 0.072 cm^3 0.01 cm^3   Wound Healing % -- 86   Margins Well-defined edges Well-defined edges   Non-staged Wound Description Full thickness Full thickness   [place]-wound Assessment Dry;Edema Dry   Wound Granulation Tissue Pink;Pale Grey;Firm Pink;Firm   Wound Bed Granulation (%) 90 % 20 %   Wound Bed Slough (%) -- 80 %   Wound Odor None None   Shape 10% scab --   Tunneling? -- No   Undermining? -- No   Sinus Tracts? -- No       Inactive Orders   Date Order Priority Status Authorizing Provider   04/21/25 0937 Cellular tissue product application Venous Ulcer Lower;Posterior;Left Leg Routine Completed Deysi Pulliam MD   03/17/25 1437 Debridement Venous Ulcer Lower;Posterior;Left Leg  Routine Completed Deysi Pulliam MD   02/24/25 1155 Debridement Venous Ulcer Lower;Posterior;Left Leg Routine Completed Deysi Pulliam MD       Compression Wrap 02/17/25 Leg Left (Active)   Placement Date/Time: 02/17/25 1601   Location: Leg  Wound Location Orientation: Left      Assessments 2/17/2025  4:01 PM 4/21/2025 10:52 AM   Response to Treatment Well tolerated Well tolerated   Compression Layers Multilayer Multilayer   Compression Product Type Unna Boot Tubigrip/Spanda;Unna Boot (spanda  size E, calamine unna boot 20-30mmHg)   Dressing Applied Yes (Myrna Ag, silver alginate) Yes (Epifix, contact alyer, hydrofera transfer, kerramax)   Compression Wrap Location Toes to Knee Toes to Knee   Compression Wrap Status Clean;Dry Clean;Dry       No associated orders.                ASSESSMENT AND PLAN:     Assessment     Encounter Diagnosis  1. 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 Right foot ulcer, with fat layer exposed (HCC)    3. Peripheral vascular disease, unspecified    4. Small fiber neuropathy    5. Rheumatoid arthritis involving multiple sites with positive rheumatoid factor (HCC)    6. Primary hypercoagulable state (HCC)            PROCEDURES:          PLAN OF CARE:    Apply collagen on posterior ankle ulcer.   Epifix in place on ant. Ulcer.   Debridements as needed.   Padded ant shin   Continue compression wraps.   Edema management.  Start wearing ankle brace.   Watch out for signs of early infection - counseled.   Plan of care discussed with patient in detail - All questions answered   Return in one week.     Patient Instructions     Return 1 week  Limit movement of ankle  Wear  ankle brace  Avoid wound rubbing on foot wear.   Consider getting a knee roller.     Wound Cleaning and Dressings:    Shower with protection - use Shower boot   DRESSINGS:   All wounds: collagen  / HF transfer on all open areas.   Zinc barrier cream kailyn-wound  Change  dressing weekly     Compression Therapy : UNNA 20-30 mm hg  + added spandagrip    Compression Therapy Instructions:  1.  Put on first thing in the morning and may remove at bedside.  Okay to wear overnight      if comfortable.  Do not let stockings roll up/down and kink.  Hand wash stockings and      hang dry as needed.    2.  Avoid prolonged standing in one place.  It is better to have your calf muscles moving       to pump fluid out of the legs.    3.  Elevate leg(s) above the level of the heart when sitting or as much as possible.    4.  Take your diuretics as directed by your provider.  Do not skip doses or change doses      unless instructed to do so by your provider.    5. Do not get leg(s) with compression wrap wet. If wraps are too tight as indicated        By pain, numbness/tingling or discoloration of toes remove wrap completely       and call the   wound center.     Off-Loading:  Offload wounded areas.     Miscellaneous Instructions:  Supplement with a daily multivitamin   Low salt diet  Increase protein intake / consider protein supplements - see below  Elevate extremities at all times when sitting / laying down.    DIETARY MODIFICATIONS TO HELP WITH WOUND HEALING:    Protein: Meats, beans, eggs, milk and yogurt particularly Greek yogurt), tofu, soy nuts, soy protein products    Vitamin C: Citrus fruits and juices, strawberries, tomatoes, tomato juice, peppers, baked potatoes, spinach, broccoli, cauliflower, [place] sprouts, cabbage    Vitamin A: Dark green, leafy vegetables, orange or yellow vegetables, cantaloupe, fortified dairy products, liver, fortified cereals    Zinc: Fortified cereals, red meats, seafood    Consider Jason by [employer] (These are essential branch chain amino acids that help with tissue building and wound healing) and take 2 packets/day. you can order online at [employer] or [employer]    ADDITIONAL REMINDERS:    The treatment plan has been discussed at length with you and your  provider. Follow all instructions carefully, it is very important. If you do not follow all instructions, you are at  risk of your wound not healing, infection, possible loss of limb and even end of life.  Please call the clinic during regular business hours ( 7:30 AM - 5:30 PM) if you notice increased bleeding, redness, warmth, pain or pus like drainage or start running a fever greater than 100.3.    For after hour emergencies, please call your primary physician or go to the nearest emergency room.      Patient/Caregiver Education: There are no barriers to learning. Medical education for above diagnosis given.   Answered all questions.    Outcome: Patient verbalizes understanding. Patient is notified to call with any questions, complications, allergies, or worsening or changing symptoms.  Patient is to call with any side effects or complications as a result of the treatments today.      DOCUMENTATION OF TIME SPENT: Code selection for this visit was based on time spent : 30 min on date of service in preparing to see the patient, obtaining and/or reviewing separately obtained history, performing a medically appropriate examination, counseling and educating the patient/family/caregiver, ordering medications or testing, referring and communicating with other healthcare providers, documenting clinical information in the E HR, independently interpreting results and communicating results to the patient/family/caregiver and care coordination with the patient's other providers.    Followup: Return in about 1 week (around 5/5/2025) for Wound followup.      Note to Patient:  The 21st Century Cures Act makes medical notes like these available to patients in the interest of transparency. However, be advised this is a medical document and is intended as [id number] communication; it is written in medical language and may appear blunt, direct, or contain abbreviations or verbiage that are unfamiliar. Medical documents are  intended to carry relevant information, facts as evident, and the clinical opinion of the practitioner.    Also, please note that this report has been produced using speech recognition software and may contain errors related to that system including, but not limited to, errors in grammar, punctuation, and spelling, as well as words and phrases that possibly may have been recognized inappropriately.  If there are any questions or concerns, contact the dictating provider for clarification.      Deysi Zhou MD  4/28/2025  9:06 AM                      [1]   Allergies  Allergen Reactions    Penicillins ANAPHYLAXIS and HYPOTENSION     Cardiac arrest      Pregabalin SWELLING    Raspberry NAUSEA AND VOMITING and SWELLING     Throat closes    Trimethobenzamide ANAPHYLAXIS     Cardiac arrest    Sulfa Antibiotics ITCHING    Pcn [Bicillin L-A] ANAPHYLAXIS     .    Tigan [Trimethobenzamide Hcl] ANAPHYLAXIS     Pass out and cardiac arrest.    [2]   Current Outpatient Medications   Medication Sig Dispense Refill    SILVER SULFADIAZINE 1 % External Cream APPLY TOPICALLY TO THE AFFECTED AREA DAILY (Patient not taking: Reported on 1/15/2025) 25 g 0    lidocaine (LIDODERM) 5 % External Patch Place 1 patch onto the skin daily. 30 patch 2    baclofen 10 MG Oral Tab Take 1 tablet (10 mg total) by mouth 3 (three) times daily. 90 tablet 2    Wound Dressings (MEDIHONEY WOUND/BURN DRESSING) External Gel Apply 1 Application topically daily as needed. (Patient not taking: Reported on 1/15/2025) 44 mL 1    RECTASMOOTHE 5 % External Cream APPLY A SMALL AMOUNT TO THE AFFECTED AREA UP TWICE DAILY (Patient not taking: Reported on 1/15/2025) 30 g 0    warfarin 2 MG Oral Tab Take 1 tablet (2 mg total) by mouth nightly. 90 tablet 3    Wound Dressings (ADAPTIC NON-ADHERING DRESSING) External Pads Apply 1 each topically daily as needed. (Patient not taking: Reported on 1/15/2025) 30 each 1    Omeprazole 40 MG Oral Capsule Delayed Release Take 1  capsule (40 mg total) by mouth daily. (Patient not taking: Reported on 1/15/2025) 90 capsule 3    enoxaparin 60 MG/0.6ML Subcutaneous Solution INJECT 60MG SUB Q INTO ABDOMINAL AREA TWICE DAILY under direction of coumadin clinic 20 each 1    gabapentin 600 MG Oral Tab       tolterodine tartrate 4 MG Oral Capsule SR 24 Hr Take 1 capsule (4 mg total) by mouth daily. 90 capsule 3    simvastatin 5 MG Oral Tab Take 1 tablet (5 mg total) by mouth nightly. 90 tablet 3    RESTASIS MULTIDOSE 0.05 % Ophthalmic Emulsion       methylPREDNISolone 4 MG Oral Tab       Cholecalciferol 25 MCG (1000 UT) Oral Tab Every Day      Mycophenolate Mofetil 500 MG Oral Tab Take 2 tablets (1,000 mg total) by mouth 2 (two) times daily.      KEVZARA 200 MG/1.14ML Subcutaneous Solution Auto-injector  (Patient not taking: Reported on 1/15/2025)      potassium chloride 20 MEQ Oral Tab CR Take 1 tablet (20 mEq total) by mouth 2 (two) times daily. 180 tablet 3    spironolactone 25 MG Oral Tab Take 1 tablet (25 mg total) by mouth daily. 90 tablet 3    Nystatin 644701 UNIT/GM External Powder Apply 1 Application topically 2 (two) times daily as needed. 60 g 2    MONTELUKAST SODIUM 10 MG Oral Tab TAKE 1 TABLET(10 MG) BY MOUTH EVERY DAY 90 tablet 3    metroNIDAZOLE 1 % External Gel Apply to face nightly (Patient not taking: Reported on 1/15/2025) 60 g 1    PULMICORT FLEXHALER 180 MCG/ACT Inhalation Aerosol Powder, Breath Activated INHALE 2 PUFFS INTO THE LUNGS TWICE DAILY 1 each 11    GABAPENTIN 400 MG Oral Cap TAKE 3 CAPSULES BY MOUTH THREE TIMES DAILY 270 capsule 5    Pilocarpine HCl 5 MG Oral Tab Take 1 tablet (5 mg total) by mouth 3 (three) times daily.      Albuterol Sulfate  (90 Base) MCG/ACT Inhalation Aero Soln Inhale 2 puffs into the lungs every 6 (six) hours as needed for Wheezing or Shortness of Breath. 18 g 3    Probiotic Product (PROBIOTIC DAILY OR) Take by mouth.      Diphenoxylate-Atropine (LOMOTIL OR) Take by mouth.       hydrocortisone (ANUSOL-HC) 2.5 % Rectal Cream Place 1 Application rectally 2 (two) times daily as needed for Hemorrhoids. 60 g 1    Zolpidem Tartrate (AMBIEN) 10 MG Oral Tab Take 1 tablet (10 mg total) by mouth nightly as needed. (Patient not taking: Reported on 1/15/2025) 30 tablet 1    Ammonium Lactate (LAC-HYDRIN) 12 % External Cream Apply qd prn to afected area 140 g 3    leflunomide (ARAVA) 20 MG Oral Tab Take 1 tablet (20 mg total) by mouth daily.      Hydroxychloroquine Sulfate 200 MG Oral Tab Take  by mouth 2 (two) times daily.

## 2025-05-05 ENCOUNTER — OFFICE VISIT (OUTPATIENT)
Dept: WOUND CARE | Facility: HOSPITAL | Age: 70
End: 2025-05-05
Attending: INTERNAL MEDICINE
Payer: MEDICARE

## 2025-05-05 VITALS
TEMPERATURE: 98 F | SYSTOLIC BLOOD PRESSURE: 140 MMHG | DIASTOLIC BLOOD PRESSURE: 67 MMHG | HEART RATE: 63 BPM | RESPIRATION RATE: 15 BRPM

## 2025-05-05 DIAGNOSIS — L97.922 NON-PRESSURE CHRONIC ULCER OF LEFT LOWER LEG WITH FAT LAYER EXPOSED (HCC): Primary | ICD-10-CM

## 2025-05-05 DIAGNOSIS — G62.9 SMALL FIBER NEUROPATHY: ICD-10-CM

## 2025-05-05 DIAGNOSIS — M05.79 RHEUMATOID ARTHRITIS INVOLVING MULTIPLE SITES WITH POSITIVE RHEUMATOID FACTOR (HCC): ICD-10-CM

## 2025-05-05 DIAGNOSIS — D68.59 PRIMARY HYPERCOAGULABLE STATE (HCC): ICD-10-CM

## 2025-05-05 DIAGNOSIS — I73.9 PERIPHERAL VASCULAR DISEASE, UNSPECIFIED: ICD-10-CM

## 2025-05-05 DIAGNOSIS — L97.512 RIGHT FOOT ULCER, WITH FAT LAYER EXPOSED (HCC): ICD-10-CM

## 2025-05-05 DIAGNOSIS — T14.8XXD DELAYED WOUND HEALING: ICD-10-CM

## 2025-05-05 PROCEDURE — 29581 APPL MULTLAYER CMPRN SYS LEG: CPT

## 2025-05-05 PROCEDURE — 99214 OFFICE O/P EST MOD 30 MIN: CPT

## 2025-05-05 PROCEDURE — 87070 CULTURE OTHR SPECIMN AEROBIC: CPT | Performed by: INTERNAL MEDICINE

## 2025-05-05 NOTE — PROGRESS NOTES
.Weekly Wound Education Note    Teaching Provided To: Patient  Training Topics: Dressing, Edema control, Discharge instructions, Compression  Training Method: Explain/Verbal, Written  Training Response: Patient responds and understands            Anterior leg appears to remain fragilely healed.  Betamethasone to leg. Cut out thick foam applied to posterior ankle wound.  Wound treated with endoform, folded xeroform.  Calamine unna boot 20-30mmHg with patients velcro & lace up ankle support.  Posterior wound cultured.

## 2025-05-05 NOTE — PROGRESS NOTES
[facility] PROGRESS NOTE  ALEXIS LUZ MD  5/5/2025    Chief Complaint:   Chief Complaint   Patient presents with    Wound Care     Patient arrives for a wound care follow up appointment. Patient reports no pain. Patient arrives in an unna wrap to the left leg. Wrap stayed up well.        HPI:   Subjective   Vero Rodriguez is a 69 year old female coming in for a follow-up visit.    HPI    Wound on anterior ankle stays closed.   Post. Ankle wound improved - with less nonviable tissue - slough present - debrided today.     Some redness periwound - which seems like it maybe from the ankle brace rubbing on the skin.   Will pad extra today.   Culture done to look for bacterial colonization / early infection.     Tolerating wraps OK.     Review of Systems  Negative except HPI   Denies chest pain / SOB / palpitations  Denies fever.     Allergies  Allergies[1]    Current Meds:  Current Medications[2]      EXAM:   Objective   Objective    Physical Exam    Vital Signs  Vitals:    05/05/25 0700   BP: 140/67   Pulse: 63   Resp: 15   Temp: 97.5 °F (36.4 °C)       Wound Assessment  Wound 01/15/25 #1 Left medial lower leg Leg Left;Medial;Lower (Active)   Date First Assessed/Time First Assessed: 01/15/25 1420    Wound Number (Wound Clinic Only): #1 Left medial lower leg  Primary Wound Type: Traumatic  Location: Leg  Wound Location Orientation: Left;Medial;Lower      Assessments 1/15/2025  2:24 PM 5/5/2025  9:33 AM   Wound Image        Drainage Amount Small None   Drainage Description Serosanguineous --   Treatments Compression (spandagrip E x2) Compression (calamine unna boot 20-30mmHg, pt velcro & lace up compression support, medipore tape)   Wound Length (cm) 3 cm (slight angle) 0 cm   Wound Width (cm) 3.8 cm 0 cm   Wound Surface Area (cm^2) 11.4 cm^2 0 cm^2   Wound Depth (cm) 0.1 cm 0 cm   Wound Volume (cm^3) 1.14 cm^3 0 cm^3   Wound Healing % -- 100   Margins Well-defined edges Well-defined edges   Non-staged  Wound Description Full thickness Full thickness   [place]-wound Assessment Edema;Ecchymosis Edema;Hemosiderin staining   Wound Granulation Tissue Firm;Pink --   Wound Bed Granulation (%) 5 % --   Wound Bed Epithelium (%) -- 100 %   Wound Bed Slough (%) 95 % --   Wound Odor None None   Tunneling? No No   Undermining? No No   Sinus Tracts? No No       Inactive Orders   Date Order Priority Status Authorizing Provider   02/10/25 1336 Debridement Traumatic Left;Medial;Lower Leg Routine Completed Deysi Pulliam MD   01/27/25 1133 Debridement Traumatic Left;Medial;Lower Leg Routine Completed Deysi Pulliam MD   01/15/25 1455 Debridement Traumatic Left;Medial;Lower Leg Routine Completed Deysi Pulliam MD       Wound 01/27/25 #5 Left posterior leg Leg Lower;Posterior;Left (Active)   Date First Assessed/Time First Assessed: 01/27/25 1033    Wound Number (Wound Clinic Only): #5 Left posterior leg  Primary Wound Type: Venous Ulcer  Location: Leg  Wound Location Orientation: Lower;Posterior;Left      Assessments 1/27/2025 10:36 AM 5/5/2025  9:35 AM   Wound Image       Drainage Amount None Small   Drainage Description -- Serosanguineous   Treatments Compression (calamine unna boot 20-30mmHg, medipore tape) Compression (calamine unna boot 20-30mmHg, pt velcro & lace up compression support, medipore tape)   Wound Length (cm) 1.8 cm 0.4 cm   Wound Width (cm) 0.4 cm 0.4 cm   Wound Surface Area (cm^2) 0.72 cm^2 0.13 cm^2   Wound Depth (cm) 0.1 cm 0.1 cm   Wound Volume (cm^3) 0.072 cm^3 0.008 cm^3   Wound Healing % -- 89   Margins Well-defined edges Well-defined edges   Non-staged Wound Description Full thickness Full thickness   [place]-wound Assessment Dry;Edema Clean;Pink   Wound Granulation Tissue Pink;Pale Grey;Firm Pink;Firm   Wound Bed Granulation (%) 90 % 10 %   Wound Bed Slough (%) -- 90 %   Wound Odor None None   Shape 10% scab --   Tunneling? -- No   Undermining? -- No   Sinus Tracts? -- No       Inactive Orders    Date Order Priority Status Authorizing Provider   04/21/25 0937 Cellular tissue product application Venous Ulcer Lower;Posterior;Left Leg Routine Completed Deysi Pulliam MD   03/17/25 1437 Debridement Venous Ulcer Lower;Posterior;Left Leg Routine Completed Deysi Pulliam MD   02/24/25 1155 Debridement Venous Ulcer Lower;Posterior;Left Leg Routine Completed Deysi Pulliam MD       Compression Wrap 02/17/25 Leg Left (Active)   Placement Date/Time: 02/17/25 1601   Location: Leg  Wound Location Orientation: Left      Assessments 2/17/2025  4:01 PM 5/5/2025 10:17 AM   Response to Treatment Well tolerated Well tolerated   Compression Layers Multilayer Multilayer   Compression Product Type Unna Boot Unna Boot (pt velcro & lace up ankle support)   Dressing Applied Yes (Myrna Ag, silver alginate) Yes (endoform, xeroform, thick foam, gauze)   Compression Wrap Location Toes to Knee Toes to Knee   Compression Wrap Status Clean;Dry Clean;Dry       No associated orders.        ASSESSMENT AND PLAN:     Assessment     Encounter Diagnosis  1. 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 Right foot ulcer, with fat layer exposed (HCC)    3. Peripheral vascular disease, unspecified    4. Small fiber neuropathy    5. Rheumatoid arthritis involving multiple sites with positive rheumatoid factor (HCC)    6. Primary hypercoagulable state (HCC)    7. Delayed wound healing        PROCEDURES:      Non-selective debridement using curette  Removed nonviable tissue with some bleeding response.     PLAN OF CARE:    Serial debridements to hasten healing.   Betamethasone to leg.  Cut out thick foam applied to posterior ankle wound.    Wound treated with endoform, folded xeroform.    Calamine unna boot 20-30mmHg with patients velcro & lace up ankle support.    Posterior wound cultured.   Watch out for signs of early infection - counseled.   Plan of care discussed with patient in detail - All  questions answered   Return in one week.     Orders  Orders Placed This Encounter   Procedures    Aerobic Bacterial Culture     Patient Instructions     Return 1 week  Limit movement of ankle  Wear  ankle brace  Avoid wound rubbing on foot wear.   Consider getting a knee roller.     Wound Cleaning and Dressings:    Shower with protection - use Shower boot   DRESSINGS:   All wounds: collagen  /xeroform on posterior ankle ulcer  Zinc barrier cream kailyn-wound  Change dressing weekly     Compression Therapy : UNNA 20-30 mm hg  + added spandagrip    Compression Therapy Instructions:  1.  Put on first thing in the morning and may remove at bedside.  Okay to wear overnight      if comfortable.  Do not let stockings roll up/down and kink.  Hand wash stockings and      hang dry as needed.    2.  Avoid prolonged standing in one place.  It is better to have your calf muscles moving       to pump fluid out of the legs.    3.  Elevate leg(s) above the level of the heart when sitting or as much as possible.    4.  Take your diuretics as directed by your provider.  Do not skip doses or change doses      unless instructed to do so by your provider.    5. Do not get leg(s) with compression wrap wet. If wraps are too tight as indicated        By pain, numbness/tingling or discoloration of toes remove wrap completely       and call the   wound center.     Off-Loading:  Offload wounded areas.     Miscellaneous Instructions:  Supplement with a daily multivitamin   Low salt diet  Increase protein intake / consider protein supplements - see below  Elevate extremities at all times when sitting / laying down.    DIETARY MODIFICATIONS TO HELP WITH WOUND HEALING:    Protein: Meats, beans, eggs, milk and yogurt particularly Greek yogurt), tofu, soy nuts, soy protein products    Vitamin C: Citrus fruits and juices, strawberries, tomatoes, tomato juice, peppers, baked potatoes, spinach, broccoli, cauliflower, [place] sprouts, cabbage    Vitamin  A: Dark green, leafy vegetables, orange or yellow vegetables, cantaloupe, fortified dairy products, liver, fortified cereals    Zinc: Fortified cereals, red meats, seafood    Consider Jason by [employer] (These are essential branch chain amino acids that help with tissue building and wound healing) and take 2 packets/day. you can order online at [employer] or [employer]    ADDITIONAL REMINDERS:    The treatment plan has been discussed at length with you and your provider. Follow all instructions carefully, it is very important. If you do not follow all instructions, you are at  risk of your wound not healing, infection, possible loss of limb and even end of life.  Please call the clinic during regular business hours ( 7:30 AM - 5:30 PM) if you notice increased bleeding, redness, warmth, pain or pus like drainage or start running a fever greater than 100.3.    For after hour emergencies, please call your primary physician or go to the nearest emergency room.      Patient/Caregiver Education: There are no barriers to learning. Medical education for above diagnosis given.   Answered all questions.    Outcome: Patient verbalizes understanding. Patient is notified to call with any questions, complications, allergies, or worsening or changing symptoms.  Patient is to call with any side effects or complications as a result of the treatments today.      DOCUMENTATION OF TIME SPENT: Code selection for this visit was based on time spent : 30 min on date of service in preparing to see the patient, obtaining and/or reviewing separately obtained history, performing a medically appropriate examination, counseling and educating the patient/family/caregiver, ordering medications or testing, referring and communicating with other healthcare providers, documenting clinical information in the E HR, independently interpreting results and communicating results to the patient/family/caregiver and care coordination with the patient's other  providers.    Followup: Return in about 1 week (around 5/12/2025) for Wound followup.      Note to Patient:  The 21st Century Cures Act makes medical notes like these available to patients in the interest of transparency. However, be advised this is a medical document and is intended as [id number] communication; it is written in medical language and may appear blunt, direct, or contain abbreviations or verbiage that are unfamiliar. Medical documents are intended to carry relevant information, facts as evident, and the clinical opinion of the practitioner.    Also, please note that this report has been produced using speech recognition software and may contain errors related to that system including, but not limited to, errors in grammar, punctuation, and spelling, as well as words and phrases that possibly may have been recognized inappropriately.  If there are any questions or concerns, contact the dictating provider for clarification.      Deysi Zhou MD  5/5/2025  9:55 AM                      [1]   Allergies  Allergen Reactions    Penicillins ANAPHYLAXIS and HYPOTENSION     Cardiac arrest      Pregabalin SWELLING    Raspberry NAUSEA AND VOMITING and SWELLING     Throat closes    Trimethobenzamide ANAPHYLAXIS     Cardiac arrest    Sulfa Antibiotics ITCHING    Pcn [Bicillin L-A] ANAPHYLAXIS     .    Tigan [Trimethobenzamide Hcl] ANAPHYLAXIS     Pass out and cardiac arrest.    [2]   Current Outpatient Medications   Medication Sig Dispense Refill    SILVER SULFADIAZINE 1 % External Cream APPLY TOPICALLY TO THE AFFECTED AREA DAILY (Patient not taking: Reported on 1/15/2025) 25 g 0    lidocaine (LIDODERM) 5 % External Patch Place 1 patch onto the skin daily. 30 patch 2    baclofen 10 MG Oral Tab Take 1 tablet (10 mg total) by mouth 3 (three) times daily. 90 tablet 2    Wound Dressings (MEDIHONEY WOUND/BURN DRESSING) External Gel Apply 1 Application topically daily as needed. (Patient not taking: Reported on  1/15/2025) 44 mL 1    RECTASMOOTHE 5 % External Cream APPLY A SMALL AMOUNT TO THE AFFECTED AREA UP TWICE DAILY (Patient not taking: Reported on 1/15/2025) 30 g 0    warfarin 2 MG Oral Tab Take 1 tablet (2 mg total) by mouth nightly. 90 tablet 3    Wound Dressings (ADAPTIC NON-ADHERING DRESSING) External Pads Apply 1 each topically daily as needed. (Patient not taking: Reported on 1/15/2025) 30 each 1    Omeprazole 40 MG Oral Capsule Delayed Release Take 1 capsule (40 mg total) by mouth daily. (Patient not taking: Reported on 1/15/2025) 90 capsule 3    enoxaparin 60 MG/0.6ML Subcutaneous Solution INJECT 60MG SUB Q INTO ABDOMINAL AREA TWICE DAILY under direction of coumadin clinic 20 each 1    gabapentin 600 MG Oral Tab       tolterodine tartrate 4 MG Oral Capsule SR 24 Hr Take 1 capsule (4 mg total) by mouth daily. 90 capsule 3    simvastatin 5 MG Oral Tab Take 1 tablet (5 mg total) by mouth nightly. 90 tablet 3    RESTASIS MULTIDOSE 0.05 % Ophthalmic Emulsion       methylPREDNISolone 4 MG Oral Tab       Cholecalciferol 25 MCG (1000 UT) Oral Tab Every Day      Mycophenolate Mofetil 500 MG Oral Tab Take 2 tablets (1,000 mg total) by mouth 2 (two) times daily.      KEVZARA 200 MG/1.14ML Subcutaneous Solution Auto-injector  (Patient not taking: Reported on 1/15/2025)      potassium chloride 20 MEQ Oral Tab CR Take 1 tablet (20 mEq total) by mouth 2 (two) times daily. 180 tablet 3    spironolactone 25 MG Oral Tab Take 1 tablet (25 mg total) by mouth daily. 90 tablet 3    Nystatin 600847 UNIT/GM External Powder Apply 1 Application topically 2 (two) times daily as needed. 60 g 2    MONTELUKAST SODIUM 10 MG Oral Tab TAKE 1 TABLET(10 MG) BY MOUTH EVERY DAY 90 tablet 3    metroNIDAZOLE 1 % External Gel Apply to face nightly (Patient not taking: Reported on 1/15/2025) 60 g 1    PULMICORT FLEXHALER 180 MCG/ACT Inhalation Aerosol Powder, Breath Activated INHALE 2 PUFFS INTO THE LUNGS TWICE DAILY 1 each 11    GABAPENTIN 400 MG  Oral Cap TAKE 3 CAPSULES BY MOUTH THREE TIMES DAILY 270 capsule 5    Pilocarpine HCl 5 MG Oral Tab Take 1 tablet (5 mg total) by mouth 3 (three) times daily.      Albuterol Sulfate  (90 Base) MCG/ACT Inhalation Aero Soln Inhale 2 puffs into the lungs every 6 (six) hours as needed for Wheezing or Shortness of Breath. 18 g 3    Probiotic Product (PROBIOTIC DAILY OR) Take by mouth.      Diphenoxylate-Atropine (LOMOTIL OR) Take by mouth.      hydrocortisone (ANUSOL-HC) 2.5 % Rectal Cream Place 1 Application rectally 2 (two) times daily as needed for Hemorrhoids. 60 g 1    Zolpidem Tartrate (AMBIEN) 10 MG Oral Tab Take 1 tablet (10 mg total) by mouth nightly as needed. (Patient not taking: Reported on 1/15/2025) 30 tablet 1    Ammonium Lactate (LAC-HYDRIN) 12 % External Cream Apply qd prn to afected area 140 g 3    leflunomide (ARAVA) 20 MG Oral Tab Take 1 tablet (20 mg total) by mouth daily.      Hydroxychloroquine Sulfate 200 MG Oral Tab Take  by mouth 2 (two) times daily.

## 2025-05-05 NOTE — PATIENT INSTRUCTIONS
Return 1 week  Limit movement of ankle  Wear  ankle brace  Avoid wound rubbing on foot wear.   Consider getting a knee roller.     Wound Cleaning and Dressings:    Shower with protection - use Shower boot   DRESSINGS:   All wounds: collagen  /xeroform on posterior ankle ulcer  Zinc barrier cream kailyn-wound  Change dressing weekly     Compression Therapy : UNNA 20-30 mm hg  + added spandagrip    Compression Therapy Instructions:  1.  Put on first thing in the morning and may remove at bedside.  Okay to wear overnight      if comfortable.  Do not let stockings roll up/down and kink.  Hand wash stockings and      hang dry as needed.    2.  Avoid prolonged standing in one place.  It is better to have your calf muscles moving       to pump fluid out of the legs.    3.  Elevate leg(s) above the level of the heart when sitting or as much as possible.    4.  Take your diuretics as directed by your provider.  Do not skip doses or change doses      unless instructed to do so by your provider.    5. Do not get leg(s) with compression wrap wet. If wraps are too tight as indicated        By pain, numbness/tingling or discoloration of toes remove wrap completely       and call the   wound center.     Off-Loading:  Offload wounded areas.     Miscellaneous Instructions:  Supplement with a daily multivitamin   Low salt diet  Increase protein intake / consider protein supplements - see below  Elevate extremities at all times when sitting / laying down.    DIETARY MODIFICATIONS TO HELP WITH WOUND HEALING:    Protein: Meats, beans, eggs, milk and yogurt particularly Greek yogurt), tofu, soy nuts, soy protein products    Vitamin C: Citrus fruits and juices, strawberries, tomatoes, tomato juice, peppers, baked potatoes, spinach, broccoli, cauliflower, [place] sprouts, cabbage    Vitamin A: Dark green, leafy vegetables, orange or yellow vegetables, cantaloupe, fortified dairy products, liver, fortified cereals    Zinc: Fortified  cereals, red meats, seafood    Consider Jason by [employer] (These are essential branch chain amino acids that help with tissue building and wound healing) and take 2 packets/day. you can order online at [employer] or [employer]    ADDITIONAL REMINDERS:    The treatment plan has been discussed at length with you and your provider. Follow all instructions carefully, it is very important. If you do not follow all instructions, you are at  risk of your wound not healing, infection, possible loss of limb and even end of life.  Please call the clinic during regular business hours ( 7:30 AM - 5:30 PM) if you notice increased bleeding, redness, warmth, pain or pus like drainage or start running a fever greater than 100.3.    For after hour emergencies, please call your primary physician or go to the nearest emergency room.

## 2025-05-08 ENCOUNTER — TELEPHONE (OUTPATIENT)
Dept: WOUND CARE | Facility: HOSPITAL | Age: 70
End: 2025-05-08

## 2025-05-08 NOTE — TELEPHONE ENCOUNTER
Spoke with patient regarding treatment plan. Informed patient that [facility] will have Gentamicin ready after 2pm today. Instructed patient to cut off her compression wrap that was placed in the clinic and begin applying Gentamicin BID with cover dressing over wound. Informed patient that she can wear own compression garments if she has one or Tubigrip, and that she should see the wound clinic in 2 weeks. Patient stated understanding.

## 2025-05-12 ENCOUNTER — APPOINTMENT (OUTPATIENT)
Dept: WOUND CARE | Facility: HOSPITAL | Age: 70
End: 2025-05-12
Attending: INTERNAL MEDICINE
Payer: MEDICARE

## 2025-05-19 ENCOUNTER — OFFICE VISIT (OUTPATIENT)
Dept: WOUND CARE | Facility: HOSPITAL | Age: 70
End: 2025-05-19
Attending: INTERNAL MEDICINE
Payer: MEDICARE

## 2025-05-19 VITALS
SYSTOLIC BLOOD PRESSURE: 131 MMHG | TEMPERATURE: 98 F | RESPIRATION RATE: 14 BRPM | HEART RATE: 60 BPM | DIASTOLIC BLOOD PRESSURE: 65 MMHG

## 2025-05-19 DIAGNOSIS — I73.9 PERIPHERAL VASCULAR DISEASE, UNSPECIFIED: ICD-10-CM

## 2025-05-19 DIAGNOSIS — T14.8XXA INFECTED WOUND: ICD-10-CM

## 2025-05-19 DIAGNOSIS — T14.8XXD DELAYED WOUND HEALING: ICD-10-CM

## 2025-05-19 DIAGNOSIS — L97.512 RIGHT FOOT ULCER, WITH FAT LAYER EXPOSED (HCC): ICD-10-CM

## 2025-05-19 DIAGNOSIS — G62.9 SMALL FIBER NEUROPATHY: ICD-10-CM

## 2025-05-19 DIAGNOSIS — A49.8 ENTEROCOCCUS FAECALIS INFECTION: ICD-10-CM

## 2025-05-19 DIAGNOSIS — M05.79 RHEUMATOID ARTHRITIS INVOLVING MULTIPLE SITES WITH POSITIVE RHEUMATOID FACTOR (HCC): ICD-10-CM

## 2025-05-19 DIAGNOSIS — Z79.01 LONG TERM (CURRENT) USE OF ANTICOAGULANTS: ICD-10-CM

## 2025-05-19 DIAGNOSIS — R23.8 SLOUGHING OF WOUND: ICD-10-CM

## 2025-05-19 DIAGNOSIS — L97.922 NON-PRESSURE CHRONIC ULCER OF LEFT LOWER LEG WITH FAT LAYER EXPOSED (HCC): Primary | ICD-10-CM

## 2025-05-19 DIAGNOSIS — L08.9 INFECTED WOUND: ICD-10-CM

## 2025-05-19 PROCEDURE — 29581 APPL MULTLAYER CMPRN SYS LEG: CPT

## 2025-05-19 NOTE — PATIENT INSTRUCTIONS
Return 1 week  Limit movement of ankle  Wear  ankle brace  Avoid wound rubbing on foot wear.   Consider getting a knee roller.     Wound Cleaning and Dressings:    Shower with protection - use Shower boot   DRESSINGS:   All wounds: collagen  /xeroform on posterior ankle ulcer  Zinc barrier cream kailyn-wound  Change dressing weekly     Compression Therapy : UNNA 20-30 mm hg  + added spandagrip    Compression Therapy Instructions:  1.  Put on first thing in the morning and may remove at bedside.  Okay to wear overnight      if comfortable.  Do not let stockings roll up/down and kink.  Hand wash stockings and      hang dry as needed.    2.  Avoid prolonged standing in one place.  It is better to have your calf muscles moving       to pump fluid out of the legs.    3.  Elevate leg(s) above the level of the heart when sitting or as much as possible.    4.  Take your diuretics as directed by your provider.  Do not skip doses or change doses      unless instructed to do so by your provider.    5. Do not get leg(s) with compression wrap wet. If wraps are too tight as indicated        By pain, numbness/tingling or discoloration of toes remove wrap completely       and call the   wound center.     Off-Loading:  Offload wounded areas.     Miscellaneous Instructions:  Supplement with a daily multivitamin   Low salt diet  Increase protein intake / consider protein supplements - see below  Elevate extremities at all times when sitting / laying down.    DIETARY MODIFICATIONS TO HELP WITH WOUND HEALING:    Protein: Meats, beans, eggs, milk and yogurt particularly Greek yogurt), tofu, soy nuts, soy protein products    Vitamin C: Citrus fruits and juices, strawberries, tomatoes, tomato juice, peppers, baked potatoes, spinach, broccoli, cauliflower, [place] sprouts, cabbage    Vitamin A: Dark green, leafy vegetables, orange or yellow vegetables, cantaloupe, fortified dairy products, liver, fortified cereals    Zinc: Fortified  cereals, red meats, seafood    Consider Jason by [employer]) (These are essential branch chain amino acids that help with tissue building and wound healing) and take 2 packets/day. you can order online at [employer] or [employer]    ADDITIONAL REMINDERS:    The treatment plan has been discussed at length with you and your provider. Follow all instructions carefully, it is very important. If you do not follow all instructions, you are at  risk of your wound not healing, infection, possible loss of limb and even end of life.  Please call the clinic during regular business hours ( 7:30 AM - 5:30 PM) if you notice increased bleeding, redness, warmth, pain or pus like drainage or start running a fever greater than 100.3.    For after hour emergencies, please call your primary physician or go to the nearest emergency room.

## 2025-05-19 NOTE — PROGRESS NOTES
.Weekly Wound Education Note    Teaching Provided To: Patient  Training Topics: Dressing, Edema control, Discharge instructions, Compression  Training Method: Explain/Verbal, Written  Training Response: Patient responds and understands, Reinforcement needed            Honey gel, honey gauze to wounds.  Calamine unna boot 20-30mmHg.

## 2025-05-19 NOTE — PROGRESS NOTES
[facility] PROGRESS NOTE  ALEXIS LUZ MD  5/19/2025    Chief Complaint:   Chief Complaint   Patient presents with    Wound Care     Follow-up for wounds to left leg. Arrives with a Band-Aid to posterior leg wound. Pain 2/10 to leg.       HPI:   Subjective   Vero Rodriguez is a 69 year old female coming in for a follow-up visit.    HPI    Anterior ankle ulcer - closed    Posterior ankle ulcer smaller.     Wound culture positive - enterococcus fecalis- started topical gentamicin.   Completed 2 weeks.   No s/o infection now.     Edema of LLE increased.     Review of Systems  Negative except HPI   Denies chest pain / SOB / palpitations  Denies fever.     Allergies  Allergies[1]    Current Meds:  Current Medications[2]      EXAM:     Objective   Objective    Physical Exam    Vital Signs  Vitals:    05/19/25 1312   BP: 131/65   Pulse: 60   Resp: 14   Temp: 98.3 °F (36.8 °C)       Wound Assessment  Wound 01/15/25 #1 Left medial lower leg Leg Left;Medial;Lower (Active)   Date First Assessed/Time First Assessed: 01/15/25 1420    Wound Number (Wound Clinic Only): #1 Left medial lower leg  Primary Wound Type: Traumatic  Location: Leg  Wound Location Orientation: Left;Medial;Lower      Assessments 1/15/2025  2:24 PM 5/19/2025  1:05 PM   Wound Image        Drainage Amount Small None   Drainage Description Serosanguineous --   Treatments Compression (spandagrip E x2) Compression (calamine unna boot 20-30mmHg, medipore tape)   Wound Length (cm) 3 cm (slight angle) 0.6 cm   Wound Width (cm) 3.8 cm 0.4 cm   Wound Surface Area (cm^2) 11.4 cm^2 0.19 cm^2   Wound Depth (cm) 0.1 cm 0 cm   Wound Volume (cm^3) 1.14 cm^3 0 cm^3   Wound Healing % -- 100   Margins Well-defined edges --   Non-staged Wound Description Full thickness Full thickness   [place]-wound Assessment Edema;Ecchymosis Dry   Wound Granulation Tissue Firm;Pink --   Wound Bed Granulation (%) 5 % --   Wound Bed Slough (%) 95 % --   Wound Odor None None   Shape --  100% scabbed   Tunneling? No --   Undermining? No --   Sinus Tracts? No --       Inactive Orders   Date Order Priority Status Authorizing Provider   02/10/25 1336 Debridement Traumatic Left;Medial;Lower Leg Routine Completed Deysi Pulliam MD   01/27/25 1133 Debridement Traumatic Left;Medial;Lower Leg Routine Completed Deysi Pulliam MD   01/15/25 1455 Debridement Traumatic Left;Medial;Lower Leg Routine Completed Deysi Pulliam MD       Wound 01/27/25 #5 Left posterior leg Leg Lower;Posterior;Left (Active)   Date First Assessed/Time First Assessed: 01/27/25 1033    Wound Number (Wound Clinic Only): #5 Left posterior leg  Primary Wound Type: Venous Ulcer  Location: Leg  Wound Location Orientation: Lower;Posterior;Left      Assessments 1/27/2025 10:36 AM 5/19/2025  1:06 PM   Wound Image       Drainage Amount None None   Treatments Compression (calamine unna boot 20-30mmHg, medipore tape) Compression (calamine unna boot 20-30mmHg, medipore tape)   Wound Length (cm) 1.8 cm 0.5 cm   Wound Width (cm) 0.4 cm 0.3 cm   Wound Surface Area (cm^2) 0.72 cm^2 0.12 cm^2   Wound Depth (cm) 0.1 cm 0.1 cm   Wound Volume (cm^3) 0.072 cm^3 0.008 cm^3   Wound Healing % -- 89   Margins Well-defined edges Well-defined edges   Non-staged Wound Description Full thickness Full thickness   [place]-wound Assessment Dry;Edema Edema;Blanchable erythema   Wound Granulation Tissue Pink;Pale Grey;Firm --   Wound Bed Granulation (%) 90 % --   Wound Bed Slough (%) -- 100 %   Wound Odor None None   Shape 10% scab --       Inactive Orders   Date Order Priority Status Authorizing Provider   04/21/25 0937 Cellular tissue product application Venous Ulcer Lower;Posterior;Left Leg Routine Completed Deysi Pulliam MD   03/17/25 1437 Debridement Venous Ulcer Lower;Posterior;Left Leg Routine Completed Deysi Pulliam MD   02/24/25 1155 Debridement Venous Ulcer Lower;Posterior;Left Leg Routine Completed Deysi Pulliam MD        Compression Wrap 02/17/25 Leg Left (Active)   Placement Date/Time: 02/17/25 1601   Location: Leg  Wound Location Orientation: Left      Assessments 2/17/2025  4:01 PM 5/19/2025  4:03 PM   Response to Treatment Well tolerated Well tolerated   Compression Layers Multilayer Multilayer   Compression Product Type Unna Boot Unna Boot   Dressing Applied Yes (Myrna Ag, silver alginate) Yes (honey gel, honey gauze)   Compression Wrap Location Toes to Knee Toes to Knee   Compression Wrap Status Clean;Dry Dry;Clean       No associated orders.                ASSESSMENT AND PLAN:     Assessment     Encounter Diagnosis  1. 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 Infected wound    3. Enterococcus faecalis infection    4. Right foot ulcer, with fat layer exposed (HCC)    5. Peripheral vascular disease, unspecified    6. Small fiber neuropathy    7. Rheumatoid arthritis involving multiple sites with positive rheumatoid factor (HCC)    8. Delayed wound healing    9. Long term (current) use of anticoagulants    10. Sloughing of wound      PROCEDURES:    Compression wrap application.     PLAN OF CARE:    Honey gel, honey gauze / kerramax to posterior ankle  wounds  Serial debridement as needed.   Calamine unna boot 20-30mmHg.   Watch out for signs of early infection - counseled.   Plan of care discussed with patient in detail - All questions answered   Return in one week.       Patient Instructions     Return 1 week  Limit movement of ankle  Wear  ankle brace  Avoid wound rubbing on foot wear.   Consider getting a knee roller.     Wound Cleaning and Dressings:    Shower with protection - use Shower boot   DRESSINGS:   All wounds: collagen  /xeroform on posterior ankle ulcer  Zinc barrier cream kailyn-wound  Change dressing weekly     Compression Therapy : UNNA 20-30 mm hg  + added spandagrip    Compression Therapy Instructions:  1.  Put on first thing in the morning and may remove at bedside.   Okay to wear overnight      if comfortable.  Do not let stockings roll up/down and kink.  Hand wash stockings and      hang dry as needed.    2.  Avoid prolonged standing in one place.  It is better to have your calf muscles moving       to pump fluid out of the legs.    3.  Elevate leg(s) above the level of the heart when sitting or as much as possible.    4.  Take your diuretics as directed by your provider.  Do not skip doses or change doses      unless instructed to do so by your provider.    5. Do not get leg(s) with compression wrap wet. If wraps are too tight as indicated        By pain, numbness/tingling or discoloration of toes remove wrap completely       and call the   wound center.     Off-Loading:  Offload wounded areas.     Miscellaneous Instructions:  Supplement with a daily multivitamin   Low salt diet  Increase protein intake / consider protein supplements - see below  Elevate extremities at all times when sitting / laying down.    DIETARY MODIFICATIONS TO HELP WITH WOUND HEALING:    Protein: Meats, beans, eggs, milk and yogurt particularly Greek yogurt), tofu, soy nuts, soy protein products    Vitamin C: Citrus fruits and juices, strawberries, tomatoes, tomato juice, peppers, baked potatoes, spinach, broccoli, cauliflower, [place] sprouts, cabbage    Vitamin A: Dark green, leafy vegetables, orange or yellow vegetables, cantaloupe, fortified dairy products, liver, fortified cereals    Zinc: Fortified cereals, red meats, seafood    Consider Jason [employer] (These are essential branch chain amino acids that help with tissue building and wound healing) and take 2 packets/day. you can order online at [employer] or [email or web address]    ADDITIONAL REMINDERS:    The treatment plan has been discussed at length with you and your provider. Follow all instructions carefully, it is very important. If you do not follow all instructions, you are at  risk of your wound not healing, infection, possible loss of limb and even  end of life.  Please call the clinic during regular business hours ( 7:30 AM - 5:30 PM) if you notice increased bleeding, redness, warmth, pain or pus like drainage or start running a fever greater than 100.3.    For after hour emergencies, please call your primary physician or go to the nearest emergency room.    Patient/Caregiver Education: There are no barriers to learning. Medical education for above diagnosis given.   Answered all questions.    Outcome: Patient verbalizes understanding. Patient is notified to call with any questions, complications, allergies, or worsening or changing symptoms.  Patient is to call with any side effects or complications as a result of the treatments today.      DOCUMENTATION OF TIME SPENT: Code selection for this visit was based on time spent : 30 min on date of service in preparing to see the patient, obtaining and/or reviewing separately obtained history, performing a medically appropriate examination, counseling and educating the patient/family/caregiver, ordering medications or testing, referring and communicating with other healthcare providers, documenting clinical information in the E HR, independently interpreting results and communicating results to the patient/family/caregiver and care coordination with the patient's other providers.    Followup: Return in about 1 week (around 5/26/2025) for Wound followup.      Note to Patient:  The 21st Century Cures Act makes medical notes like these available to patients in the interest of transparency. However, be advised this is a medical document and is intended as [id number] communication; it is written in medical language and may appear blunt, direct, or contain abbreviations or verbiage that are unfamiliar. Medical documents are intended to carry relevant information, facts as evident, and the clinical opinion of the practitioner.    Also, please note that this report has been produced using speech recognition software and  may contain errors related to that system including, but not limited to, errors in grammar, punctuation, and spelling, as well as words and phrases that possibly may have been recognized inappropriately.  If there are any questions or concerns, contact the dictating provider for clarification.      Deysi Zhou MD  5/19/2025  4:45 PM                      [1]   Allergies  Allergen Reactions    Penicillins ANAPHYLAXIS and HYPOTENSION     Cardiac arrest      Pregabalin SWELLING    Raspberry NAUSEA AND VOMITING and SWELLING     Throat closes    Trimethobenzamide ANAPHYLAXIS     Cardiac arrest    Sulfa Antibiotics ITCHING    Pcn [Bicillin L-A] ANAPHYLAXIS     .    Tigan [Trimethobenzamide Hcl] ANAPHYLAXIS     Pass out and cardiac arrest.    [2]   Current Outpatient Medications   Medication Sig Dispense Refill    gentamicin 0.1 % External Ointment Apply 1 Application topically 3 (three) times daily. 30 g 3    SILVER SULFADIAZINE 1 % External Cream APPLY TOPICALLY TO THE AFFECTED AREA DAILY (Patient not taking: Reported on 1/15/2025) 25 g 0    lidocaine (LIDODERM) 5 % External Patch Place 1 patch onto the skin daily. 30 patch 2    baclofen 10 MG Oral Tab Take 1 tablet (10 mg total) by mouth 3 (three) times daily. 90 tablet 2    Wound Dressings (MEDIHONEY WOUND/BURN DRESSING) External Gel Apply 1 Application topically daily as needed. (Patient not taking: Reported on 1/15/2025) 44 mL 1    RECTASMOOTHE 5 % External Cream APPLY A SMALL AMOUNT TO THE AFFECTED AREA UP TWICE DAILY (Patient not taking: Reported on 1/15/2025) 30 g 0    warfarin 2 MG Oral Tab Take 1 tablet (2 mg total) by mouth nightly. 90 tablet 3    Wound Dressings (ADAPTIC NON-ADHERING DRESSING) External Pads Apply 1 each topically daily as needed. (Patient not taking: Reported on 1/15/2025) 30 each 1    Omeprazole 40 MG Oral Capsule Delayed Release Take 1 capsule (40 mg total) by mouth daily. (Patient not taking: Reported on 1/15/2025) 90 capsule 3     enoxaparin 60 MG/0.6ML Subcutaneous Solution INJECT 60MG SUB Q INTO ABDOMINAL AREA TWICE DAILY under direction of coumadin clinic 20 each 1    gabapentin 600 MG Oral Tab       tolterodine tartrate 4 MG Oral Capsule SR 24 Hr Take 1 capsule (4 mg total) by mouth daily. 90 capsule 3    simvastatin 5 MG Oral Tab Take 1 tablet (5 mg total) by mouth nightly. 90 tablet 3    RESTASIS MULTIDOSE 0.05 % Ophthalmic Emulsion       methylPREDNISolone 4 MG Oral Tab       Cholecalciferol 25 MCG (1000 UT) Oral Tab Every Day      Mycophenolate Mofetil 500 MG Oral Tab Take 2 tablets (1,000 mg total) by mouth 2 (two) times daily.      KEVZARA 200 MG/1.14ML Subcutaneous Solution Auto-injector  (Patient not taking: Reported on 1/15/2025)      potassium chloride 20 MEQ Oral Tab CR Take 1 tablet (20 mEq total) by mouth 2 (two) times daily. 180 tablet 3    spironolactone 25 MG Oral Tab Take 1 tablet (25 mg total) by mouth daily. 90 tablet 3    Nystatin 019289 UNIT/GM External Powder Apply 1 Application topically 2 (two) times daily as needed. 60 g 2    MONTELUKAST SODIUM 10 MG Oral Tab TAKE 1 TABLET(10 MG) BY MOUTH EVERY DAY 90 tablet 3    metroNIDAZOLE 1 % External Gel Apply to face nightly (Patient not taking: Reported on 1/15/2025) 60 g 1    PULMICORT FLEXHALER 180 MCG/ACT Inhalation Aerosol Powder, Breath Activated INHALE 2 PUFFS INTO THE LUNGS TWICE DAILY 1 each 11    GABAPENTIN 400 MG Oral Cap TAKE 3 CAPSULES BY MOUTH THREE TIMES DAILY 270 capsule 5    Pilocarpine HCl 5 MG Oral Tab Take 1 tablet (5 mg total) by mouth 3 (three) times daily.      Albuterol Sulfate  (90 Base) MCG/ACT Inhalation Aero Soln Inhale 2 puffs into the lungs every 6 (six) hours as needed for Wheezing or Shortness of Breath. 18 g 3    Probiotic Product (PROBIOTIC DAILY OR) Take by mouth.      Diphenoxylate-Atropine (LOMOTIL OR) Take by mouth.      hydrocortisone (ANUSOL-HC) 2.5 % Rectal Cream Place 1 Application rectally 2 (two) times daily as needed for  Hemorrhoids. 60 g 1    Zolpidem Tartrate (AMBIEN) 10 MG Oral Tab Take 1 tablet (10 mg total) by mouth nightly as needed. (Patient not taking: Reported on 1/15/2025) 30 tablet 1    Ammonium Lactate (LAC-HYDRIN) 12 % External Cream Apply qd prn to afected area 140 g 3    leflunomide (ARAVA) 20 MG Oral Tab Take 1 tablet (20 mg total) by mouth daily.      Hydroxychloroquine Sulfate 200 MG Oral Tab Take  by mouth 2 (two) times daily.

## 2025-05-23 ENCOUNTER — OFFICE VISIT (OUTPATIENT)
Dept: WOUND CARE | Facility: HOSPITAL | Age: 70
End: 2025-05-23
Attending: INTERNAL MEDICINE
Payer: MEDICARE

## 2025-05-23 VITALS
SYSTOLIC BLOOD PRESSURE: 134 MMHG | RESPIRATION RATE: 12 BRPM | TEMPERATURE: 97 F | DIASTOLIC BLOOD PRESSURE: 70 MMHG | HEART RATE: 65 BPM

## 2025-05-23 DIAGNOSIS — L97.922 NON-PRESSURE CHRONIC ULCER OF LEFT LOWER LEG WITH FAT LAYER EXPOSED (HCC): Primary | ICD-10-CM

## 2025-05-23 PROCEDURE — 29581 APPL MULTLAYER CMPRN SYS LEG: CPT

## 2025-05-23 NOTE — PROGRESS NOTES
Chief Complaint   Patient presents with    Nurse Visit     Pt here for nurse visit compression wrap change, She reports some burning to left leg near top of wrap         Medications - Current[1]    Allergies[2]       HISTORY:     Past medical, surgical, family and social history updated where appropriate.    PHYSICAL EXAM:   /70   Pulse 65   Temp 97.2 °F (36.2 °C)   Resp 12        Vital signs reviewed.      Calf  Point of Measurement - Left Calf: 30     Left Calf from:: Heel  Calf Left cm:: 24.5          Ankle  Point of Measurement - Left Ankle: 11     Left Ankle from:: Heel  Left Ankle cm:: 17.5                Wound 01/15/25 #1 Left medial lower leg Leg Left;Medial;Lower (Active)   Date First Assessed/Time First Assessed: 01/15/25 1420    Wound Number (Wound Clinic Only): #1 Left medial lower leg  Primary Wound Type: Traumatic  Location: Leg  Wound Location Orientation: Left;Medial;Lower      Assessments 1/15/2025  2:24 PM 5/23/2025  4:08 PM   Wound Image        Drainage Amount Small None   Drainage Description Serosanguineous --   Treatments Compression Compression   Wound Length (cm) 3 cm 0.1 cm   Wound Width (cm) 3.8 cm 0.1 cm   Wound Surface Area (cm^2) 11.4 cm^2 0.01 cm^2   Wound Depth (cm) 0.1 cm 0 cm   Wound Volume (cm^3) 1.14 cm^3 0 cm^3   Wound Healing % -- 100   Margins Well-defined edges Well-defined edges   Non-staged Wound Description Full thickness Full thickness   [place]-wound Assessment Edema;Ecchymosis Clean   Wound Granulation Tissue Firm;Pink --   Wound Bed Granulation (%) 5 % --   Wound Bed Epithelium (%) -- 199 %   Wound Bed Slough (%) 95 % --   Wound Odor None None   Shape -- wound fragilly closed   Tunneling? No No   Undermining? No No   Sinus Tracts? No No       Inactive Orders   Date Order Priority Status Authorizing Provider   02/10/25 1336 Debridement Traumatic Left;Medial;Lower Leg Routine Completed Deysi Pulliam MD   01/27/25 1133 Debridement Traumatic Left;Medial;Lower  Leg Routine Completed Deysi Pulliam MD   01/15/25 1455 Debridement Traumatic Left;Medial;Lower Leg Routine Completed Deysi Pulliam MD       Wound 01/27/25 #5 Left posterior leg Leg Lower;Posterior;Left (Active)   Date First Assessed/Time First Assessed: 01/27/25 1033    Wound Number (Wound Clinic Only): #5 Left posterior leg  Primary Wound Type: Venous Ulcer  Location: Leg  Wound Location Orientation: Lower;Posterior;Left      Assessments 1/27/2025 10:36 AM 5/23/2025  4:09 PM   Wound Image       Drainage Amount None None   Treatments Compression Compression   Wound Length (cm) 1.8 cm 0.1 cm   Wound Width (cm) 0.4 cm 0.1 cm   Wound Surface Area (cm^2) 0.72 cm^2 0.01 cm^2   Wound Depth (cm) 0.1 cm 0 cm   Wound Volume (cm^3) 0.072 cm^3 0 cm^3   Wound Healing % -- 100   Margins Well-defined edges Attached edges   Non-staged Wound Description Full thickness --   [place]-wound Assessment Dry;Edema Clean   Wound Granulation Tissue Pink;Pale Grey;Firm --   Wound Bed Granulation (%) 90 % --   Wound Bed Epithelium (%) -- 100 %   Wound Odor None None   Shape 10% scab fragile epithilial tissue noted to wound bed   Tunneling? -- No   Undermining? -- No   Sinus Tracts? -- No       Inactive Orders   Date Order Priority Status Authorizing Provider   04/21/25 0937 Cellular tissue product application Venous Ulcer Lower;Posterior;Left Leg Routine Completed Deysi Pulliam MD   03/17/25 1437 Debridement Venous Ulcer Lower;Posterior;Left Leg Routine Completed Deysi Pulliam MD   02/24/25 1155 Debridement Venous Ulcer Lower;Posterior;Left Leg Routine Completed Deysi Pulliam MD       Compression Wrap 02/17/25 Leg Left (Active)   Placement Date/Time: 02/17/25 1601   Location: Leg  Wound Location Orientation: Left      Assessments 2/17/2025  4:01 PM 5/23/2025  4:12 PM   Response to Treatment Well tolerated Well tolerated   Compression Layers Multilayer Multilayer   Compression Product Type Unna Boot Unna Boot    Dressing Applied Yes Yes   Compression Wrap Location Toes to Knee Toes to Knee   Compression Wrap Status Clean;Dry Dry;Clean       No associated orders.          ASSESSMENT AND PLAN:        Risks, benefits, and alternatives of current treatment plan discussed in detail.  Questions and concerns addressed. Red flags to RTC or ED reviewed.  Patient (or parent) agrees to plan.      No follow-ups on file.  Weekly Wound Education Note    Teaching Provided To: Patient     Training Method: Demonstration, Explain/Verbal, Written  Training Response: Patient responds and understands        Notes: Pt has discomfort to left anterior leg near top of wrap, Applied endofrom and hydrofera ready to wound per provider., Applied extra abd at top of wrap with 20-30 calamine unna. Reviewed compression information and concern handout information with pt.               Ginna FELDMAN RN   5/23/2025  4:12 PM              [1]   Current Outpatient Medications:     gentamicin 0.1 % External Ointment, Apply 1 Application topically 3 (three) times daily., Disp: 30 g, Rfl: 3    SILVER SULFADIAZINE 1 % External Cream, APPLY TOPICALLY TO THE AFFECTED AREA DAILY (Patient not taking: Reported on 1/15/2025), Disp: 25 g, Rfl: 0    lidocaine (LIDODERM) 5 % External Patch, Place 1 patch onto the skin daily., Disp: 30 patch, Rfl: 2    baclofen 10 MG Oral Tab, Take 1 tablet (10 mg total) by mouth 3 (three) times daily., Disp: 90 tablet, Rfl: 2    Wound Dressings (MEDIHONEY WOUND/BURN DRESSING) External Gel, Apply 1 Application topically daily as needed. (Patient not taking: Reported on 1/15/2025), Disp: 44 mL, Rfl: 1    RECTASMOOTHE 5 % External Cream, APPLY A SMALL AMOUNT TO THE AFFECTED AREA UP TWICE DAILY (Patient not taking: Reported on 1/15/2025), Disp: 30 g, Rfl: 0    warfarin 2 MG Oral Tab, Take 1 tablet (2 mg total) by mouth nightly., Disp: 90 tablet, Rfl: 3    Wound Dressings (ADAPTIC NON-ADHERING DRESSING) External Pads, Apply 1 each topically  daily as needed. (Patient not taking: Reported on 1/15/2025), Disp: 30 each, Rfl: 1    Omeprazole 40 MG Oral Capsule Delayed Release, Take 1 capsule (40 mg total) by mouth daily. (Patient not taking: Reported on 1/15/2025), Disp: 90 capsule, Rfl: 3    enoxaparin 60 MG/0.6ML Subcutaneous Solution, INJECT 60MG SUB Q INTO ABDOMINAL AREA TWICE DAILY under direction of coumadin clinic, Disp: 20 each, Rfl: 1    gabapentin 600 MG Oral Tab, , Disp: , Rfl:     tolterodine tartrate 4 MG Oral Capsule SR 24 Hr, Take 1 capsule (4 mg total) by mouth daily., Disp: 90 capsule, Rfl: 3    simvastatin 5 MG Oral Tab, Take 1 tablet (5 mg total) by mouth nightly., Disp: 90 tablet, Rfl: 3    RESTASIS MULTIDOSE 0.05 % Ophthalmic Emulsion, , Disp: , Rfl:     methylPREDNISolone 4 MG Oral Tab, , Disp: , Rfl:     Cholecalciferol 25 MCG (1000 UT) Oral Tab, Every Day, Disp: , Rfl:     Mycophenolate Mofetil 500 MG Oral Tab, Take 2 tablets (1,000 mg total) by mouth 2 (two) times daily., Disp: , Rfl:     KEVZARA 200 MG/1.14ML Subcutaneous Solution Auto-injector, , Disp: , Rfl:     potassium chloride 20 MEQ Oral Tab CR, Take 1 tablet (20 mEq total) by mouth 2 (two) times daily., Disp: 180 tablet, Rfl: 3    spironolactone 25 MG Oral Tab, Take 1 tablet (25 mg total) by mouth daily., Disp: 90 tablet, Rfl: 3    Nystatin 463234 UNIT/GM External Powder, Apply 1 Application topically 2 (two) times daily as needed., Disp: 60 g, Rfl: 2    MONTELUKAST SODIUM 10 MG Oral Tab, TAKE 1 TABLET(10 MG) BY MOUTH EVERY DAY, Disp: 90 tablet, Rfl: 3    metroNIDAZOLE 1 % External Gel, Apply to face nightly (Patient not taking: Reported on 1/15/2025), Disp: 60 g, Rfl: 1    PULMICORT FLEXHALER 180 MCG/ACT Inhalation Aerosol Powder, Breath Activated, INHALE 2 PUFFS INTO THE LUNGS TWICE DAILY, Disp: 1 each, Rfl: 11    GABAPENTIN 400 MG Oral Cap, TAKE 3 CAPSULES BY MOUTH THREE TIMES DAILY, Disp: 270 capsule, Rfl: 5    Pilocarpine HCl 5 MG Oral Tab, Take 1 tablet (5 mg total)  by mouth 3 (three) times daily., Disp: , Rfl:     Albuterol Sulfate  (90 Base) MCG/ACT Inhalation Aero Soln, Inhale 2 puffs into the lungs every 6 (six) hours as needed for Wheezing or Shortness of Breath., Disp: 18 g, Rfl: 3    Probiotic Product (PROBIOTIC DAILY OR), Take by mouth., Disp: , Rfl:     Diphenoxylate-Atropine (LOMOTIL OR), Take by mouth., Disp: , Rfl:     hydrocortisone (ANUSOL-HC) 2.5 % Rectal Cream, Place 1 Application rectally 2 (two) times daily as needed for Hemorrhoids., Disp: 60 g, Rfl: 1    Zolpidem Tartrate (AMBIEN) 10 MG Oral Tab, Take 1 tablet (10 mg total) by mouth nightly as needed. (Patient not taking: Reported on 1/15/2025), Disp: 30 tablet, Rfl: 1    Ammonium Lactate (LAC-HYDRIN) 12 % External Cream, Apply qd prn to afected area, Disp: 140 g, Rfl: 3    leflunomide (ARAVA) 20 MG Oral Tab, Take 1 tablet (20 mg total) by mouth daily., Disp: , Rfl:     Hydroxychloroquine Sulfate 200 MG Oral Tab, Take  by mouth 2 (two) times daily., Disp: , Rfl:   [2]   Allergies  Allergen Reactions    Penicillins ANAPHYLAXIS and HYPOTENSION     Cardiac arrest      Pregabalin SWELLING    Raspberry NAUSEA AND VOMITING and SWELLING     Throat closes    Trimethobenzamide ANAPHYLAXIS     Cardiac arrest    Sulfa Antibiotics ITCHING    Pcn [Bicillin L-A] ANAPHYLAXIS     .    Tigan [Trimethobenzamide Hcl] ANAPHYLAXIS     Pass out and cardiac arrest.

## 2025-05-23 NOTE — PROGRESS NOTES
Weekly Wound Education Note    Teaching Provided To: Patient     Training Method: Demonstration, Explain/Verbal, Written  Training Response: Patient responds and understands        Notes: Pt has discomfort to left anterior leg near top of wrap, Applied endofrom and hydrofera ready to wound per provider., Applied extra abd at top of wrap with 20-30 calamine unna. Reviewed compression information and concern handout information with pt.

## 2025-05-28 ENCOUNTER — TELEPHONE (OUTPATIENT)
Dept: WOUND CARE | Facility: HOSPITAL | Age: 70
End: 2025-05-28

## 2025-05-28 NOTE — TELEPHONE ENCOUNTER
Talked to Vero, she stated she fell last night and rip opened her upper part of her leg. Informed pt that her provider is booked today. Adv pt to go to ED and we will see her on 5/30 as previously scheduled.

## 2025-05-30 ENCOUNTER — OFFICE VISIT (OUTPATIENT)
Dept: WOUND CARE | Facility: HOSPITAL | Age: 70
End: 2025-05-30
Attending: INTERNAL MEDICINE
Payer: MEDICARE

## 2025-05-30 VITALS
SYSTOLIC BLOOD PRESSURE: 108 MMHG | DIASTOLIC BLOOD PRESSURE: 58 MMHG | RESPIRATION RATE: 16 BRPM | HEART RATE: 63 BPM | TEMPERATURE: 98 F

## 2025-05-30 DIAGNOSIS — Z79.01 LONG TERM (CURRENT) USE OF ANTICOAGULANTS: ICD-10-CM

## 2025-05-30 DIAGNOSIS — G62.9 SMALL FIBER NEUROPATHY: ICD-10-CM

## 2025-05-30 DIAGNOSIS — S81.802A OPEN WOUND OF LEFT LOWER LEG, INITIAL ENCOUNTER: ICD-10-CM

## 2025-05-30 DIAGNOSIS — R23.8 SLOUGHING OF WOUND: ICD-10-CM

## 2025-05-30 DIAGNOSIS — L97.922 NON-PRESSURE CHRONIC ULCER OF LEFT LOWER LEG WITH FAT LAYER EXPOSED (HCC): ICD-10-CM

## 2025-05-30 DIAGNOSIS — S71.102A OPEN WOUND OF LEFT THIGH, INITIAL ENCOUNTER: Primary | ICD-10-CM

## 2025-05-30 DIAGNOSIS — I73.9 PERIPHERAL VASCULAR DISEASE, UNSPECIFIED: ICD-10-CM

## 2025-05-30 DIAGNOSIS — T14.8XXD DELAYED WOUND HEALING: ICD-10-CM

## 2025-05-30 PROCEDURE — 99214 OFFICE O/P EST MOD 30 MIN: CPT

## 2025-05-30 NOTE — PATIENT INSTRUCTIONS
Nurse visit next week  Return 2 weeks    Wound Cleaning and Dressings:    Shower with protection - use Shower boot   DRESSINGS:   All wounds: honey gel / xeroform / bordered foam - Zinc barrier cream kailyn-wound  Change dressing weekly     Compression Therapy : spandagrip    Compression Therapy Instructions:  1.  Put on first thing in the morning and may remove at bedside.  Okay to wear overnight      if comfortable.  Do not let stockings roll up/down and kink.  Hand wash stockings and      hang dry as needed.    2.  Avoid prolonged standing in one place.  It is better to have your calf muscles moving       to pump fluid out of the legs.    3.  Elevate leg(s) above the level of the heart when sitting or as much as possible.    4.  Take your diuretics as directed by your provider.  Do not skip doses or change doses      unless instructed to do so by your provider.    5. Do not get leg(s) with compression wrap wet. If wraps are too tight as indicated        By pain, numbness/tingling or discoloration of toes remove wrap completely       and call the   wound center.     Off-Loading:  Offload wounded areas.     Miscellaneous Instructions:  Supplement with a daily multivitamin   Low salt diet  Increase protein intake / consider protein supplements - see below  Elevate extremities at all times when sitting / laying down.    DIETARY MODIFICATIONS TO HELP WITH WOUND HEALING:    Protein: Meats, beans, eggs, milk and yogurt particularly Greek yogurt), tofu, soy nuts, soy protein products    Vitamin C: Citrus fruits and juices, strawberries, tomatoes, tomato juice, peppers, baked potatoes, spinach, broccoli, cauliflower, [place] sprouts, cabbage    Vitamin A: Dark green, leafy vegetables, orange or yellow vegetables, cantaloupe, fortified dairy products, liver, fortified cereals    Zinc: Fortified cereals, red meats, seafood    Consider Jason by [employer] ([email or web address]) (These are essential branch chain amino acids that help with tissue  building and wound healing) and take 2 packets/day. you can order online at [employer] or [employer]    ADDITIONAL REMINDERS:    The treatment plan has been discussed at length with you and your provider. Follow all instructions carefully, it is very important. If you do not follow all instructions, you are at  risk of your wound not healing, infection, possible loss of limb and even end of life.  Please call the clinic during regular business hours ( 7:30 AM - 5:30 PM) if you notice increased bleeding, redness, warmth, pain or pus like drainage or start running a fever greater than 100.3.    For after hour emergencies, please call your primary physician or go to the nearest emergency room.

## 2025-05-30 NOTE — PROGRESS NOTES
Weekly Wound Education Note    Teaching Provided To: Patient  Training Topics: Cleasing and general instructions, Discharge instructions, Dressing  Training Method: Explain/Verbal  Training Response: Patient responds and understands        Notes: Per provider wounds to left posterior and medial leg are healed. New wounds due to fall to left anterior thigh and lateral leg. Honey gel, xeroform, and bordered foam applied. Supplies ordered from [employer].

## 2025-05-30 NOTE — PROGRESS NOTES
[facility] PROGRESS NOTE  ALEXIS LUZ MD  5/30/2025    Chief Complaint:   Chief Complaint   Patient presents with    Wound Care     Patient is here for a wound care follow up. She presents with a new wound from a fall this past Wednesday. Her pain is currently 9/10.        HPI:   Subjective   Vero Rodriguez is a 69 year old female coming in for a follow-up visit.    HPI    Stephanie has new wound on the left thigh and left leg-from a recent trauma/fall.  She did not go to the emergency room.  It looks like a skin tear on the wound base.    There are no active signs of infection in the periwound area or in the wound.  Pain is well-tolerated.    Posterior ankle wound and anterior foot ulcer are all closed-skin mature.  Edema well-controlled.    Review of Systems  Negative except HPI   Denies chest pain / SOB / palpitations  Denies fever.     Allergies  Allergies[1]    Current Meds:  Current Medications[2]      EXAM:     Objective   Objective    Physical Exam    Vital Signs  Vitals:    05/30/25 1404   BP: 108/58   Pulse: 63   Resp: 16   Temp: 97.5 °F (36.4 °C)       Wound Assessment  Wound 01/15/25 #1 Left medial lower leg Leg Left;Medial;Lower (Active)   Date First Assessed/Time First Assessed: 01/15/25 1420    Wound Number (Wound Clinic Only): #1 Left medial lower leg  Primary Wound Type: Traumatic  Location: Leg  Wound Location Orientation: Left;Medial;Lower      Assessments 1/15/2025  2:24 PM 5/30/2025  2:03 PM   Wound Image        Drainage Amount Small None   Drainage Description Serosanguineous --   Treatments Compression (spandagrip E x2) --   Wound Length (cm) 3 cm (slight angle) 0 cm   Wound Width (cm) 3.8 cm 0 cm   Wound Surface Area (cm^2) 11.4 cm^2 0 cm^2   Wound Depth (cm) 0.1 cm 0 cm   Wound Volume (cm^3) 1.14 cm^3 0 cm^3   Wound Healing % -- 100   Margins Well-defined edges Well-defined edges   Non-staged Wound Description Full thickness Full thickness   [place]-wound Assessment Edema;Ecchymosis  Clean   Wound Granulation Tissue Firm;Pink --   Wound Bed Granulation (%) 5 % --   Wound Bed Epithelium (%) -- 100 %   Wound Bed Slough (%) 95 % --   Wound Odor None None   Tunneling? No No   Undermining? No No   Sinus Tracts? No No       Inactive Orders   Date Order Priority Status Authorizing Provider   02/10/25 1336 Debridement Traumatic Left;Medial;Lower Leg Routine Completed Deysi Pulliam MD   01/27/25 1133 Debridement Traumatic Left;Medial;Lower Leg Routine Completed Deysi Pulliam MD   01/15/25 1455 Debridement Traumatic Left;Medial;Lower Leg Routine Completed Deysi Pulliam MD       Wound 01/27/25 #5 Left posterior leg Leg Lower;Posterior;Left (Active)   Date First Assessed/Time First Assessed: 01/27/25 1033    Wound Number (Wound Clinic Only): #5 Left posterior leg  Primary Wound Type: Venous Ulcer  Location: Leg  Wound Location Orientation: Lower;Posterior;Left      Assessments 1/27/2025 10:36 AM 5/30/2025  2:02 PM   Wound Image       Drainage Amount None None   Treatments Compression (calamine unna boot 20-30mmHg, medipore tape) --   Wound Length (cm) 1.8 cm 0 cm   Wound Width (cm) 0.4 cm 0 cm   Wound Surface Area (cm^2) 0.72 cm^2 0 cm^2   Wound Depth (cm) 0.1 cm 0 cm   Wound Volume (cm^3) 0.072 cm^3 0 cm^3   Wound Healing % -- 100   Margins Well-defined edges Well-defined edges   Non-staged Wound Description Full thickness Full thickness   [place]-wound Assessment Dry;Edema Clean;Dry   Wound Granulation Tissue Pink;Pale Grey;Firm --   Wound Bed Granulation (%) 90 % --   Wound Bed Epithelium (%) -- 100 %   Wound Odor None None   Shape 10% scab --   Tunneling? -- No   Undermining? -- No   Sinus Tracts? -- No       Inactive Orders   Date Order Priority Status Authorizing Provider   04/21/25 0937 Cellular tissue product application Venous Ulcer Lower;Posterior;Left Leg Routine Completed Deysi Pulliam MD   03/17/25 1437 Debridement Venous Ulcer Lower;Posterior;Left Leg Routine Completed  Deysi Pulliam MD   02/24/25 1155 Debridement Venous Ulcer Lower;Posterior;Left Leg Routine Completed Deysi Pulliam MD       Wound 05/30/25 #2 Left anterior upper leg Leg Anterior;Left;Upper (Active)   Date First Assessed/Time First Assessed: 05/30/25 1353    Wound Number (Wound Clinic Only): #2 Left anterior upper leg  Primary Wound Type: Traumatic  Location: Leg  Wound Location Orientation: Anterior;Left;Upper      Assessments 5/30/2025  2:01 PM   Wound Image     Drainage Amount Moderate   Drainage Description Serosanguineous   Wound Length (cm) 1.1 cm   Wound Width (cm) 6 cm (angled)   Wound Surface Area (cm^2) 5.18 cm^2   Wound Depth (cm) 0.1 cm   Wound Volume (cm^3) 0.346 cm^3   Margins Well-defined edges   Non-staged Wound Description Full thickness   [place]-wound Assessment Ecchymosis   Wound Granulation Tissue Spongy;Pink   Wound Bed Granulation (%) 80 % (20% adipose)   Wound Odor None   Shape 20% adipose   Tunneling? No   Undermining? No   Sinus Tracts? No       No associated orders.       Wound 05/30/25 #6 Left lateral leg Leg Left (Active)   Date First Assessed/Time First Assessed: 05/30/25 1424    Wound Number (Wound Clinic Only): #6 Left lateral leg  Primary Wound Type: Traumatic  Location: Leg  Wound Location Orientation: Left      Assessments 5/30/2025  2:24 PM   Wound Image     Drainage Amount Unable to assess   Wound Length (cm) 2 cm   Wound Width (cm) 0.6 cm   Wound Surface Area (cm^2) 0.94 cm^2   Wound Depth (cm) 0.1 cm   Wound Volume (cm^3) 0.063 cm^3   Margins Well-defined edges   Non-staged Wound Description Full thickness   [place]-wound Assessment Edema   Wound Granulation Tissue Pink;Firm   Wound Bed Granulation (%) 80 %   Wound Bed Slough (%) 20 %   Wound Odor None       No associated orders.                ASSESSMENT AND PLAN:     Assessment     Encounter Diagnosis  1. Open wound of left thigh, initial encounter    2. Open wound of left lower leg, initial encounter    3.  Non-pressure chronic ulcer of left lower leg with fat layer exposed (HCC)    4. Peripheral vascular disease, unspecified    5. Small fiber neuropathy    6. Delayed wound healing    7. Sloughing of wound    8. Long term (current) use of anticoagulants        PLAN OF CARE:    Start honey gel on wound base for enzymatic debridement on the open wounds.  Cover with Xeroform and absorptive dressing  Apply zinc barrier cream in the periwound area  Watch out for signs of infection  Edema controlled with compression socks  Watch out for signs of early infection - counseled.   Plan of care discussed with patient in detail - All questions answered   Return in one - two week.         Patient Instructions     Nurse visit next week  Return 2 weeks    Wound Cleaning and Dressings:    Shower with protection - use Shower boot   DRESSINGS:   All wounds: honey gel / xeroform / bordered foam - Zinc barrier cream kailyn-wound  Change dressing weekly     Compression Therapy : spandagrip    Compression Therapy Instructions:  1.  Put on first thing in the morning and may remove at bedside.  Okay to wear overnight      if comfortable.  Do not let stockings roll up/down and kink.  Hand wash stockings and      hang dry as needed.    2.  Avoid prolonged standing in one place.  It is better to have your calf muscles moving       to pump fluid out of the legs.    3.  Elevate leg(s) above the level of the heart when sitting or as much as possible.    4.  Take your diuretics as directed by your provider.  Do not skip doses or change doses      unless instructed to do so by your provider.    5. Do not get leg(s) with compression wrap wet. If wraps are too tight as indicated        By pain, numbness/tingling or discoloration of toes remove wrap completely       and call the   wound center.     Off-Loading:  Offload wounded areas.     Miscellaneous Instructions:  Supplement with a daily multivitamin   Low salt diet  Increase protein intake / consider  protein supplements - see below  Elevate extremities at all times when sitting / laying down.    DIETARY MODIFICATIONS TO HELP WITH WOUND HEALING:    Protein: Meats, beans, eggs, milk and yogurt particularly Greek yogurt), tofu, soy nuts, soy protein products    Vitamin C: Citrus fruits and juices, strawberries, tomatoes, tomato juice, peppers, baked potatoes, spinach, broccoli, cauliflower, [place] sprouts, cabbage    Vitamin A: Dark green, leafy vegetables, orange or yellow vegetables, cantaloupe, fortified dairy products, liver, fortified cereals    Zinc: Fortified cereals, red meats, seafood    Consider Jason by [employer] (These are essential branch chain amino acids that help with tissue building and wound healing) and take 2 packets/day. you can order online at [employer] or [employer]    ADDITIONAL REMINDERS:    The treatment plan has been discussed at length with you and your provider. Follow all instructions carefully, it is very important. If you do not follow all instructions, you are at  risk of your wound not healing, infection, possible loss of limb and even end of life.  Please call the clinic during regular business hours ( 7:30 AM - 5:30 PM) if you notice increased bleeding, redness, warmth, pain or pus like drainage or start running a fever greater than 100.3.    For after hour emergencies, please call your primary physician or go to the nearest emergency room.    Patient/Caregiver Education: There are no barriers to learning. Medical education for above diagnosis given.   Answered all questions.    Outcome: Patient verbalizes understanding. Patient is notified to call with any questions, complications, allergies, or worsening or changing symptoms.  Patient is to call with any side effects or complications as a result of the treatments today.      DOCUMENTATION OF TIME SPENT: Code selection for this visit was based on time spent : 35 min on date of service in preparing to see the patient, obtaining  and/or reviewing separately obtained history, performing a medically appropriate examination, counseling and educating the patient/family/caregiver, ordering medications or testing, referring and communicating with other healthcare providers, documenting clinical information in the E HR, independently interpreting results and communicating results to the patient/family/caregiver and care coordination with the patient's other providers.    Followup: Return in about 12 days (around 6/11/2025) for Wound followup.      Note to Patient:  The 21st Century Cures Act makes medical notes like these available to patients in the interest of transparency. However, be advised this is a medical document and is intended as [id number] communication; it is written in medical language and may appear blunt, direct, or contain abbreviations or verbiage that are unfamiliar. Medical documents are intended to carry relevant information, facts as evident, and the clinical opinion of the practitioner.    Also, please note that this report has been produced using speech recognition software and may contain errors related to that system including, but not limited to, errors in grammar, punctuation, and spelling, as well as words and phrases that possibly may have been recognized inappropriately.  If there are any questions or concerns, contact the dictating provider for clarification.      Deysi Zhou MD  5/30/2025  2:29 PM                      [1]   Allergies  Allergen Reactions    Penicillins ANAPHYLAXIS and HYPOTENSION     Cardiac arrest      Pregabalin SWELLING    Raspberry NAUSEA AND VOMITING and SWELLING     Throat closes    Trimethobenzamide ANAPHYLAXIS     Cardiac arrest    Sulfa Antibiotics ITCHING    Pcn [Bicillin L-A] ANAPHYLAXIS     .    Tigan [Trimethobenzamide Hcl] ANAPHYLAXIS     Pass out and cardiac arrest.    [2]   Current Outpatient Medications   Medication Sig Dispense Refill    gentamicin 0.1 % External Ointment  Apply 1 Application topically 3 (three) times daily. 30 g 3    SILVER SULFADIAZINE 1 % External Cream APPLY TOPICALLY TO THE AFFECTED AREA DAILY (Patient not taking: Reported on 1/15/2025) 25 g 0    lidocaine (LIDODERM) 5 % External Patch Place 1 patch onto the skin daily. 30 patch 2    baclofen 10 MG Oral Tab Take 1 tablet (10 mg total) by mouth 3 (three) times daily. 90 tablet 2    Wound Dressings (MEDIHONEY WOUND/BURN DRESSING) External Gel Apply 1 Application topically daily as needed. (Patient not taking: Reported on 1/15/2025) 44 mL 1    RECTASMOOTHE 5 % External Cream APPLY A SMALL AMOUNT TO THE AFFECTED AREA UP TWICE DAILY (Patient not taking: Reported on 1/15/2025) 30 g 0    warfarin 2 MG Oral Tab Take 1 tablet (2 mg total) by mouth nightly. 90 tablet 3    Wound Dressings (ADAPTIC NON-ADHERING DRESSING) External Pads Apply 1 each topically daily as needed. (Patient not taking: Reported on 1/15/2025) 30 each 1    Omeprazole 40 MG Oral Capsule Delayed Release Take 1 capsule (40 mg total) by mouth daily. (Patient not taking: Reported on 1/15/2025) 90 capsule 3    enoxaparin 60 MG/0.6ML Subcutaneous Solution INJECT 60MG SUB Q INTO ABDOMINAL AREA TWICE DAILY under direction of coumadin clinic 20 each 1    gabapentin 600 MG Oral Tab       tolterodine tartrate 4 MG Oral Capsule SR 24 Hr Take 1 capsule (4 mg total) by mouth daily. 90 capsule 3    simvastatin 5 MG Oral Tab Take 1 tablet (5 mg total) by mouth nightly. 90 tablet 3    RESTASIS MULTIDOSE 0.05 % Ophthalmic Emulsion       methylPREDNISolone 4 MG Oral Tab       Cholecalciferol 25 MCG (1000 UT) Oral Tab Every Day      Mycophenolate Mofetil 500 MG Oral Tab Take 2 tablets (1,000 mg total) by mouth 2 (two) times daily.      KEVZARA 200 MG/1.14ML Subcutaneous Solution Auto-injector  (Patient not taking: Reported on 1/15/2025)      potassium chloride 20 MEQ Oral Tab CR Take 1 tablet (20 mEq total) by mouth 2 (two) times daily. 180 tablet 3    spironolactone 25  MG Oral Tab Take 1 tablet (25 mg total) by mouth daily. 90 tablet 3    Nystatin 654665 UNIT/GM External Powder Apply 1 Application topically 2 (two) times daily as needed. 60 g 2    MONTELUKAST SODIUM 10 MG Oral Tab TAKE 1 TABLET(10 MG) BY MOUTH EVERY DAY 90 tablet 3    metroNIDAZOLE 1 % External Gel Apply to face nightly (Patient not taking: Reported on 1/15/2025) 60 g 1    PULMICORT FLEXHALER 180 MCG/ACT Inhalation Aerosol Powder, Breath Activated INHALE 2 PUFFS INTO THE LUNGS TWICE DAILY 1 each 11    GABAPENTIN 400 MG Oral Cap TAKE 3 CAPSULES BY MOUTH THREE TIMES DAILY 270 capsule 5    Pilocarpine HCl 5 MG Oral Tab Take 1 tablet (5 mg total) by mouth 3 (three) times daily.      Albuterol Sulfate  (90 Base) MCG/ACT Inhalation Aero Soln Inhale 2 puffs into the lungs every 6 (six) hours as needed for Wheezing or Shortness of Breath. 18 g 3    Probiotic Product (PROBIOTIC DAILY OR) Take by mouth.      Diphenoxylate-Atropine (LOMOTIL OR) Take by mouth.      hydrocortisone (ANUSOL-HC) 2.5 % Rectal Cream Place 1 Application rectally 2 (two) times daily as needed for Hemorrhoids. 60 g 1    Zolpidem Tartrate (AMBIEN) 10 MG Oral Tab Take 1 tablet (10 mg total) by mouth nightly as needed. (Patient not taking: Reported on 1/15/2025) 30 tablet 1    Ammonium Lactate (LAC-HYDRIN) 12 % External Cream Apply qd prn to afected area 140 g 3    leflunomide (ARAVA) 20 MG Oral Tab Take 1 tablet (20 mg total) by mouth daily.      Hydroxychloroquine Sulfate 200 MG Oral Tab Take  by mouth 2 (two) times daily.

## 2025-06-04 DIAGNOSIS — M81.0 OSTEOPOROSIS: Primary | ICD-10-CM

## 2025-06-06 ENCOUNTER — OFFICE VISIT (OUTPATIENT)
Dept: WOUND CARE | Facility: HOSPITAL | Age: 70
End: 2025-06-06
Attending: INTERNAL MEDICINE
Payer: MEDICARE

## 2025-06-06 VITALS
RESPIRATION RATE: 16 BRPM | TEMPERATURE: 98 F | DIASTOLIC BLOOD PRESSURE: 69 MMHG | HEART RATE: 63 BPM | SYSTOLIC BLOOD PRESSURE: 144 MMHG

## 2025-06-06 DIAGNOSIS — L97.922 NON-PRESSURE CHRONIC ULCER OF LEFT LOWER LEG WITH FAT LAYER EXPOSED (HCC): Primary | ICD-10-CM

## 2025-06-06 PROCEDURE — 99214 OFFICE O/P EST MOD 30 MIN: CPT

## 2025-06-06 NOTE — PROGRESS NOTES
Chief Complaint   Patient presents with    Wound Care     Follow up for left leg wound. No complaints at this time.          Medications - Current[1]    Allergies[2]       HISTORY:     Past medical, surgical, family and social history updated where appropriate.    PHYSICAL EXAM:   /69   Pulse 63   Temp 98.3 °F (36.8 °C)   Resp 16        Vital signs reviewed.      Calf  Point of Measurement - Left Calf: 30     Left Calf from:: Heel  Calf Left cm:: 25.1          Ankle  Point of Measurement - Left Ankle: 11     Left Ankle from:: Heel  Left Ankle cm:: 18.8                Wound 05/30/25 #7 Left anterior upper leg Leg Anterior;Left;Upper (Active)   Date First Assessed/Time First Assessed: 05/30/25 1353    Wound Number (Wound Clinic Only): #7 Left anterior upper leg  Primary Wound Type: Traumatic  Location: Leg  Wound Location Orientation: Anterior;Left;Upper      Assessments 5/30/2025  2:01 PM 6/6/2025 10:58 AM   Wound Image       Drainage Amount Moderate Scant   Drainage Description Serosanguineous Serosanguineous   Wound Length (cm) 1.1 cm 0.9 cm   Wound Width (cm) 6 cm 5 cm   Wound Surface Area (cm^2) 5.18 cm^2 3.53 cm^2   Wound Depth (cm) 0.1 cm 0.1 cm   Wound Volume (cm^3) 0.346 cm^3 0.236 cm^3   Wound Healing % -- 32   Margins Well-defined edges Well-defined edges   Non-staged Wound Description Full thickness Full thickness   [place]-wound Assessment Ecchymosis Ecchymosis;Edema;Pink   Wound Granulation Tissue Spongy;Pink Pink;Red;Spongy   Wound Bed Granulation (%) 80 % 5 %   Wound Bed Slough (%) -- 95 %   Wound Odor None None   Shape 20% adipose --   Tunneling? No --   Undermining? No --   Sinus Tracts? No --       No associated orders.       Wound 05/30/25 #6 Left lateral leg Leg Left (Active)   Date First Assessed/Time First Assessed: 05/30/25 1424    Wound Number (Wound Clinic Only): #6 Left lateral leg  Primary Wound Type: Traumatic  Location: Leg  Wound Location Orientation: Left      Assessments  5/30/2025  2:24 PM 6/6/2025 10:59 AM   Wound Image       Drainage Amount Unable to assess Small   Drainage Description -- Serosanguineous   Wound Length (cm) 2 cm 1.4 cm   Wound Width (cm) 0.6 cm 0.7 cm   Wound Surface Area (cm^2) 0.94 cm^2 0.77 cm^2   Wound Depth (cm) 0.1 cm 0.1 cm   Wound Volume (cm^3) 0.063 cm^3 0.051 cm^3   Wound Healing % -- 19   Margins Well-defined edges Well-defined edges   Non-staged Wound Description Full thickness Full thickness   [place]-wound Assessment Edema Edema   Wound Granulation Tissue Pink;Firm Red;Pink;Spongy   Wound Bed Granulation (%) 80 % 40 %   Wound Bed Slough (%) 20 % 60 %   Wound Odor None None   Tunneling? No --   Undermining? No --   Sinus Tracts? No --       No associated orders.          ASSESSMENT AND PLAN:        Risks, benefits, and alternatives of current treatment plan discussed in detail.  Questions and concerns addressed. Red flags to RTC or ED reviewed.  Patient (or parent) agrees to plan.      No follow-ups on file.  Weekly Wound Education Note    Teaching Provided To: Patient  Training Topics: Dressing, Cleasing and general instructions, Discharge instructions  Training Method: Explain/Verbal, Written  Training Response: Patient responds and understands        Notes: Wounds improving. Continue honey gel, xeroform, and bordered foam.               Karina HINES RN   6/6/2025  11:09 AM             [1]   Current Outpatient Medications:     gentamicin 0.1 % External Ointment, Apply 1 Application topically 3 (three) times daily., Disp: 30 g, Rfl: 3    SILVER SULFADIAZINE 1 % External Cream, APPLY TOPICALLY TO THE AFFECTED AREA DAILY (Patient not taking: Reported on 1/15/2025), Disp: 25 g, Rfl: 0    lidocaine (LIDODERM) 5 % External Patch, Place 1 patch onto the skin daily., Disp: 30 patch, Rfl: 2    baclofen 10 MG Oral Tab, Take 1 tablet (10 mg total) by mouth 3 (three) times daily., Disp: 90 tablet, Rfl: 2    Wound Dressings (MEDIHONEY WOUND/BURN DRESSING) External  Gel, Apply 1 Application topically daily as needed. (Patient not taking: Reported on 1/15/2025), Disp: 44 mL, Rfl: 1    RECTASMOOTHE 5 % External Cream, APPLY A SMALL AMOUNT TO THE AFFECTED AREA UP TWICE DAILY (Patient not taking: Reported on 1/15/2025), Disp: 30 g, Rfl: 0    warfarin 2 MG Oral Tab, Take 1 tablet (2 mg total) by mouth nightly., Disp: 90 tablet, Rfl: 3    Wound Dressings (ADAPTIC NON-ADHERING DRESSING) External Pads, Apply 1 each topically daily as needed. (Patient not taking: Reported on 1/15/2025), Disp: 30 each, Rfl: 1    Omeprazole 40 MG Oral Capsule Delayed Release, Take 1 capsule (40 mg total) by mouth daily. (Patient not taking: Reported on 1/15/2025), Disp: 90 capsule, Rfl: 3    enoxaparin 60 MG/0.6ML Subcutaneous Solution, INJECT 60MG SUB Q INTO ABDOMINAL AREA TWICE DAILY under direction of coumadin clinic, Disp: 20 each, Rfl: 1    gabapentin 600 MG Oral Tab, , Disp: , Rfl:     tolterodine tartrate 4 MG Oral Capsule SR 24 Hr, Take 1 capsule (4 mg total) by mouth daily., Disp: 90 capsule, Rfl: 3    simvastatin 5 MG Oral Tab, Take 1 tablet (5 mg total) by mouth nightly., Disp: 90 tablet, Rfl: 3    RESTASIS MULTIDOSE 0.05 % Ophthalmic Emulsion, , Disp: , Rfl:     methylPREDNISolone 4 MG Oral Tab, , Disp: , Rfl:     Cholecalciferol 25 MCG (1000 UT) Oral Tab, Every Day, Disp: , Rfl:     Mycophenolate Mofetil 500 MG Oral Tab, Take 2 tablets (1,000 mg total) by mouth 2 (two) times daily., Disp: , Rfl:     KEVZARA 200 MG/1.14ML Subcutaneous Solution Auto-injector, , Disp: , Rfl:     potassium chloride 20 MEQ Oral Tab CR, Take 1 tablet (20 mEq total) by mouth 2 (two) times daily., Disp: 180 tablet, Rfl: 3    spironolactone 25 MG Oral Tab, Take 1 tablet (25 mg total) by mouth daily., Disp: 90 tablet, Rfl: 3    Nystatin 359877 UNIT/GM External Powder, Apply 1 Application topically 2 (two) times daily as needed., Disp: 60 g, Rfl: 2    MONTELUKAST SODIUM 10 MG Oral Tab, TAKE 1 TABLET(10 MG) BY MOUTH  EVERY DAY, Disp: 90 tablet, Rfl: 3    metroNIDAZOLE 1 % External Gel, Apply to face nightly (Patient not taking: Reported on 1/15/2025), Disp: 60 g, Rfl: 1    PULMICORT FLEXHALER 180 MCG/ACT Inhalation Aerosol Powder, Breath Activated, INHALE 2 PUFFS INTO THE LUNGS TWICE DAILY, Disp: 1 each, Rfl: 11    GABAPENTIN 400 MG Oral Cap, TAKE 3 CAPSULES BY MOUTH THREE TIMES DAILY, Disp: 270 capsule, Rfl: 5    Pilocarpine HCl 5 MG Oral Tab, Take 1 tablet (5 mg total) by mouth 3 (three) times daily., Disp: , Rfl:     Albuterol Sulfate  (90 Base) MCG/ACT Inhalation Aero Soln, Inhale 2 puffs into the lungs every 6 (six) hours as needed for Wheezing or Shortness of Breath., Disp: 18 g, Rfl: 3    Probiotic Product (PROBIOTIC DAILY OR), Take by mouth., Disp: , Rfl:     Diphenoxylate-Atropine (LOMOTIL OR), Take by mouth., Disp: , Rfl:     hydrocortisone (ANUSOL-HC) 2.5 % Rectal Cream, Place 1 Application rectally 2 (two) times daily as needed for Hemorrhoids., Disp: 60 g, Rfl: 1    Zolpidem Tartrate (AMBIEN) 10 MG Oral Tab, Take 1 tablet (10 mg total) by mouth nightly as needed. (Patient not taking: Reported on 1/15/2025), Disp: 30 tablet, Rfl: 1    Ammonium Lactate (LAC-HYDRIN) 12 % External Cream, Apply qd prn to afected area, Disp: 140 g, Rfl: 3    leflunomide (ARAVA) 20 MG Oral Tab, Take 1 tablet (20 mg total) by mouth daily., Disp: , Rfl:     Hydroxychloroquine Sulfate 200 MG Oral Tab, Take  by mouth 2 (two) times daily., Disp: , Rfl:   [2]   Allergies  Allergen Reactions    Penicillins ANAPHYLAXIS and HYPOTENSION     Cardiac arrest      Pregabalin SWELLING    Raspberry NAUSEA AND VOMITING and SWELLING     Throat closes    Trimethobenzamide ANAPHYLAXIS     Cardiac arrest    Sulfa Antibiotics ITCHING    Pcn [Bicillin L-A] ANAPHYLAXIS     .    Tigan [Trimethobenzamide Hcl] ANAPHYLAXIS     Pass out and cardiac arrest.

## 2025-06-09 ENCOUNTER — OFFICE VISIT (OUTPATIENT)
Dept: WOUND CARE | Facility: HOSPITAL | Age: 70
End: 2025-06-09
Attending: INTERNAL MEDICINE
Payer: MEDICARE

## 2025-06-09 VITALS
DIASTOLIC BLOOD PRESSURE: 63 MMHG | SYSTOLIC BLOOD PRESSURE: 124 MMHG | RESPIRATION RATE: 15 BRPM | TEMPERATURE: 98 F | HEART RATE: 61 BPM

## 2025-06-09 DIAGNOSIS — R23.8 SLOUGHING OF WOUND: ICD-10-CM

## 2025-06-09 DIAGNOSIS — S81.802D OPEN WOUND OF LEFT LOWER LEG, SUBSEQUENT ENCOUNTER: ICD-10-CM

## 2025-06-09 DIAGNOSIS — I73.9 PERIPHERAL VASCULAR DISEASE, UNSPECIFIED: ICD-10-CM

## 2025-06-09 DIAGNOSIS — S71.102D OPEN WOUND OF LEFT THIGH, SUBSEQUENT ENCOUNTER: Primary | ICD-10-CM

## 2025-06-09 DIAGNOSIS — Z79.01 LONG TERM (CURRENT) USE OF ANTICOAGULANTS: ICD-10-CM

## 2025-06-09 DIAGNOSIS — T14.8XXD DELAYED WOUND HEALING: ICD-10-CM

## 2025-06-09 PROCEDURE — 97597 DBRDMT OPN WND 1ST 20 CM/<: CPT | Performed by: INTERNAL MEDICINE

## 2025-06-09 PROCEDURE — 11042 DBRDMT SUBQ TIS 1ST 20SQCM/<: CPT | Performed by: INTERNAL MEDICINE

## 2025-06-09 NOTE — PATIENT INSTRUCTIONS
Return 2 weeks    Wound Cleaning and Dressings:    Shower with protection - use Shower boot   DRESSINGS:   All wounds: honey gel / xeroform / bordered foam - Zinc barrier cream kailyn-wound  Change dressing daily    Compression Therapy : spandagrip    Compression Therapy Instructions:  1.  Put on first thing in the morning and may remove at bedside.  Okay to wear overnight      if comfortable.  Do not let stockings roll up/down and kink.  Hand wash stockings and      hang dry as needed.    2.  Avoid prolonged standing in one place.  It is better to have your calf muscles moving       to pump fluid out of the legs.    3.  Elevate leg(s) above the level of the heart when sitting or as much as possible.    4.  Take your diuretics as directed by your provider.  Do not skip doses or change doses      unless instructed to do so by your provider.    5. Do not get leg(s) with compression wrap wet. If wraps are too tight as indicated        By pain, numbness/tingling or discoloration of toes remove wrap completely       and call the   wound center.     Off-Loading:  Offload wounded areas.     Miscellaneous Instructions:  Supplement with a daily multivitamin   Low salt diet  Increase protein intake / consider protein supplements - see below  Elevate extremities at all times when sitting / laying down.    DIETARY MODIFICATIONS TO HELP WITH WOUND HEALING:    Protein: Meats, beans, eggs, milk and yogurt particularly Greek yogurt), tofu, soy nuts, soy protein products    Vitamin C: Citrus fruits and juices, strawberries, tomatoes, tomato juice, peppers, baked potatoes, spinach, broccoli, cauliflower, Sesser sprouts, cabbage    Vitamin A: Dark green, leafy vegetables, orange or yellow vegetables, cantaloupe, fortified dairy products, liver, fortified cereals    Zinc: Fortified cereals, red meats, seafood    Consider Jason by [employer] (These are essential branch chain amino acids that help with tissue building and wound  healing) and take 2 packets/day. you can order online at [employer] or [employer]    ADDITIONAL REMINDERS:    The treatment plan has been discussed at length with you and your provider. Follow all instructions carefully, it is very important. If you do not follow all instructions, you are at  risk of your wound not healing, infection, possible loss of limb and even end of life.  Please call the clinic during regular business hours ( 7:30 AM - 5:30 PM) if you notice increased bleeding, redness, warmth, pain or pus like drainage or start running a fever greater than 100.3.    For after hour emergencies, please call your primary physician or go to the nearest emergency room.

## 2025-06-09 NOTE — PROGRESS NOTES
Patient ID: Vero Rodriguez is a 69 year old female.    Debridement Traumatic Anterior;Left;Upper Leg   Wound 05/30/25 #7 Left anterior upper leg Leg Anterior;Left;Upper    Performed by: Deysi Pulliam MD  Authorized by: Deysi Pulliam MD      Consent   Consent obtained? verbal  Consent given by: patient    Debridement Details  Performed by: physician  Debridement type: conservative sharp  Pain control: lidocaine 4%  Pain control administration type: topical    Pre-debridement measurements  Length (cm): 0.7  Width (cm): 4.5 (slight angle)  Depth (cm): 0.1  Surface Area (cm^2): 2.47    Post-debridement measurements  Length (cm): 0.7  Width (cm): 4.5  Depth (cm): 0.2  Percent debrided: 100%  Surface Area (cm^2): 2.47  Area Debrided (cm^2): 2.47  Volume (cm^3): 0.33    Tissue and other material debrided: subcutaneous tissue  Devitalized tissue debrided: biofilm and slough  Instrument(s) utilized: curette  Bleeding: small  Hemostasis obtained with: pressure  Procedural pain (0-10): 4  Post-procedural pain: 2   Response to treatment: procedure was tolerated well    Debridement Traumatic Left Leg   Wound 05/30/25 #6 Left lateral leg Leg Left    Performed by: Deysi Pulliam MD  Authorized by: Deysi Pulliam MD      Consent   Consent obtained? verbal  Consent given by: patient    Debridement Details  Performed by: physician  Debridement type: surgical  Level of debridement: subcutaneous tissue  Pain control: lidocaine 4%  Pain control administration type: topical    Pre-debridement measurements  Length (cm): 1.1  Width (cm): 0.5  Depth (cm): 0.1  Surface Area (cm^2): 0.43    Post-debridement measurements  Length (cm): 1.1  Width (cm): 0.5  Depth (cm): 0.2  Percent debrided: 100%  Surface Area (cm^2): 0.43  Area Debrided (cm^2): 0.43  Volume (cm^3): 0.06    Tissue and other material debrided: subcutaneous tissue  Devitalized tissue debrided: biofilm and slough  Instrument(s) utilized:  curette  Bleeding: small  Hemostasis obtained with: pressure  Procedural pain (0-10): 4  Post-procedural pain: 2   Response to treatment: procedure was tolerated well

## 2025-06-09 NOTE — PROGRESS NOTES
.Weekly Wound Education Note    Teaching Provided To: Patient  Training Topics: Dressing, Discharge instructions, Cleasing and general instructions  Training Method: Explain/Verbal, Written  Training Response: Patient responds and understands            Honey gel, folded xeroform, border foam to wounds.

## 2025-06-09 NOTE — PROGRESS NOTES
[facility] PROGRESS NOTE  ALEXIS LUZ MD  6/9/2025    Chief Complaint:   Chief Complaint   Patient presents with    Wound Care     Patient arrives for a wound care follow up appointment. Patient reports no pain to the wounds. Patient is using Zetavit to the wounds. She is also using honey. There is no compression.       HPI:   Subjective   Vero Rodriguez is a 69 year old female coming in for a follow-up visit.    HPI    Wound Improved  Dimensions better  Improved tissue quality  More granulation - slough persists.   No s/o infection.   Edema controlled.     Review of Systems  Negative except HPI   Denies chest pain / SOB / palpitations  Denies fever.     Allergies  Allergies[1]    Current Meds:  Current Medications[2]      EXAM:     Objective   Objective    Physical Exam    Vital Signs  Vitals:    06/09/25 1419   BP: 124/63   Pulse: 61   Resp: 15   Temp: 98.2 °F (36.8 °C)       Wound Assessment  Wound 05/30/25 #7 Left anterior upper leg Leg Anterior;Left;Upper (Active)   Date First Assessed/Time First Assessed: 05/30/25 1353    Wound Number (Wound Clinic Only): #7 Left anterior upper leg  Primary Wound Type: Traumatic  Location: Leg  Wound Location Orientation: Anterior;Left;Upper      Assessments 5/30/2025  2:01 PM 6/9/2025  2:16 PM   Wound Image        Drainage Amount Moderate Scant   Drainage Description Serosanguineous Serous;Yellow   Wound Length (cm) 1.1 cm 0.7 cm   Wound Width (cm) 6 cm (angled) 4.5 cm (slight angle)   Wound Surface Area (cm^2) 5.18 cm^2 2.47 cm^2   Wound Depth (cm) 0.1 cm 0.1 cm   Wound Volume (cm^3) 0.346 cm^3 0.165 cm^3   Wound Healing % -- 52   Margins Well-defined edges Well-defined edges   Non-staged Wound Description Full thickness Full thickness   [place]-wound Assessment Ecchymosis Ecchymosis;Edema   Wound Granulation Tissue Spongy;Pink Pink;Firm   Wound Bed Granulation (%) 80 % (20% adipose) 5 %   Wound Bed Slough (%) -- 95 %   Wound Odor None None   Shape 20% adipose --    Tunneling? No No   Undermining? No No   Sinus Tracts? No No       Active Orders   Date Order Priority Status Authorizing Provider   06/09/25 1508 Debridement Traumatic Anterior;Left;Upper Leg Routine Active Deysi Pulliam MD       Wound 05/30/25 #6 Left lateral leg Leg Left (Active)   Date First Assessed/Time First Assessed: 05/30/25 1424    Wound Number (Wound Clinic Only): #6 Left lateral leg  Primary Wound Type: Traumatic  Location: Leg  Wound Location Orientation: Left      Assessments 5/30/2025  2:24 PM 6/9/2025  2:14 PM   Wound Image        Drainage Amount Unable to assess Small   Drainage Description -- Serosanguineous   Wound Length (cm) 2 cm 1.1 cm   Wound Width (cm) 0.6 cm 0.5 cm   Wound Surface Area (cm^2) 0.94 cm^2 0.43 cm^2   Wound Depth (cm) 0.1 cm 0.1 cm   Wound Volume (cm^3) 0.063 cm^3 0.029 cm^3   Wound Healing % -- 54   Margins Well-defined edges Well-defined edges   Non-staged Wound Description Full thickness Full thickness   [place]-wound Assessment Edema Edema   Wound Granulation Tissue Pink;Firm Pink;Firm   Wound Bed Granulation (%) 80 % 75 %   Wound Bed Slough (%) 20 % 25 %   Wound Odor None None   Tunneling? No No   Undermining? No No   Sinus Tracts? No No       Active Orders   Date Order Priority Status Authorizing Provider   06/09/25 1508 Debridement Traumatic Left Leg Routine Active Deysi Pulliam MD                ASSESSMENT AND PLAN:     Assessment     Encounter Diagnosis  1. Open wound of left thigh, subsequent encounter    2. Open wound of left lower leg, subsequent encounter    3. Sloughing of wound    4. Long term (current) use of anticoagulants    5. Delayed wound healing    6. Peripheral vascular disease, unspecified            PROCEDURES:    Debridement Traumatic Anterior;Left;Upper Leg   Wound 05/30/25 #7 Left anterior upper leg Leg Anterior;Left;Upper     Performed by: Deysi Pulliam MD  Authorized by: Deysi Pulliam MD       Consent   Consent obtained?  verbal  Consent given by: patient     Debridement Details  Performed by: physician  Debridement type: conservative sharp  Pain control: lidocaine 4%  Pain control administration type: topical     Pre-debridement measurements  Length (cm): 0.7  Width (cm): 4.5 (slight angle)  Depth (cm): 0.1  Surface Area (cm^2): 2.47     Post-debridement measurements  Length (cm): 0.7  Width (cm): 4.5  Depth (cm): 0.2  Percent debrided: 100%  Surface Area (cm^2): 2.47  Area Debrided (cm^2): 2.47  Volume (cm^3): 0.33     Tissue and other material debrided: subcutaneous tissue  Devitalized tissue debrided: biofilm and slough  Instrument(s) utilized: curette  Bleeding: small  Hemostasis obtained with: pressure  Procedural pain (0-10): 4  Post-procedural pain: 2   Response to treatment: procedure was tolerated well     Debridement Traumatic Left Leg   Wound 05/30/25 #6 Left lateral leg Leg Left     Performed by: Deysi Pulliam MD  Authorized by: Deysi Pulliam MD       Consent   Consent obtained? verbal  Consent given by: patient     Debridement Details  Performed by: physician  Debridement type: surgical  Level of debridement: subcutaneous tissue  Pain control: lidocaine 4%  Pain control administration type: topical     Pre-debridement measurements  Length (cm): 1.1  Width (cm): 0.5  Depth (cm): 0.1  Surface Area (cm^2): 0.43     Post-debridement measurements  Length (cm): 1.1  Width (cm): 0.5  Depth (cm): 0.2  Percent debrided: 100%  Surface Area (cm^2): 0.43  Area Debrided (cm^2): 0.43  Volume (cm^3): 0.06     Tissue and other material debrided: subcutaneous tissue  Devitalized tissue debrided: biofilm and slough  Instrument(s) utilized: curette  Bleeding: small  Hemostasis obtained with: pressure  Procedural pain (0-10): 4  Post-procedural pain: 2   Response to treatment: procedure was tolerated well                      PLAN OF CARE:    Serial debridements to hasten healing.   Honey gel, folded xeroform, border foam to  wounds.   Address edema.   Watch out for signs of early infection - counseled.   Plan of care discussed with patient in detail - All questions answered   Return in 2 weeks.     Orders  Orders Placed This Encounter   Procedures    Debridement Traumatic Anterior;Left;Upper Leg    Debridement Traumatic Left Leg         Patient Instructions     Return 2 weeks    Wound Cleaning and Dressings:    Shower with protection - use Shower boot   DRESSINGS:   All wounds: honey gel / xeroform / bordered foam - Zinc barrier cream kailyn-wound  Change dressing daily    Compression Therapy : spandagrip    Compression Therapy Instructions:  1.  Put on first thing in the morning and may remove at bedside.  Okay to wear overnight      if comfortable.  Do not let stockings roll up/down and kink.  Hand wash stockings and      hang dry as needed.    2.  Avoid prolonged standing in one place.  It is better to have your calf muscles moving       to pump fluid out of the legs.    3.  Elevate leg(s) above the level of the heart when sitting or as much as possible.    4.  Take your diuretics as directed by your provider.  Do not skip doses or change doses      unless instructed to do so by your provider.    5. Do not get leg(s) with compression wrap wet. If wraps are too tight as indicated        By pain, numbness/tingling or discoloration of toes remove wrap completely       and call the   wound center.     Off-Loading:  Offload wounded areas.     Miscellaneous Instructions:  Supplement with a daily multivitamin   Low salt diet  Increase protein intake / consider protein supplements - see below  Elevate extremities at all times when sitting / laying down.    DIETARY MODIFICATIONS TO HELP WITH WOUND HEALING:    Protein: Meats, beans, eggs, milk and yogurt particularly Greek yogurt), tofu, soy nuts, soy protein products    Vitamin C: Citrus fruits and juices, strawberries, tomatoes, tomato juice, peppers, baked potatoes, spinach, broccoli,  cauliflower, [place] sprouts, cabbage    Vitamin A: Dark green, leafy vegetables, orange or yellow vegetables, cantaloupe, fortified dairy products, liver, fortified cereals    Zinc: Fortified cereals, red meats, seafood    Consider Jason by [employer] (These are essential branch chain amino acids that help with tissue building and wound healing) and take 2 packets/day. you can order online at [employer] or [employer]    ADDITIONAL REMINDERS:    The treatment plan has been discussed at length with you and your provider. Follow all instructions carefully, it is very important. If you do not follow all instructions, you are at  risk of your wound not healing, infection, possible loss of limb and even end of life.  Please call the clinic during regular business hours ( 7:30 AM - 5:30 PM) if you notice increased bleeding, redness, warmth, pain or pus like drainage or start running a fever greater than 100.3.    For after hour emergencies, please call your primary physician or go to the nearest emergency room.    Patient/Caregiver Education: There are no barriers to learning. Medical education for above diagnosis given.   Answered all questions.    Outcome: Patient verbalizes understanding. Patient is notified to call with any questions, complications, allergies, or worsening or changing symptoms.  Patient is to call with any side effects or complications as a result of the treatments today.      DOCUMENTATION OF TIME SPENT: Code selection for this visit was based on time spent : 35 min on date of service in preparing to see the patient, obtaining and/or reviewing separately obtained history, performing a medically appropriate examination, counseling and educating the patient/family/caregiver, ordering medications or testing, referring and communicating with other healthcare providers, documenting clinical information in the E HR, independently interpreting results and communicating results to the patient/family/caregiver and  care coordination with the patient's other providers.    Followup: Return in about 2 weeks (around 6/23/2025) for Wound followup.      Note to Patient:  The 21st Century Cures Act makes medical notes like these available to patients in the interest of transparency. However, be advised this is a medical document and is intended as kmbj-ls-cafk communication; it is written in medical language and may appear blunt, direct, or contain abbreviations or verbiage that are unfamiliar. Medical documents are intended to carry relevant information, facts as evident, and the clinical opinion of the practitioner.    Also, please note that this report has been produced using speech recognition software and may contain errors related to that system including, but not limited to, errors in grammar, punctuation, and spelling, as well as words and phrases that possibly may have been recognized inappropriately.  If there are any questions or concerns, contact the dictating provider for clarification.      Deysi Zhou MD  6/9/2025  2:43 PM                      [1]   Allergies  Allergen Reactions    Penicillins ANAPHYLAXIS and HYPOTENSION     Cardiac arrest      Pregabalin SWELLING    Raspberry NAUSEA AND VOMITING and SWELLING     Throat closes    Trimethobenzamide ANAPHYLAXIS     Cardiac arrest    Sulfa Antibiotics ITCHING    Pcn [Bicillin L-A] ANAPHYLAXIS     .    Tigan [Trimethobenzamide Hcl] ANAPHYLAXIS     Pass out and cardiac arrest.    [2]   Current Outpatient Medications   Medication Sig Dispense Refill    gentamicin 0.1 % External Ointment Apply 1 Application topically 3 (three) times daily. 30 g 3    SILVER SULFADIAZINE 1 % External Cream APPLY TOPICALLY TO THE AFFECTED AREA DAILY (Patient not taking: Reported on 1/15/2025) 25 g 0    lidocaine (LIDODERM) 5 % External Patch Place 1 patch onto the skin daily. 30 patch 2    baclofen 10 MG Oral Tab Take 1 tablet (10 mg total) by mouth 3 (three) times daily. 90 tablet 2     Wound Dressings ([facility] WOUND/BURN DRESSING) External Gel Apply 1 Application topically daily as needed. (Patient not taking: Reported on 1/15/2025) 44 mL 1    RECTASMOOTHE 5 % External Cream APPLY A SMALL AMOUNT TO THE AFFECTED AREA UP TWICE DAILY (Patient not taking: Reported on 1/15/2025) 30 g 0    warfarin 2 MG Oral Tab Take 1 tablet (2 mg total) by mouth nightly. 90 tablet 3    Wound Dressings (ADAPTIC NON-ADHERING DRESSING) External Pads Apply 1 each topically daily as needed. (Patient not taking: Reported on 1/15/2025) 30 each 1    Omeprazole 40 MG Oral Capsule Delayed Release Take 1 capsule (40 mg total) by mouth daily. (Patient not taking: Reported on 1/15/2025) 90 capsule 3    enoxaparin 60 MG/0.6ML Subcutaneous Solution INJECT 60MG SUB Q INTO ABDOMINAL AREA TWICE DAILY under direction of coumadin clinic 20 each 1    gabapentin 600 MG Oral Tab       tolterodine tartrate 4 MG Oral Capsule SR 24 Hr Take 1 capsule (4 mg total) by mouth daily. 90 capsule 3    simvastatin 5 MG Oral Tab Take 1 tablet (5 mg total) by mouth nightly. 90 tablet 3    RESTASIS MULTIDOSE 0.05 % Ophthalmic Emulsion       methylPREDNISolone 4 MG Oral Tab       Cholecalciferol 25 MCG (1000 UT) Oral Tab Every Day      Mycophenolate Mofetil 500 MG Oral Tab Take 2 tablets (1,000 mg total) by mouth 2 (two) times daily.      KEVZARA 200 MG/1.14ML Subcutaneous Solution Auto-injector  (Patient not taking: Reported on 1/15/2025)      potassium chloride 20 MEQ Oral Tab CR Take 1 tablet (20 mEq total) by mouth 2 (two) times daily. 180 tablet 3    spironolactone 25 MG Oral Tab Take 1 tablet (25 mg total) by mouth daily. 90 tablet 3    Nystatin 411250 UNIT/GM External Powder Apply 1 Application topically 2 (two) times daily as needed. 60 g 2    MONTELUKAST SODIUM 10 MG Oral Tab TAKE 1 TABLET(10 MG) BY MOUTH EVERY DAY 90 tablet 3    metroNIDAZOLE 1 % External Gel Apply to face nightly (Patient not taking: Reported on 1/15/2025) 60 g 1    PULMICORT  FLEXHALER 180 MCG/ACT Inhalation Aerosol Powder, Breath Activated INHALE 2 PUFFS INTO THE LUNGS TWICE DAILY 1 each 11    GABAPENTIN 400 MG Oral Cap TAKE 3 CAPSULES BY MOUTH THREE TIMES DAILY 270 capsule 5    Pilocarpine HCl 5 MG Oral Tab Take 1 tablet (5 mg total) by mouth 3 (three) times daily.      Albuterol Sulfate  (90 Base) MCG/ACT Inhalation Aero Soln Inhale 2 puffs into the lungs every 6 (six) hours as needed for Wheezing or Shortness of Breath. 18 g 3    Probiotic Product (PROBIOTIC DAILY OR) Take by mouth.      Diphenoxylate-Atropine (LOMOTIL OR) Take by mouth.      hydrocortisone (ANUSOL-HC) 2.5 % Rectal Cream Place 1 Application rectally 2 (two) times daily as needed for Hemorrhoids. 60 g 1    Zolpidem Tartrate (AMBIEN) 10 MG Oral Tab Take 1 tablet (10 mg total) by mouth nightly as needed. (Patient not taking: Reported on 1/15/2025) 30 tablet 1    Ammonium Lactate (LAC-HYDRIN) 12 % External Cream Apply qd prn to afected area 140 g 3    leflunomide (ARAVA) 20 MG Oral Tab Take 1 tablet (20 mg total) by mouth daily.      Hydroxychloroquine Sulfate 200 MG Oral Tab Take  by mouth 2 (two) times daily.

## 2025-06-12 ENCOUNTER — HOSPITAL ENCOUNTER (OUTPATIENT)
Dept: CT IMAGING | Age: 70
Discharge: HOME | End: 2025-06-12
Attending: INTERNAL MEDICINE

## 2025-06-12 DIAGNOSIS — M81.0 OSTEOPOROSIS: ICD-10-CM

## 2025-06-12 LAB
DEXA FRACTURE RISK HIP: NORMAL
DEXA FRACTURE RISK MAJOR: NORMAL
DEXA LT FEMNECK TSCORE: -1.8
DEXA LT FEMNECK ZSCORE: 0
DEXA LT FOREARM T-SCORE: -2.5
DEXA LT FOREARM Z-SCORE: -0.4
DEXA LT HIP FRAX: NORMAL
DEXA LT MAJOR OSTEO FRAX: NORMAL
DEXA LT TOTFEM TSCORE: -1.2
DEXA LT TOTFEM ZSCORE: 0.3

## 2025-06-12 PROCEDURE — 77080 DXA BONE DENSITY AXIAL: CPT

## 2025-06-23 ENCOUNTER — APPOINTMENT (OUTPATIENT)
Dept: WOUND CARE | Facility: HOSPITAL | Age: 70
End: 2025-06-23
Attending: INTERNAL MEDICINE
Payer: MEDICARE

## 2025-06-23 VITALS
TEMPERATURE: 98 F | DIASTOLIC BLOOD PRESSURE: 65 MMHG | HEART RATE: 70 BPM | SYSTOLIC BLOOD PRESSURE: 130 MMHG | RESPIRATION RATE: 14 BRPM

## 2025-06-23 DIAGNOSIS — R23.8 SLOUGHING OF WOUND: ICD-10-CM

## 2025-06-23 DIAGNOSIS — I73.9 PERIPHERAL VASCULAR DISEASE, UNSPECIFIED: ICD-10-CM

## 2025-06-23 DIAGNOSIS — Z79.01 LONG TERM (CURRENT) USE OF ANTICOAGULANTS: ICD-10-CM

## 2025-06-23 DIAGNOSIS — L97.922 NON-PRESSURE CHRONIC ULCER OF LEFT LOWER LEG WITH FAT LAYER EXPOSED (HCC): Primary | ICD-10-CM

## 2025-06-23 DIAGNOSIS — L97.122 NON-PRESSURE CHRONIC ULCER OF LEFT THIGH WITH FAT LAYER EXPOSED (HCC): ICD-10-CM

## 2025-06-23 DIAGNOSIS — T14.8XXD DELAYED WOUND HEALING: ICD-10-CM

## 2025-06-23 PROCEDURE — 99214 OFFICE O/P EST MOD 30 MIN: CPT

## 2025-06-23 PROCEDURE — 97597 DBRDMT OPN WND 1ST 20 CM/<: CPT | Performed by: INTERNAL MEDICINE

## 2025-06-23 NOTE — PROGRESS NOTES
.Weekly Wound Education Note    Teaching Provided To: Patient  Training Topics: Dressing, Discharge instructions, Cleasing and general instructions, Edema control  Training Method: Explain/Verbal, Written  Training Response: Patient responds and understands            Honey gel, folded xeroform to wounds, cover with border foam.  Patient encouraged to wear her compression socks.

## 2025-06-23 NOTE — PROGRESS NOTES
Patient ID: Vero Rodriguez is a 69 year old female.    Debridement Traumatic Anterior;Left;Upper Leg   Wound 05/30/25 #7 Left anterior upper leg Leg Anterior;Left;Upper    Performed by: Deysi Pulliam MD  Authorized by: Deysi Pulliam MD      Consent   Consent obtained? verbal  Consent given by: patient    Debridement Details  Performed by: physician  Debridement type: conservative sharp  Pain control: lidocaine 4%  Pain control administration type: topical    Pre-debridement measurements  Length (cm): 0.6  Width (cm): 3.5 (slight angle)  Depth (cm): 0.1  Surface Area (cm^2): 1.65    Post-debridement measurements  Length (cm): 0.6  Width (cm): 3.5  Depth (cm): 0.2  Percent debrided: 100%  Surface Area (cm^2): 1.65  Area Debrided (cm^2): 1.65  Volume (cm^3): 0.22    Devitalized tissue debrided: biofilm and slough  Instrument(s) utilized: curette  Bleeding: small  Hemostasis obtained with: pressure  Procedural pain (0-10): 4  Post-procedural pain: 2   Response to treatment: procedure was tolerated well

## 2025-06-23 NOTE — PROGRESS NOTES
[facility] PROGRESS NOTE  ALEXIS LUZ MD  6/23/2025    Chief Complaint:   Chief Complaint   Patient presents with    Wound Care     Follow-up for wounds to left side - thigh/leg. Denies pain or concerns at this time.        HPI:   Subjective   Vero Rodriguez is a 69 year old female coming in for a follow-up visit.    HPI    Wounds both improved.   Leg wound significantly smaller with more epithelium and granulation.     Thigh wound also improved but significant slough present   Debridement done.     Not wearing compression at all as it is hot    No s/o infection.       Review of Systems  Negative except HPI   Denies chest pain / SOB / palpitations  Denies fever.     Allergies  Allergies[1]    Current Meds:  Current Medications[2]      EXAM:   Objective   Objective    Physical Exam    Vital Signs  Vitals:    06/23/25 1603   BP: 130/65   Pulse: 70   Resp: 14   Temp: 97.8 °F (36.6 °C)       Wound Assessment  Wound 05/30/25 #7 Left anterior upper leg Leg Anterior;Left;Upper (Active)   Date First Assessed/Time First Assessed: 05/30/25 1353    Wound Number (Wound Clinic Only): #7 Left anterior upper leg  Primary Wound Type: Traumatic  Location: Leg  Wound Location Orientation: Anterior;Left;Upper      Assessments 5/30/2025  2:01 PM 6/23/2025  4:06 PM   Wound Image        Drainage Amount Moderate Small   Drainage Description Serosanguineous Serosanguineous   Wound Length (cm) 1.1 cm 0.6 cm   Wound Width (cm) 6 cm (angled) 3.5 cm (slight angle)   Wound Surface Area (cm^2) 5.18 cm^2 1.65 cm^2   Wound Depth (cm) 0.1 cm 0.1 cm   Wound Volume (cm^3) 0.346 cm^3 0.11 cm^3   Wound Healing % -- 68   Margins Well-defined edges Well-defined edges   Non-staged Wound Description Full thickness Full thickness   [place]-wound Assessment Ecchymosis Edema;Moist   Wound Granulation Tissue Spongy;Pink Pink;Firm   Wound Bed Granulation (%) 80 % (20% adipose) 10 %   Wound Bed Epithelium (%) -- 5 %   Wound Bed Slough (%) -- 85 %    Wound Odor None Mild   Shape 20% adipose bridged   Tunneling? No No   Undermining? No No   Sinus Tracts? No No       Active Orders   Date Order Priority Status Authorizing Provider   06/23/25 1627 Debridement Traumatic Anterior;Left;Upper Leg Routine Active Deysi Pulliam MD       Inactive Orders   Date Order Priority Status Authorizing Provider   06/09/25 1508 Debridement Traumatic Anterior;Left;Upper Leg Routine Completed Deysi Pulliam MD       Wound 05/30/25 #6 Left lateral leg Leg Left (Active)   Date First Assessed/Time First Assessed: 05/30/25 1424    Wound Number (Wound Clinic Only): #6 Left lateral leg  Primary Wound Type: Traumatic  Location: Leg  Wound Location Orientation: Left      Assessments 5/30/2025  2:24 PM 6/23/2025  4:07 PM   Wound Image       Drainage Amount Unable to assess Scant   Drainage Description -- Yellow;Serous   Wound Length (cm) 2 cm 0.4 cm   Wound Width (cm) 0.6 cm 0.2 cm   Wound Surface Area (cm^2) 0.94 cm^2 0.06 cm^2   Wound Depth (cm) 0.1 cm 0.1 cm   Wound Volume (cm^3) 0.063 cm^3 0.004 cm^3   Wound Healing % -- 94   Margins Well-defined edges Well-defined edges   Non-staged Wound Description Full thickness Full thickness   [place]-wound Assessment Edema Hemosiderin staining   Wound Granulation Tissue Pink;Firm Pink;Firm   Wound Bed Granulation (%) 80 % 100 %   Wound Bed Slough (%) 20 % --   Wound Odor None None   Tunneling? No No   Undermining? No No   Sinus Tracts? No No       Inactive Orders   Date Order Priority Status Authorizing Provider   06/09/25 1508 Debridement Traumatic Left Leg Routine Completed Deysi Pulliam MD          ASSESSMENT AND PLAN:     Assessment     Encounter Diagnosis  1. 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 Sloughing of wound    4. Delayed wound healing    5. Peripheral vascular disease, unspecified    6. Long term  (current) use of anticoagulants      PROCEDURES:    Debridement Traumatic Anterior;Left;Upper Leg   Wound 05/30/25 #7 Left anterior upper leg Leg Anterior;Left;Upper     Performed by: Deysi Pulliam MD  Authorized by: Deysi Pulliam MD       Consent   Consent obtained? verbal  Consent given by: patient     Debridement Details  Performed by: physician  Debridement type: conservative sharp  Pain control: lidocaine 4%  Pain control administration type: topical     Pre-debridement measurements  Length (cm): 0.6  Width (cm): 3.5 (slight angle)  Depth (cm): 0.1  Surface Area (cm^2): 1.65     Post-debridement measurements  Length (cm): 0.6  Width (cm): 3.5  Depth (cm): 0.2  Percent debrided: 100%  Surface Area (cm^2): 1.65  Area Debrided (cm^2): 1.65  Volume (cm^3): 0.22     Devitalized tissue debrided: biofilm and slough  Instrument(s) utilized: curette  Bleeding: small  Hemostasis obtained with: pressure  Procedural pain (0-10): 4  Post-procedural pain: 2   Response to treatment: procedure was tolerated well             PLAN OF CARE:    Serial debridements to hasten healing.   Continue honey gel for enzymatic debridement.   Recommend wear compression garment  Watch out for signs of early infection - counseled.   Plan of care discussed with patient in detail - All questions answered   Return in 3 week.     Orders  Orders Placed This Encounter   Procedures    Debridement Traumatic Anterior;Left;Upper Leg       Meds & Refills for this Visit:  Requested Prescriptions      No prescriptions requested or ordered in this encounter         Patient Instructions     Return 3 weeks    Wound Cleaning and Dressings:    Shower with protection - use Shower boot   DRESSINGS:   All wounds: honey gel / xeroform / bordered foam - Zinc barrier cream kailyn-wound  Change dressing daily    Compression Therapy : spandagrip    Compression Therapy Instructions:  1.  Put on first thing in the morning and may remove at bedside.  Okay to  wear overnight      if comfortable.  Do not let stockings roll up/down and kink.  Hand wash stockings and      hang dry as needed.    2.  Avoid prolonged standing in one place.  It is better to have your calf muscles moving       to pump fluid out of the legs.    3.  Elevate leg(s) above the level of the heart when sitting or as much as possible.    4.  Take your diuretics as directed by your provider.  Do not skip doses or change doses      unless instructed to do so by your provider.    5. Do not get leg(s) with compression wrap wet. If wraps are too tight as indicated        By pain, numbness/tingling or discoloration of toes remove wrap completely       and call the   wound center.     Off-Loading:  Offload wounded areas.     Miscellaneous Instructions:  Supplement with a daily multivitamin   Low salt diet  Increase protein intake / consider protein supplements - see below  Elevate extremities at all times when sitting / laying down.    DIETARY MODIFICATIONS TO HELP WITH WOUND HEALING:    Protein: Meats, beans, eggs, milk and yogurt particularly Greek yogurt), tofu, soy nuts, soy protein products    Vitamin C: Citrus fruits and juices, strawberries, tomatoes, tomato juice, peppers, baked potatoes, spinach, broccoli, cauliflower, [place] sprouts, cabbage    Vitamin A: Dark green, leafy vegetables, orange or yellow vegetables, cantaloupe, fortified dairy products, liver, fortified cereals    Zinc: Fortified cereals, red meats, seafood    Consider Jason by [employer] (These are essential branch chain amino acids that help with tissue building and wound healing) and take 2 packets/day. you can order online at [employer] or [employer]    ADDITIONAL REMINDERS:    The treatment plan has been discussed at length with you and your provider. Follow all instructions carefully, it is very important. If you do not follow all instructions, you are at  risk of your wound not healing, infection, possible loss of limb and even end of  life.  Please call the clinic during regular business hours ( 7:30 AM - 5:30 PM) if you notice increased bleeding, redness, warmth, pain or pus like drainage or start running a fever greater than 100.3.    For after hour emergencies, please call your primary physician or go to the nearest emergency room.      Patient/Caregiver Education: There are no barriers to learning. Medical education for above diagnosis given.   Answered all questions.    Outcome: Patient verbalizes understanding. Patient is notified to call with any questions, complications, allergies, or worsening or changing symptoms.  Patient is to call with any side effects or complications as a result of the treatments today.      DOCUMENTATION OF TIME SPENT: Code selection for this visit was based on time spent : 30 min on date of service in preparing to see the patient, obtaining and/or reviewing separately obtained history, performing a medically appropriate examination, counseling and educating the patient/family/caregiver, ordering medications or testing, referring and communicating with other healthcare providers, documenting clinical information in the E HR, independently interpreting results and communicating results to the patient/family/caregiver and care coordination with the patient's other providers.    Followup: Return in about 3 weeks (around 7/14/2025).      Note to Patient:  The 21st Century Cures Act makes medical notes like these available to patients in the interest of transparency. However, be advised this is a medical document and is intended as [id number] communication; it is written in medical language and may appear blunt, direct, or contain abbreviations or verbiage that are unfamiliar. Medical documents are intended to carry relevant information, facts as evident, and the clinical opinion of the practitioner.    Also, please note that this report has been produced using speech recognition software and may contain errors  related to that system including, but not limited to, errors in grammar, punctuation, and spelling, as well as words and phrases that possibly may have been recognized inappropriately.  If there are any questions or concerns, contact the dictating provider for clarification.      Deysi Zhou MD  6/23/2025  4:31 PM                      [1]   Allergies  Allergen Reactions    Penicillins ANAPHYLAXIS and HYPOTENSION     Cardiac arrest      Pregabalin SWELLING    Raspberry NAUSEA AND VOMITING and SWELLING     Throat closes    Trimethobenzamide ANAPHYLAXIS     Cardiac arrest    Sulfa Antibiotics ITCHING    Pcn [Bicillin L-A] ANAPHYLAXIS     .    Tigan [Trimethobenzamide Hcl] ANAPHYLAXIS     Pass out and cardiac arrest.    [2]   Current Outpatient Medications   Medication Sig Dispense Refill    gentamicin 0.1 % External Ointment Apply 1 Application topically 3 (three) times daily. 30 g 3    SILVER SULFADIAZINE 1 % External Cream APPLY TOPICALLY TO THE AFFECTED AREA DAILY (Patient not taking: Reported on 1/15/2025) 25 g 0    lidocaine (LIDODERM) 5 % External Patch Place 1 patch onto the skin daily. 30 patch 2    baclofen 10 MG Oral Tab Take 1 tablet (10 mg total) by mouth 3 (three) times daily. 90 tablet 2    Wound Dressings (MEDIHONEY WOUND/BURN DRESSING) External Gel Apply 1 Application topically daily as needed. (Patient not taking: Reported on 1/15/2025) 44 mL 1    RECTASMOOTHE 5 % External Cream APPLY A SMALL AMOUNT TO THE AFFECTED AREA UP TWICE DAILY (Patient not taking: Reported on 1/15/2025) 30 g 0    warfarin 2 MG Oral Tab Take 1 tablet (2 mg total) by mouth nightly. 90 tablet 3    Wound Dressings (ADAPTIC NON-ADHERING DRESSING) External Pads Apply 1 each topically daily as needed. (Patient not taking: Reported on 1/15/2025) 30 each 1    Omeprazole 40 MG Oral Capsule Delayed Release Take 1 capsule (40 mg total) by mouth daily. (Patient not taking: Reported on 1/15/2025) 90 capsule 3    enoxaparin 60  MG/0.6ML Subcutaneous Solution INJECT 60MG SUB Q INTO ABDOMINAL AREA TWICE DAILY under direction of coumadin clinic 20 each 1    gabapentin 600 MG Oral Tab       tolterodine tartrate 4 MG Oral Capsule SR 24 Hr Take 1 capsule (4 mg total) by mouth daily. 90 capsule 3    simvastatin 5 MG Oral Tab Take 1 tablet (5 mg total) by mouth nightly. 90 tablet 3    RESTASIS MULTIDOSE 0.05 % Ophthalmic Emulsion       methylPREDNISolone 4 MG Oral Tab       Cholecalciferol 25 MCG (1000 UT) Oral Tab Every Day      Mycophenolate Mofetil 500 MG Oral Tab Take 2 tablets (1,000 mg total) by mouth 2 (two) times daily.      KEVZARA 200 MG/1.14ML Subcutaneous Solution Auto-injector  (Patient not taking: Reported on 1/15/2025)      potassium chloride 20 MEQ Oral Tab CR Take 1 tablet (20 mEq total) by mouth 2 (two) times daily. 180 tablet 3    spironolactone 25 MG Oral Tab Take 1 tablet (25 mg total) by mouth daily. 90 tablet 3    Nystatin 987471 UNIT/GM External Powder Apply 1 Application topically 2 (two) times daily as needed. 60 g 2    MONTELUKAST SODIUM 10 MG Oral Tab TAKE 1 TABLET(10 MG) BY MOUTH EVERY DAY 90 tablet 3    metroNIDAZOLE 1 % External Gel Apply to face nightly (Patient not taking: Reported on 1/15/2025) 60 g 1    PULMICORT FLEXHALER 180 MCG/ACT Inhalation Aerosol Powder, Breath Activated INHALE 2 PUFFS INTO THE LUNGS TWICE DAILY 1 each 11    GABAPENTIN 400 MG Oral Cap TAKE 3 CAPSULES BY MOUTH THREE TIMES DAILY 270 capsule 5    Pilocarpine HCl 5 MG Oral Tab Take 1 tablet (5 mg total) by mouth 3 (three) times daily.      Albuterol Sulfate  (90 Base) MCG/ACT Inhalation Aero Soln Inhale 2 puffs into the lungs every 6 (six) hours as needed for Wheezing or Shortness of Breath. 18 g 3    Probiotic Product (PROBIOTIC DAILY OR) Take by mouth.      Diphenoxylate-Atropine (LOMOTIL OR) Take by mouth.      hydrocortisone (ANUSOL-HC) 2.5 % Rectal Cream Place 1 Application rectally 2 (two) times daily as needed for Hemorrhoids. 60  g 1    Zolpidem Tartrate (AMBIEN) 10 MG Oral Tab Take 1 tablet (10 mg total) by mouth nightly as needed. (Patient not taking: Reported on 1/15/2025) 30 tablet 1    Ammonium Lactate (LAC-HYDRIN) 12 % External Cream Apply qd prn to afected area 140 g 3    leflunomide (ARAVA) 20 MG Oral Tab Take 1 tablet (20 mg total) by mouth daily.      Hydroxychloroquine Sulfate 200 MG Oral Tab Take  by mouth 2 (two) times daily.

## 2025-06-23 NOTE — PATIENT INSTRUCTIONS
Return 3 weeks    Wound Cleaning and Dressings:    Shower with protection - use Shower boot   DRESSINGS:   All wounds: honey gel / xeroform / bordered foam - Zinc barrier cream kailyn-wound  Change dressing daily    Compression Therapy : spandagrip    Compression Therapy Instructions:  1.  Put on first thing in the morning and may remove at bedside.  Okay to wear overnight      if comfortable.  Do not let stockings roll up/down and kink.  Hand wash stockings and      hang dry as needed.    2.  Avoid prolonged standing in one place.  It is better to have your calf muscles moving       to pump fluid out of the legs.    3.  Elevate leg(s) above the level of the heart when sitting or as much as possible.    4.  Take your diuretics as directed by your provider.  Do not skip doses or change doses      unless instructed to do so by your provider.    5. Do not get leg(s) with compression wrap wet. If wraps are too tight as indicated        By pain, numbness/tingling or discoloration of toes remove wrap completely       and call the   wound center.     Off-Loading:  Offload wounded areas.     Miscellaneous Instructions:  Supplement with a daily multivitamin   Low salt diet  Increase protein intake / consider protein supplements - see below  Elevate extremities at all times when sitting / laying down.    DIETARY MODIFICATIONS TO HELP WITH WOUND HEALING:    Protein: Meats, beans, eggs, milk and yogurt particularly Greek yogurt), tofu, soy nuts, soy protein products    Vitamin C: Citrus fruits and juices, strawberries, tomatoes, tomato juice, peppers, baked potatoes, spinach, broccoli, cauliflower, [place] sprouts, cabbage    Vitamin A: Dark green, leafy vegetables, orange or yellow vegetables, cantaloupe, fortified dairy products, liver, fortified cereals    Zinc: Fortified cereals, red meats, seafood    Consider Jason by [employer] (These are essential branch chain amino acids that help with tissue building and wound  healing) and take 2 packets/day. you can order online at [employer] or [employer]    ADDITIONAL REMINDERS:    The treatment plan has been discussed at length with you and your provider. Follow all instructions carefully, it is very important. If you do not follow all instructions, you are at  risk of your wound not healing, infection, possible loss of limb and even end of life.  Please call the clinic during regular business hours ( 7:30 AM - 5:30 PM) if you notice increased bleeding, redness, warmth, pain or pus like drainage or start running a fever greater than 100.3.    For after hour emergencies, please call your primary physician or go to the nearest emergency room.

## 2025-07-14 ENCOUNTER — OFFICE VISIT (OUTPATIENT)
Dept: WOUND CARE | Facility: HOSPITAL | Age: 70
End: 2025-07-14
Attending: INTERNAL MEDICINE
Payer: MEDICARE

## 2025-07-14 VITALS
DIASTOLIC BLOOD PRESSURE: 67 MMHG | HEART RATE: 57 BPM | TEMPERATURE: 98 F | RESPIRATION RATE: 14 BRPM | SYSTOLIC BLOOD PRESSURE: 148 MMHG

## 2025-07-14 DIAGNOSIS — L97.922 NON-PRESSURE CHRONIC ULCER OF LEFT LOWER LEG WITH FAT LAYER EXPOSED (HCC): Primary | ICD-10-CM

## 2025-07-14 DIAGNOSIS — I73.9 PERIPHERAL VASCULAR DISEASE, UNSPECIFIED: ICD-10-CM

## 2025-07-14 DIAGNOSIS — Z79.01 LONG TERM (CURRENT) USE OF ANTICOAGULANTS: ICD-10-CM

## 2025-07-14 DIAGNOSIS — L97.122 NON-PRESSURE CHRONIC ULCER OF LEFT THIGH WITH FAT LAYER EXPOSED (HCC): ICD-10-CM

## 2025-07-14 DIAGNOSIS — T14.8XXD DELAYED WOUND HEALING: ICD-10-CM

## 2025-07-14 DIAGNOSIS — R23.8 SLOUGHING OF WOUND: ICD-10-CM

## 2025-07-14 PROCEDURE — 99213 OFFICE O/P EST LOW 20 MIN: CPT

## 2025-07-14 NOTE — PROGRESS NOTES
[facility] PROGRESS NOTE  ALXEIS LUZ MD  7/14/2025    Chief Complaint:   Chief Complaint   Patient presents with    Wound Care     Patient is here for a follow up visit for a left thigh wound.         HPI:   Subjective   Vero Rodriguez is a 69 year old female coming in for a follow-up visit.    HPI    Wound closed  Skin mature.   Bruises both legs - some old some new.   No open ulcers.   Did not come in with compression garment  and refused to wear one at discharge   Edema well controlled at present.     Review of Systems  Negative except HPI   Denies chest pain / SOB / palpitations  Denies fever.     Allergies  Allergies[1]    Current Meds:  Current Medications[2]      EXAM:   Objective   Objective    Physical Exam    Vital Signs  Vitals:    07/14/25 1101   BP: 148/67   Pulse: 57   Resp: 14   Temp: 98 °F (36.7 °C)       Wound Assessment  Wound 05/30/25 #7 Left anterior upper leg Leg Anterior;Left;Upper (Active)   Date First Assessed/Time First Assessed: 05/30/25 1353    Wound Number (Wound Clinic Only): #7 Left anterior upper leg  Primary Wound Type: Traumatic  Location: Leg  Wound Location Orientation: Anterior;Left;Upper      Assessments 5/30/2025  2:01 PM 7/14/2025 10:57 AM   Wound Image       Drainage Amount Moderate None   Drainage Description Serosanguineous --   Wound Length (cm) 1.1 cm 0 cm   Wound Width (cm) 6 cm (angled) 0 cm   Wound Surface Area (cm^2) 5.18 cm^2 0 cm^2   Wound Depth (cm) 0.1 cm 0 cm   Wound Volume (cm^3) 0.346 cm^3 0 cm^3   Wound Healing % -- 100   Margins Well-defined edges Flat and Intact   Non-staged Wound Description Full thickness Full thickness   [place]-wound Assessment Ecchymosis Clean;Intact   Wound Granulation Tissue Spongy;Pink --   Wound Bed Granulation (%) 80 % (20% adipose) --   Wound Bed Epithelium (%) -- 100 %   Wound Odor None None   Shape 20% adipose --   Tunneling? No No   Undermining? No No   Sinus Tracts? No No       Inactive Orders   Date Order Priority  Status Authorizing Provider   06/23/25 1627 Debridement Traumatic Anterior;Left;Upper Leg Routine Completed Deysi Pulliam MD   06/09/25 1508 Debridement Traumatic Anterior;Left;Upper Leg Routine Completed Deysi Pulliam MD       Wound 05/30/25 #6 Left lateral leg Leg Left (Active)   Date First Assessed/Time First Assessed: 05/30/25 1424    Wound Number (Wound Clinic Only): #6 Left lateral leg  Primary Wound Type: Traumatic  Location: Leg  Wound Location Orientation: Left      Assessments 5/30/2025  2:24 PM 7/14/2025 10:56 AM   Wound Image       Drainage Amount Unable to assess None   Wound Length (cm) 2 cm 0 cm   Wound Width (cm) 0.6 cm 0 cm   Wound Surface Area (cm^2) 0.94 cm^2 0 cm^2   Wound Depth (cm) 0.1 cm 0 cm   Wound Volume (cm^3) 0.063 cm^3 0 cm^3   Wound Healing % -- 100   Margins Well-defined edges Flat and Intact   Non-staged Wound Description Full thickness Full thickness   [place]-wound Assessment Edema Intact;Clean   Wound Granulation Tissue Pink;Firm --   Wound Bed Granulation (%) 80 % --   Wound Bed Epithelium (%) -- 100 %   Wound Bed Slough (%) 20 % --   Wound Odor None None   Tunneling? No No   Undermining? No No   Sinus Tracts? No No       Inactive Orders   Date Order Priority Status Authorizing Provider   06/09/25 1508 Debridement Traumatic Left Leg Routine Completed Deysi Pulliam MD                ASSESSMENT AND PLAN:     Assessment     Encounter Diagnosis  1. 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 Peripheral vascular disease, unspecified    4. Long term (current) use of anticoagulants    5. Delayed wound healing    6. Sloughing of wound        PLAN OF CARE:    Strongly recommend - focus on edema management I.e. compression garment at all times when not in bed  Low sat diet.   Leg elevation.  Watch out for signs of early infection - counseled.   Plan of care  discussed with patient in detail - All questions answered   Return in one week.     Patient Instructions     Compression Therapy : spandagrip / compression garment    Compression Therapy Instructions:  1.  Put on first thing in the morning and may remove at bedside.  Okay to wear overnight      if comfortable.  Do not let stockings roll up/down and kink.  Hand wash stockings and      hang dry as needed.    2.  Avoid prolonged standing in one place.  It is better to have your calf muscles moving       to pump fluid out of the legs.    3.  Elevate leg(s) above the level of the heart when sitting or as much as possible.    4.  Take your diuretics as directed by your provider.  Do not skip doses or change doses      unless instructed to do so by your provider.    5. Do not get leg(s) with compression wrap wet. If wraps are too tight as indicated        By pain, numbness/tingling or discoloration of toes remove wrap completely       and call the   wound center.     Miscellaneous Instructions:    Low salt diet    Elevate extremities at all times when sitting / laying down.    DIETARY MODIFICATIONS TO HELP WITH WOUND HEALING:    Protein: Meats, beans, eggs, milk and yogurt particularly Greek yogurt), tofu, soy nuts, soy protein products    Vitamin C: Citrus fruits and juices, strawberries, tomatoes, tomato juice, peppers, baked potatoes, spinach, broccoli, cauliflower, [place] sprouts, cabbage    Vitamin A: Dark green, leafy vegetables, orange or yellow vegetables, cantaloupe, fortified dairy products, liver, fortified cereals    Zinc: Fortified cereals, red meats, seafood    Consider Jason by [employer] (These are essential branch chain amino acids that help with tissue building and wound healing) and take 2 packets/day. you can order online at [employer] or [employer]    ADDITIONAL REMINDERS:    The treatment plan has been discussed at length with you and your provider. Follow all instructions carefully, it is very important.  If you do not follow all instructions, you are at  risk of your wound not healing, infection, possible loss of limb and even end of life.  Please call the clinic during regular business hours ( 7:30 AM - 5:30 PM) if you notice increased bleeding, redness, warmth, pain or pus like drainage or start running a fever greater than 100.3.    For after hour emergencies, please call your primary physician or go to the nearest emergency room.      Patient/Caregiver Education: There are no barriers to learning. Medical education for above diagnosis given.   Answered all questions.    Outcome: Patient verbalizes understanding. Patient is notified to call with any questions, complications, allergies, or worsening or changing symptoms.  Patient is to call with any side effects or complications as a result of the treatments today.      DOCUMENTATION OF TIME SPENT: Code selection for this visit was based on time spent : 23 min on date of service in preparing to see the patient, obtaining and/or reviewing separately obtained history, performing a medically appropriate examination, counseling and educating the patient/family/caregiver, ordering medications or testing, referring and communicating with other healthcare providers, documenting clinical information in the E HR, independently interpreting results and communicating results to the patient/family/caregiver and care coordination with the patient's other providers.    Followup: Return if symptoms worsen or fail to improve, for if wound reopens - Discharge from Wound clinic..      Note to Patient:  The 21st Century Cures Act makes medical notes like these available to patients in the interest of transparency. However, be advised this is a medical document and is intended as xnen-ih-gral communication; it is written in medical language and may appear blunt, direct, or contain abbreviations or verbiage that are unfamiliar. Medical documents are intended to carry relevant  information, facts as evident, and the clinical opinion of the practitioner.    Also, please note that this report has been produced using speech recognition software and may contain errors related to that system including, but not limited to, errors in grammar, punctuation, and spelling, as well as words and phrases that possibly may have been recognized inappropriately.  If there are any questions or concerns, contact the dictating provider for clarification.      Deysi Zhou MD  7/14/2025  11:48 AM                        [1]   Allergies  Allergen Reactions    Penicillins ANAPHYLAXIS and HYPOTENSION     Cardiac arrest      Pregabalin SWELLING    Raspberry NAUSEA AND VOMITING and SWELLING     Throat closes    Trimethobenzamide ANAPHYLAXIS     Cardiac arrest    Sulfa Antibiotics ITCHING    Pcn [Bicillin L-A] ANAPHYLAXIS     .    Tigan [Trimethobenzamide Hcl] ANAPHYLAXIS     Pass out and cardiac arrest.    [2]   Current Outpatient Medications   Medication Sig Dispense Refill    gentamicin 0.1 % External Ointment Apply 1 Application topically 3 (three) times daily. 30 g 3    SILVER SULFADIAZINE 1 % External Cream APPLY TOPICALLY TO THE AFFECTED AREA DAILY (Patient not taking: Reported on 1/15/2025) 25 g 0    lidocaine (LIDODERM) 5 % External Patch Place 1 patch onto the skin daily. 30 patch 2    baclofen 10 MG Oral Tab Take 1 tablet (10 mg total) by mouth 3 (three) times daily. 90 tablet 2    Wound Dressings (MEDIHONEY WOUND/BURN DRESSING) External Gel Apply 1 Application topically daily as needed. (Patient not taking: Reported on 1/15/2025) 44 mL 1    RECTASMOOTHE 5 % External Cream APPLY A SMALL AMOUNT TO THE AFFECTED AREA UP TWICE DAILY (Patient not taking: Reported on 1/15/2025) 30 g 0    warfarin 2 MG Oral Tab Take 1 tablet (2 mg total) by mouth nightly. 90 tablet 3    Wound Dressings (ADAPTIC NON-ADHERING DRESSING) External Pads Apply 1 each topically daily as needed. (Patient not taking: Reported on  1/15/2025) 30 each 1    Omeprazole 40 MG Oral Capsule Delayed Release Take 1 capsule (40 mg total) by mouth daily. (Patient not taking: Reported on 1/15/2025) 90 capsule 3    enoxaparin 60 MG/0.6ML Subcutaneous Solution INJECT 60MG SUB Q INTO ABDOMINAL AREA TWICE DAILY under direction of coumadin clinic 20 each 1    gabapentin 600 MG Oral Tab       tolterodine tartrate 4 MG Oral Capsule SR 24 Hr Take 1 capsule (4 mg total) by mouth daily. 90 capsule 3    simvastatin 5 MG Oral Tab Take 1 tablet (5 mg total) by mouth nightly. 90 tablet 3    RESTASIS MULTIDOSE 0.05 % Ophthalmic Emulsion       methylPREDNISolone 4 MG Oral Tab       Cholecalciferol 25 MCG (1000 UT) Oral Tab Every Day      Mycophenolate Mofetil 500 MG Oral Tab Take 2 tablets (1,000 mg total) by mouth 2 (two) times daily.      KEVZARA 200 MG/1.14ML Subcutaneous Solution Auto-injector  (Patient not taking: Reported on 1/15/2025)      potassium chloride 20 MEQ Oral Tab CR Take 1 tablet (20 mEq total) by mouth 2 (two) times daily. 180 tablet 3    spironolactone 25 MG Oral Tab Take 1 tablet (25 mg total) by mouth daily. 90 tablet 3    Nystatin 346791 UNIT/GM External Powder Apply 1 Application topically 2 (two) times daily as needed. 60 g 2    MONTELUKAST SODIUM 10 MG Oral Tab TAKE 1 TABLET(10 MG) BY MOUTH EVERY DAY 90 tablet 3    metroNIDAZOLE 1 % External Gel Apply to face nightly (Patient not taking: Reported on 1/15/2025) 60 g 1    PULMICORT FLEXHALER 180 MCG/ACT Inhalation Aerosol Powder, Breath Activated INHALE 2 PUFFS INTO THE LUNGS TWICE DAILY 1 each 11    GABAPENTIN 400 MG Oral Cap TAKE 3 CAPSULES BY MOUTH THREE TIMES DAILY 270 capsule 5    Pilocarpine HCl 5 MG Oral Tab Take 1 tablet (5 mg total) by mouth 3 (three) times daily.      Albuterol Sulfate  (90 Base) MCG/ACT Inhalation Aero Soln Inhale 2 puffs into the lungs every 6 (six) hours as needed for Wheezing or Shortness of Breath. 18 g 3    Probiotic Product (PROBIOTIC DAILY OR) Take by  mouth.      Diphenoxylate-Atropine (LOMOTIL OR) Take by mouth.      hydrocortisone (ANUSOL-HC) 2.5 % Rectal Cream Place 1 Application rectally 2 (two) times daily as needed for Hemorrhoids. 60 g 1    Zolpidem Tartrate (AMBIEN) 10 MG Oral Tab Take 1 tablet (10 mg total) by mouth nightly as needed. (Patient not taking: Reported on 1/15/2025) 30 tablet 1    Ammonium Lactate (LAC-HYDRIN) 12 % External Cream Apply qd prn to afected area 140 g 3    leflunomide (ARAVA) 20 MG Oral Tab Take 1 tablet (20 mg total) by mouth daily.      Hydroxychloroquine Sulfate 200 MG Oral Tab Take  by mouth 2 (two) times daily.

## 2025-07-14 NOTE — PROGRESS NOTES
.Weekly Wound Education Note    Teaching Provided To: Patient  Training Topics: Discharge instructions, Compression, Cleasing and general instructions, Edema control  Training Method: Explain/Verbal, Written  Training Response: Patient responds and understands            Wounds are healed.  Patient encouraged to wear compression socks, refused spanda  in clinic.  Discharged from clinic.

## 2025-07-14 NOTE — PATIENT INSTRUCTIONS
Compression Therapy : spandagrip / compression garment    Compression Therapy Instructions:  1.  Put on first thing in the morning and may remove at bedside.  Okay to wear overnight      if comfortable.  Do not let stockings roll up/down and kink.  Hand wash stockings and      hang dry as needed.    2.  Avoid prolonged standing in one place.  It is better to have your calf muscles moving       to pump fluid out of the legs.    3.  Elevate leg(s) above the level of the heart when sitting or as much as possible.    4.  Take your diuretics as directed by your provider.  Do not skip doses or change doses      unless instructed to do so by your provider.    5. Do not get leg(s) with compression wrap wet. If wraps are too tight as indicated        By pain, numbness/tingling or discoloration of toes remove wrap completely       and call the   wound center.     Miscellaneous Instructions:    Low salt diet    Elevate extremities at all times when sitting / laying down.    DIETARY MODIFICATIONS TO HELP WITH WOUND HEALING:    Protein: Meats, beans, eggs, milk and yogurt particularly Greek yogurt), tofu, soy nuts, soy protein products    Vitamin C: Citrus fruits and juices, strawberries, tomatoes, tomato juice, peppers, baked potatoes, spinach, broccoli, cauliflower, [place] sprouts, cabbage    Vitamin A: Dark green, leafy vegetables, orange or yellow vegetables, cantaloupe, fortified dairy products, liver, fortified cereals    Zinc: Fortified cereals, red meats, seafood    Consider Jason by [employer] (These are essential branch chain amino acids that help with tissue building and wound healing) and take 2 packets/day. you can order online at [employer] or [employer]    ADDITIONAL REMINDERS:    The treatment plan has been discussed at length with you and your provider. Follow all instructions carefully, it is very important. If you do not follow all instructions, you are at  risk of your wound not healing, infection, possible loss  of limb and even end of life.  Please call the clinic during regular business hours ( 7:30 AM - 5:30 PM) if you notice increased bleeding, redness, warmth, pain or pus like drainage or start running a fever greater than 100.3.    For after hour emergencies, please call your primary physician or go to the nearest emergency room.

## 2025-07-29 ENCOUNTER — TELEPHONE (OUTPATIENT)
Dept: WOUND CARE | Facility: HOSPITAL | Age: 70
End: 2025-07-29

## 2025-08-04 ENCOUNTER — OFFICE VISIT (OUTPATIENT)
Dept: WOUND CARE | Facility: HOSPITAL | Age: 70
End: 2025-08-04
Attending: INTERNAL MEDICINE

## 2025-08-04 VITALS
DIASTOLIC BLOOD PRESSURE: 61 MMHG | HEART RATE: 60 BPM | TEMPERATURE: 98 F | SYSTOLIC BLOOD PRESSURE: 125 MMHG | RESPIRATION RATE: 15 BRPM

## 2025-08-04 DIAGNOSIS — I87.331 IDIOPATHIC CHRONIC VENOUS HYPERTENSION OF RIGHT LOWER EXTREMITY WITH ULCER AND INFLAMMATION (HCC): ICD-10-CM

## 2025-08-04 DIAGNOSIS — I73.9 PERIPHERAL VASCULAR DISEASE, UNSPECIFIED: ICD-10-CM

## 2025-08-04 DIAGNOSIS — R23.8 SLOUGHING OF WOUND: ICD-10-CM

## 2025-08-04 DIAGNOSIS — T14.8XXD DELAYED WOUND HEALING: ICD-10-CM

## 2025-08-04 DIAGNOSIS — L97.212 NON-PRESSURE CHRONIC ULCER OF RIGHT CALF WITH FAT LAYER EXPOSED (HCC): Primary | ICD-10-CM

## 2025-08-04 DIAGNOSIS — M79.89 RIGHT LEG SWELLING: ICD-10-CM

## 2025-08-04 PROCEDURE — 11042 DBRDMT SUBQ TIS 1ST 20SQCM/<: CPT | Performed by: INTERNAL MEDICINE

## 2025-08-04 PROCEDURE — 99214 OFFICE O/P EST MOD 30 MIN: CPT

## 2025-08-07 ENCOUNTER — OFFICE VISIT (OUTPATIENT)
Dept: WOUND CARE | Facility: HOSPITAL | Age: 70
End: 2025-08-07
Attending: INTERNAL MEDICINE

## 2025-08-07 VITALS
TEMPERATURE: 98 F | SYSTOLIC BLOOD PRESSURE: 127 MMHG | RESPIRATION RATE: 16 BRPM | HEART RATE: 59 BPM | DIASTOLIC BLOOD PRESSURE: 61 MMHG

## 2025-08-07 DIAGNOSIS — T14.8XXD DELAYED WOUND HEALING: ICD-10-CM

## 2025-08-07 DIAGNOSIS — I73.9 PERIPHERAL VASCULAR DISEASE, UNSPECIFIED: ICD-10-CM

## 2025-08-07 DIAGNOSIS — R23.8 SLOUGHING OF WOUND: ICD-10-CM

## 2025-08-07 DIAGNOSIS — I87.331 IDIOPATHIC CHRONIC VENOUS HYPERTENSION OF RIGHT LOWER EXTREMITY WITH ULCER AND INFLAMMATION (HCC): ICD-10-CM

## 2025-08-07 DIAGNOSIS — M79.89 RIGHT LEG SWELLING: ICD-10-CM

## 2025-08-07 DIAGNOSIS — L97.212 NON-PRESSURE CHRONIC ULCER OF RIGHT CALF WITH FAT LAYER EXPOSED (HCC): Primary | ICD-10-CM

## 2025-08-07 PROCEDURE — 29581 APPL MULTLAYER CMPRN SYS LEG: CPT

## 2025-08-11 ENCOUNTER — OFFICE VISIT (OUTPATIENT)
Dept: WOUND CARE | Facility: HOSPITAL | Age: 70
End: 2025-08-11
Attending: INTERNAL MEDICINE

## 2025-08-11 VITALS
HEART RATE: 66 BPM | RESPIRATION RATE: 15 BRPM | SYSTOLIC BLOOD PRESSURE: 130 MMHG | TEMPERATURE: 97 F | DIASTOLIC BLOOD PRESSURE: 66 MMHG

## 2025-08-11 DIAGNOSIS — T14.8XXD DELAYED WOUND HEALING: ICD-10-CM

## 2025-08-11 DIAGNOSIS — M79.89 RIGHT LEG SWELLING: ICD-10-CM

## 2025-08-11 DIAGNOSIS — L97.212 NON-PRESSURE CHRONIC ULCER OF RIGHT CALF WITH FAT LAYER EXPOSED (HCC): Primary | ICD-10-CM

## 2025-08-11 DIAGNOSIS — Z79.01 LONG TERM (CURRENT) USE OF ANTICOAGULANTS: ICD-10-CM

## 2025-08-11 DIAGNOSIS — I87.331 IDIOPATHIC CHRONIC VENOUS HYPERTENSION OF RIGHT LOWER EXTREMITY WITH ULCER AND INFLAMMATION (HCC): ICD-10-CM

## 2025-08-11 DIAGNOSIS — R23.8 SLOUGHING OF WOUND: ICD-10-CM

## 2025-08-11 DIAGNOSIS — I73.9 PERIPHERAL VASCULAR DISEASE, UNSPECIFIED: ICD-10-CM

## 2025-08-11 PROCEDURE — 29581 APPL MULTLAYER CMPRN SYS LEG: CPT

## 2025-08-18 ENCOUNTER — OFFICE VISIT (OUTPATIENT)
Dept: WOUND CARE | Facility: HOSPITAL | Age: 70
End: 2025-08-18
Attending: INTERNAL MEDICINE

## 2025-08-18 VITALS
SYSTOLIC BLOOD PRESSURE: 95 MMHG | RESPIRATION RATE: 15 BRPM | DIASTOLIC BLOOD PRESSURE: 57 MMHG | HEART RATE: 70 BPM | TEMPERATURE: 98 F

## 2025-08-18 DIAGNOSIS — R23.8 SLOUGHING OF WOUND: ICD-10-CM

## 2025-08-18 DIAGNOSIS — Z79.01 LONG TERM (CURRENT) USE OF ANTICOAGULANTS: ICD-10-CM

## 2025-08-18 DIAGNOSIS — I73.9 PERIPHERAL VASCULAR DISEASE, UNSPECIFIED: ICD-10-CM

## 2025-08-18 DIAGNOSIS — I87.331 IDIOPATHIC CHRONIC VENOUS HYPERTENSION OF RIGHT LOWER EXTREMITY WITH ULCER AND INFLAMMATION (HCC): ICD-10-CM

## 2025-08-18 DIAGNOSIS — T14.8XXD DELAYED WOUND HEALING: ICD-10-CM

## 2025-08-18 DIAGNOSIS — L97.212 NON-PRESSURE CHRONIC ULCER OF RIGHT CALF WITH FAT LAYER EXPOSED (HCC): Primary | ICD-10-CM

## 2025-08-18 DIAGNOSIS — M79.89 RIGHT LEG SWELLING: ICD-10-CM

## 2025-08-18 PROCEDURE — 29581 APPL MULTLAYER CMPRN SYS LEG: CPT

## 2025-08-22 ENCOUNTER — OFFICE VISIT (OUTPATIENT)
Dept: WOUND CARE | Facility: HOSPITAL | Age: 70
End: 2025-08-22
Attending: INTERNAL MEDICINE

## 2025-08-22 ENCOUNTER — APPOINTMENT (OUTPATIENT)
Dept: WOUND CARE | Facility: HOSPITAL | Age: 70
End: 2025-08-22
Attending: INTERNAL MEDICINE

## 2025-08-22 VITALS
RESPIRATION RATE: 14 BRPM | DIASTOLIC BLOOD PRESSURE: 61 MMHG | HEART RATE: 82 BPM | SYSTOLIC BLOOD PRESSURE: 128 MMHG | TEMPERATURE: 98 F

## 2025-08-22 DIAGNOSIS — L97.212 NON-PRESSURE CHRONIC ULCER OF RIGHT CALF WITH FAT LAYER EXPOSED (HCC): Primary | ICD-10-CM

## 2025-08-22 DIAGNOSIS — R23.8 SLOUGHING OF WOUND: ICD-10-CM

## 2025-08-22 DIAGNOSIS — T14.8XXD DELAYED WOUND HEALING: ICD-10-CM

## 2025-08-22 DIAGNOSIS — M79.89 RIGHT LEG SWELLING: ICD-10-CM

## 2025-08-22 DIAGNOSIS — I87.331 IDIOPATHIC CHRONIC VENOUS HYPERTENSION OF RIGHT LOWER EXTREMITY WITH ULCER AND INFLAMMATION (HCC): ICD-10-CM

## 2025-08-22 DIAGNOSIS — I73.9 PERIPHERAL VASCULAR DISEASE, UNSPECIFIED: ICD-10-CM

## 2025-08-22 PROCEDURE — 29581 APPL MULTLAYER CMPRN SYS LEG: CPT

## 2025-08-29 ENCOUNTER — OFFICE VISIT (OUTPATIENT)
Dept: WOUND CARE | Facility: HOSPITAL | Age: 70
End: 2025-08-29
Attending: INTERNAL MEDICINE

## 2025-08-29 ENCOUNTER — APPOINTMENT (OUTPATIENT)
Dept: WOUND CARE | Facility: HOSPITAL | Age: 70
End: 2025-08-29
Attending: INTERNAL MEDICINE

## 2025-08-29 VITALS
TEMPERATURE: 100 F | DIASTOLIC BLOOD PRESSURE: 58 MMHG | HEART RATE: 73 BPM | RESPIRATION RATE: 14 BRPM | SYSTOLIC BLOOD PRESSURE: 118 MMHG

## 2025-08-29 DIAGNOSIS — I87.331 IDIOPATHIC CHRONIC VENOUS HYPERTENSION OF RIGHT LOWER EXTREMITY WITH ULCER AND INFLAMMATION (HCC): ICD-10-CM

## 2025-08-29 DIAGNOSIS — I73.9 PERIPHERAL VASCULAR DISEASE, UNSPECIFIED: ICD-10-CM

## 2025-08-29 DIAGNOSIS — L97.812 ULCER OF RIGHT PRETIBIAL REGION, WITH FAT LAYER EXPOSED (HCC): Primary | ICD-10-CM

## 2025-08-29 DIAGNOSIS — M79.89 RIGHT LEG SWELLING: ICD-10-CM

## 2025-08-29 DIAGNOSIS — R23.8 SLOUGHING OF WOUND: ICD-10-CM

## 2025-08-29 DIAGNOSIS — S51.812A LACERATION OF LEFT FOREARM, INITIAL ENCOUNTER: ICD-10-CM

## 2025-08-29 DIAGNOSIS — Z79.01 LONG TERM (CURRENT) USE OF ANTICOAGULANTS: ICD-10-CM

## 2025-08-29 DIAGNOSIS — T14.8XXD DELAYED WOUND HEALING: ICD-10-CM
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
## 2025-08-29 PROBLEM — R93.0 ABNORMAL CT OF THE HEAD: Status: ACTIVE | Noted: 2025-08-29

## 2025-08-29 PROBLEM — R55 SYNCOPE, UNSPECIFIED SYNCOPE TYPE: Status: ACTIVE | Noted: 2025-08-29

## 2025-08-29 PROBLEM — D69.6 THROMBOCYTOPENIA: Status: ACTIVE | Noted: 2025-08-29

## 2025-08-29 PROBLEM — R79.89 AZOTEMIA: Status: ACTIVE | Noted: 2025-08-29

## 2025-08-29 PROBLEM — U07.1 COVID-19: Status: ACTIVE | Noted: 2025-08-29

## 2025-08-29 PROCEDURE — 29581 APPL MULTLAYER CMPRN SYS LEG: CPT

## (undated) NOTE — LETTER
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 punctuation, and spelling, as well as words and phrases that possibly may have been recognized inappropriately.  If there are any questions or concerns, contact the dictating provider for clarification.      Deysi Zhou MD  1/15/2025  2:59 PM              [1]   Allergies  Allergen Reactions    Penicillins ANAPHYLAXIS and HYPOTENSION     Cardiac arrest      Pregabalin SWELLING    Raspberry NAUSEA AND VOMITING and SWELLING     Throat closes    Trimethobenzamide ANAPHYLAXIS     Cardiac arrest    Sulfa Antibiotics ITCHING    Pcn [Bicillin L-A] ANAPHYLAXIS     .    Tigan [Trimethobenzamide Hcl] ANAPHYLAXIS     Pass out and cardiac arrest.        Weekly Wound Education Note    Teaching Provided To: Patient  Training Topics: Cleasing and general instructions;Discharge instructions;Dressing  Training Method: Explain/Verbal  Training Response: Patient responds and understands;Reinforcement needed        Notes: Initial visit: left ankle: honey gel, honey gauze, triad paste to periwound, kerramax, conforming gauze, tape, spndagrip E x2. Right finger and heel: honey gel and bordered gauze. Right arm: silver foam Educated on all dressing and ordered supplies. REY ordered, advised pt to schedule test.        Wound Information/Order:  Wound  Number: All wounds  Product:  Right Finger and heel wound: 2x2 bordered gauze - daily changes  Right Arm: silver foam - 3 times a week  Left leg: honey gauze, 4x4 kerramax, conforming gauze roll, paper tape - daily changes  Dispense: 30 days  Dressing Frequency:Change dressing daily and/or PRN    Was a Debridement performed: Yes, Debridement type: selective    Compression Stockings ordered: No    Notes: Dispense as written. Please call patient if there is OOP.

## (undated) NOTE — LETTER
Date: 5/30/2025  Patient name: Debbie Rodriguez  YOB: 1955  Medical Record Number: [id number]  Primary Coverage: Payor: MEDICARE / Plan: MEDICARE PART A&B / Product Type: *No Product type* /   Secondary Coverage: [place] - BLUE CROSS [facility] PPO  Insurance ID: [id number]  Patient Address: [address] [place] [id number]  Telephone Information:   Home Phone [phone]   Work Phone [phone]   Mobile [phone]   Home Phone [phone]         Encounter Date: 5/30/2025  Provider: Alexis Luz MD  Diagnosis:     ICD-10-CM   1. Open wound of left thigh, initial encounter  S71.102A   2. Open wound of left lower leg, initial encounter  S81.802A   3. Non-pressure chronic ulcer of left lower leg with fat layer exposed (HCC)  L97.922   4. Peripheral vascular disease, unspecified  I73.9   5. Small fiber neuropathy  G62.9   6. Delayed wound healing  T14.8XXD   7. Sloughing of wound  R23.8   8. Long term (current) use of anticoagulants  Z79.01       Progress Note:  [facility] PROGRESS NOTE  ALEXIS LUZ MD  5/30/2025    Chief Complaint:   Chief Complaint   Patient presents with    Wound Care     Patient is here for a wound care follow up. She presents with a new wound from a fall this past Wednesday. Her pain is currently 9/10.        HPI:   Subjective   Vero Rodriguze is a 69 year old female coming in for a follow-up visit.    HPI    Stephanie has new wound on the left thigh and left leg-from a recent trauma/fall.  She did not go to the emergency room.  It looks like a skin tear on the wound base.    There are no active signs of infection in the periwound area or in the wound.  Pain is well-tolerated.    Posterior ankle wound and anterior foot ulcer are all closed-skin mature.  Edema well-controlled.    Review of Systems  Negative except HPI   Denies chest pain / SOB / palpitations  Denies fever.     Allergies  Allergies[1]    Current Meds:  Current  Medications[2]      EXAM:     Objective   Objective    Physical Exam    Vital Signs  Vitals:    05/30/25 1404   BP: 108/58   Pulse: 63   Resp: 16   Temp: 97.5 °F (36.4 °C)       Wound Assessment  Wound 01/15/25 #1 Left medial lower leg Leg Left;Medial;Lower (Active)   Date First Assessed/Time First Assessed: 01/15/25 1420    Wound Number (Wound Clinic Only): #1 Left medial lower leg  Primary Wound Type: Traumatic  Location: Leg  Wound Location Orientation: Left;Medial;Lower      Assessments 1/15/2025  2:24 PM 5/30/2025  2:03 PM   Wound Image        Drainage Amount Small None   Drainage Description Serosanguineous --   Treatments Compression (spandagrip E x2) --   Wound Length (cm) 3 cm (slight angle) 0 cm   Wound Width (cm) 3.8 cm 0 cm   Wound Surface Area (cm^2) 11.4 cm^2 0 cm^2   Wound Depth (cm) 0.1 cm 0 cm   Wound Volume (cm^3) 1.14 cm^3 0 cm^3   Wound Healing % -- 100   Margins Well-defined edges Well-defined edges   Non-staged Wound Description Full thickness Full thickness   [place]-wound Assessment Edema;Ecchymosis Clean   Wound Granulation Tissue Firm;Pink --   Wound Bed Granulation (%) 5 % --   Wound Bed Epithelium (%) -- 100 %   Wound Bed Slough (%) 95 % --   Wound Odor None None   Tunneling? No No   Undermining? No No   Sinus Tracts? No No       Inactive Orders   Date Order Priority Status Authorizing Provider   02/10/25 1336 Debridement Traumatic Left;Medial;Lower Leg Routine Completed Deysi Pulliam MD   01/27/25 1133 Debridement Traumatic Left;Medial;Lower Leg Routine Completed Deysi Pulliam MD   01/15/25 1455 Debridement Traumatic Left;Medial;Lower Leg Routine Completed Deysi Pulliam MD       Wound 01/27/25 #5 Left posterior leg Leg Lower;Posterior;Left (Active)   Date First Assessed/Time First Assessed: 01/27/25 1033    Wound Number (Wound Clinic Only): #5 Left posterior leg  Primary Wound Type: Venous Ulcer  Location: Leg  Wound Location Orientation: Lower;Posterior;Left       Assessments 1/27/2025 10:36 AM 5/30/2025  2:02 PM   Wound Image       Drainage Amount None None   Treatments Compression (calamine unna boot 20-30mmHg, medipore tape) --   Wound Length (cm) 1.8 cm 0 cm   Wound Width (cm) 0.4 cm 0 cm   Wound Surface Area (cm^2) 0.72 cm^2 0 cm^2   Wound Depth (cm) 0.1 cm 0 cm   Wound Volume (cm^3) 0.072 cm^3 0 cm^3   Wound Healing % -- 100   Margins Well-defined edges Well-defined edges   Non-staged Wound Description Full thickness Full thickness   [place]-wound Assessment Dry;Edema Clean;Dry   Wound Granulation Tissue Pink;Pale Grey;Firm --   Wound Bed Granulation (%) 90 % --   Wound Bed Epithelium (%) -- 100 %   Wound Odor None None   Shape 10% scab --   Tunneling? -- No   Undermining? -- No   Sinus Tracts? -- No       Inactive Orders   Date Order Priority Status Authorizing Provider   04/21/25 0937 Cellular tissue product application Venous Ulcer Lower;Posterior;Left Leg Routine Completed Deysi Pulliam MD   03/17/25 1437 Debridement Venous Ulcer Lower;Posterior;Left Leg Routine Completed Deysi Pulliam MD   02/24/25 1155 Debridement Venous Ulcer Lower;Posterior;Left Leg Routine Completed Deysi Pulliam MD       Wound 05/30/25 #2 Left anterior upper leg Leg Anterior;Left;Upper (Active)   Date First Assessed/Time First Assessed: 05/30/25 1353    Wound Number (Wound Clinic Only): #2 Left anterior upper leg  Primary Wound Type: Traumatic  Location: Leg  Wound Location Orientation: Anterior;Left;Upper      Assessments 5/30/2025  2:01 PM   Wound Image     Drainage Amount Moderate   Drainage Description Serosanguineous   Wound Length (cm) 1.1 cm   Wound Width (cm) 6 cm (angled)   Wound Surface Area (cm^2) 5.18 cm^2   Wound Depth (cm) 0.1 cm   Wound Volume (cm^3) 0.346 cm^3   Margins Well-defined edges   Non-staged Wound Description Full thickness   [place]-wound Assessment Ecchymosis   Wound Granulation Tissue Spongy;Pink   Wound Bed Granulation (%) 80 % (20% adipose)    Wound Odor None   Shape 20% adipose   Tunneling? No   Undermining? No   Sinus Tracts? No       No associated orders.       Wound 05/30/25 #6 Left lateral leg Leg Left (Active)   Date First Assessed/Time First Assessed: 05/30/25 1424    Wound Number (Wound Clinic Only): #6 Left lateral leg  Primary Wound Type: Traumatic  Location: Leg  Wound Location Orientation: Left      Assessments 5/30/2025  2:24 PM   Wound Image     Drainage Amount Unable to assess   Wound Length (cm) 2 cm   Wound Width (cm) 0.6 cm   Wound Surface Area (cm^2) 0.94 cm^2   Wound Depth (cm) 0.1 cm   Wound Volume (cm^3) 0.063 cm^3   Margins Well-defined edges   Non-staged Wound Description Full thickness   [place]-wound Assessment Edema   Wound Granulation Tissue Pink;Firm   Wound Bed Granulation (%) 80 %   Wound Bed Slough (%) 20 %   Wound Odor None       No associated orders.                ASSESSMENT AND PLAN:     Assessment    Encounter Diagnosis  1. Open wound of left thigh, initial encounter    2. Open wound of left lower leg, initial encounter    3. Non-pressure chronic ulcer of left lower leg with fat layer exposed (HCC)    4. Peripheral vascular disease, unspecified    5. Small fiber neuropathy    6. Delayed wound healing    7. Sloughing of wound    8. Long term (current) use of anticoagulants        PLAN OF CARE:    Start honey gel on wound base for enzymatic debridement on the open wounds.  Cover with Xeroform and absorptive dressing  Apply zinc barrier cream in the periwound area  Watch out for signs of infection  Edema controlled with compression socks  Watch out for signs of early infection - counseled.   Plan of care discussed with patient in detail - All questions answered   Return in one - two week.         Patient Instructions     Nurse visit next week  Return 2 weeks    Wound Cleaning and Dressings:    Shower with protection - use Shower boot   DRESSINGS:   All wounds: honey gel / xeroform / bordered foam - Zinc barrier cream  kailyn-wound  Change dressing weekly     Compression Therapy : spandagrip    Compression Therapy Instructions:  1.  Put on first thing in the morning and may remove at bedside.  Okay to wear overnight      if comfortable.  Do not let stockings roll up/down and kink.  Hand wash stockings and      hang dry as needed.    2.  Avoid prolonged standing in one place.  It is better to have your calf muscles moving       to pump fluid out of the legs.    3.  Elevate leg(s) above the level of the heart when sitting or as much as possible.    4.  Take your diuretics as directed by your provider.  Do not skip doses or change doses      unless instructed to do so by your provider.    5. Do not get leg(s) with compression wrap wet. If wraps are too tight as indicated        By pain, numbness/tingling or discoloration of toes remove wrap completely       and call the   wound center.     Off-Loading:  Offload wounded areas.     Miscellaneous Instructions:  Supplement with a daily multivitamin   Low salt diet  Increase protein intake / consider protein supplements - see below  Elevate extremities at all times when sitting / laying down.    DIETARY MODIFICATIONS TO HELP WITH WOUND HEALING:    Protein: Meats, beans, eggs, milk and yogurt particularly Greek yogurt), tofu, soy nuts, soy protein products    Vitamin C: Citrus fruits and juices, strawberries, tomatoes, tomato juice, peppers, baked potatoes, spinach, broccoli, cauliflower, [place] sprouts, cabbage    Vitamin A: Dark green, leafy vegetables, orange or yellow vegetables, cantaloupe, fortified dairy products, liver, fortified cereals    Zinc: Fortified cereals, red meats, seafood    Consider Jason by [employer] (These are essential branch chain amino acids that help with tissue building and wound healing) and take 2 packets/day. you can order online at [employer] or [employer]    ADDITIONAL REMINDERS:    The treatment plan has been discussed at length with you and your provider.  Follow all instructions carefully, it is very important. If you do not follow all instructions, you are at  risk of your wound not healing, infection, possible loss of limb and even end of life.  Please call the clinic during regular business hours ( 7:30 AM - 5:30 PM) if you notice increased bleeding, redness, warmth, pain or pus like drainage or start running a fever greater than 100.3.    For after hour emergencies, please call your primary physician or go to the nearest emergency room.    Patient/Caregiver Education: There are no barriers to learning. Medical education for above diagnosis given.   Answered all questions.    Outcome: Patient verbalizes understanding. Patient is notified to call with any questions, complications, allergies, or worsening or changing symptoms.  Patient is to call with any side effects or complications as a result of the treatments today.      DOCUMENTATION OF TIME SPENT: Code selection for this visit was based on time spent : 35 min on date of service in preparing to see the patient, obtaining and/or reviewing separately obtained history, performing a medically appropriate examination, counseling and educating the patient/family/caregiver, ordering medications or testing, referring and communicating with other healthcare providers, documenting clinical information in the E HR, independently interpreting results and communicating results to the patient/family/caregiver and care coordination with the patient's other providers.    Followup: Return in about 12 days (around 6/11/2025) for Wound followup.      Note to Patient:  The 21st Century Cures Act makes medical notes like these available to patients in the interest of transparency. However, be advised this is a medical document and is intended as xrzm-vk-bviy communication; it is written in medical language and may appear blunt, direct, or contain abbreviations or verbiage that are unfamiliar. Medical documents are intended to  carry relevant information, facts as evident, and the clinical opinion of the practitioner.    Also, please note that this report has been produced using speech recognition software and may contain errors related to that system including, but not limited to, errors in grammar, punctuation, and spelling, as well as words and phrases that possibly may have been recognized inappropriately.  If there are any questions or concerns, contact the dictating provider for clarification.      Deysi Zhou MD  5/30/2025  2:29 PM                      [1]   Allergies  Allergen Reactions    Penicillins ANAPHYLAXIS and HYPOTENSION     Cardiac arrest      Pregabalin SWELLING    Raspberry NAUSEA AND VOMITING and SWELLING     Throat closes    Trimethobenzamide ANAPHYLAXIS     Cardiac arrest    Sulfa Antibiotics ITCHING    Pcn [Bicillin L-A] ANAPHYLAXIS     .    Tigan [Trimethobenzamide Hcl] ANAPHYLAXIS     Pass out and cardiac arrest.    [2]   Current Outpatient Medications   Medication Sig Dispense Refill    gentamicin 0.1 % External Ointment Apply 1 Application topically 3 (three) times daily. 30 g 3    SILVER SULFADIAZINE 1 % External Cream APPLY TOPICALLY TO THE AFFECTED AREA DAILY (Patient not taking: Reported on 1/15/2025) 25 g 0    lidocaine (LIDODERM) 5 % External Patch Place 1 patch onto the skin daily. 30 patch 2    baclofen 10 MG Oral Tab Take 1 tablet (10 mg total) by mouth 3 (three) times daily. 90 tablet 2    Wound Dressings (MEDIHONEY WOUND/BURN DRESSING) External Gel Apply 1 Application topically daily as needed. (Patient not taking: Reported on 1/15/2025) 44 mL 1    RECTASMOOTHE 5 % External Cream APPLY A SMALL AMOUNT TO THE AFFECTED AREA UP TWICE DAILY (Patient not taking: Reported on 1/15/2025) 30 g 0    warfarin 2 MG Oral Tab Take 1 tablet (2 mg total) by mouth nightly. 90 tablet 3    Wound Dressings (ADAPTIC NON-ADHERING DRESSING) External Pads Apply 1 each topically daily as needed. (Patient not taking:  Reported on 1/15/2025) 30 each 1    Omeprazole 40 MG Oral Capsule Delayed Release Take 1 capsule (40 mg total) by mouth daily. (Patient not taking: Reported on 1/15/2025) 90 capsule 3    enoxaparin 60 MG/0.6ML Subcutaneous Solution INJECT 60MG SUB Q INTO ABDOMINAL AREA TWICE DAILY under direction of coumadin clinic 20 each 1    gabapentin 600 MG Oral Tab       tolterodine tartrate 4 MG Oral Capsule SR 24 Hr Take 1 capsule (4 mg total) by mouth daily. 90 capsule 3    simvastatin 5 MG Oral Tab Take 1 tablet (5 mg total) by mouth nightly. 90 tablet 3    RESTASIS MULTIDOSE 0.05 % Ophthalmic Emulsion       methylPREDNISolone 4 MG Oral Tab       Cholecalciferol 25 MCG (1000 UT) Oral Tab Every Day      Mycophenolate Mofetil 500 MG Oral Tab Take 2 tablets (1,000 mg total) by mouth 2 (two) times daily.      KEVZARA 200 MG/1.14ML Subcutaneous Solution Auto-injector  (Patient not taking: Reported on 1/15/2025)      potassium chloride 20 MEQ Oral Tab CR Take 1 tablet (20 mEq total) by mouth 2 (two) times daily. 180 tablet 3    spironolactone 25 MG Oral Tab Take 1 tablet (25 mg total) by mouth daily. 90 tablet 3    Nystatin 668639 UNIT/GM External Powder Apply 1 Application topically 2 (two) times daily as needed. 60 g 2    MONTELUKAST SODIUM 10 MG Oral Tab TAKE 1 TABLET(10 MG) BY MOUTH EVERY DAY 90 tablet 3    metroNIDAZOLE 1 % External Gel Apply to face nightly (Patient not taking: Reported on 1/15/2025) 60 g 1    PULMICORT FLEXHALER 180 MCG/ACT Inhalation Aerosol Powder, Breath Activated INHALE 2 PUFFS INTO THE LUNGS TWICE DAILY 1 each 11    GABAPENTIN 400 MG Oral Cap TAKE 3 CAPSULES BY MOUTH THREE TIMES DAILY 270 capsule 5    Pilocarpine HCl 5 MG Oral Tab Take 1 tablet (5 mg total) by mouth 3 (three) times daily.      Albuterol Sulfate  (90 Base) MCG/ACT Inhalation Aero Soln Inhale 2 puffs into the lungs every 6 (six) hours as needed for Wheezing or Shortness of Breath. 18 g 3    Probiotic Product (PROBIOTIC DAILY OR)  Take by mouth.      Diphenoxylate-Atropine (LOMOTIL OR) Take by mouth.      hydrocortisone (ANUSOL-HC) 2.5 % Rectal Cream Place 1 Application rectally 2 (two) times daily as needed for Hemorrhoids. 60 g 1    Zolpidem Tartrate (AMBIEN) 10 MG Oral Tab Take 1 tablet (10 mg total) by mouth nightly as needed. (Patient not taking: Reported on 1/15/2025) 30 tablet 1    Ammonium Lactate (LAC-HYDRIN) 12 % External Cream Apply qd prn to afected area 140 g 3    leflunomide (ARAVA) 20 MG Oral Tab Take 1 tablet (20 mg total) by mouth daily.      Hydroxychloroquine Sulfate 200 MG Oral Tab Take  by mouth 2 (two) times daily.         Weekly Wound Education Note    Teaching Provided To: Patient  Training Topics: Cleasing and general instructions, Discharge instructions, Dressing  Training Method: Explain/Verbal  Training Response: Patient responds and understands        Notes: Per provider wounds to left posterior and medial leg are healed. New wounds due to fall to left anterior thigh and lateral leg. Honey gel, xeroform, and bordered foam applied. Supplies ordered from [employer].        Wound Information/Order:  Wound Number: All wounds  Product: zetuvit plus silicone border #[id number]  Dispense: 30 days  Dressing Frequency:Change dressing daily and/or PRN    Was a Debridement performed: Yes, Debridement type: mechanical    Compression Stockings ordered: No    Notes: Dispense as written. Patient changing dressing daily. Please call patient if there is any type/amount of copay.     Additional wound: No

## (undated) NOTE — LETTER
Patient Name: Terry Devine  YOB: 1955          MRN number:  [id number]  Date:  11/1/2023  Referring Physician:  No ref. provider found    Discharge Summary  Pt has attended 7 visits in Speech Therapy. Dear Dr. Blanca Domingo,  This letter is to inform you of Vero Rodriguez's progress in speech-language therapy. Since her initial evaluation, Janak Oneill has attended 7 sessions. Therapy sessions have targeted voice including use of high intensity vocal exercises. A home exercise program (HEP) addressing HIVE and vocal function exercises has been provided and completed consistently. During this treatment period, Janak Oneill has demonstrated improved ability to project voice and facilitate increased vocal quality given training and cueing. As Vero has demonstrated significant progress and has met all goals, it is recommended that she be discharged from speech therapy at this time. Thank you for your referral. Please re-consult should further concerns arise. Subjective: Patient arrived on time to session. Patient participated actively in therapeutic tasks. Pain: Patient not being seen for pain. Objective:  Goals: (to be met in 12 visits)   STG 1: Patient will verbalize understanding and report use diaphragmatic breathing and vocal hygiene strategies over 2-3 sessions. Progress: Verbalized understanding of vocal hygiene and diaphragmatic breathing independently. Goal met. STG 2: Patient will complete vocal function and/or resonance exercises with 80% accuracy given min verbal/visual cues over 2-3 sessions. Progress: 95% given intermittent verbal cues. Goal met. STG 3: Patient will indicate reduced voice handicap after completing Vocal Function and High Intensity Voice Exercises measured by improved Voice Handicap Index score (baseline 27 / 120). Progress: 11/120. Goal met.      STG 4: Patient will independently complete home exercise program (i.e., vocal function exercises and high intensity voice exercises) at least 6 times per week across 6 weeks. Progress: Goal met. 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 Patient's deficits adversely impacted her perception of her voice and decrease her ability to effectively communicate with others. Patient has made significant progress toward improving vocal quality and ability to utilize voice within functional opportunities. Patient has also reported increased perception of voice with less impact on overall quality of communication. Plan: Patient to be discharged from speech-language therapy as all goals have been met. Patient/Family/Caregiver was advised of these findings, precautions, and treatment options and has agreed to actively participate in planning and for this course of care. Thank you for your referral. If you have any questions, please contact me at Dept: [phone]. Sincerely,  Electronically signed by therapist: CHIQUIS Ramos         21st Century Cures Act Notice to Patient: Medical documents like this are made available to patients in the interest of transparency. However, be advised this is a medical document and it is intended as wxif-gd-kbtu communication between your medical providers.  This medical document may contain abbreviations, assessments, medical data, and results or other terms that are unfamiliar. Medical documents are intended to carry relevant information, facts as evident, and the clinical opinion of the practitioner. As such, this medical document may be written in language that appears blunt or direct. You are encouraged to contact your medical provider and/or [facility] Experience if you have any questions about this medical document.

## (undated) NOTE — LETTER
Date: 6/23/2025  Patient name: Debbie Rodriguez  YOB: 1955  Medical Record Number: [id number]  Primary Coverage: Payor: MEDICARE / Plan: MEDICARE PART A&B / Product Type: *No Product type* /   Secondary Coverage: [facility] PPO - BLUE CROSS [facility] PPO  Insurance ID: [id number]  Patient Address: [address] [place] [id number]  Telephone Information:   Home Phone [phone]   Work Phone [phone]   Mobile [phone]   Home Phone [phone]         Encounter Date: 6/23/2025  Provider: Deysi Zhou MD  Diagnosis:     ICD-10-CM   1. 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 Sloughing of wound  R23.8   4. Delayed wound healing  T14.8XXD   5. Peripheral vascular disease, unspecified  I73.9   6. Long term (current) use of anticoagulants  Z79.01       Progress Note:  Patient ID: Vero Rodriguez is a 69 year old female.    Debridement Traumatic Anterior;Left;Upper Leg   Wound 05/30/25 #7 Left anterior upper leg Leg Anterior;Left;Upper    Performed by: Deysi Pulliam MD  Authorized by: Deysi Pulliam MD      Consent   Consent obtained? verbal  Consent given by: patient    Debridement Details  Performed by: physician  Debridement type: conservative sharp  Pain control: lidocaine 4%  Pain control administration type: topical    Pre-debridement measurements  Length (cm): 0.6  Width (cm): 3.5 (slight angle)  Depth (cm): 0.1  Surface Area (cm^2): 1.65    Post-debridement measurements  Length (cm): 0.6  Width (cm): 3.5  Depth (cm): 0.2  Percent debrided: 100%  Surface Area (cm^2): 1.65  Area Debrided (cm^2): 1.65  Volume (cm^3): 0.22    Devitalized tissue debrided: biofilm and slough  Instrument(s) utilized: curette  Bleeding: small  Hemostasis obtained with: pressure  Procedural pain (0-10): 4  Post-procedural pain: 2   Response to treatment:  procedure was tolerated well                  [facility] PROGRESS NOTE  ALEXIS LUZ MD  6/23/2025    Chief Complaint:   Chief Complaint   Patient presents with    Wound Care     Follow-up for wounds to left side - thigh/leg. Denies pain or concerns at this time.        HPI:   Subjective  Vero Rodriguez is a 69 year old female coming in for a follow-up visit.    HPI    Wounds both improved.   Leg wound significantly smaller with more epithelium and granulation.     Thigh wound also improved but significant slough present   Debridement done.     Not wearing compression at all as it is hot    No s/o infection.       Review of Systems  Negative except HPI   Denies chest pain / SOB / palpitations  Denies fever.     Allergies  Allergies[1]    Current Meds:  Current Medications[2]      EXAM:   Objective  Objective    Physical Exam    Vital Signs  Vitals:    06/23/25 1603   BP: 130/65   Pulse: 70   Resp: 14   Temp: 97.8 °F (36.6 °C)       Wound Assessment  Wound 05/30/25 #7 Left anterior upper leg Leg Anterior;Left;Upper (Active)   Date First Assessed/Time First Assessed: 05/30/25 1353    Wound Number (Wound Clinic Only): #7 Left anterior upper leg  Primary Wound Type: Traumatic  Location: Leg  Wound Location Orientation: Anterior;Left;Upper      Assessments 5/30/2025  2:01 PM 6/23/2025  4:06 PM   Wound Image        Drainage Amount Moderate Small   Drainage Description Serosanguineous Serosanguineous   Wound Length (cm) 1.1 cm 0.6 cm   Wound Width (cm) 6 cm (angled) 3.5 cm (slight angle)   Wound Surface Area (cm^2) 5.18 cm^2 1.65 cm^2   Wound Depth (cm) 0.1 cm 0.1 cm   Wound Volume (cm^3) 0.346 cm^3 0.11 cm^3   Wound Healing % -- 68   Margins Well-defined edges Well-defined edges   Non-staged Wound Description Full thickness Full thickness   [place]-wound Assessment Ecchymosis Edema;Moist   Wound Granulation Tissue Spongy;Pink Pink;Firm   Wound Bed Granulation (%) 80 % (20% adipose) 10 %   Wound Bed Epithelium  (%) -- 5 %   Wound Bed Slough (%) -- 85 %   Wound Odor None Mild   Shape 20% adipose bridged   Tunneling? No No   Undermining? No No   Sinus Tracts? No No       Active Orders   Date Order Priority Status Authorizing Provider   06/23/25 1627 Debridement Traumatic Anterior;Left;Upper Leg Routine Active Deysi Pulliam MD       Inactive Orders   Date Order Priority Status Authorizing Provider   06/09/25 1508 Debridement Traumatic Anterior;Left;Upper Leg Routine Completed Deysi Pulliam MD       Wound 05/30/25 #6 Left lateral leg Leg Left (Active)   Date First Assessed/Time First Assessed: 05/30/25 1424    Wound Number (Wound Clinic Only): #6 Left lateral leg  Primary Wound Type: Traumatic  Location: Leg  Wound Location Orientation: Left      Assessments 5/30/2025  2:24 PM 6/23/2025  4:07 PM   Wound Image       Drainage Amount Unable to assess Scant   Drainage Description -- Yellow;Serous   Wound Length (cm) 2 cm 0.4 cm   Wound Width (cm) 0.6 cm 0.2 cm   Wound Surface Area (cm^2) 0.94 cm^2 0.06 cm^2   Wound Depth (cm) 0.1 cm 0.1 cm   Wound Volume (cm^3) 0.063 cm^3 0.004 cm^3   Wound Healing % -- 94   Margins Well-defined edges Well-defined edges   Non-staged Wound Description Full thickness Full thickness   [place]-wound Assessment Edema Hemosiderin staining   Wound Granulation Tissue Pink;Firm Pink;Firm   Wound Bed Granulation (%) 80 % 100 %   Wound Bed Slough (%) 20 % --   Wound Odor None None   Tunneling? No No   Undermining? No No   Sinus Tracts? No No       Inactive Orders   Date Order Priority Status Authorizing Provider   06/09/25 1508 Debridement Traumatic Left Leg Routine Completed Deysi Pulliam MD          ASSESSMENT AND PLAN:     Assessment    Encounter Diagnosis  1. 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 Sloughing of wound    4. Delayed wound healing    5. Peripheral vascular  disease, unspecified    6. Long term (current) use of anticoagulants      PROCEDURES:    Debridement Traumatic Anterior;Left;Upper Leg   Wound 05/30/25 #7 Left anterior upper leg Leg Anterior;Left;Upper     Performed by: Deysi Pulliam MD  Authorized by: Deysi Pulliam MD       Consent   Consent obtained? verbal  Consent given by: patient     Debridement Details  Performed by: physician  Debridement type: conservative sharp  Pain control: lidocaine 4%  Pain control administration type: topical     Pre-debridement measurements  Length (cm): 0.6  Width (cm): 3.5 (slight angle)  Depth (cm): 0.1  Surface Area (cm^2): 1.65     Post-debridement measurements  Length (cm): 0.6  Width (cm): 3.5  Depth (cm): 0.2  Percent debrided: 100%  Surface Area (cm^2): 1.65  Area Debrided (cm^2): 1.65  Volume (cm^3): 0.22     Devitalized tissue debrided: biofilm and slough  Instrument(s) utilized: curette  Bleeding: small  Hemostasis obtained with: pressure  Procedural pain (0-10): 4  Post-procedural pain: 2   Response to treatment: procedure was tolerated well             PLAN OF CARE:    Serial debridements to hasten healing.   Continue honey gel for enzymatic debridement.   Recommend wear compression garment  Watch out for signs of early infection - counseled.   Plan of care discussed with patient in detail - All questions answered   Return in 3 week.     Orders  Orders Placed This Encounter   Procedures    Debridement Traumatic Anterior;Left;Upper Leg       Meds & Refills for this Visit:  Requested Prescriptions      No prescriptions requested or ordered in this encounter         Patient Instructions     Return 3 weeks    Wound Cleaning and Dressings:    Shower with protection - use Shower boot   DRESSINGS:   All wounds: honey gel / xeroform / bordered foam - Zinc barrier cream kailyn-wound  Change dressing daily    Compression Therapy : spandagrip    Compression Therapy Instructions:  1.  Put on first thing in the morning  and may remove at bedside.  Okay to wear overnight      if comfortable.  Do not let stockings roll up/down and kink.  Hand wash stockings and      hang dry as needed.    2.  Avoid prolonged standing in one place.  It is better to have your calf muscles moving       to pump fluid out of the legs.    3.  Elevate leg(s) above the level of the heart when sitting or as much as possible.    4.  Take your diuretics as directed by your provider.  Do not skip doses or change doses      unless instructed to do so by your provider.    5. Do not get leg(s) with compression wrap wet. If wraps are too tight as indicated        By pain, numbness/tingling or discoloration of toes remove wrap completely       and call the   wound center.     Off-Loading:  Offload wounded areas.     Miscellaneous Instructions:  Supplement with a daily multivitamin   Low salt diet  Increase protein intake / consider protein supplements - see below  Elevate extremities at all times when sitting / laying down.    DIETARY MODIFICATIONS TO HELP WITH WOUND HEALING:    Protein: Meats, beans, eggs, milk and yogurt particularly Greek yogurt), tofu, soy nuts, soy protein products    Vitamin C: Citrus fruits and juices, strawberries, tomatoes, tomato juice, peppers, baked potatoes, spinach, broccoli, cauliflower, [place] sprouts, cabbage    Vitamin A: Dark green, leafy vegetables, orange or yellow vegetables, cantaloupe, fortified dairy products, liver, fortified cereals    Zinc: Fortified cereals, red meats, seafood    Consider Jason [employer]) (These are essential branch chain amino acids that help with tissue building and wound healing) and take 2 packets/day. you can order online at [employer] or [facility] [place]    ADDITIONAL REMINDERS:    The treatment plan has been discussed at length with you and your provider. Follow all instructions carefully, it is very important. If you do not follow all instructions, you are at  risk of your wound not healing, infection,  possible loss of limb and even end of life.  Please call the clinic during regular business hours ( 7:30 AM - 5:30 PM) if you notice increased bleeding, redness, warmth, pain or pus like drainage or start running a fever greater than 100.3.    For after hour emergencies, please call your primary physician or go to the nearest emergency room.      Patient/Caregiver Education: There are no barriers to learning. Medical education for above diagnosis given.   Answered all questions.    Outcome: Patient verbalizes understanding. Patient is notified to call with any questions, complications, allergies, or worsening or changing symptoms.  Patient is to call with any side effects or complications as a result of the treatments today.      DOCUMENTATION OF TIME SPENT: Code selection for this visit was based on time spent : 30 min on date of service in preparing to see the patient, obtaining and/or reviewing separately obtained history, performing a medically appropriate examination, counseling and educating the patient/family/caregiver, ordering medications or testing, referring and communicating with other healthcare providers, documenting clinical information in the E HR, independently interpreting results and communicating results to the patient/family/caregiver and care coordination with the patient's other providers.    Followup: Return in about 3 weeks (around 7/14/2025).      Note to Patient:  The 21st Century Cures Act makes medical notes like these available to patients in the interest of transparency. However, be advised this is a medical document and is intended as hexn-qj-ihud communication; it is written in medical language and may appear blunt, direct, or contain abbreviations or verbiage that are unfamiliar. Medical documents are intended to carry relevant information, facts as evident, and the clinical opinion of the practitioner.    Also, please note that this report has been produced using speech recognition  software and may contain errors related to that system including, but not limited to, errors in grammar, punctuation, and spelling, as well as words and phrases that possibly may have been recognized inappropriately.  If there are any questions or concerns, contact the dictating provider for clarification.      Deysi Zhou MD  6/23/2025  4:31 PM                      [1]   Allergies  Allergen Reactions    Penicillins ANAPHYLAXIS and HYPOTENSION     Cardiac arrest      Pregabalin SWELLING    Raspberry NAUSEA AND VOMITING and SWELLING     Throat closes    Trimethobenzamide ANAPHYLAXIS     Cardiac arrest    Sulfa Antibiotics ITCHING    Pcn [Bicillin L-A] ANAPHYLAXIS     .    Tigan [Trimethobenzamide Hcl] ANAPHYLAXIS     Pass out and cardiac arrest.    [2]   Current Outpatient Medications   Medication Sig Dispense Refill    gentamicin 0.1 % External Ointment Apply 1 Application topically 3 (three) times daily. 30 g 3    SILVER SULFADIAZINE 1 % External Cream APPLY TOPICALLY TO THE AFFECTED AREA DAILY (Patient not taking: Reported on 1/15/2025) 25 g 0    lidocaine (LIDODERM) 5 % External Patch Place 1 patch onto the skin daily. 30 patch 2    baclofen 10 MG Oral Tab Take 1 tablet (10 mg total) by mouth 3 (three) times daily. 90 tablet 2    Wound Dressings (MEDIHONEY WOUND/BURN DRESSING) External Gel Apply 1 Application topically daily as needed. (Patient not taking: Reported on 1/15/2025) 44 mL 1    RECTASMOOTHE 5 % External Cream APPLY A SMALL AMOUNT TO THE AFFECTED AREA UP TWICE DAILY (Patient not taking: Reported on 1/15/2025) 30 g 0    warfarin 2 MG Oral Tab Take 1 tablet (2 mg total) by mouth nightly. 90 tablet 3    Wound Dressings (ADAPTIC NON-ADHERING DRESSING) External Pads Apply 1 each topically daily as needed. (Patient not taking: Reported on 1/15/2025) 30 each 1    Omeprazole 40 MG Oral Capsule Delayed Release Take 1 capsule (40 mg total) by mouth daily. (Patient not taking: Reported on 1/15/2025) 90  capsule 3    enoxaparin 60 MG/0.6ML Subcutaneous Solution INJECT 60MG SUB Q INTO ABDOMINAL AREA TWICE DAILY under direction of coumadin clinic 20 each 1    gabapentin 600 MG Oral Tab       tolterodine tartrate 4 MG Oral Capsule SR 24 Hr Take 1 capsule (4 mg total) by mouth daily. 90 capsule 3    simvastatin 5 MG Oral Tab Take 1 tablet (5 mg total) by mouth nightly. 90 tablet 3    RESTASIS MULTIDOSE 0.05 % Ophthalmic Emulsion       methylPREDNISolone 4 MG Oral Tab       Cholecalciferol 25 MCG (1000 UT) Oral Tab Every Day      Mycophenolate Mofetil 500 MG Oral Tab Take 2 tablets (1,000 mg total) by mouth 2 (two) times daily.      KEVZARA 200 MG/1.14ML Subcutaneous Solution Auto-injector  (Patient not taking: Reported on 1/15/2025)      potassium chloride 20 MEQ Oral Tab CR Take 1 tablet (20 mEq total) by mouth 2 (two) times daily. 180 tablet 3    spironolactone 25 MG Oral Tab Take 1 tablet (25 mg total) by mouth daily. 90 tablet 3    Nystatin 051117 UNIT/GM External Powder Apply 1 Application topically 2 (two) times daily as needed. 60 g 2    MONTELUKAST SODIUM 10 MG Oral Tab TAKE 1 TABLET(10 MG) BY MOUTH EVERY DAY 90 tablet 3    metroNIDAZOLE 1 % External Gel Apply to face nightly (Patient not taking: Reported on 1/15/2025) 60 g 1    PULMICORT FLEXHALER 180 MCG/ACT Inhalation Aerosol Powder, Breath Activated INHALE 2 PUFFS INTO THE LUNGS TWICE DAILY 1 each 11    GABAPENTIN 400 MG Oral Cap TAKE 3 CAPSULES BY MOUTH THREE TIMES DAILY 270 capsule 5    Pilocarpine HCl 5 MG Oral Tab Take 1 tablet (5 mg total) by mouth 3 (three) times daily.      Albuterol Sulfate  (90 Base) MCG/ACT Inhalation Aero Soln Inhale 2 puffs into the lungs every 6 (six) hours as needed for Wheezing or Shortness of Breath. 18 g 3    Probiotic Product (PROBIOTIC DAILY OR) Take by mouth.      Diphenoxylate-Atropine (LOMOTIL OR) Take by mouth.      hydrocortisone (ANUSOL-HC) 2.5 % Rectal Cream Place 1 Application rectally 2 (two) times daily as  needed for Hemorrhoids. 60 g 1    Zolpidem Tartrate (AMBIEN) 10 MG Oral Tab Take 1 tablet (10 mg total) by mouth nightly as needed. (Patient not taking: Reported on 1/15/2025) 30 tablet 1    Ammonium Lactate (LAC-HYDRIN) 12 % External Cream Apply qd prn to afected area 140 g 3    leflunomide (ARAVA) 20 MG Oral Tab Take 1 tablet (20 mg total) by mouth daily.      Hydroxychloroquine Sulfate 200 MG Oral Tab Take  by mouth 2 (two) times daily.         .Weekly Wound Education Note    Teaching Provided To: Patient  Training Topics: Dressing, Discharge instructions, Cleasing and general instructions, Edema control  Training Method: Explain/Verbal, Written  Training Response: Patient responds and understands            Honey gel, folded xeroform to wounds, cover with border foam.  Patient encouraged to wear her compression socks.    Wound Treatment Orders:  No orders of the defined types were placed in this encounter.        Wound Information/Order:  Product:Other  zetuvit plus silicone border #[id number]   Dispense: 30 days  Dressing Frequency:Change dressing daily and/or PRN    Was a Debridement performed: Yes, Debridement type: selective    Compression Stockings ordered: No    Notes: zetuvit plus silicone border #[id number] .  Dispense as written.